# Patient Record
Sex: FEMALE | Race: ASIAN | NOT HISPANIC OR LATINO | Employment: PART TIME | URBAN - METROPOLITAN AREA
[De-identification: names, ages, dates, MRNs, and addresses within clinical notes are randomized per-mention and may not be internally consistent; named-entity substitution may affect disease eponyms.]

---

## 2017-01-03 ENCOUNTER — RADIANT APPOINTMENT (OUTPATIENT)
Dept: ULTRASOUND IMAGING | Facility: CLINIC | Age: 32
End: 2017-01-03
Payer: COMMERCIAL

## 2017-01-03 DIAGNOSIS — Z01.812 PRE-OPERATIVE LABORATORY EXAMINATION: Primary | ICD-10-CM

## 2017-01-03 DIAGNOSIS — Z34.83 PRENATAL CARE, SUBSEQUENT PREGNANCY, THIRD TRIMESTER: ICD-10-CM

## 2017-01-03 PROCEDURE — 76819 FETAL BIOPHYS PROFIL W/O NST: CPT | Performed by: OBSTETRICS & GYNECOLOGY

## 2017-01-05 ENCOUNTER — PRENATAL OFFICE VISIT (OUTPATIENT)
Dept: OBGYN | Facility: CLINIC | Age: 32
End: 2017-01-05
Payer: COMMERCIAL

## 2017-01-05 VITALS
BODY MASS INDEX: 32.81 KG/M2 | WEIGHT: 176.5 LBS | SYSTOLIC BLOOD PRESSURE: 104 MMHG | RESPIRATION RATE: 14 BRPM | DIASTOLIC BLOOD PRESSURE: 78 MMHG | HEART RATE: 88 BPM

## 2017-01-05 DIAGNOSIS — Z01.818 PREOPERATIVE EXAMINATION: Primary | ICD-10-CM

## 2017-01-05 DIAGNOSIS — Z34.83 PRENATAL CARE, SUBSEQUENT PREGNANCY, THIRD TRIMESTER: ICD-10-CM

## 2017-01-05 PROCEDURE — 99207 ZZC PRENATAL VISIT: CPT | Performed by: OBSTETRICS & GYNECOLOGY

## 2017-01-05 PROCEDURE — 59425 ANTEPARTUM CARE ONLY: CPT | Performed by: OBSTETRICS & GYNECOLOGY

## 2017-01-05 NOTE — NURSING NOTE
"Chief Complaint   Patient presents with     Prenatal Care       Initial /78 mmHg  Pulse 88  Resp 14  Wt 176 lb 8 oz (80.06 kg)  LMP 04/11/2016 (Exact Date) Estimated body mass index is 32.81 kg/(m^2) as calculated from the following:    Height as of 11/16/16: 5' 1.5\" (1.562 m).    Weight as of this encounter: 176 lb 8 oz (80.06 kg).  BP completed using cuff size: regular  38w3d  States feeling good, No concerns.  Having good fetal movements.  Jocelin Parrish LPN      "

## 2017-01-09 ENCOUNTER — ANESTHESIA EVENT (OUTPATIENT)
Dept: OBGYN | Facility: CLINIC | Age: 32
End: 2017-01-09
Payer: COMMERCIAL

## 2017-01-10 ENCOUNTER — HOSPITAL ENCOUNTER (OUTPATIENT)
Dept: LAB | Facility: CLINIC | Age: 32
Discharge: HOME | End: 2017-01-10
Attending: OBSTETRICS & GYNECOLOGY | Admitting: OBSTETRICS & GYNECOLOGY
Payer: COMMERCIAL

## 2017-01-10 DIAGNOSIS — Z01.812 PRE-OPERATIVE LABORATORY EXAMINATION: ICD-10-CM

## 2017-01-10 PROBLEM — O99.820 GROUP B STREPTOCOCCUS CARRIER, +RV CULTURE, CURRENTLY PREGNANT: Status: ACTIVE | Noted: 2017-01-10

## 2017-01-10 LAB
ABO + RH BLD: NORMAL
ABO + RH BLD: NORMAL
BLD GP AB SCN SERPL QL: NORMAL
BLOOD BANK CMNT PATIENT-IMP: NORMAL
HGB BLD-MCNC: 13.3 G/DL (ref 11.7–15.7)
SPECIMEN EXP DATE BLD: NORMAL

## 2017-01-10 PROCEDURE — 86850 RBC ANTIBODY SCREEN: CPT | Performed by: OBSTETRICS & GYNECOLOGY

## 2017-01-10 PROCEDURE — 36415 COLL VENOUS BLD VENIPUNCTURE: CPT | Performed by: OBSTETRICS & GYNECOLOGY

## 2017-01-10 PROCEDURE — 85018 HEMOGLOBIN: CPT | Performed by: OBSTETRICS & GYNECOLOGY

## 2017-01-10 PROCEDURE — 86900 BLOOD TYPING SEROLOGIC ABO: CPT | Performed by: OBSTETRICS & GYNECOLOGY

## 2017-01-10 PROCEDURE — 86901 BLOOD TYPING SEROLOGIC RH(D): CPT | Performed by: OBSTETRICS & GYNECOLOGY

## 2017-01-10 RX ORDER — SODIUM CHLORIDE, SODIUM LACTATE, POTASSIUM CHLORIDE, CALCIUM CHLORIDE 600; 310; 30; 20 MG/100ML; MG/100ML; MG/100ML; MG/100ML
INJECTION, SOLUTION INTRAVENOUS CONTINUOUS
Status: CANCELLED | OUTPATIENT
Start: 2017-01-10

## 2017-01-11 ENCOUNTER — ANESTHESIA (OUTPATIENT)
Dept: OBGYN | Facility: CLINIC | Age: 32
End: 2017-01-11
Payer: COMMERCIAL

## 2017-01-11 PROBLEM — O34.219 PREVIOUS CESAREAN DELIVERY AFFECTING PREGNANCY: Status: ACTIVE | Noted: 2017-01-11

## 2017-01-11 PROCEDURE — 25000125 ZZHC RX 250: Performed by: NURSE ANESTHETIST, CERTIFIED REGISTERED

## 2017-01-11 PROCEDURE — 25800025 ZZH RX 258: Performed by: OBSTETRICS & GYNECOLOGY

## 2017-01-11 PROCEDURE — 25000125 ZZHC RX 250: Performed by: ANESTHESIOLOGY

## 2017-01-11 PROCEDURE — 25000125 ZZHC RX 250: Performed by: OBSTETRICS & GYNECOLOGY

## 2017-01-11 RX ORDER — EPHEDRINE SULFATE 50 MG/ML
INJECTION, SOLUTION INTRAVENOUS PRN
Status: DISCONTINUED | OUTPATIENT
Start: 2017-01-11 | End: 2017-01-11

## 2017-01-11 RX ORDER — FENTANYL CITRATE 50 UG/ML
INJECTION, SOLUTION INTRAMUSCULAR; INTRAVENOUS PRN
Status: DISCONTINUED | OUTPATIENT
Start: 2017-01-11 | End: 2017-01-11

## 2017-01-11 RX ORDER — ONDANSETRON 2 MG/ML
INJECTION INTRAMUSCULAR; INTRAVENOUS PRN
Status: DISCONTINUED | OUTPATIENT
Start: 2017-01-11 | End: 2017-01-11

## 2017-01-11 RX ORDER — BUPIVACAINE HYDROCHLORIDE 7.5 MG/ML
INJECTION, SOLUTION INTRASPINAL PRN
Status: DISCONTINUED | OUTPATIENT
Start: 2017-01-11 | End: 2017-01-11

## 2017-01-11 RX ORDER — MORPHINE SULFATE 1 MG/ML
INJECTION, SOLUTION EPIDURAL; INTRATHECAL; INTRAVENOUS PRN
Status: DISCONTINUED | OUTPATIENT
Start: 2017-01-11 | End: 2017-01-11

## 2017-01-11 RX ORDER — DEXAMETHASONE SODIUM PHOSPHATE 4 MG/ML
INJECTION, SOLUTION INTRA-ARTICULAR; INTRALESIONAL; INTRAMUSCULAR; INTRAVENOUS; SOFT TISSUE PRN
Status: DISCONTINUED | OUTPATIENT
Start: 2017-01-11 | End: 2017-01-11

## 2017-01-11 RX ADMIN — ONDANSETRON 4 MG: 2 INJECTION INTRAMUSCULAR; INTRAVENOUS at 11:47

## 2017-01-11 RX ADMIN — OXYTOCIN-SODIUM CHLORIDE 0.9% IV SOLN 30 UNIT/500ML: 30-0.9/5 SOLUTION at 11:59

## 2017-01-11 RX ADMIN — DEXAMETHASONE SODIUM PHOSPHATE 4 MG: 4 INJECTION, SOLUTION INTRAMUSCULAR; INTRAVENOUS at 12:20

## 2017-01-11 RX ADMIN — SODIUM CHLORIDE, POTASSIUM CHLORIDE, SODIUM LACTATE AND CALCIUM CHLORIDE: 600; 310; 30; 20 INJECTION, SOLUTION INTRAVENOUS at 12:10

## 2017-01-11 RX ADMIN — FENTANYL CITRATE 20 MCG: 50 INJECTION, SOLUTION INTRAMUSCULAR; INTRAVENOUS at 11:35

## 2017-01-11 RX ADMIN — PHENYLEPHRINE HYDROCHLORIDE 150 MCG: 10 INJECTION, SOLUTION INTRAMUSCULAR; INTRAVENOUS; SUBCUTANEOUS at 11:40

## 2017-01-11 RX ADMIN — BUPIVACAINE HYDROCHLORIDE IN DEXTROSE 13.5 MG: 7.5 INJECTION, SOLUTION SUBARACHNOID at 11:35

## 2017-01-11 RX ADMIN — CEFAZOLIN SODIUM 2 G: 2 INJECTION, SOLUTION INTRAVENOUS at 11:29

## 2017-01-11 RX ADMIN — METOCLOPRAMIDE HYDROCHLORIDE 10 MG: 5 INJECTION INTRAMUSCULAR; INTRAVENOUS at 12:30

## 2017-01-11 RX ADMIN — EPHEDRINE SULFATE 5 MG: 50 INJECTION INTRAMUSCULAR; INTRAVENOUS; SUBCUTANEOUS at 11:53

## 2017-01-11 RX ADMIN — MORPHINE SULFATE 0.4 MG: 1 INJECTION EPIDURAL; INTRATHECAL; INTRAVENOUS at 11:35

## 2017-01-11 RX ADMIN — PHENYLEPHRINE HYDROCHLORIDE 150 MCG: 10 INJECTION, SOLUTION INTRAMUSCULAR; INTRAVENOUS; SUBCUTANEOUS at 11:53

## 2017-01-11 RX ADMIN — EPHEDRINE SULFATE 7.5 MG: 50 INJECTION INTRAMUSCULAR; INTRAVENOUS; SUBCUTANEOUS at 11:44

## 2017-01-11 RX ADMIN — SODIUM CHLORIDE, POTASSIUM CHLORIDE, SODIUM LACTATE AND CALCIUM CHLORIDE: 600; 310; 30; 20 INJECTION, SOLUTION INTRAVENOUS at 11:29

## 2017-01-11 NOTE — PROGRESS NOTES
Pre Op Exam: January 10, 2017    Aleyda Nicole is an 31 year old year old female  here for preop physical.     /78 mmHg  Pulse 88  Resp 14  Wt 176 lb 8 oz (80.06 kg)  LMP 2016 (Exact Date)    Smoker:Non-smoker  Alcohol/wk:  none  Living will: Unknown    PRESENT HISTORY:    Reason for admission:Gestational DM, Recurrent thyroid cancer with surgery during pregnancy, Previous  section, proceed with repeat  section.    Onset of Illness: now  Type of Surgery Anticipated: repeat  section  Type of Anesthesia Anticipated:  Spinal      Medications:   Current Outpatient Prescriptions   Medication     levothyroxine (SYNTHROID/LEVOTHROID) 150 MCG tablet     blood glucose monitoring (ACCU-CHEK FASTCLIX) lancets     blood glucose monitoring (ACCU-CHEK QUEENIE PLUS) test strip     acetone, Urine, test (KETOSTIX) STRP     calcium carbonate (OS-ESTHER 500 MG [place]. [place]) 500 MG tablet     Prenatal Vit-Fe Fumarate-FA (PRENATAL MULTIVITAMIN  PLUS IRON) 27-0.8 MG TABS     No current facility-administered medications for this visit.         Any Aspirin within 10 days? No      Family History: Review of patient's family history indicates:    Family History Negative        No family hx of             No family history of malignant hyperthermia.  No family history of bleeding disorder.  No history of Sleep Apnea    Past Medical History   Diagnosis Date     Pneumonia      LLL     Influenza A 2009      labor      Papillary thyroid carcinoma      Papillary carcinoma, follicular variant with     Gestational diabetes      GDM diet controlled     Gestational diabetes 2013     PONV (postoperative nausea and vomiting)      Hypocalcemia 2016       Past Surgical History   Procedure Laterality Date      section  10/26/2013     Procedure:  SECTION;  primary  section;  Surgeon: Rodney Mckee MD;  Location: [facility] L[facility]D     Thyroidectomy       Total, for cancer.       Dissection radical neck Left 2016     Procedure: DISSECTION RADICAL NECK;  Surgeon: Vy Theodore MD;  Location: [facility] OR     Dissection radical neck modified Left 2016     Procedure: DISSECTION RADICAL NECK MODIFIED;  Surgeon: Luis Brown MD;  Location: [facility] OR       Allergies   Allergen Reactions     No Known Drug Allergies        Transfusion reactions: No prior transfusions    Bleeding tendencies:No bleeding problems noted      REVIEW OF SYSTEMS:    Cardiovascular: NORMAL  Respiratory: NORMAL  Gastrointestinal: NORMAL  Genitourinary: NORMAL    Patient's LMP from OB Dating Form was 2016..      PHYSICAL EXAM:  General Appearance: healthy,alert,no distress  Head: Normocephalic. No masses, lesions, tenderness or abnormalities  Eyes: normal  Nose: Nares normal  Mouth: Oropharynx normal  Neck: Neck supple. No adenopathy. Thyroid symmetric, normal size,  Chest: Clear to auscultation  Breast: Not done.  Heart:normal, regular rate and rhythm and regular rate and rhythm,no murmurs, clicks, or gallops  Abdomen: Abdomen soft, non-tender. BS normal. Gravid  Genitals: Deferred  Extremities: Extremities normal. No deformities, edema, or skin discoloration.  Skin: Skin color, texture, turgor normal. No rashes or lesions.  Neurological: Gait normal. Reflexes normal and symmetric. Sensation grossly WNL.    Diabetic Instructions Given for Pre-Op Insulin: NOT APPLICABLE    Comments: Discussed indication, and risks of surgery, such as infection , bleeding, damage to bowel or bladder, uterus , fallopian tubes and ovaries.    Impression: Gestational DM, Recurrent thyroid cancer with surgery during pregnancy, Previous  section, proceed with repeat  section.      MD Signature: ________________________________________________  Hakeem Combs MD

## 2017-01-11 NOTE — ANESTHESIA PREPROCEDURE EVALUATION
PAC NOTE:       ANESTHESIA PRE EVALUATION:  Anesthesia Evaluation     .        ROS/MED HX    ENT/Pulmonary:  - neg pulmonary ROS     Neurologic:  - neg neurologic ROS     Cardiovascular:  - neg cardiovascular ROS       METS/Exercise Tolerance:     Hematologic:  - neg hematologic  ROS       Musculoskeletal:  - neg musculoskeletal ROS       GI/Hepatic:  - neg GI/hepatic ROS       Renal/Genitourinary:  - ROS Renal section negative       Endo: Comment: Gestational    (+) type II DM thyroid problem .      Psychiatric:  - neg psychiatric ROS       Infectious Disease:  - neg infectious disease ROS       Malignancy:   (+) Malignancy History of Other  Other CA Thyroid status post         Other:    - neg other ROS           Physical Exam  Normal systems: cardiovascular and pulmonary    Airway   Mallampati: II    Dental     Cardiovascular   Rhythm and rate: regular and normal      Pulmonary    breath sounds clear to auscultation             Anesthesia Plan      History & Physical Review  History and physical reviewed and following examination; no interval change.    ASA Status:  2 .        Plan for Spinal   PONV prophylaxis:  Ondansetron (or other 5HT-3), Scopolamine patch and Promethazine or metoclopramide       Postoperative Care      Consents                            .

## 2017-01-11 NOTE — ANESTHESIA POSTPROCEDURE EVALUATION
Patient: Aleyda Nicole     SECTION (N/A Abdomen)  Additional InformationProcedure(s):   SECTION  - Wound Class: II-Clean Contaminated    Diagnosis:previous  Diagnosis Additional Information:  Previous   Delivered infant    Anesthesia Type:  Spinal    Note:  Anesthesia Post Evaluation    Patient location during evaluation: PACU  Patient participation: Able to fully participate in evaluation  Level of consciousness: awake  Pain management: adequate  Airway patency: patent  Cardiovascular status: acceptable  Respiratory status: acceptable  Hydration status: acceptable  PONV: controlled     Anesthetic complications: None    Comments: .Anticipate full return of neurologic function          Last vitals:  Filed Vitals:    17 1430 17 1435 17 1440   BP: 116/62 118/65 121/67   Temp:      Resp:      SpO2:          Electronically Signed By: Bubba Santana DO  2017  3:03 PM

## 2017-01-11 NOTE — ANESTHESIA PROCEDURE NOTES
Peripheral nerve/Neuraxial procedure note : intrathecal  Pre-Procedure  Performed by LEANDRO SANTANA  Location: OR      Pre-Anesthestic Checklist: patient identified, IV checked, risks and benefits discussed, informed consent, monitors and equipment checked, pre-op evaluation and at physician/surgeon's request    Timeout  Correct Patient: Yes   Correct Procedure: Yes   Correct Site: Yes   Correct Laterality: N/A   Correct Position: Yes   Site Marked: N/A   .   Procedure Documentation    .    Procedure:    Intrathecal.  Insertion Site:L2-3  (midline approach)      Patient Prep;mask, sterile gloves, povidone-iodine 7.5% surgical scrub.  .  Needle: (). . Spinal Needle: Sprotte 24 G. 3.5 in.  Introducer used. . .     Assessment/Narrative  Paresthesias: No.  .  .  clear CSF fluid removed . Comments:  .Bupivicaine 13.5mg + Fentanyl 15mcg + Morphine 0.3mg    LEXA Santana

## 2017-01-11 NOTE — ANESTHESIA CARE TRANSFER NOTE
Patient: Aleyda Nicole     SECTION (N/A Abdomen)  Additional InformationProcedure(s):   SECTION  - Wound Class: II-Clean Contaminated    Diagnosis: previous  Diagnosis Additional Information: No value filed.    Anesthesia Type:   Spinal     Note:  Airway :Room Air  Patient transferred to:Labor and Delivery  Comments: VSS, report to RN.      Vitals: (Last set prior to Anesthesia Care Transfer)              Electronically Signed By: EDUARDO Sauer CRNA  2017  12:44 PM

## 2017-01-14 ENCOUNTER — TELEPHONE (OUTPATIENT)
Dept: NURSING | Facility: CLINIC | Age: 32
End: 2017-01-14

## 2017-01-14 NOTE — TELEPHONE ENCOUNTER
"Call Type: Triage Call    Presenting Problem: \"I am having a hard time breastfeeding. My breast  are full and hard. Baby is having a hard time latching on, she keeps  falling asleep.\" Denies redness or fever. Mom is pumping between  feedings,\"getting about 2 ml.\" Triaged and gave home care advice on  breast feeding, engorgement, stimulating baby for feedings, poops  and color. Also gave signs of mastitis. Gave lactation consultant  number for [place]. Call back if needed.  Triage Note:  Guideline Title: Pregnancy: Postpartum Breast Symptoms  Recommended Disposition: Provide Home/Self Care  Original Inclination: Wanted to speak with a nurse  Override Disposition:  Intended Action: Follow advice given  Physician Contacted: No  Breast(s) engorged ?  YES  On antibiotics for breast infection (mastitis) for more than 48 hours AND  symptoms  worsening or not improving ? NO  More than six weeks postpartum AND not breastfeeding ? NO  Pus-like discharge from nipple ? NO  Unusual discharge from nipple ? NO  Sore or cracked nipple(s) AND not responding to selfcare ? NO  Breast or armpit lump or thickening ? NO  Change in appearance of nipple or orange-peel appearance or dimpling of skin ? NO  Firm or tender area on breast that doesn't soften during feeding ? NO  Problems establishing or maintaining lactation ? NO  New ache or pain in breast AND generally feels ill ? NO  New onset or increasing redness, tenderness, localized warmth or swelling ? NO  Evaluated by provider and has question/concern about their condition, treatment  plan, treatment options, follow-up appointments, or other follow-up care. ? NO  New onset or worsening area of localized pain in breast or unusual pain during  breastfeeding ? NO  Physician Instructions:  Care Advice: Call provider if symptoms worsen or new symptoms develop.  If breastfeeding, be sure to continue.  Make sure to empty breast with each  feeding.  If unable to empty breast with nursing, express " milk manually or  with pump until breast empty.  SYMPTOM / CONDITION MANAGEMENT  Breastfeeding with Engorgement:   - Apply warm, moist compresses to breast  for 15-20 minutes prior to nursing to help promote the milk let-down  reflex, making the milk flow easier.   - Manually express a little milk to  soften the areola, making it easier for the baby to latch on.   - Nurse  frequently, 8-12 times every day for at least 10-15 minutes each breast.  Switch breast every 5 minutes when baby's sucking starts to slow. - Apply  cloth-covered cold compresses for no more than 20 minutes to breast after  breastfeeding, and every 1-2 hours.  Frozen bags of vegetables, cloth  wrapped, work well.  Lactation Suppression:  - Treatment goal is to reduce swelling, pain and  milk suppression in breasts.  - Use tight binder for 24-48 hours, then  sleep with tight bra.  May use large bath towel or sheet folded in half  lengthwise, wrapped snugly around breasts and pinned.   - Apply  cloth-covered cold compresses to breasts 15-20 minutes every 1-2 hours.  Cloth-covered frozen bags of vegetables work well.   - Avoid nipple  stimulation.  Don't allow shower water to strike breasts.  Avoid nipple  stimulation during sex.  Analgesic/Antipyretic Advice - Acetaminophen:  Consider acetaminophen as  directed on label or by pharmacist/provider for pain or fever.  PRECAUTIONS:  - Use only if there is no history of liver disease,  alcoholism, or intake of three or more alcohol drinks per day.  - If  approved by provider when breastfeeding. - Do not exceed recommended dose  or frequency. Do not take more than 3000 milligrams (mg) in 24 hours. Do  not take this medicine for more than 10 days unless recommended by your  provider. - To make sure you don't take too much, check other medicines you  take to see if they also contain acetaminophen.

## 2017-01-19 ENCOUNTER — OFFICE VISIT (OUTPATIENT)
Dept: OBGYN | Facility: CLINIC | Age: 32
End: 2017-01-19
Payer: COMMERCIAL

## 2017-01-19 VITALS
SYSTOLIC BLOOD PRESSURE: 112 MMHG | DIASTOLIC BLOOD PRESSURE: 76 MMHG | WEIGHT: 161 LBS | BODY MASS INDEX: 29.93 KG/M2 | OXYGEN SATURATION: 97 % | HEART RATE: 100 BPM

## 2017-01-19 DIAGNOSIS — Z09 POSTOPERATIVE EXAMINATION: Primary | ICD-10-CM

## 2017-01-19 PROCEDURE — 99024 POSTOP FOLLOW-UP VISIT: CPT | Performed by: OBSTETRICS & GYNECOLOGY

## 2017-01-19 NOTE — NURSING NOTE
"Chief Complaint   Patient presents with     Surgical Followup     DOS 2017  section        Initial /76 mmHg  Pulse 100  Wt 161 lb (73.029 kg)  SpO2 97%  LMP 2016 (Exact Date) Estimated body mass index is 29.93 kg/(m^2) as calculated from the following:    Height as of 16: 5' 1.5\" (1.562 m).    Weight as of this encounter: 161 lb (73.029 kg).  BP completed using cuff size: regular  Cassie Lima MA      "

## 2017-02-07 NOTE — PROGRESS NOTES
SUBJECTIVE: Aleyda Mounika Nicole, is a 31 year old, female, status post c/s, here for post-op check. Patient without complaints.    Exam: Incision healing well    Imp:  Post-op vist doing well    Plan: Return vist 5 weeks

## 2017-02-23 ENCOUNTER — OFFICE VISIT (OUTPATIENT)
Dept: ENDOCRINOLOGY | Facility: CLINIC | Age: 32
End: 2017-02-23
Payer: COMMERCIAL

## 2017-02-23 VITALS
HEART RATE: 99 BPM | DIASTOLIC BLOOD PRESSURE: 76 MMHG | OXYGEN SATURATION: 95 % | TEMPERATURE: 98 F | BODY MASS INDEX: 28.34 KG/M2 | HEIGHT: 62 IN | WEIGHT: 154 LBS | SYSTOLIC BLOOD PRESSURE: 102 MMHG

## 2017-02-23 DIAGNOSIS — C73 RECURRENT THYROID CANCER (H): Primary | ICD-10-CM

## 2017-02-23 DIAGNOSIS — C73 PAPILLARY CARCINOMA, FOLLICULAR VARIANT (H): ICD-10-CM

## 2017-02-23 DIAGNOSIS — E89.0 POSTOPERATIVE HYPOTHYROIDISM: ICD-10-CM

## 2017-02-23 DIAGNOSIS — E83.51 HYPOCALCEMIA: ICD-10-CM

## 2017-02-23 LAB
CALCIUM SERPL-MCNC: 9.2 MG/DL (ref 8.5–10.1)
MAGNESIUM SERPL-MCNC: 1.9 MG/DL (ref 1.6–2.3)
PHOSPHATE SERPL-MCNC: 3.9 MG/DL (ref 2.5–4.5)
PTH-INTACT SERPL-MCNC: 11 PG/ML (ref 12–72)
T4 FREE SERPL-MCNC: 1.8 NG/DL (ref 0.76–1.46)
TSH SERPL DL<=0.05 MIU/L-ACNC: 0.04 MU/L (ref 0.4–4)

## 2017-02-23 PROCEDURE — 00000344 ZZHCL STATISTIC REMEASURE THYROGLOBULIN: Mod: 90 | Performed by: INTERNAL MEDICINE

## 2017-02-23 PROCEDURE — 82306 VITAMIN D 25 HYDROXY: CPT | Performed by: INTERNAL MEDICINE

## 2017-02-23 PROCEDURE — 36415 COLL VENOUS BLD VENIPUNCTURE: CPT | Performed by: INTERNAL MEDICINE

## 2017-02-23 PROCEDURE — 99000 SPECIMEN HANDLING OFFICE-LAB: CPT | Performed by: INTERNAL MEDICINE

## 2017-02-23 PROCEDURE — 99214 OFFICE O/P EST MOD 30 MIN: CPT | Performed by: INTERNAL MEDICINE

## 2017-02-23 PROCEDURE — 83970 ASSAY OF PARATHORMONE: CPT | Performed by: INTERNAL MEDICINE

## 2017-02-23 PROCEDURE — 84439 ASSAY OF FREE THYROXINE: CPT | Performed by: INTERNAL MEDICINE

## 2017-02-23 PROCEDURE — 84100 ASSAY OF PHOSPHORUS: CPT | Performed by: INTERNAL MEDICINE

## 2017-02-23 PROCEDURE — 84432 ASSAY OF THYROGLOBULIN: CPT | Mod: 90 | Performed by: INTERNAL MEDICINE

## 2017-02-23 PROCEDURE — 82310 ASSAY OF CALCIUM: CPT | Performed by: INTERNAL MEDICINE

## 2017-02-23 PROCEDURE — 83735 ASSAY OF MAGNESIUM: CPT | Performed by: INTERNAL MEDICINE

## 2017-02-23 PROCEDURE — 84443 ASSAY THYROID STIM HORMONE: CPT | Performed by: INTERNAL MEDICINE

## 2017-02-23 PROCEDURE — 86800 THYROGLOBULIN ANTIBODY: CPT | Mod: 90 | Performed by: INTERNAL MEDICINE

## 2017-02-23 RX ORDER — LEVOTHYROXINE SODIUM 150 UG/1
150 TABLET ORAL DAILY
Qty: 30 TABLET | Refills: 11 | Status: SHIPPED | OUTPATIENT
Start: 2017-02-23 | End: 2017-03-03

## 2017-02-23 NOTE — NURSING NOTE
"Chief Complaint   Patient presents with     Consult     papillary thyroid carcinoma, postoperative hypothyroidism        Initial /76 (BP Location: Right arm, Patient Position: Chair, Cuff Size: Adult Large)  Pulse 99  Temp 98  F (36.7  C) (Oral)  Ht 1.562 m (5' 1.5\")  Wt 69.9 kg (154 lb)  LMP 2016 (Exact Date)  SpO2 95%  BMI 28.63 kg/m2 Estimated body mass index is 28.63 kg/(m^2) as calculated from the following:    Height as of this encounter: 1.562 m (5' 1.5\").    Weight as of this encounter: 69.9 kg (154 lb).  Medication Reconciliation: complete     ENDOCRINOLOGY INTAKE FORM    Patient Name:  Aleyda Nicole  :  1985    Is patient Diabetic?   No  Does patient have non-diabetic or other endocrine issues?  Yes: papillary thyroid carcinoma, postoperative hypothyroidism     Vitals: /76 (BP Location: Right arm, Patient Position: Chair, Cuff Size: Adult Large)  Pulse 99  Temp 98  F (36.7  C) (Oral)  Ht 1.562 m (5' 1.5\")  Wt 69.9 kg (154 lb)  LMP 2016 (Exact Date)  SpO2 95%  BMI 28.63 kg/m2  BMI= Body mass index is 28.63 kg/(m^2).    Flu vaccine:  completed  Pneumonia vaccine:  No    Smoking and Alcohol use:  Social History   Substance Use Topics     Smoking status: Never Smoker     Smokeless tobacco: Never Used     Alcohol use No     Staff Signature:  Antoinette Esteban CMA        "

## 2017-02-23 NOTE — PATIENT INSTRUCTIONS
[facility] & [place] locations   Dr Costello, Endocrinology Department      [facility]  [address] [place] [address]  Appointment [phone]  Fax: [phone]  [facility]: Tuesday and Wednesday         [facility]   [address] [facility] [place] [address]  Appointment [phone]  Fax: [phone]  [place]: Monday and Thursday         Labs today  Check with insurance cost of thyrogen injection  Follow up with radiology for whole body scan  Dicussed contact protection with her  Dose changes based on labs     [facility] radiology scheduleing  [phone]   [facility] Radiology scheduling  [phone]     Please call and schedule the recommended test as discussed in clinic visit. These are the numbers to call.

## 2017-02-23 NOTE — MR AVS SNAPSHOT
After Visit Summary   2017    [facility] Mounika Nicole    MRN: [id number]           Patient Information     Date Of Birth          1985        Visit Information        Provider Department      2017 11:30 AM Vandana Costello MD [facility]        Today's Diagnoses     Recurrent thyroid cancer (H)    -  1    Postoperative hypothyroidism        Papillary Thyroid carcinoma, follicular variant        Hypocalcemia          Care Instructions    [facility] & [place] locations   Dr Costello, Endocrinology Department      [facility]  [address] [address]  Appointment [phone]  Fax: [phone]  [facility]: Tuesday and Wednesday         [address] [place] [address]  Appointment [phone]  Fax: [phone]  [place]: Monday and Thursday         Labs today  Check with insurance cost of thyrogen injection  Follow up with radiology for whole body scan  Dicussed contact protection with her  Dose changes based on labs     [facility] radiology scheduleing  [phone]   [facility] Radiology scheduling  [phone]     Please call and schedule the recommended test as discussed in clinic visit. These are the numbers to call.          Follow-ups after your visit        Your next 10 appointments already scheduled     Mar 02, 2017  2:30 PM CST   [facility] Partum with Hakeem Combs MD   [facility]    [address] [id number] [phone]              Who to contact     If you have questions or need follow up information about today's clinic visit or your schedule please contact [facility] directly at [phone].  Normal or non-critical lab and imaging results will be communicated to you by MyChart, letter or phone within 4  "business days after the clinic has received the results. If you do not hear from us within 7 days, please contact the clinic through [employer] or phone. If you have a critical or abnormal lab result, we will notify you by phone as soon as possible.  Submit refill requests through [employer] or call your pharmacy and they will forward the refill request to us. Please allow 3 business days for your refill to be completed.          Additional Information About Your Visit        [employer] Information     [employer] gives you secure access to your electronic health record. If you see a primary care provider, you can also send messages to your care team and make appointments. If you have questions, please call your primary care clinic.  If you do not have a primary care provider, please call [phone] and they will assist you.        Care EveryWhere ID     This is your Care EveryWhere ID. This could be used by other organizations to access your [facility] medical records  [id number]        Your Vitals Were     Pulse Temperature Height Last Period Pulse Oximetry BMI (Body Mass Index)    99 98  F (36.7  C) (Oral) 1.562 m (5' 1.5\") 04/11/2016 (Exact Date) 95% 28.63 kg/m2       Blood Pressure from Last 3 Encounters:   02/23/17 102/76   01/19/17 112/76   01/13/17 113/64    Weight from Last 3 Encounters:   02/23/17 69.9 kg (154 lb)   01/19/17 73 kg (161 lb)   01/05/17 80.1 kg (176 lb 8 oz)              We Performed the Following     Calcium     Magnesium     Parathyroid Hormone Intact     Phosphorus     T4 free     Thyroglobulin and antibody     TSH     Vitamin D Deficiency          Where to get your medicines      These medications were sent to [address] [address]     Phone:  [phone]     levothyroxine 150 MCG tablet          Primary Care Provider Office Phone # Fax #    Evan Zamora [phone]       [facility] " [address] [place] [phone]        Thank you!     Thank you for choosing [facility]  for your care. Our goal is always to provide you with excellent care. Hearing back from our patients is one way we can continue to improve our services. Please take a few minutes to complete the written survey that you may receive in the mail after your visit with us. Thank you!             Your Updated Medication List - Protect others around you: Learn how to safely use, store and throw away your medicines at www.disposemymeds.org.          This list is accurate as of: 2/23/17 11:49 AM.  Always use your most recent med list.                   Brand Name Dispense Instructions for use    acetone (Urine) test Strp    KETOSTIX    25 each    Test once daily x1 week, then reduce to once weekly if consistently negative       blood glucose monitoring lancets     1 Box    Use to test blood sugar 4 times daily or as directed.       blood glucose monitoring test strip    ACCU-CHEK QUEENIE PLUS    100 strip    Use to test blood sugar 4 times daily or as directed.       calcium carbonate 500 MG tablet    OS-ESTHER 500 mg [place]. [place]     Take 500 mg by mouth 2 times daily Reported on 2/23/2017       levothyroxine 150 MCG tablet    SYNTHROID/LEVOTHROID    30 tablet    Take 1 tablet (150 mcg) by mouth daily       oxyCODONE 5 MG IR tablet    ROXICODONE    20 tablet    Take 1-2 tablets (5-10 mg) by mouth every 3 hours as needed for moderate to severe pain       prenatal multivitamin  plus iron 27-0.8 MG Tabs per tablet      Take 1 tablet by mouth daily

## 2017-02-23 NOTE — PROGRESS NOTES
Name: Aleyda Nicole  Seen at the request of   For papillary thyroid cancer and postoperative hypothyroidism  Chief Complaint   Patient presents with     Consult     papillary thyroid carcinoma, postoperative hypothyroidism      HPI:  Aleyda Nicole is a 30 year old female who presents for the evaluation of      1.  Papillary thyroid cancer;  Thyroid nodule was noticed in February 2011 which was followed by thyroid nodule biopsy which showed suspicious for papillary thyroid cancer.  She underwent total thyroidectomy 3/2011 and pathology showed papillary thyroid cancer with follicular pattern.  Tumor was about 2.2 cm on the left lobe.  No lymph node involvement.  S/p 78.7 mCi of I131 HERNANDEZ  8/2011. No evidence for distant metastatic disease on post therapy scan.  Neck US 6/2016- showed new lymph node in neck along with rising Tg levels  S/p FNA lymph node: 6/2016- c/w papillary thyroid cancer    8/2016: underwent left modified neck dissection.  1/27 lymph node positive one left modified neck dissection.  1 cm in greatest diameter.  Negative for external involvement at level II.    Tg decreased post left neck dissection from 1.3 to < 0.1    Postop course was complicated by hypocalcemia and was started on calcium supplement.  Taking calcium- not regular with BID dosing.    Delivered baby 1/2017. Has pumped milk for next few months.     She was followed by endocrinology before and then lost to follow-up with endocrinology in 2012.  No history of neck radiation prior to diagnosis of thyroid cancer.  No family history of thyroid cancer.      2.  Hypothyroidism (postoperative):  Was started on levothyroxine following thyroidectomy.  Currently she is taking levothyroxine 150  g per day.  She is on current dose since June 2016.  at that time the dose was decreased.  Taking generic levothyroxine.  Reports compliance.  Delivered 1/2017.  Due for labs .  Wt Readings from Last 2 Encounters:   02/23/17 69.9 kg (154 lb)    17 73 kg (161 lb)     No diarrhea, tremors. No palpitations.  No difficulty with swallowing, no pain during swallowing.    PMH/PSH:  Past Medical History   Diagnosis Date     Gestational diabetes      GDM diet controlled     Gestational diabetes 2013     Hypocalcemia 2016     Influenza A      Papillary thyroid carcinoma      Papillary carcinoma, follicular variant with     Pneumonia      LLL     PONV (postoperative nausea and vomiting)       labor      Past Surgical History   Procedure Laterality Date      section  10/26/2013     Procedure:  SECTION;  primary  section;  Surgeon: Rodney Mckee MD;  Location: [facility]     Thyroidectomy       Total, for cancer.      Dissection radical neck Left 2016     Procedure: DISSECTION RADICAL NECK;  Surgeon: Vy Theodore MD;  Location: [facility] OR     Dissection radical neck modified Left 2016     Procedure: DISSECTION RADICAL NECK MODIFIED;  Surgeon: Luis Brown MD;  Location: [facility] OR      section N/A 2017     Procedure:  SECTION;  Surgeon: Hakeem Combs MD;  Location: [facility]     Family Hx:  Family History   Problem Relation Age of Onset     DIABETES Mother      CEREBROVASCULAR DISEASE Mother      Hypertension Mother      DIABETES Father 50     Hypertension Father      Genitourinary Problems Father      kidney disease     Coronary Artery Disease Father      Thyroid disease: No        Social Hx:  Social History     Social History     Marital status:      Spouse name: Brock     Number of children: 1     Years of education: N/A     Occupational History     nursing student [employer]     Social History Main Topics     Smoking status: Never Smoker     Smokeless tobacco: Never Used     Alcohol use No     Drug use: No     Sexual activity: Yes     Partners: Male     Birth control/ protection:      Other Topics Concern      " Service No     Blood Transfusions No     Caffeine Concern No     Hobby Hazards No     Sleep Concern No     Stress Concern No     Weight Concern Yes     Special Diet No     Back Care No     Exercise No     Bike Helmet No     Seat Belt Yes     Self-Exams No     Social History Narrative    Pt lives with  and father.          MEDICATIONS:  has a current medication list which includes the following prescription(s): levothyroxine, prenatal multivitamin  plus iron, oxycodone, blood glucose monitoring, blood glucose monitoring, acetone (urine) test, and calcium carbonate.    ROS     ROS: 10 point ROS neg other than the symptoms noted above in the HPI.      Physical Exam   VS: /76 (BP Location: Right arm, Patient Position: Chair, Cuff Size: Adult Large)  Pulse 99  Temp 98  F (36.7  C) (Oral)  Ht 1.562 m (5' 1.5\")  Wt 69.9 kg (154 lb)  LMP 2016 (Exact Date)  SpO2 95%  BMI 28.63 kg/m2  GENERAL: AXOX3, NAD, well dressed, answering questions appropriately, appears stated age.  HEENT: OP clear, no LAD, no TM, non-tender, no exopthalmous, no proptosis, EOMI, no lig lag, no retraction  Thyroid: Hypertrophied scar, will healed thyroidectomy scar present.  Hypertrophied scar on lateral side of neck.  CV: RRR, no rubs, gallops, no murmurs  LUNGS: CTAB, no wheezes, rales, or ronchi  ABDOMEN: +BS  EXTREMITIES: no edema, +pulses, no rashes, no lesions  NEUROLOGY: CN grossly intact, + DTR upper and lower extremity, no tremors  MSK: grossly intact  SKIN: no rashes, no lesions  PSYCH: normal affect and mood      LABS:  11/10/16:  TSH 0.74  TgAb: <0.4  TG: <0.10     2016:  TSH: 0.08  TgAb: <0.4  T.30     2012:  TSH: 12.40  TGAB: 1.2  T.6      ENDO THYROID LABS-[facility] Latest Ref Rng & Units 11/10/2016   TSH 0.40 - 4.00 mU/L 0.74   T4 TOTAL 5.0 - 11.0 ug/dL    T4 FREE 0.76 - 1.46 ng/dL 1.30     ENDO THYROID LABS-[facility] Latest Ref Rng 2016   TSH 0.40 - 4.00 mU/L 0.08 (L)   T4 TOTAL 5.0 - 11.0 ug/dL    T4 " FREE 0.76 - 1.46 ng/dL 1.50 (H)   FREE T3 2.3 - 4.2 pg/mL    TRIIODOTHYRONINE(T3) 60 - 181 ng/dL 112     ENDO THYROID LABS-[facility] Latest Ref Rng 5/19/2016 2/5/2016 11/6/2015   TSH 0.40 - 4.00 mU/L 0.16 (L) 0.37 (L) 0.06 (L)   T4 TOTAL 5.0 - 11.0 ug/dL      T4 FREE 0.76 - 1.46 ng/dL 1.42 1.24 1.51 (H)   FREE T3 2.3 - 4.2 pg/mL        ENDO THYROID LABS-[facility] Latest Ref Rng 6/26/2015 11/14/2014   TSH 0.40 - 4.00 mU/L 0.32 (L) 7.86 (H)   T4 TOTAL 5.0 - 11.0 ug/dL     T4 FREE 0.76 - 1.46 ng/dL 1.33 1.37   FREE T3 2.3 - 4.2 pg/mL       ENDO THYROID LABS-[facility] Latest Ref Rng 7/29/2014 4/3/2014   TSH 0.40 - 4.00 mU/L 3.67 7.63 (H)   T4 TOTAL 5.0 - 11.0 ug/dL     T4 FREE 0.76 - 1.46 ng/dL  1.39   FREE T3 2.3 - 4.2 pg/mL       NM THYROID UPTAKE/SCAN   Feb 4, 2011 2:47:00 PM      COMPARISON: None.     HISTORY: Hyperthyroidism.     TECHNIQUE:  The patient was given 173 uCi of I-123 orally, followed by  24-hour thyroid scan and radioiodine uptake evaluation.     FINDINGS:  The thyroid gland appears normal in size. 24-hour uptake  was calculated at 46.4%, which is above the normal range. Normal range  is 10-30% 24-hour uptake. Focal area of decreased uptake in the mid  left thyroid lobe may represent a cold thyroid nodule or cyst.     IMPRESSION:    1. Elevated 24-hour radioiodine uptake in the thyroid at 46.4%.  2. Possible cold nodule or cyst in the mid left thyroid lobe. Thyroid  ultrasound is recommended for further evaluation.    NUCLEAR MEDICINE I-131 SCAN    Aug 10, 2011 8:04:00 AM      TECHNIQUE: 78.7 mCi I-131 ablation scan. Five days post therapy  imaging performed today.     HISTORY: Thyroid cancer.     COMPARISON:   Nuclear Study: Thyroid uptake and scan 2/4/2011.  Other Relevant Studies: Ultrasound neck 2/17/2011.     FINDINGS: Intense radiotracer uptake at the left greater than right  neck at the thyroid level. No evidence for distant metastatic disease.     IMPRESSION: Intense radiotracer uptake localizing to the  thyroidectomy  bed, left greater than right. This is consistent with residual thyroid  tissue. No distant metastatic disease identified.    ULTRASOUND THYROID  Feb 17, 2011      HISTORY: Hyperthyroidism. Thyroid nodule.      COMPARISON: Nuclear Medicine thyroid scan 2/4/2011.     FINDINGS: The thyroid gland is enlarged. The right lobe measures 5.4 x  1.6 x 2.1 cm. The left lobe measures 5.9 x 2.3 x 2.7 cm. There are 2  right thyroid nodules and 3 left thyroid nodules.  1. Right upper lobe, hypoechoic solid measuring 0.7 x 0.6 x 0.6 cm.  2. Right lower pole, solid measuring 0.7 x 0.5 x 0.6 cm.  3. Left upper pole, cystic and solid measuring 1.1 x 0.6 x 1.0 cm.  4. Left mid thyroid lobe, primarily solid with small cystic areas  measuring 2.7 x 1.9 x 2.1 cm.  5. Left lower pole, cystic and solid measuring 1.4 x 0.9 x 1.2 cm.     The primarily solid left mid thyroid nodule appears to correspond to  the cold nodule on thyroid uptake and scan.  IMPRESSION: Multinodular thyroid gland.    FNA thyroid nodule:  Copath Report       FNA-thyroid, left mid lobe nodule:   Suspicious for malignancy.  Suspicious for papillary carcinoma  Specimen Adequacy: Satisfactory for evaluation.           Surgical pathology:     SPECIMEN(S):  A: Thyroid, left lobe  B: Parathyroid gland, biopsy of right inferior  C: Thyroid, right lobe    FINAL DIAGNOSIS:  A. and C.  Thyroid, right and left lobes, total thyroidectomy -  Specimen/Procedure(s) and Laterality:   Right and left thyroid lobes,  total thyroidectomy.  Specimen Integrity:   Intact.  Specimen Size and Weight:   See Gross Description.  Histologic Tumor Type(s):   Papillary carcinoma, follicular variant with  focal papillary morphology.  Tumor Focality: Unifocal.  Tumor Laterality:   Left lobe.  Tumor Size(s):   Left lobe: 2.2 cm (greatest dimension).  Margins:   Close, but uninvolved.            Distance to closest margin, if negative:   Tumor 0.05 cm from  nearest paratracheal surface  "and 1.75 cm from nearest anterolateral  surface.  Tumor Capsular Invasion: Present.    Tumor Capsule: Partial.  Extrathyroidal Invasion:   Not identified.  Lymph-Vascular Invasion:   Not identified.  Lymph Nodes:   Two nodes without evidence of metastatic carcinoma (0/2;  one at isthmus and one adjacent to right lobe).  Pathologic Staging:   pT2, pN0, pM not applicable.  Additional Pathologic Findings:   Right thyroid lobe without evidence of  malignancy.  Background nodular hyperplasia and thyroiditis with  lymphoid follicles.  Left lobe with focal previous biopsy site changes.  No parathyroid tissue identified.  CAP Protocol Based on AJCC/UICC TNM, 7th edition; Protocol Effective  Date:  January 2010    B.  \"Parathyroid gland\", right inferior, biopsy - Thyroid tissue; no  parathyroid glandular tissue identified.    8/2016: left modified neck Dissection:  Copath Report      SPECIMEN(S):   A: Scar, left neck   B: Parathyroid gland, left inferior   C: Left central neck dissection, para and pretracheal lymph nodes   D: apical jugular lymph node   E: Left modified neck dissection     FINAL DIAGNOSIS:   A. Skin, neck/scar, excision:   - Hypertrophic scar; benign.     B. Parathyroid gland, left inferior, biopsy:   - Parathyroid tissue present.     C. Left central neck dissection with para-and pretracheal lymph nodes:   - One lymph node; no evidence of malignancy (0/1).   - Thymus and parathyroid tissue present.     D. Lymph node, apical jugular, excision:   - One lymph node; no evidence of malignancy (0/1).     E. Left modified neck dissection:   - 27 lymph nodes identified (level II- 10, level III- 9, level IV- 5 and   lymph nodes associated with jugular vein- 3).   - One (of 27) lymph node positive for metastatic papillary thyroid   carcinoma.  1 cm in greatest diameter.  Negative for extranodal   involvement  (Level II).   - Jugular vein negative for tumor.            All pertinent notes, labs, and images personally " reviewed by me.     A/P  Ms.Nhu Mounika Nicole is a 30 year old here for the evaluation of thyroid cancer:    1. 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  It was 2.2 cm unifocal, left lobe tumor with no lymph node involvement.  S/p  total thyroidectomy in 2011 and 78.7 mCi of I-131 HERNANDEZ. No evidence for distant metastatic disease on post therapy scan.  2016- new lymph node s/p FNA with PTC with rising TG  S/p 8/2016- left modified neck radiation. 1/27 lymph node + ( level2). Postop course complicated by hypocalcemia.  Delivered 1/2017. She is planning to stop breastfeeding.  -- get TG  -- get PTH and Calcium  -- get WBS. I discussed contact precautions and the need to stop breastfeeding before and after that. Contraception for 6 months. She will check Thyrogen injection with insurance.    2.  Hypothyroidism (postoperative following total thyroidectomy for thyroid cancer):  On levothyroxine 150  g/day. Generic.  Labs today   Dose change based on labs.  Consider to switch to synthroid- she will check with insurance.  With history of thyroid cancer goal TSH < 0.1    Labs ordered today:   Orders Placed This Encounter   Procedures     Thyroglobulin and antibody     Radiology/Consults ordered today: None    More than 50% of the time spent with Ms. Nicole on counseling / coordinating her care.  40 min.    Follow-up:  Follow up after WBS    Vandana Costello MD  Endocrinology   [facility]  CC: Evan Zamora     Addendum to above note and clinic visit:    Labs reviewed.    See result note/telephone encounter.

## 2017-02-24 LAB — DEPRECATED CALCIDIOL+CALCIFEROL SERPL-MC: 23 UG/L (ref 20–75)

## 2017-03-02 ENCOUNTER — PRENATAL OFFICE VISIT (OUTPATIENT)
Dept: OBGYN | Facility: CLINIC | Age: 32
End: 2017-03-02
Payer: COMMERCIAL

## 2017-03-02 VITALS
HEART RATE: 86 BPM | SYSTOLIC BLOOD PRESSURE: 102 MMHG | BODY MASS INDEX: 29.18 KG/M2 | OXYGEN SATURATION: 96 % | DIASTOLIC BLOOD PRESSURE: 60 MMHG | WEIGHT: 157 LBS

## 2017-03-02 DIAGNOSIS — Z12.4 SCREENING FOR MALIGNANT NEOPLASM OF CERVIX: ICD-10-CM

## 2017-03-02 DIAGNOSIS — Z30.011 ENCOUNTER FOR INITIAL PRESCRIPTION OF CONTRACEPTIVE PILLS: ICD-10-CM

## 2017-03-02 PROCEDURE — 87624 HPV HI-RISK TYP POOLED RSLT: CPT | Performed by: OBSTETRICS & GYNECOLOGY

## 2017-03-02 PROCEDURE — G0145 SCR C/V CYTO,THINLAYER,RESCR: HCPCS | Performed by: OBSTETRICS & GYNECOLOGY

## 2017-03-02 PROCEDURE — 99207 ZZC POST PARTUM EXAM: CPT | Performed by: OBSTETRICS & GYNECOLOGY

## 2017-03-02 RX ORDER — NORETHINDRONE ACETATE AND ETHINYL ESTRADIOL 1MG-20(21)
1 KIT ORAL DAILY
Qty: 84 TABLET | Refills: 1 | Status: SHIPPED | OUTPATIENT
Start: 2017-03-02 | End: 2017-03-21

## 2017-03-02 RX ORDER — ACETAMINOPHEN AND CODEINE PHOSPHATE 120; 12 MG/5ML; MG/5ML
1 SOLUTION ORAL DAILY
Qty: 84 TABLET | Refills: 3 | Status: SHIPPED | OUTPATIENT
Start: 2017-03-02 | End: 2017-03-02

## 2017-03-02 RX ORDER — ACETAMINOPHEN AND CODEINE PHOSPHATE 120; 12 MG/5ML; MG/5ML
1 SOLUTION ORAL DAILY
Qty: 84 TABLET | Refills: 3 | Status: SHIPPED | OUTPATIENT
Start: 2017-03-02 | End: 2017-03-02 | Stop reason: ALTCHOICE

## 2017-03-02 NOTE — MR AVS SNAPSHOT
"              After Visit Summary   3/2/2017    Aleyda Nicole    MRN: [id number]           Patient Information     Date Of Birth          1985        Visit Information        Provider Department      3/2/2017 2:30 PM Hakeem Combs MD [facility]        Today's Diagnoses     Routine postpartum follow-up    -  1    Encounter for initial prescription of contraceptive pills           Follow-ups after your visit        Who to contact     If you have questions or need follow up information about today's clinic visit or your schedule please contact [facility] directly at [phone].  Normal or non-critical lab and imaging results will be communicated to you by MyChart, letter or phone within 4 business days after the clinic has received the results. If you do not hear from us within 7 days, please contact the clinic through [employer] or phone. If you have a critical or abnormal lab result, we will notify you by phone as soon as possible.  Submit refill requests through [employer] or call your pharmacy and they will forward the refill request to us. Please allow 3 business days for your refill to be completed.          Additional Information About Your Visit        MyChart Information     [employer] lets you send messages to your doctor, view your test results, renew your prescriptions, schedule appointments and more. To sign up, go to [email or web address] . Click on \"Log in\" on the left side of the screen, which will take you to the Welcome page. Then click on \"Sign up Now\" on the right side of the page.     You will be asked to enter the access code listed below, as well as some personal information. Please follow the directions to create your username and password.     Your access code is: [email or web address]  Expires: 2017  2:52 PM     Your access code will  in 90 days. If you need help or a new code, please call your [facility] or [phone].      "   Care EveryWhere ID     This is your Care EveryWhere ID. This could be used by other organizations to access your [place] medical records  [id number]        Your Vitals Were     Pulse Last Period Pulse Oximetry BMI (Body Mass Index)          86 04/11/2016 (Exact Date) 96% 29.18 kg/m2         Blood Pressure from Last 3 Encounters:   03/02/17 102/60   02/23/17 102/76   01/19/17 112/76    Weight from Last 3 Encounters:   03/02/17 157 lb (71.2 kg)   02/23/17 154 lb (69.9 kg)   01/19/17 161 lb (73 kg)              We Performed the Following     OB HEMOGLOBIN          Today's Medication Changes          These changes are accurate as of: 3/2/17  2:52 PM.  If you have any questions, ask your nurse or doctor.               Start taking these medicines.        Dose/Directions    norethindrone 0.35 MG per tablet   Commonly known as:  MICRONOR   Used for:  Encounter for initial prescription of contraceptive pills   Started by:  Hakeem Combs MD        Dose:  1 tablet   Take 1 tablet (0.35 mg) by mouth daily   Quantity:  84 tablet   Refills:  3            Where to get your medicines      These medications were sent to [facility] [address] [address]     Phone:  [phone]     norethindrone 0.35 MG per tablet                Primary Care Provider Office Phone # Fax #    Evan Zamora [phone]       [address] [address]        Thank you!     Thank you for choosing [facility]  for your care. Our goal is always to provide you with excellent care. Hearing back from our patients is one way we can continue to improve our services. Please take a few minutes to complete the written survey that you may receive in the mail after your visit with us. Thank you!             Your Updated Medication List - Protect others around you: Learn how to safely use, store and  throw away your medicines at www.disposemymeds.org.          This list is accurate as of: 3/2/17  2:52 PM.  Always use your most recent med list.                   Brand Name Dispense Instructions for use    acetone (Urine) test Strp    KETOSTIX    25 each    Test once daily x1 week, then reduce to once weekly if consistently negative       blood glucose monitoring lancets     1 Box    Use to test blood sugar 4 times daily or as directed.       blood glucose monitoring test strip    ACCU-CHEK QUEENIE PLUS    100 strip    Use to test blood sugar 4 times daily or as directed.       calcium carbonate 500 MG tablet    OS-ESTHER 500 mg [place]. Ca     Take 500 mg by mouth 2 times daily Reported on 3/2/2017       levothyroxine 150 MCG tablet    SYNTHROID/LEVOTHROID    30 tablet    Take 1 tablet (150 mcg) by mouth daily       norethindrone 0.35 MG per tablet    MICRONOR    84 tablet    Take 1 tablet (0.35 mg) by mouth daily       oxyCODONE 5 MG IR tablet    ROXICODONE    20 tablet    Take 1-2 tablets (5-10 mg) by mouth every 3 hours as needed for moderate to severe pain       prenatal multivitamin  plus iron 27-0.8 MG Tabs per tablet      Take 1 tablet by mouth daily

## 2017-03-02 NOTE — PROGRESS NOTES
SUBJECTIVE: Aleyda is here for a 6-week postpartum checkup.    Date of Last Pap:  DUE    Delivery date was 2017. She had a  and repeat c/s of a viable girl, weight 6 pounds 11 oz., with no complications.  Since delivery, she has been breast feeding.  She has no signs of infection, bleeding or other complications.  We discussed contraceptions and she has chosen none.  She  has had intercourse since delivery and complains of dryness discomfort. Patient screened for postpartum depression and complaints are NEGATIVE. Screening has also been completed for intimate partner violence.      EXAM:  normal external genitalia, normal groin lymphatics, normal urethral meatus, normal vaginal mucosa, normal cervix, normal adnexa, no masses or tenderness, uterus normal size and shape and uterus antiverted.     ASSESSMENT:   Normal postpartum exam after repeat c/s.    PLAN:  Return as needed or at time of next expected pap, pelvic, or breast exam.

## 2017-03-02 NOTE — LETTER
[address] [id number] [phone]      March 8, 2017    [facility] Mounika Nicole  [address] [place] [facility] [address]    Dear Aleyda,  We are happy to inform you that your PAP smear result from 3/2/17 is normal.  We are now able to do a follow up test on PAP smears. The DNA test is for HPV (Human Papilloma Virus). Cervical cancer is closely linked with certain types of HPV. Your result showed no evidence of high risk HPV.  Therefore we recommend you return in 3 years for your next pap smear and HPV test.  You will still need to return to the clinic every year for an annual exam and other preventive tests.  Please contact the clinic with any questions.  Sincerely,  Hakeem Combs MD/lorraine

## 2017-03-02 NOTE — NURSING NOTE
"Chief Complaint   Patient presents with     Post Partum Exam       Initial /60  Pulse 86  Wt 157 lb (71.2 kg)  LMP 04/11/2016 (Exact Date)  SpO2 96%  BMI 29.18 kg/m2 Estimated body mass index is 29.18 kg/(m^2) as calculated from the following:    Height as of 2/23/17: 5' 1.5\" (1.562 m).    Weight as of this encounter: 157 lb (71.2 kg).  Medication Reconciliation: complete   Cassie Lima MA      "

## 2017-03-03 ENCOUNTER — TELEPHONE (OUTPATIENT)
Dept: INTERNAL MEDICINE | Facility: CLINIC | Age: 32
End: 2017-03-03

## 2017-03-03 DIAGNOSIS — C73 PAPILLARY CARCINOMA, FOLLICULAR VARIANT (H): Primary | ICD-10-CM

## 2017-03-03 DIAGNOSIS — E89.0 POSTOPERATIVE HYPOTHYROIDISM: ICD-10-CM

## 2017-03-03 LAB — LAB SCANNED RESULT: NORMAL

## 2017-03-03 RX ORDER — LEVOTHYROXINE SODIUM 137 UG/1
137 TABLET ORAL DAILY
Qty: 90 TABLET | Refills: 1 | Status: SHIPPED | OUTPATIENT
Start: 2017-03-03 | End: 2017-08-03

## 2017-03-03 ASSESSMENT — PATIENT HEALTH QUESTIONNAIRE - PHQ9: SUM OF ALL RESPONSES TO PHQ QUESTIONS 1-9: 1

## 2017-03-03 NOTE — LETTER
March 14, 2017      Aleyda Nicole  [address] [facility] [address]              Dear Aleyda Nicole,    Your recent lab results indicate that your dosage of Levothyroxine needs to be changed to 137mcg daily. A prescription has been sent to your pharmacy. Please follow up for a lab only visit in 2 months, with an office visit a week after labs to discuss results.  If you have any questions, please contact the clinic at [phone].     Sincerely,    Dr. Costello / Rosalba CALDERON CMA    Your [facility] Care Team

## 2017-03-03 NOTE — TELEPHONE ENCOUNTER
Component      Latest Ref Rng & Units 2/23/2017   Phosphorus      2.5 - 4.5 mg/dL 3.9   Parathyroid Hormone Intact      12 - 72 pg/mL 11 (L)   Magnesium      1.6 - 2.3 mg/dL 1.9   Vitamin D Deficiency screening      20 - 75 ug/L 23   T4 Free      0.76 - 1.46 ng/dL 1.80 (H)   TSH      0.40 - 4.00 mU/L 0.04 (L)   Calcium      8.5 - 10.1 mg/dL 9.2     ENDO THYROID LABS-[facility] Latest Ref Rng & Units 2/23/2017   TSH 0.40 - 4.00 mU/L 0.04 (L)   T4 TOTAL 5.0 - 11.0 ug/dL    T4 FREE 0.76 - 1.46 ng/dL 1.80 (H)     H/o thyroid cancer.  On levothyroxine 150 mcg/day    Plan: decrease dose to 137 mcg/day  Labs in 2 months  Please make a lab appointment for blood work and follow up clinic appointment in 1 week after that to discuss results.    Please call patient with above information.    Vandana Costello MD  Endocrinology   [facility]/[place]  March 3, 2017

## 2017-03-03 NOTE — TELEPHONE ENCOUNTER
Left voicemail for patient requesting return call to the clinic.     Rosalba Albarado CMA    3/3/2017  10:19 AM

## 2017-03-06 LAB
COPATH REPORT: NORMAL
PAP: NORMAL

## 2017-03-08 LAB
FINAL DIAGNOSIS: NORMAL
HPV HR 12 DNA CVX QL NAA+PROBE: NEGATIVE
HPV16 DNA SPEC QL NAA+PROBE: NEGATIVE
HPV18 DNA SPEC QL NAA+PROBE: NEGATIVE
SPECIMEN DESCRIPTION: NORMAL

## 2017-03-14 ENCOUNTER — TELEPHONE (OUTPATIENT)
Dept: ENDOCRINOLOGY | Facility: CLINIC | Age: 32
End: 2017-03-14

## 2017-03-14 DIAGNOSIS — E89.0 POSTOPERATIVE HYPOTHYROIDISM: ICD-10-CM

## 2017-03-14 DIAGNOSIS — C73 PAPILLARY CARCINOMA, FOLLICULAR VARIANT (H): Primary | ICD-10-CM

## 2017-03-14 NOTE — LETTER
March 17, 2017      [facility] Mounika Nicole  [address] [place] [facility] [address]              Dear Aleyda Nicole,    Here are the instructions from Dr. Costello:     She needs to stop breast-feeding.  She needs to use contraception for next six months at least.  She will need urine pregnancy test before test.    Whole Body Radioiodine Scan - Thyrogen 2 Dose: HERNANDEZ I 123    Start low iodine diet 2 weeks to 10 days before procedure    Day #1 Thyrogen 0.9 mg IM  __________    Day #2 Thyrogen 0.9 mg IM  __________  (serum Pregnancy for women)    Day #3   - Lab work ( make a lab appointment)- TSH, thyroglobulin, CBC  - I 123 scan      [facility] radiology scheduleing  [phone]       Vandana Costello MD  Endocrinology   [facility]/[place]  March 16, 2017      Your [facility] Care Team

## 2017-03-14 NOTE — TELEPHONE ENCOUNTER
Pt calls asking about the Whole body scan (WBS) and Thyrogen injection. She checked with her insurance and its covered.   Please advise.       She had OV with Dr Costello on 2/23/17.     After review of the note,     Need to confirm that she is taking the Junel oral contraceptives. This is on her med list. Also need to ask if breast feeding or not.

## 2017-03-16 ENCOUNTER — TELEPHONE (OUTPATIENT)
Dept: ENDOCRINOLOGY | Facility: CLINIC | Age: 32
End: 2017-03-16

## 2017-03-16 NOTE — TELEPHONE ENCOUNTER
Please see previous tele encoutner. 3/16/2017    Please call patient.  I accidentally clsoed that encounter.

## 2017-03-16 NOTE — TELEPHONE ENCOUNTER
Order are in place.  It will be done with radiology  She needs to stop breast-feeding.  She needs to use contraception for next six months at least.  She will need urine pregnancy test before test.    Whole Body Radioiodine Scan - Thyrogen 2 Dose: HERNANDEZ I 123    Start low iodine diet 2 weeks to 10 days before procedure    Day #1 Thyrogen 0.9 mg IM  __________    Day #2 Thyrogen 0.9 mg IM  __________  (serum Pregnancy for women)    Day #3   - Lab work ( make a lab appointment)- TSH, thyroglobulin, CBC  - I 123 scan      [facility] radiology scheduleing  [phone]       Please call patient with above information.    Vandana Costello MD  Endocrinology   [facility]/[place]  March 16, 2017

## 2017-03-21 ENCOUNTER — OFFICE VISIT (OUTPATIENT)
Dept: INTERNAL MEDICINE | Facility: CLINIC | Age: 32
End: 2017-03-21
Payer: COMMERCIAL

## 2017-03-21 VITALS
OXYGEN SATURATION: 94 % | HEIGHT: 62 IN | SYSTOLIC BLOOD PRESSURE: 120 MMHG | WEIGHT: 161.9 LBS | DIASTOLIC BLOOD PRESSURE: 82 MMHG | TEMPERATURE: 98.4 F | HEART RATE: 91 BPM | BODY MASS INDEX: 29.79 KG/M2

## 2017-03-21 DIAGNOSIS — J06.9 UPPER RESPIRATORY TRACT INFECTION, UNSPECIFIED TYPE: ICD-10-CM

## 2017-03-21 DIAGNOSIS — J98.01 ACUTE BRONCHOSPASM: Primary | ICD-10-CM

## 2017-03-21 PROCEDURE — 99213 OFFICE O/P EST LOW 20 MIN: CPT | Performed by: PHYSICIAN ASSISTANT

## 2017-03-21 RX ORDER — ALBUTEROL SULFATE 90 UG/1
2 AEROSOL, METERED RESPIRATORY (INHALATION) EVERY 4 HOURS PRN
Qty: 1 INHALER | Refills: 0 | Status: SHIPPED | OUTPATIENT
Start: 2017-03-21 | End: 2017-06-14

## 2017-03-21 NOTE — PROGRESS NOTES
"  SUBJECTIVE:                                                    Aleyda Nicole is a 31 year old female who presents to clinic today for the following health issues:      Concern - Cough/Wheezing     Onset: x2 wks    Description:   Pt states she has been coughing and wheezing the past 2 weeks, worse at night    Intensity: moderate    Progression of Symptoms:  worsening    Accompanying Signs & Symptoms:  No fever chills or sweats   Some thick mucus  No sinus pain or pressure  No ear pain.   No sore throat.        Previous history of similar problem:   yes    Precipitating factors:   Worsened by: laying down    Alleviating factors:  Improved by: nothing        Therapies Tried and outcome:   Hx of albuterol in past with a viral illness.     -------------------------------------    Problem list and histories reviewed & adjusted, as indicated.  Additional history: as documented    Labs reviewed in EPIC    Reviewed and updated as needed this visit by clinical staff  Tobacco  Allergies       Reviewed and updated as needed this visit by Provider         ROS:  Constitutional, HEENT, cardiovascular, pulmonary, gi and gu systems are negative, except as otherwise noted.    OBJECTIVE:                                                    /82 (BP Location: Left arm, Patient Position: Chair, Cuff Size: Adult Regular)  Pulse 91  Temp 98.4  F (36.9  C) (Oral)  Ht 5' 1.5\" (1.562 m)  Wt 161 lb 14.4 oz (73.4 kg)  LMP 04/11/2016 (Exact Date)  SpO2 94%  BMI 30.1 kg/m2  Body mass index is 30.1 kg/(m^2).  GENERAL: healthy, alert and no distress  HENT: normal cephalic/atraumatic, ear canals and TM's normal, nose and mouth without ulcers or lesions, rhinorrhea clear, oropharynx clear and oral mucous membranes moist  NECK: no adenopathy, no asymmetry, masses, or scars and thyroid normal to palpation  RESP:mild expiratory wheeze scattered   CV: regular rate and rhythm, normal S1 S2, no S3 or S4, no murmur, click or rub, no peripheral " edema and peripheral pulses strong  SKIN: no suspicious lesions or rashes    Diagnostic Test Results:  none      ASSESSMENT/PLAN:                                                            1. Acute bronchospasm    - albuterol (PROAIR HFA/PROVENTIL HFA/VENTOLIN HFA) 108 (90 BASE) MCG/ACT Inhaler; Inhale 2 puffs into the lungs every 4 hours as needed for shortness of breath / dyspnea or wheezing  Dispense: 1 Inhaler; Refill: 0    2. Upper respiratory tract infection, unspecified type    - albuterol (PROAIR HFA/PROVENTIL HFA/VENTOLIN HFA) 108 (90 BASE) MCG/ACT Inhaler; Inhale 2 puffs into the lungs every 4 hours as needed for shortness of breath / dyspnea or wheezing  Dispense: 1 Inhaler; Refill: 0    Monitor if fever or worsening then recheck in clinic  Saline rinse for sinus congestion     Carmen Sky PA-C  [facility]

## 2017-03-21 NOTE — NURSING NOTE
"Chief Complaint   Patient presents with     Cough     x2 wks        Initial /82 (BP Location: Left arm, Patient Position: Chair, Cuff Size: Adult Regular)  Pulse 91  Temp 98.4  F (36.9  C) (Oral)  Ht 5' 1.5\" (1.562 m)  Wt 161 lb 14.4 oz (73.4 kg)  LMP 04/11/2016 (Exact Date)  SpO2 94%  BMI 30.1 kg/m2 Estimated body mass index is 30.1 kg/(m^2) as calculated from the following:    Height as of this encounter: 5' 1.5\" (1.562 m).    Weight as of this encounter: 161 lb 14.4 oz (73.4 kg).  Medication Reconciliation: complete    "

## 2017-03-27 ENCOUNTER — HOSPITAL ENCOUNTER (OUTPATIENT)
Facility: CLINIC | Age: 32
End: 2017-03-27
Admitting: INTERNAL MEDICINE
Payer: COMMERCIAL

## 2017-03-28 ENCOUNTER — OFFICE VISIT (OUTPATIENT)
Dept: ENDOCRINOLOGY | Facility: CLINIC | Age: 32
End: 2017-03-28
Payer: COMMERCIAL

## 2017-03-28 VITALS
HEIGHT: 61 IN | WEIGHT: 159.4 LBS | BODY MASS INDEX: 30.09 KG/M2 | SYSTOLIC BLOOD PRESSURE: 118 MMHG | DIASTOLIC BLOOD PRESSURE: 82 MMHG | HEART RATE: 84 BPM | OXYGEN SATURATION: 95 % | TEMPERATURE: 97.6 F

## 2017-03-28 DIAGNOSIS — C73 PAPILLARY CARCINOMA, FOLLICULAR VARIANT (H): Primary | ICD-10-CM

## 2017-03-28 DIAGNOSIS — E89.0 POSTOPERATIVE HYPOTHYROIDISM: ICD-10-CM

## 2017-03-28 PROCEDURE — 99213 OFFICE O/P EST LOW 20 MIN: CPT | Performed by: INTERNAL MEDICINE

## 2017-03-28 NOTE — PROGRESS NOTES
Name: Aleyda Nicole  Seen for f/u of papillary thyroid cancer and postoperative hypothyroidism  Chief Complaint   Patient presents with     Consult     would like to duiscuss up-coming scan     HPI:  Aleyda Nicole is a 30 year old female who presents for the evaluation of      1.  Papillary thyroid cancer;  Thyroid nodule was noticed in February 2011 which was followed by thyroid nodule biopsy which showed suspicious for papillary thyroid cancer.  She underwent total thyroidectomy 3/2011 and pathology showed papillary thyroid cancer with follicular pattern.  Tumor was about 2.2 cm on the left lobe.  No lymph node involvement.  S/p 78.7 mCi of I131 HERNANDEZ  8/2011. No evidence for distant metastatic disease on post therapy scan.  Neck US 6/2016- showed new lymph node in neck along with rising Tg levels  S/p FNA lymph node: 6/2016- c/w papillary thyroid cancer    8/2016: underwent left modified neck dissection.  1/27 lymph node positive one left modified neck dissection.  1 cm in greatest diameter.  Negative for external involvement at level II.    Tg decreased post left neck dissection from 1.3 to < 0.1    Postop course was complicated by hypocalcemia and was started on calcium supplement.  Taking calcium- not regular with BID dosing.    Delivered baby 1/2017. No longer breast feeding    She was followed by endocrinology before and then lost to follow-up with endocrinology in 2012.  No history of neck radiation prior to diagnosis of thyroid cancer.  No family history of thyroid cancer.  Here to discuss WBC scan and protocol.  Has upcoming radiology appointments scheduled.      2.  Hypothyroidism (postoperative):  Was started on levothyroxine following thyroidectomy.  Currently she is taking levothyroxine 137  g per day.    Taking generic levothyroxine.  Reports compliance.  Delivered 1/2017.  Due for labs .  Wt Readings from Last 2 Encounters:   03/28/17 72.3 kg (159 lb 6.4 oz)   03/21/17 73.4 kg (161 lb 14.4 oz)      No diarrhea, tremors. No palpitations.  No difficulty with swallowing, no pain during swallowing.    PMH/PSH:  Past Medical History:   Diagnosis Date     Gestational diabetes     GDM diet controlled     Gestational diabetes 2013     Hypocalcemia 2016     Influenza A      Papillary thyroid carcinoma     Papillary carcinoma, follicular variant with     Pneumonia     LLL     PONV (postoperative nausea and vomiting)       labor      Past Surgical History:   Procedure Laterality Date      SECTION  10/26/2013    Procedure:  SECTION;  primary  section;  Surgeon: Rodney Mckee MD;  Location: [facility] L+D      SECTION N/A 2017    Procedure:  SECTION;  Surgeon: Hakeem Combs MD;  Location: [facility] L+[facility]     DISSECTION RADICAL NECK Left 2016    Procedure: DISSECTION RADICAL NECK;  Surgeon: Vy Theodore MD;  Location: [facility] OR     DISSECTION RADICAL NECK MODIFIED Left 2016    Procedure: DISSECTION RADICAL NECK MODIFIED;  Surgeon: Luis Brown MD;  Location: [facility] OR     THYROIDECTOMY      Total, for cancer.      Family Hx:  Family History   Problem Relation Age of Onset     DIABETES Mother      CEREBROVASCULAR DISEASE Mother      Hypertension Mother      DIABETES Father 50     Hypertension Father      Genitourinary Problems Father      kidney disease     Coronary Artery Disease Father      Thyroid disease: No        Social Hx:  Social History     Social History     Marital status:      Spouse name: Brock     Number of children: 1     Years of education: N/A     Occupational History     nursing student [employer]     Social History Main Topics     Smoking status: Never Smoker     Smokeless tobacco: Never Used     Alcohol use No     Drug use: No     Sexual activity: Yes     Partners: Male     Birth control/ protection:      Other Topics Concern      Service No     Blood  "Transfusions No     Caffeine Concern No     Hobby Hazards No     Sleep Concern No     Stress Concern No     Weight Concern Yes     Special Diet No     Back Care No     Exercise No     Bike Helmet No     Seat Belt Yes     Self-Exams No     Social History Narrative    Pt lives with  and father.          MEDICATIONS:  has a current medication list which includes the following prescription(s): albuterol, levothyroxine, and prenatal multivitamin  plus iron.    ROS     ROS: 10 point ROS neg other than the symptoms noted above in the HPI.      Physical Exam   VS: /82 (BP Location: Right arm, Patient Position: Chair, Cuff Size: Adult Regular)  Pulse 84  Temp 97.6  F (36.4  C) (Oral)  Ht 1.549 m (5' 1\")  Wt 72.3 kg (159 lb 6.4 oz)  LMP 2016 (Exact Date)  SpO2 95%  BMI 30.12 kg/m2  GENERAL: AXOX3, NAD, well dressed, answering questions appropriately, appears stated age.  HEENT: OP clear, no LAD, no TM, non-tender, no exopthalmous, no proptosis, EOMI, no lig lag, no retraction  Thyroid: Hypertrophied scar, will healed thyroidectomy scar present.  Hypertrophied scar on lateral side of neck. No lymphadenopathy.  CV: RRR, no rubs, gallops, no murmurs  LUNGS: CTAB, no wheezes, rales, or ronchi  ABDOMEN: +BS  EXTREMITIES: no edema, +pulses, no rashes, no lesions  NEUROLOGY: CN grossly intact, + DTR upper and lower extremity, no tremors  MSK: grossly intact  SKIN: no rashes, no lesions  PSYCH: normal affect and mood      LABS:  17:  TSH: 0.04  TgAB: <0.4  TG: <0.10    11/10/16:  TSH 0.74  TgAb: <0.4  TG: <0.10     2016:  TSH: 0.08  TgAb: <0.4  T.30     2012:  TSH: 12.40  TGAB: 1.2  T.6    ENDO THYROID LABS-[facility] Latest Ref Rng & Units 2017   TSH 0.40 - 4.00 mU/L 0.04 (L)   T4 TOTAL 5.0 - 11.0 ug/dL    T4 FREE 0.76 - 1.46 ng/dL 1.80 (H)     ENDO THYROID LABS-[facility] Latest Ref Rng & Units 11/10/2016   TSH 0.40 - 4.00 mU/L 0.74   T4 TOTAL 5.0 - 11.0 ug/dL    T4 FREE 0.76 - 1.46 ng/dL 1.30 "     ENDO THYROID LABS-[facility] Latest Ref Rng 6/7/2016   TSH 0.40 - 4.00 mU/L 0.08 (L)   T4 TOTAL 5.0 - 11.0 ug/dL    T4 FREE 0.76 - 1.46 ng/dL 1.50 (H)   FREE T3 2.3 - 4.2 pg/mL    TRIIODOTHYRONINE(T3) 60 - 181 ng/dL 112     ENDO THYROID LABS-[facility] Latest Ref Rng 5/19/2016 2/5/2016 11/6/2015   TSH 0.40 - 4.00 mU/L 0.16 (L) 0.37 (L) 0.06 (L)   T4 TOTAL 5.0 - 11.0 ug/dL      T4 FREE 0.76 - 1.46 ng/dL 1.42 1.24 1.51 (H)   FREE T3 2.3 - 4.2 pg/mL        ENDO THYROID LABS-[facility] Latest Ref Rng 6/26/2015 11/14/2014   TSH 0.40 - 4.00 mU/L 0.32 (L) 7.86 (H)   T4 TOTAL 5.0 - 11.0 ug/dL     T4 FREE 0.76 - 1.46 ng/dL 1.33 1.37   FREE T3 2.3 - 4.2 pg/mL       ENDO THYROID LABS-[facility] Latest Ref Rng 7/29/2014 4/3/2014   TSH 0.40 - 4.00 mU/L 3.67 7.63 (H)   T4 TOTAL 5.0 - 11.0 ug/dL     T4 FREE 0.76 - 1.46 ng/dL  1.39   FREE T3 2.3 - 4.2 pg/mL       NM THYROID UPTAKE/SCAN   Feb 4, 2011 2:47:00 PM      COMPARISON: None.     HISTORY: Hyperthyroidism.     TECHNIQUE:  The patient was given 173 uCi of I-123 orally, followed by  24-hour thyroid scan and radioiodine uptake evaluation.     FINDINGS:  The thyroid gland appears normal in size. 24-hour uptake  was calculated at 46.4%, which is above the normal range. Normal range  is 10-30% 24-hour uptake. Focal area of decreased uptake in the mid  left thyroid lobe may represent a cold thyroid nodule or cyst.     IMPRESSION:    1. Elevated 24-hour radioiodine uptake in the thyroid at 46.4%.  2. Possible cold nodule or cyst in the mid left thyroid lobe. Thyroid  ultrasound is recommended for further evaluation.    NUCLEAR MEDICINE I-131 SCAN    Aug 10, 2011 8:04:00 AM      TECHNIQUE: 78.7 mCi I-131 ablation scan. Five days post therapy  imaging performed today.     HISTORY: Thyroid cancer.     COMPARISON:   Nuclear Study: Thyroid uptake and scan 2/4/2011.  Other Relevant Studies: Ultrasound neck 2/17/2011.     FINDINGS: Intense radiotracer uptake at the left greater than right  neck at the thyroid  level. No evidence for distant metastatic disease.     IMPRESSION: Intense radiotracer uptake localizing to the thyroidectomy  bed, left greater than right. This is consistent with residual thyroid  tissue. No distant metastatic disease identified.    ULTRASOUND THYROID  Feb 17, 2011      HISTORY: Hyperthyroidism. Thyroid nodule.      COMPARISON: Nuclear Medicine thyroid scan 2/4/2011.     FINDINGS: The thyroid gland is enlarged. The right lobe measures 5.4 x  1.6 x 2.1 cm. The left lobe measures 5.9 x 2.3 x 2.7 cm. There are 2  right thyroid nodules and 3 left thyroid nodules.  1. Right upper lobe, hypoechoic solid measuring 0.7 x 0.6 x 0.6 cm.  2. Right lower pole, solid measuring 0.7 x 0.5 x 0.6 cm.  3. Left upper pole, cystic and solid measuring 1.1 x 0.6 x 1.0 cm.  4. Left mid thyroid lobe, primarily solid with small cystic areas  measuring 2.7 x 1.9 x 2.1 cm.  5. Left lower pole, cystic and solid measuring 1.4 x 0.9 x 1.2 cm.     The primarily solid left mid thyroid nodule appears to correspond to  the cold nodule on thyroid uptake and scan.  IMPRESSION: Multinodular thyroid gland.    FNA thyroid nodule:  Copath Report       FNA-thyroid, left mid lobe nodule:   Suspicious for malignancy.  Suspicious for papillary carcinoma  Specimen Adequacy: Satisfactory for evaluation.           Surgical pathology:     SPECIMEN(S):  A: Thyroid, left lobe  B: Parathyroid gland, biopsy of right inferior  C: Thyroid, right lobe    FINAL DIAGNOSIS:  A. and C.  Thyroid, right and left lobes, total thyroidectomy -  Specimen/Procedure(s) and Laterality:   Right and left thyroid lobes,  total thyroidectomy.  Specimen Integrity:   Intact.  Specimen Size and Weight:   See Gross Description.  Histologic Tumor Type(s):   Papillary carcinoma, follicular variant with  focal papillary morphology.  Tumor Focality: Unifocal.  Tumor Laterality:   Left lobe.  Tumor Size(s):   Left lobe: 2.2 cm (greatest dimension).  Margins:   Close, but  "uninvolved.            Distance to closest margin, if negative:   Tumor 0.05 cm from  nearest paratracheal surface and 1.75 cm from nearest anterolateral  surface.  Tumor Capsular Invasion: Present.    Tumor Capsule: Partial.  Extrathyroidal Invasion:   Not identified.  Lymph-Vascular Invasion:   Not identified.  Lymph Nodes:   Two nodes without evidence of metastatic carcinoma (0/2;  one at isthmus and one adjacent to right lobe).  Pathologic Staging:   pT2, pN0, pM not applicable.  Additional Pathologic Findings:   Right thyroid lobe without evidence of  malignancy.  Background nodular hyperplasia and thyroiditis with  lymphoid follicles.  Left lobe with focal previous biopsy site changes.  No parathyroid tissue identified.  CAP Protocol Based on AJCC/UICC TNM, 7th edition; Protocol Effective  Date:  January 2010    B.  \"Parathyroid gland\", right inferior, biopsy - Thyroid tissue; no  parathyroid glandular tissue identified.    8/2016: left modified neck Dissection:  Copath Report      SPECIMEN(S):   A: Scar, left neck   B: Parathyroid gland, left inferior   C: Left central neck dissection, para and pretracheal lymph nodes   D: apical jugular lymph node   E: Left modified neck dissection     FINAL DIAGNOSIS:   A. Skin, neck/scar, excision:   - Hypertrophic scar; benign.     B. Parathyroid gland, left inferior, biopsy:   - Parathyroid tissue present.     C. Left central neck dissection with para-and pretracheal lymph nodes:   - One lymph node; no evidence of malignancy (0/1).   - Thymus and parathyroid tissue present.     D. Lymph node, apical jugular, excision:   - One lymph node; no evidence of malignancy (0/1).     E. Left modified neck dissection:   - 27 lymph nodes identified (level II- 10, level III- 9, level IV- 5 and   lymph nodes associated with jugular vein- 3).   - One (of 27) lymph node positive for metastatic papillary thyroid   carcinoma.  1 cm in greatest diameter.  Negative for extranodal "   involvement  (Level II).   - Jugular vein negative for tumor.            All pertinent notes, labs, and images personally reviewed by me.     A/P  Ms.Nhu Mounika Nicole is a 30 year old here for the evaluation of thyroid cancer:    1. 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  It was 2.2 cm unifocal, left lobe tumor with no lymph node involvement.  S/p  total thyroidectomy in 2011 and 78.7 mCi of I-131 HERNANDEZ. No evidence for distant metastatic disease on post therapy scan.  2016- new lymph node s/p FNA with PTC with rising TG  S/p 8/2016- left modified neck radiation. 1/27 lymph node + ( level2). Postop course complicated by hypocalcemia.  Delivered 1/2017. She is no longer breastfeeding.  -- get WBS. I discussed contact precautions and the need to stop breastfeeding before and after that. Contraception for 6 months.  Discussed protocol.  2.  Hypothyroidism (postoperative following total thyroidectomy for thyroid cancer):  On levothyroxine 137  g/day. Generic. Since 3/3/17  Consider to switch to synthroid- she will check with insurance.  With history of thyroid cancer goal TSH < 0.1    More than 50% of the time spent with Ms. Nicole on counseling / coordinating her care.      Follow-up:  Follow up after WBS    Vandana Costello MD  Endocrinology   [facility]/[place]  CC: Evan Zamora     Addendum to above note and clinic visit:    Labs reviewed.    See result note/telephone encounter.

## 2017-03-28 NOTE — NURSING NOTE
"Chief Complaint   Patient presents with     Consult     would like to duiscuss up-coming scan       Initial /82 (BP Location: Right arm, Patient Position: Chair, Cuff Size: Adult Regular)  Pulse 84  Temp 97.6  F (36.4  C) (Oral)  Ht 5' 1\" (1.549 m)  Wt 159 lb 6.4 oz (72.3 kg)  LMP 04/11/2016 (Exact Date)  SpO2 95%  BMI 30.12 kg/m2 Estimated body mass index is 30.12 kg/(m^2) as calculated from the following:    Height as of this encounter: 5' 1\" (1.549 m).    Weight as of this encounter: 159 lb 6.4 oz (72.3 kg).  Medication Reconciliation: complete  "

## 2017-03-28 NOTE — MR AVS SNAPSHOT
After Visit Summary   3/28/2017    Aleyda Nicole    MRN: [id number]           Patient Information     Date Of Birth          1985        Visit Information        Provider Department      3/28/2017 1:30 PM Vandana Costello MD [facility]        Today's Diagnoses     Papillary Thyroid carcinoma, follicular variant    -  1    Postoperative hypothyroidism           Follow-ups after your visit        Your next 10 appointments already scheduled     Apr 17, 2017  9:00 AM CDT   NM INJECTION THYROGEN with SHNMINJ   [facility] ([facility])    [address] [facility] [address] [phone]           Please bring a list of your medicines to the exam. (Include vitamins, minerals and over-the-counter drugs.) You should wear comfortable clothes. Leave your valuables at home. Please bring related prior results and films.  Tell your doctor:   If you are breastfeeding or may be pregnant.   If you have had a barium test within the past few days. Barium may change the results of certain exams.   If you think you may need sedation (medicine to help you relax).  You may eat and drink as normal.  Please call your Imaging Department at your exam site with any questions.            Apr 18, 2017  9:00 AM CDT   NM INJECTION THYROGEN with SHNMINJ   [facility] ([facility])    [address] [facility] [address] [phone]           Please bring a list of your medicines to the exam. (Include vitamins, minerals and over-the-counter drugs.) You should wear comfortable clothes. Leave your valuables at home. Please bring related prior results and films.  Tell your doctor:   If you are breastfeeding or may be pregnant.   If you have had a barium test within the past few days. Barium may change the results of certain exams.   If you think you may need sedation (medicine to help you relax).  You may eat and  drink as normal.  Please call your Imaging Department at your exam site with any questions.            Apr 19, 2017  9:00 AM CDT   NM INJECT with SHNMINJ   [facility] ([facility])    [address] [facility] [address]   [phone]            Apr 20, 2017  9:00 AM CDT   NM THYROID WH BDY SCAN I 123 with SHNM1   [facility] ([facility])    [address] [facility] [address]   [phone]           If you take a thyroid hormone, you should stop taking it 3 to 6 weeks before your test or treatment. Your doctor will tell you when to stop taking it.  For 3 to 4 weeks before your test or treatment, avoid foods or medicines that contain large amounts of iodine. These include seafood, iodized salt, cold medicine and IV dye (used during medical exams). Tell your doctor if you ve had any of these in the last two months.  For an uptake and scan: Stop eating 4 hours before your first visit. You may drink water.  For thyroid therapy: Stop all food and drink (including water) 1 hour before your first visit.  Women of child-bearing age: Tell your doctor if you are breastfeeding or if there s any chance you may be pregnant. If you will have thyroid therapy, you must have a pregnancy test within 48 hours.  Please bring a list of your medicines to the hospital. Include vitamins, minerals and over-the-counter drugs.  You should wear loose clothing, such as a sweat suit or jogging clothes. We may ask you to undress and put on a hospital gown.  You may need to remove your watch and any jewelry. It is safest to leave personal items at home.              Who to contact     If you have questions or need follow up information about today's clinic visit or your schedule please contact [facility] directly at [phone].  Normal or non-critical lab and imaging results will be communicated to you by MyChart, letter or phone  "within 4 business days after the clinic has received the results. If you do not hear from us within 7 days, please contact the clinic through [employer] or phone. If you have a critical or abnormal lab result, we will notify you by phone as soon as possible.  Submit refill requests through [employer] or call your pharmacy and they will forward the refill request to us. Please allow 3 business days for your refill to be completed.          Additional Information About Your Visit        [employer] Information     [employer] lets you send messages to your doctor, view your test results, renew your prescriptions, schedule appointments and more. To sign up, go to [email or web address] . Click on \"Log in\" on the left side of the screen, which will take you to the Welcome page. Then click on \"Sign up Now\" on the right side of the page.     You will be asked to enter the access code listed below, as well as some personal information. Please follow the directions to create your username and password.     Your access code is: [email or web address]  Expires: 2017  3:52 PM     Your access code will  in 90 days. If you need help or a new code, please call your [facility] clinic or [phone].        Care EveryWhere ID     This is your Care EveryWhere ID. This could be used by other organizations to access your [facility] medical records  [id number]        Your Vitals Were     Pulse Temperature Height Last Period Pulse Oximetry BMI (Body Mass Index)    84 97.6  F (36.4  C) (Oral) 1.549 m (5' 1\") 2016 (Exact Date) 95% 30.12 kg/m2       Blood Pressure from Last 3 Encounters:   17 118/82   17 120/82   17 102/60    Weight from Last 3 Encounters:   17 72.3 kg (159 lb 6.4 oz)   17 73.4 kg (161 lb 14.4 oz)   17 71.2 kg (157 lb)              Today, you had the following     No orders found for display       Primary Care Provider Office Phone # Fax #    Evan Zamora [phone]    "    [facility] [address] [facility] [address]        Thank you!     Thank you for choosing [facility]  for your care. Our goal is always to provide you with excellent care. Hearing back from our patients is one way we can continue to improve our services. Please take a few minutes to complete the written survey that you may receive in the mail after your visit with us. Thank you!             Your Updated Medication List - Protect others around you: Learn how to safely use, store and throw away your medicines at www.disposemymeds.org.          This list is accurate as of: 3/28/17  2:15 PM.  Always use your most recent med list.                   Brand Name Dispense Instructions for use    albuterol 108 (90 BASE) MCG/ACT Inhaler    PROAIR HFA/PROVENTIL HFA/VENTOLIN HFA    1 Inhaler    Inhale 2 puffs into the lungs every 4 hours as needed for shortness of breath / dyspnea or wheezing       levothyroxine 137 MCG tablet    SYNTHROID/LEVOTHROID    90 tablet    Take 1 tablet (137 mcg) by mouth daily       prenatal multivitamin  plus iron 27-0.8 MG Tabs per tablet      Take 1 tablet by mouth daily

## 2017-04-13 DIAGNOSIS — E89.0 POSTOPERATIVE HYPOTHYROIDISM: ICD-10-CM

## 2017-04-13 DIAGNOSIS — C73 PAPILLARY CARCINOMA, FOLLICULAR VARIANT (H): ICD-10-CM

## 2017-04-13 LAB — BETA HCG QUAL IFA URINE: NEGATIVE

## 2017-04-13 PROCEDURE — 84703 CHORIONIC GONADOTROPIN ASSAY: CPT | Performed by: INTERNAL MEDICINE

## 2017-04-17 ENCOUNTER — HOSPITAL ENCOUNTER (OUTPATIENT)
Facility: CLINIC | Age: 32
Discharge: HOME | End: 2017-04-17
Admitting: RADIOLOGY
Payer: COMMERCIAL

## 2017-04-17 ENCOUNTER — HOSPITAL ENCOUNTER (OUTPATIENT)
Dept: NUCLEAR MEDICINE | Facility: CLINIC | Age: 32
Setting detail: NUCLEAR MEDICINE
End: 2017-04-17
Attending: INTERNAL MEDICINE
Payer: COMMERCIAL

## 2017-04-17 VITALS
HEART RATE: 77 BPM | OXYGEN SATURATION: 96 % | BODY MASS INDEX: 29.27 KG/M2 | RESPIRATION RATE: 16 BRPM | WEIGHT: 155 LBS | DIASTOLIC BLOOD PRESSURE: 74 MMHG | HEIGHT: 61 IN | SYSTOLIC BLOOD PRESSURE: 116 MMHG | TEMPERATURE: 97.9 F

## 2017-04-17 DIAGNOSIS — C73 PAPILLARY CARCINOMA, FOLLICULAR VARIANT (H): ICD-10-CM

## 2017-04-17 DIAGNOSIS — E89.0 POSTOPERATIVE HYPOTHYROIDISM: ICD-10-CM

## 2017-04-17 LAB — HCG SERPL QL: NEGATIVE

## 2017-04-17 PROCEDURE — 84703 CHORIONIC GONADOTROPIN ASSAY: CPT | Performed by: RADIOLOGY

## 2017-04-17 PROCEDURE — 96372 THER/PROPH/DIAG INJ SC/IM: CPT

## 2017-04-17 PROCEDURE — 36415 COLL VENOUS BLD VENIPUNCTURE: CPT | Performed by: RADIOLOGY

## 2017-04-17 PROCEDURE — 40000863 ZZH STATISTIC RADIOLOGY XRAY, US, CT, MAR, NM

## 2017-04-17 PROCEDURE — 25000128 H RX IP 250 OP 636: Performed by: RADIOLOGY

## 2017-04-17 RX ADMIN — THYROTROPIN ALFA 0.9 MG: KIT at 10:43

## 2017-04-17 NOTE — PROGRESS NOTES
Pre procedure plan of care reviewed with pt prior to injection.  All questions answered and pt appears  to accept understand.  Given thyrogen packet. Pt denies pain, nausea or vomiting at this time.

## 2017-04-17 NOTE — PROGRESS NOTES
Care Suites Arrival    Reason for Visit: thyrogen  Arrival interventions:none    Pt resting in bed/recyliner, denies additional needs at this time, call light in reach.  Waiting for injection.

## 2017-04-17 NOTE — PROGRESS NOTES
Pt denies nausea, vomiting, pain or lightheadedness post injection.  Ambulating safely at time of d/c to home.  Declines po food or fluids prior to D/C.

## 2017-04-18 ENCOUNTER — OFFICE VISIT (OUTPATIENT)
Dept: INTERNAL MEDICINE | Facility: CLINIC | Age: 32
End: 2017-04-18
Payer: COMMERCIAL

## 2017-04-18 ENCOUNTER — HOSPITAL ENCOUNTER (OUTPATIENT)
Facility: CLINIC | Age: 32
Discharge: HOME | End: 2017-04-18
Attending: INTERNAL MEDICINE | Admitting: INTERNAL MEDICINE
Payer: COMMERCIAL

## 2017-04-18 ENCOUNTER — HOSPITAL ENCOUNTER (OUTPATIENT)
Dept: NUCLEAR MEDICINE | Facility: CLINIC | Age: 32
Setting detail: NUCLEAR MEDICINE
End: 2017-04-18
Attending: INTERNAL MEDICINE
Payer: COMMERCIAL

## 2017-04-18 VITALS
SYSTOLIC BLOOD PRESSURE: 108 MMHG | RESPIRATION RATE: 16 BRPM | HEART RATE: 78 BPM | BODY MASS INDEX: 29.27 KG/M2 | DIASTOLIC BLOOD PRESSURE: 70 MMHG | OXYGEN SATURATION: 96 % | HEIGHT: 61 IN | WEIGHT: 155 LBS | TEMPERATURE: 97.7 F

## 2017-04-18 VITALS
WEIGHT: 157.8 LBS | SYSTOLIC BLOOD PRESSURE: 108 MMHG | HEART RATE: 82 BPM | DIASTOLIC BLOOD PRESSURE: 78 MMHG | BODY MASS INDEX: 29.82 KG/M2 | OXYGEN SATURATION: 97 % | TEMPERATURE: 98 F

## 2017-04-18 DIAGNOSIS — J98.01 ACUTE BRONCHOSPASM: ICD-10-CM

## 2017-04-18 DIAGNOSIS — J45.21 INTERMITTENT ASTHMA, WITH ACUTE EXACERBATION: Primary | ICD-10-CM

## 2017-04-18 PROCEDURE — 99214 OFFICE O/P EST MOD 30 MIN: CPT | Performed by: INTERNAL MEDICINE

## 2017-04-18 PROCEDURE — 25000128 H RX IP 250 OP 636: Performed by: INTERNAL MEDICINE

## 2017-04-18 PROCEDURE — 40000863 ZZH STATISTIC RADIOLOGY XRAY, US, CT, MAR, NM

## 2017-04-18 RX ORDER — MONTELUKAST SODIUM 10 MG/1
10 TABLET ORAL AT BEDTIME
Qty: 30 TABLET | Refills: 1 | Status: SHIPPED | OUTPATIENT
Start: 2017-04-18 | End: 2017-09-20

## 2017-04-18 RX ADMIN — THYROTROPIN ALFA 0.9 MG: 0.9 INJECTION, POWDER, FOR SOLUTION INTRAMUSCULAR at 09:03

## 2017-04-18 NOTE — PROGRESS NOTES
0900 Pregnancy test negative. No intercoarse. No symptoms with yesterday injection. Injection given today.  0917 reviewed discharge instructions and copy given. Answered questions, called [facility] to get answers. Updated schedule for her.  0920 pt denies any symptoms after injection, feels  Normal. 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 Pt discharged.

## 2017-04-18 NOTE — PROGRESS NOTES
SUBJECTIVE:                                                    Aleyda Nicole is a 31 year old female who presents to clinic today for the following health issues:      Concern - Wheezing and coughing at night     Onset: 1 month    Has sx earlier this month seen by PA and rx w/some success.    Last year also had similar sx in spring    Notes allergies typically flare up this time of year    Description:   Patient complains of coughing and wheezing at night    Intensity: mild    Progression of Symptoms:  same    Accompanying Signs & Symptoms:  none       Previous history of similar problem:   none    Precipitating factors:   Worsened by: Laying down    Alleviating factors:  Improved by: none       Therapies Tried and outcome: Inhaler sometimes helps    Problem list and histories reviewed & adjusted, as indicated.  Additional history: as documented    Labs reviewed in EPIC    Reviewed and updated as needed this visit by clinical staff  Allergies       Reviewed and updated as needed this visit by Provider       ROS:  Constitutional, HEENT, cardiovascular, pulmonary, gi and gu systems are negative, except as otherwise noted.    OBJECTIVE:                                                    /78  Pulse 82  Temp 98  F (36.7  C) (Oral)  Wt 157 lb 12.8 oz (71.6 kg)  SpO2 97%  BMI 29.82 kg/m2  Body mass index is 29.82 kg/(m^2).  GENERAL APPEARANCE: healthy, no distress and over weight  HENT: nose and mouth without ulcers or lesions  NECK: no adenopathy, no asymmetry, masses, or scars and thyroid normal to palpation  RESP: faint wheezing noted   CV: regular rates and rhythm, normal S1 S2, no S3 or S4 and no murmur, click or rub    Diagnostic test results:  none      ASSESSMENT/PLAN:                                                    1. Intermittent asthma, with acute exacerbation  2. Acute bronchospasm  I think she has a touch of asthma - or at least reactive airway disease in the setting of her spring allergies    Try using albuterol prn and singulair for the months of march -June each year  - montelukast (SINGULAIR) 10 MG tablet; Take 1 tablet (10 mg) by mouth At Bedtime  Dispense: 30 tablet; Refill: 1      Follow up with Provider - lea Zamora MD  [facility]

## 2017-04-18 NOTE — IP AVS SNAPSHOT
MRN:[id number]                      After Visit Summary   4/18/2017    Aleyda Nicole    MRN: [id number]           Visit Information        Department      4/18/2017  8:42 AM [facility]          Review of your medicines      UNREVIEWED medicines. Ask your doctor about these medicines        Dose / Directions    albuterol 108 (90 BASE) MCG/ACT Inhaler   Commonly known as:  PROAIR HFA/PROVENTIL HFA/VENTOLIN HFA   Used for:  Acute bronchospasm, Upper respiratory tract infection, unspecified type        Dose:  2 puff   Inhale 2 puffs into the lungs every 4 hours as needed for shortness of breath / dyspnea or wheezing   Quantity:  1 Inhaler   Refills:  0       levothyroxine 137 MCG tablet   Commonly known as:  SYNTHROID/LEVOTHROID   Used for:  Papillary carcinoma, follicular variant (H), Postoperative hypothyroidism        Dose:  137 mcg   Take 1 tablet (137 mcg) by mouth daily   Quantity:  90 tablet   Refills:  1       prenatal multivitamin  plus iron 27-0.8 MG Tabs per tablet   Used for:  Postoperative hypothyroidism, Papillary carcinoma, follicular variant (H)        Dose:  1 tablet   Take 1 tablet by mouth daily   Refills:  0                Protect others around you: Learn how to safely use, store and throw away your medicines at www.disposemymeds.org.         Follow-ups after your visit        Your next 10 appointments already scheduled     Apr 18, 2017  1:20 PM CDT   Office Visit with Evan Zamora MD   [facility] ([facility])    [address] [place] [address] [phone]           Bring a current list of meds and any records pertaining to this visit.  For Physicals, please bring immunization records and any forms needing to be filled out.  Please arrive 10 minutes early to complete paperwork.            Apr 19, 2017  9:00 AM CDT   NM INJECT with NANCI   [facility] ([facility]  [facility])    [address] [facility] [address]   [phone]            Apr 20, 2017  9:00 AM CDT   NM THYROID WH BDY SCAN I 123 with SHNM1   [facility] ([facility])    [address] [address]   [phone]           If you take a thyroid hormone, you should stop taking it 3 to 6 weeks before your test or treatment. Your doctor will tell you when to stop taking it.  For 3 to 4 weeks before your test or treatment, avoid foods or medicines that contain large amounts of iodine. These include seafood, iodized salt, cold medicine and IV dye (used during medical exams). Tell your doctor if you ve had any of these in the last two months.  For an uptake and scan: Stop eating 4 hours before your first visit. You may drink water.  For thyroid therapy: Stop all food and drink (including water) 1 hour before your first visit.  Women of child-bearing age: Tell your doctor if you are breastfeeding or if there s any chance you may be pregnant. If you will have thyroid therapy, you must have a pregnancy test within 48 hours.  Please bring a list of your medicines to the hospital. Include vitamins, minerals and over-the-counter drugs.  You should wear loose clothing, such as a sweat suit or jogging clothes. We may ask you to undress and put on a hospital gown.  You may need to remove your watch and any jewelry. It is safest to leave personal items at home.               Care Instructions        Further instructions from your care team       Thyrogen Discharge Instructions    Activity:      You may go back to your normal routine    Do not have sex for a week starting today. It is vital that you do not pass radiation to your partner, and that you prevent pregnancy.    Diet:      Stay on your low-iodine diet until after your scan - if you have been instructed to do so by you provider    Medicines:      Keep taking your regular medications     Side  "Effects:      You may have mild nausea, vomiting, headache, dizziness and/or fatigue    Side effects should be gone within 48 hours after your second dose of thyrogen            Follow Up Appointments:      Follow up with your primary care provider as needed    Call your primary care provider if:      Chills or a fever greater than 101 F (38 C)    Hives, a rash or unusual itching    Problems breathing    Any questions or concerns        If you have questions call:          [facility] Radiology Dept @ [phone]  Check in wed 17 0900 will go take some capsules in Nuc Med. Nothing to eat, drinking liquids is ok prior to coming in. Ok to take meds.  Check in Thur 17 0845 will go to [facility]uc [facility] for one hour scan.     Additional Information About Your Visit        [employer] Information     [employer] lets you send messages to your doctor, view your test results, renew your prescriptions, schedule appointments and more. To sign up, go to [email or web address] . Click on \"Log in\" on the left side of the screen, which will take you to the Welcome page. Then click on \"Sign up Now\" on the right side of the page.     You will be asked to enter the access code listed below, as well as some personal information. Please follow the directions to create your username and password.     Your access code is: [email or web address]  Expires: 2017  3:52 PM     Your access code will  in 90 days. If you need help or a new code, please call your [facility] clinic or [phone].        Care EveryWhere ID     This is your Care EveryWhere ID. This could be used by other organizations to access your [facility] medical records  [id number]        Your Vitals Were     Blood Pressure Pulse Temperature Respirations Height Weight    108/70 (BP Location: Right arm) 78 97.7  F (36.5  C) (Oral) 16 1.549 m (5' 1\") 70.3 kg (155 lb)    Pulse Oximetry BMI (Body Mass Index)                96% 29.29 kg/m2           Primary Care Provider Office " Phone # Fax #    Evan Zamora [phone]      Thank you!     Thank you for choosing [facility] for your care. Our goal is always to provide you with excellent care. Hearing back from our patients is one way we can continue to improve our services. Please take a few minutes to complete the written survey that you may receive in the mail after you visit with us. Thank you!             Medication List: This is a list of all your medications and when to take them. Check marks below indicate your daily home schedule. Keep this list as a reference.      Medications           Morning Afternoon Evening Bedtime As Needed    albuterol 108 (90 BASE) MCG/ACT Inhaler   Commonly known as:  PROAIR HFA/PROVENTIL HFA/VENTOLIN HFA   Inhale 2 puffs into the lungs every 4 hours as needed for shortness of breath / dyspnea or wheezing                                levothyroxine 137 MCG tablet   Commonly known as:  SYNTHROID/LEVOTHROID   Take 1 tablet (137 mcg) by mouth daily                                prenatal multivitamin  plus iron 27-0.8 MG Tabs per tablet   Take 1 tablet by mouth daily

## 2017-04-18 NOTE — LETTER
My Asthma Action Plan  Name: Aleyda Nicole   Date: 4/18/2017   My doctor: Evan Zamora   My clinic: [address] [address] [phone] My Asthma Severity: intermittent    My Control Medicine: singulair (spring only)  My Rescue Medicine: Albuterol     Pharmacy: [address]  Avoid these possible asthma triggers: smoke, upper respiratory infections, dust mites, pollens, animal dander, insects/rodents, mold, humidity, aspirin, strong odors and fumes, occupational exposure, exercise or sports, emotions, cold air and Gastric Reflux        GREEN ZONE   Good Control    I feel good    No cough or wheeze    Can work, sleep and play without asthma symptoms       Take your asthma control medicine every day.    1. If exercise triggers your asthma, take albuterol 2 puffs      15 minutes before exercise or sports, and    During exercise if you have asthma symptoms  2. Spacer to use with inhaler: no              YELLOW ZONE Getting Worse  I have ANY of these:    I do not feel good    Cough or wheeze    Chest feels tight    Wake up at night   1. Keep taking your Green Zone medications  2. Start taking your rescue medicine:    every 20 minutes for up to 1 hour. Then every 4 hours for 24-48 hours.  3. If you stay in the Yellow Zone for more than 12-24 hours, contact your doctor.  4. If you do not return to the Green Zone in 12-24 hours or you get worse, start taking your oral steroid medicine if prescribed by your provider.           RED ZONE Medical Alert - Get Help  I have ANY of these:    I feel awful    Medicine is not helping    Breathing getting harder    Trouble walking or talking    Nose opens wide to breathe       1. Take your rescue medicine NOW  2. If your provider has prescribed an oral steroid medicine, start taking it NOW  3. Call your doctor NOW  4. If you are still in the Red Zone after 20 minutes  and you have not reached your doctor:    Take your rescue medicine again and    Call 911 or go to the emergency room right away    See your regular doctor within 2 weeks of an Emergency Room or Urgent Care visit for follow-up treatment.        Asthma Health Reminders:    * Meet with Asthma Educator annually, if indicated  * Flu shot each year in the fall  * Pneumonia shot    Electronically signed April 18, 2017 by: Evan Zamora                          Asthma Triggers  How To Control Things That Make Your Asthma Worse    Triggers are things that make your asthma worse.  Look at the list below to help you find your triggers and what you can do about them.  You can help prevent asthma flare-ups by staying away from your triggers.      Trigger                                                          What you can do   Cigarette Smoke  Tobacco smoke can make asthma worse. Do not allow smoking in your home, car or around you.  Be sure no one smokes at a child s day care or school.  If you smoke, ask your health care provider for ways to help you quick.  Ask family members to quit too.  Ask your health care provider for a referral to Quit plan to help you quit smoking, or call 6-457-156-PLAN.     Colds, Flu, Bronchitis  These are common triggers of asthma. Wash your hands often.  Don t touch your eyes, nose or mouth.  Get a flu shot every year.     Dust Mites  These are tiny bugs that live in cloth or carpet. They are too small to see. Wash sheets and blankets in hot water every week.   Encase pillows and mattress in dust mite proof covers.  Avoid having carpet if you can. If you have carpet, vacuum weekly.   Use a dust mask and HEPA vacuum.   Pollen and Outdoor Mold  Some people are allergic to trees, grass, or weed pollen, or molds. Try to keep your windows closed.  Limit time out doors when pollen count is high.   Ask you health care provider about taking medicine during allergy season.     Animal Dander  Some  people are allergic to skin flakes, urine or saliva from pets with fur or feathers. Keep pets with fur or feathers out of your home.    If you can t keep the pet outdoors, then keep the pet out of your bedroom.  Keep the bedroom door closed.  Keep pets off cloth furniture and away from stuffed toys.     Mice, Rats, and Cockroaches  Some people are allergic to the waste from these pets.   Cover food and garbage.  Clean up spills and food crumbs.  Store grease in the refrigerator.   Keep food out of the bedroom.   Indoor Mold  This can be a trigger if your home has high moisture Fix leaking faucets, pipes, or other sources of water.   Clean moldy surfaces.  Dehumidify basement if it is damp and smelly.   Smoke, Strong Odors, and Sprays  These can reduce air quality. Stay away from strong odors and sprays, such as perfume, powder, hair spray, paints, smoke incense, paints, cleaning products, candles and new carpet.   Exercise or Sports  Some people with asthma have this trigger. Be active!  Ask you doctor about taking medicine before sports or exercise to prevent symptoms.    Warm up for 5-10 minutes before and after sports or exercise.     Other Triggers of Asthma  Cold air:  Cover your nose and mouth with a scarf.  Sometimes laughing or crying can be a trigger.  Some medicines and food can trigger asthma.

## 2017-04-18 NOTE — IP AVS SNAPSHOT
[place] [address]    [place] [address]    Phone:  [phone]                                       After Visit Summary   4/18/2017    Aleyda Nicole    MRN: [id number]           After Visit Summary Signature Page     I have received my discharge instructions, and my questions have been answered. I have discussed any challenges I see with this plan with the nurse or doctor.    ..........................................................................................................................................  Patient/Patient Representative Signature      ..........................................................................................................................................  Patient Representative Print Name and Relationship to Patient    ..................................................               ................................................  Date                                            Time    ..........................................................................................................................................  Reviewed by Signature/Title    ...................................................              ..............................................  Date                                                            Time

## 2017-04-18 NOTE — MR AVS SNAPSHOT
After Visit Summary   4/18/2017    Aleyda Nicole    MRN: [id number]           Patient Information     Date Of Birth          1985        Visit Information        Provider Department      4/18/2017 1:20 PM Evan Zamora MD [facility]        Today's Diagnoses     Intermittent asthma, with acute exacerbation    -  1    Acute bronchospasm           Follow-ups after your visit        Your next 10 appointments already scheduled     Apr 19, 2017  9:00 AM CDT   NM INJECT with SHNMINJ   [facility])    [address] [facility] [address]   [phone]            Apr 20, 2017  9:00 AM CDT   NM THYROID WH BDY SCAN I 123 with SHNM1   [facility])    [address] [facility] [address]   [phone]           If you take a thyroid hormone, you should stop taking it 3 to 6 weeks before your test or treatment. Your doctor will tell you when to stop taking it.  For 3 to 4 weeks before your test or treatment, avoid foods or medicines that contain large amounts of iodine. These include seafood, iodized salt, cold medicine and IV dye (used during medical exams). Tell your doctor if you ve had any of these in the last two months.  For an uptake and scan: Stop eating 4 hours before your first visit. You may drink water.  For thyroid therapy: Stop all food and drink (including water) 1 hour before your first visit.  Women of child-bearing age: Tell your doctor if you are breastfeeding or if there s any chance you may be pregnant. If you will have thyroid therapy, you must have a pregnancy test within 48 hours.  Please bring a list of your medicines to the hospital. Include vitamins, minerals and over-the-counter drugs.  You should wear loose clothing, such as a sweat suit or jogging clothes. We may ask you to undress and put on a hospital gown.  You may need to  "remove your watch and any jewelry. It is safest to leave personal items at home.              Who to contact     If you have questions or need follow up information about today's clinic visit or your schedule please contact [facility] directly at [phone].  Normal or non-critical lab and imaging results will be communicated to you by MyChart, letter or phone within 4 business days after the clinic has received the results. If you do not hear from us within 7 days, please contact the clinic through MyChart or phone. If you have a critical or abnormal lab result, we will notify you by phone as soon as possible.  Submit refill requests through [employer] or call your pharmacy and they will forward the refill request to us. Please allow 3 business days for your refill to be completed.          Additional Information About Your Visit        [facility] Information     [employer] lets you send messages to your doctor, view your test results, renew your prescriptions, schedule appointments and more. To sign up, go to [email or web address] . Click on \"Log in\" on the left side of the screen, which will take you to the Welcome page. Then click on \"Sign up Now\" on the right side of the page.     You will be asked to enter the access code listed below, as well as some personal information. Please follow the directions to create your username and password.     Your access code is: [email or web address]  Expires: 2017  3:52 PM     Your access code will  in 90 days. If you need help or a new code, please call your [facility] clinic or [phone].        Care EveryWhere ID     This is your Care EveryWhere ID. This could be used by other organizations to access your [facility] medical records  [id number]        Your Vitals Were     Pulse Temperature Pulse Oximetry BMI (Body Mass Index)          82 98  F (36.7  C) (Oral) 97% 29.82 kg/m2         Blood Pressure from Last 3 Encounters:   17 108/78   17 " 108/70   04/17/17 116/74    Weight from Last 3 Encounters:   04/18/17 157 lb 12.8 oz (71.6 kg)   04/18/17 155 lb (70.3 kg)   04/17/17 155 lb (70.3 kg)              We Performed the Following     Asthma Action Plan (AAP)          Today's Medication Changes          These changes are accurate as of: 4/18/17  2:01 PM.  If you have any questions, ask your nurse or doctor.               Start taking these medicines.        Dose/Directions    montelukast 10 MG tablet   Commonly known as:  SINGULAIR   Used for:  Acute bronchospasm, Intermittent asthma, with acute exacerbation        Dose:  10 mg   Take 1 tablet (10 mg) by mouth At Bedtime   Quantity:  30 tablet   Refills:  1            Where to get your medicines      These medications were sent to [facility] [address] [address]     Phone:  [phone]     montelukast 10 MG tablet                Primary Care Provider Office Phone # Fax #    Evan Zamora [phone]       [address] [address]        Thank you!     Thank you for choosing [facility]  for your care. Our goal is always to provide you with excellent care. Hearing back from our patients is one way we can continue to improve our services. Please take a few minutes to complete the written survey that you may receive in the mail after your visit with us. Thank you!             Your Updated Medication List - Protect others around you: Learn how to safely use, store and throw away your medicines at www.disposemymeds.org.          This list is accurate as of: 4/18/17  2:01 PM.  Always use your most recent med list.                   Brand Name Dispense Instructions for use    albuterol 108 (90 BASE) MCG/ACT Inhaler    PROAIR HFA/PROVENTIL HFA/VENTOLIN HFA    1 Inhaler    Inhale 2 puffs into the lungs every 4 hours as needed for shortness of breath / dyspnea  or wheezing       levothyroxine 137 MCG tablet    SYNTHROID/LEVOTHROID    90 tablet    Take 1 tablet (137 mcg) by mouth daily       montelukast 10 MG tablet    SINGULAIR    30 tablet    Take 1 tablet (10 mg) by mouth At Bedtime       prenatal multivitamin  plus iron 27-0.8 MG Tabs per tablet      Take 1 tablet by mouth daily

## 2017-04-18 NOTE — DISCHARGE INSTRUCTIONS
Thyrogen Discharge Instructions    Activity:      You may go back to your normal routine    Do not have sex for a week starting today. It is vital that you do not pass radiation to your partner, and that you prevent pregnancy.    Diet:      Stay on your low-iodine diet until after your scan - if you have been instructed to do so by you provider    Medicines:      Keep taking your regular medications     Side Effects:      You may have mild nausea, vomiting, headache, dizziness and/or fatigue    Side effects should be gone within 48 hours after your second dose of thyrogen            Follow Up Appointments:      Follow up with your primary care provider as needed    Call your primary care provider if:      Chills or a fever greater than 101 F (38 C)    Hives, a rash or unusual itching    Problems breathing    Any questions or concerns        If you have questions call:          [facility] @ [phone]  Check in wed 4-19-17 0900 will go take some capsules in [facility]. Nothing to eat, drinking liquids is ok prior to coming in. Ok to take meds.  Check in Thur 4-20-17 0845 will go to [facility] for one hour scan.

## 2017-04-18 NOTE — NURSING NOTE
"Chief Complaint   Patient presents with     Wheezing and coughing at night       Initial /78  Pulse 82  Temp 98  F (36.7  C) (Oral)  Wt 157 lb 12.8 oz (71.6 kg)  SpO2 97%  BMI 29.82 kg/m2 Estimated body mass index is 29.82 kg/(m^2) as calculated from the following:    Height as of an earlier encounter on 4/18/17: 5' 1\" (1.549 m).    Weight as of this encounter: 157 lb 12.8 oz (71.6 kg).  Medication Reconciliation: complete    "

## 2017-04-19 ENCOUNTER — HOSPITAL ENCOUNTER (OUTPATIENT)
Dept: NUCLEAR MEDICINE | Facility: CLINIC | Age: 32
Setting detail: NUCLEAR MEDICINE
Discharge: HOME | End: 2017-04-19
Attending: INTERNAL MEDICINE | Admitting: INTERNAL MEDICINE
Payer: COMMERCIAL

## 2017-04-19 DIAGNOSIS — E89.0 POSTOPERATIVE HYPOTHYROIDISM: ICD-10-CM

## 2017-04-19 DIAGNOSIS — C73 PAPILLARY CARCINOMA, FOLLICULAR VARIANT (H): ICD-10-CM

## 2017-04-19 LAB
ERYTHROCYTE [DISTWIDTH] IN BLOOD BY AUTOMATED COUNT: 14.1 % (ref 10–15)
HCT VFR BLD AUTO: 39.3 % (ref 35–47)
HGB BLD-MCNC: 12.8 G/DL (ref 11.7–15.7)
MCH RBC QN AUTO: 26.3 PG (ref 26.5–33)
MCHC RBC AUTO-ENTMCNC: 32.6 G/DL (ref 31.5–36.5)
MCV RBC AUTO: 81 FL (ref 78–100)
PLATELET # BLD AUTO: 274 10E9/L (ref 150–450)
RBC # BLD AUTO: 4.86 10E12/L (ref 3.8–5.2)
WBC # BLD AUTO: 7.3 10E9/L (ref 4–11)

## 2017-04-19 PROCEDURE — 36415 COLL VENOUS BLD VENIPUNCTURE: CPT | Performed by: INTERNAL MEDICINE

## 2017-04-19 PROCEDURE — 86800 THYROGLOBULIN ANTIBODY: CPT | Mod: 90 | Performed by: INTERNAL MEDICINE

## 2017-04-19 PROCEDURE — 84432 ASSAY OF THYROGLOBULIN: CPT | Mod: 90 | Performed by: INTERNAL MEDICINE

## 2017-04-19 PROCEDURE — 84443 ASSAY THYROID STIM HORMONE: CPT | Performed by: INTERNAL MEDICINE

## 2017-04-19 PROCEDURE — 85027 COMPLETE CBC AUTOMATED: CPT | Performed by: INTERNAL MEDICINE

## 2017-04-19 PROCEDURE — 99000 SPECIMEN HANDLING OFFICE-LAB: CPT | Performed by: INTERNAL MEDICINE

## 2017-04-19 PROCEDURE — 84439 ASSAY OF FREE THYROXINE: CPT | Performed by: INTERNAL MEDICINE

## 2017-04-19 PROCEDURE — 34300033 ZZH RX 343: Performed by: INTERNAL MEDICINE

## 2017-04-19 PROCEDURE — 00000344 ZZHCL STATISTIC REMEASURE THYROGLOBULIN: Mod: 90 | Performed by: INTERNAL MEDICINE

## 2017-04-19 PROCEDURE — A9509 IODINE I-123 SOD IODIDE MIL: HCPCS | Performed by: INTERNAL MEDICINE

## 2017-04-19 RX ORDER — SODIUM IODIDE-123 3.7 MBQ
2 CAPSULE ORAL ONCE
Status: COMPLETED | OUTPATIENT
Start: 2017-04-19 | End: 2017-04-19

## 2017-04-19 RX ADMIN — Medication 1.8 MCI.: at 09:25

## 2017-04-20 ENCOUNTER — HOSPITAL ENCOUNTER (OUTPATIENT)
Dept: NUCLEAR MEDICINE | Facility: CLINIC | Age: 32
Setting detail: NUCLEAR MEDICINE
End: 2017-04-20
Attending: INTERNAL MEDICINE
Payer: COMMERCIAL

## 2017-04-20 ENCOUNTER — TELEPHONE (OUTPATIENT)
Dept: ENDOCRINOLOGY | Facility: CLINIC | Age: 32
End: 2017-04-20

## 2017-04-20 LAB
T4 FREE SERPL-MCNC: 1.29 NG/DL (ref 0.76–1.46)
TSH SERPL DL<=0.005 MIU/L-ACNC: 47.89 MU/L (ref 0.4–4)

## 2017-04-20 NOTE — TELEPHONE ENCOUNTER
NUCLEAR MEDICINE THYROID WHOLE BODY SCAN I-123 4/20/2017 11:38 AM      TECHNIQUE: The patient was given 1.8 mCi iodine 123 per oral on  4/19/2017. SPECT-CT with fusion was performed at the level of the neck  and chest.     HISTORY: Malignant neoplasm of thyroid gland. Postprocedural  hypothyroidism.     COMPARISON:   Nuclear Study: None.     Other Relevant Studies: None.     FINDINGS: Thyroidectomy. No suspicious focus of abnormal radiotracer  is seen at the thyroidectomy bed identified. Physiologic tracer uptake  seen at the salivary glands. There are several small subcentimeter  lymph nodes involving the bilateral neck without conspicuous focal  tracer uptake outside of the background tracer distribution. There  appears to be left neck postoperative change causing some  inconspicuity of the left sternocleidomastoid muscle. No convincing  worrisome airspace disease or mediastinal abnormality is seen.         IMPRESSION: No worrisome focal tracer uptake is identified to suggest  recurrent neoplasm or remote metastatic disease. Some postoperative  changes at the left neck noted, likely accounting for inconspicuity of  the left sternocleidomastoid muscle. Small bilateral subcentimeter  neck lymph nodes noted.    Called Aleyda. Discussed results.    The patient indicates understanding of these issues and agrees with the plan.

## 2017-04-26 LAB — LAB SCANNED RESULT: NORMAL

## 2017-04-27 ENCOUNTER — TELEPHONE (OUTPATIENT)
Dept: ENDOCRINOLOGY | Facility: CLINIC | Age: 32
End: 2017-04-27

## 2017-06-14 ENCOUNTER — OFFICE VISIT (OUTPATIENT)
Dept: INTERNAL MEDICINE | Facility: CLINIC | Age: 32
End: 2017-06-14
Payer: COMMERCIAL

## 2017-06-14 VITALS
BODY MASS INDEX: 30.42 KG/M2 | TEMPERATURE: 98 F | WEIGHT: 161 LBS | SYSTOLIC BLOOD PRESSURE: 114 MMHG | HEART RATE: 86 BPM | DIASTOLIC BLOOD PRESSURE: 80 MMHG | OXYGEN SATURATION: 98 %

## 2017-06-14 DIAGNOSIS — J45.21 INTERMITTENT ASTHMA, WITH ACUTE EXACERBATION: Primary | ICD-10-CM

## 2017-06-14 PROCEDURE — 99214 OFFICE O/P EST MOD 30 MIN: CPT | Performed by: INTERNAL MEDICINE

## 2017-06-14 RX ORDER — ALBUTEROL SULFATE 90 UG/1
2 AEROSOL, METERED RESPIRATORY (INHALATION) EVERY 4 HOURS PRN
Qty: 1 INHALER | Refills: 5 | Status: SHIPPED | OUTPATIENT
Start: 2017-06-14 | End: 2017-12-19

## 2017-06-14 RX ORDER — ALBUTEROL SULFATE 0.83 MG/ML
1 SOLUTION RESPIRATORY (INHALATION) EVERY 6 HOURS PRN
Qty: 60 VIAL | Refills: 5 | Status: SHIPPED | OUTPATIENT
Start: 2017-06-14 | End: 2017-09-20

## 2017-06-14 NOTE — NURSING NOTE
"Chief Complaint   Patient presents with     Asthma       Initial /80  Pulse 86  Temp 98  F (36.7  C) (Oral)  Wt 161 lb (73 kg)  LMP 05/24/2017  SpO2 98%  BMI 30.42 kg/m2 Estimated body mass index is 30.42 kg/(m^2) as calculated from the following:    Height as of 4/18/17: 5' 1\" (1.549 m).    Weight as of this encounter: 161 lb (73 kg).  Medication Reconciliation: complete     Sarah Severino, SAVI       "

## 2017-06-14 NOTE — MR AVS SNAPSHOT
"              After Visit Summary   2017    Aleyda Nicole    MRN: [id number]           Patient Information     Date Of Birth          1985        Visit Information        Provider Department      2017 10:20 AM Evan Zamora MD [facility]        Today's Diagnoses     Intermittent asthma, with acute exacerbation    -  1       Follow-ups after your visit        Who to contact     If you have questions or need follow up information about today's clinic visit or your schedule please contact [facility] directly at [phone].  Normal or non-critical lab and imaging results will be communicated to you by [employer], letter or phone within 4 business days after the clinic has received the results. If you do not hear from us within 7 days, please contact the clinic through [employer] or phone. If you have a critical or abnormal lab result, we will notify you by phone as soon as possible.  Submit refill requests through [employer] or call your pharmacy and they will forward the refill request to us. Please allow 3 business days for your refill to be completed.          Additional Information About Your Visit        MyChart Information     [employer] lets you send messages to your doctor, view your test results, renew your prescriptions, schedule appointments and more. To sign up, go to [email or web address] . Click on \"Log in\" on the left side of the screen, which will take you to the Welcome page. Then click on \"Sign up Now\" on the right side of the page.     You will be asked to enter the access code listed below, as well as some personal information. Please follow the directions to create your username and password.     Your access code is: [email or web address]  Expires: 2017 11:01 AM     Your access code will  in 90 days. If you need help or a new code, please call your [facility] or [phone].        Care EveryWhere ID     This is your Care " EveryWhere ID. This could be used by other organizations to access your [place] medical records  [id number]        Your Vitals Were     Pulse Temperature Last Period Pulse Oximetry BMI (Body Mass Index)       86 98  F (36.7  C) (Oral) 05/24/2017 98% 30.42 kg/m2        Blood Pressure from Last 3 Encounters:   06/14/17 114/80   04/18/17 108/78   04/18/17 108/70    Weight from Last 3 Encounters:   06/14/17 161 lb (73 kg)   04/18/17 157 lb 12.8 oz (71.6 kg)   04/18/17 155 lb (70.3 kg)              Today, you had the following     No orders found for display         Today's Medication Changes          These changes are accurate as of: 6/14/17 11:01 AM.  If you have any questions, ask your nurse or doctor.               These medicines have changed or have updated prescriptions.        Dose/Directions    * albuterol 108 (90 BASE) MCG/ACT Inhaler   Commonly known as:  PROAIR HFA/PROVENTIL HFA/VENTOLIN HFA   This may have changed:  Another medication with the same name was added. Make sure you understand how and when to take each.   Used for:  Intermittent asthma, with acute exacerbation   Changed by:  Evan Zamora MD        Dose:  2 puff   Inhale 2 puffs into the lungs every 4 hours as needed for shortness of breath / dyspnea or wheezing   Quantity:  1 Inhaler   Refills:  5       * albuterol (2.5 MG/3ML) 0.083% neb solution   This may have changed:  You were already taking a medication with the same name, and this prescription was added. Make sure you understand how and when to take each.   Used for:  Intermittent asthma, with acute exacerbation   Changed by:  Evan Zamora MD        Dose:  1 vial   Take 1 vial (2.5 mg) by nebulization every 6 hours as needed for shortness of breath / dyspnea or wheezing   Quantity:  60 vial   Refills:  5       * Notice:  This list has 2 medication(s) that are the same as other medications prescribed for you. Read the directions carefully, and ask your doctor or other care  provider to review them with you.         Where to get your medicines      These medications were sent to [facility] [address]  [address], [facility] [address]     Phone:  [phone]     albuterol (2.5 MG/3ML) 0.083% neb solution    albuterol 108 (90 BASE) MCG/ACT Inhaler                Primary Care Provider Office Phone # Fax #    Evan Zamora [phone]       [facility] [address]  [facility] [address]        Thank you!     Thank you for choosing [facility]  for your care. Our goal is always to provide you with excellent care. Hearing back from our patients is one way we can continue to improve our services. Please take a few minutes to complete the written survey that you may receive in the mail after your visit with us. Thank you!             Your Updated Medication List - Protect others around you: Learn how to safely use, store and throw away your medicines at www.disposemymeds.org.          This list is accurate as of: 6/14/17 11:01 AM.  Always use your most recent med list.                   Brand Name Dispense Instructions for use    * albuterol 108 (90 BASE) MCG/ACT Inhaler    PROAIR HFA/PROVENTIL HFA/VENTOLIN HFA    1 Inhaler    Inhale 2 puffs into the lungs every 4 hours as needed for shortness of breath / dyspnea or wheezing       * albuterol (2.5 MG/3ML) 0.083% neb solution     60 vial    Take 1 vial (2.5 mg) by nebulization every 6 hours as needed for shortness of breath / dyspnea or wheezing       levothyroxine 137 MCG tablet    SYNTHROID/LEVOTHROID    90 tablet    Take 1 tablet (137 mcg) by mouth daily       montelukast 10 MG tablet    SINGULAIR    30 tablet    Take 1 tablet (10 mg) by mouth At Bedtime       prenatal multivitamin  plus iron 27-0.8 MG Tabs per tablet      Take 1 tablet by mouth daily       * Notice:  This list has 2 medication(s) that are the same as other  medications prescribed for you. Read the directions carefully, and ask your doctor or other care provider to review them with you.

## 2017-06-14 NOTE — PROGRESS NOTES
SUBJECTIVE:                                                    Aleyda Nicole is a 31 year old female who presents to clinic today for the following health issues:    Asthma Follow-Up    Was ACT completed today?  Yes    ACT Total Scores 6/14/2017   ACT TOTAL SCORE (Goal Greater than or Equal to 20) 12     Recent asthma triggers that patient is dealing with: coughing and wheezing       Amount of exercise or physical activity: None    Problems taking medications regularly: No    Medication side effects: none    Diet: regular (no restrictions)    Problem list and histories reviewed & adjusted, as indicated.  Additional history: as documented    Labs reviewed in EPIC    Reviewed and updated as needed this visit by clinical staff       Reviewed and updated as needed this visit by Provider         ROS:  Constitutional, HEENT, cardiovascular, pulmonary, gi and gu systems are negative, except as otherwise noted.    OBJECTIVE:                                                    /80  Pulse 86  Temp 98  F (36.7  C) (Oral)  Wt 161 lb (73 kg)  LMP 05/24/2017  SpO2 98%  BMI 30.42 kg/m2  Body mass index is 30.42 kg/(m^2).  GENERAL APPEARANCE: alert and no distress  RESP: Good BS bilat . Very slight inspiratory wheezes once rarely  CV: regular rates and rhythm, normal S1 S2, no S3 or S4 and no murmur, click or rub    Diagnostic test results:  none      ASSESSMENT/PLAN:                                                    1. Intermittent asthma, with acute exacerbation  Very mild   - albuterol (PROAIR HFA/PROVENTIL HFA/VENTOLIN HFA) 108 (90 BASE) MCG/ACT Inhaler; Inhale 2 puffs into the lungs every 4 hours as needed for shortness of breath / dyspnea or wheezing  Dispense: 1 Inhaler; Refill: 5  - albuterol (2.5 MG/3ML) 0.083% neb solution; Take 1 vial (2.5 mg) by nebulization every 6 hours as needed for shortness of breath / dyspnea or wheezing  Dispense: 60 vial; Refill: 5      Follow up with Provider -lea LOPEZ  MD Sera  [facility]

## 2017-08-03 ENCOUNTER — OFFICE VISIT (OUTPATIENT)
Dept: ENDOCRINOLOGY | Facility: CLINIC | Age: 32
End: 2017-08-03
Payer: COMMERCIAL

## 2017-08-03 VITALS
TEMPERATURE: 98 F | HEIGHT: 61 IN | SYSTOLIC BLOOD PRESSURE: 110 MMHG | HEART RATE: 93 BPM | BODY MASS INDEX: 30.49 KG/M2 | DIASTOLIC BLOOD PRESSURE: 80 MMHG | WEIGHT: 161.5 LBS | OXYGEN SATURATION: 97 %

## 2017-08-03 DIAGNOSIS — E89.0 POSTOPERATIVE HYPOTHYROIDISM: ICD-10-CM

## 2017-08-03 DIAGNOSIS — C73 PAPILLARY CARCINOMA, FOLLICULAR VARIANT (H): Primary | ICD-10-CM

## 2017-08-03 DIAGNOSIS — C73 RECURRENT THYROID CANCER (H): ICD-10-CM

## 2017-08-03 LAB
CALCIUM SERPL-MCNC: 7.8 MG/DL (ref 8.5–10.1)
PTH-INTACT SERPL-MCNC: 18 PG/ML (ref 12–72)
T4 FREE SERPL-MCNC: 1.31 NG/DL (ref 0.76–1.46)
TSH SERPL DL<=0.005 MIU/L-ACNC: 5.31 MU/L (ref 0.4–4)

## 2017-08-03 PROCEDURE — 84443 ASSAY THYROID STIM HORMONE: CPT | Performed by: INTERNAL MEDICINE

## 2017-08-03 PROCEDURE — 82310 ASSAY OF CALCIUM: CPT | Performed by: INTERNAL MEDICINE

## 2017-08-03 PROCEDURE — 36415 COLL VENOUS BLD VENIPUNCTURE: CPT | Performed by: INTERNAL MEDICINE

## 2017-08-03 PROCEDURE — 99214 OFFICE O/P EST MOD 30 MIN: CPT | Performed by: INTERNAL MEDICINE

## 2017-08-03 PROCEDURE — 84439 ASSAY OF FREE THYROXINE: CPT | Performed by: INTERNAL MEDICINE

## 2017-08-03 PROCEDURE — 82306 VITAMIN D 25 HYDROXY: CPT | Performed by: INTERNAL MEDICINE

## 2017-08-03 PROCEDURE — 83970 ASSAY OF PARATHORMONE: CPT | Performed by: INTERNAL MEDICINE

## 2017-08-03 RX ORDER — LEVOTHYROXINE SODIUM 137 UG/1
137 TABLET ORAL DAILY
Qty: 90 TABLET | Refills: 1 | Status: SHIPPED | OUTPATIENT
Start: 2017-08-03 | End: 2017-08-05 | Stop reason: DRUGHIGH

## 2017-08-03 NOTE — PROGRESS NOTES
Name: Aleyda Nicole  Seen for f/u of papillary thyroid cancer and postoperative hypothyroidism.    Chief Complaint   Patient presents with     Consult     hypothyroidism      HPI:  Aleyda Nicole is a 30 year old female who presents for the evaluation of      1.  Papillary thyroid cancer;  Thyroid nodule was noticed in February 2011 which was followed by thyroid nodule biopsy which showed suspicious for papillary thyroid cancer.  She underwent total thyroidectomy 3/2011 and pathology showed papillary thyroid cancer with follicular pattern.  Tumor was about 2.2 cm on the left lobe.  No lymph node involvement.  S/p 78.7 mCi of I131 HERNANDEZ  8/2011. No evidence for distant metastatic disease on post therapy scan.  Neck US 6/2016- showed new lymph node in neck along with rising Tg levels  S/p FNA lymph node: 6/2016- c/w papillary thyroid cancer    8/2016: underwent left modified neck dissection.  1/27 lymph node positive one left modified neck dissection.  1 cm in greatest diameter.  Negative for external involvement at level II.    Tg decreased post left neck dissection from 1.3 to < 0.1    Follow up I123 4/2017: no mets.      Postop course was complicated by hypocalcemia and was started on calcium supplement.  Taking calcium- not taking.    Delivered baby 1/2017. No longer breast feeding    She was followed by endocrinology before and then lost to follow-up with endocrinology in 2012.  No history of neck radiation prior to diagnosis of thyroid cancer.  No family history of thyroid cancer.  Here to discuss WBC scan and protocol.  Has upcoming radiology appointments scheduled.      2.  Hypothyroidism (postoperative):  Was started on levothyroxine following thyroidectomy.  Currently she is taking levothyroxine 137  g per day.    Taking generic levothyroxine.  Reports compliance.  Delivered 1/2017.  Due for labs.    Wt Readings from Last 2 Encounters:   08/03/17 73.3 kg (161 lb 8 oz)   06/14/17 73 kg (161 lb)     No  diarrhea, tremors. No palpitations.  No difficulty with swallowing, no pain during swallowing.    PMH/PSH:  Past Medical History:   Diagnosis Date     Gestational diabetes     GDM diet controlled     Gestational diabetes 2013     Hypocalcemia 2016     Influenza A      Papillary thyroid carcinoma     Papillary carcinoma, follicular variant with     Pneumonia     LLL     PONV (postoperative nausea and vomiting)       labor      Past Surgical History:   Procedure Laterality Date      SECTION  10/26/2013    Procedure:  SECTION;  primary  section;  Surgeon: Rodney Mckee MD;  Location: [facility]      SECTION N/A 2017    Procedure:  SECTION;  Surgeon: Hakeem Combs MD;  Location: [facility]     DISSECTION RADICAL NECK Left 2016    Procedure: DISSECTION RADICAL NECK;  Surgeon: Vy Theodore MD;  Location: [facility] OR     DISSECTION RADICAL NECK MODIFIED Left 2016    Procedure: DISSECTION RADICAL NECK MODIFIED;  Surgeon: Luis Brown MD;  Location: [facility] OR     THYROIDECTOMY      Total, for cancer.      Family Hx:  Family History   Problem Relation Age of Onset     DIABETES Mother      CEREBROVASCULAR DISEASE Mother      Hypertension Mother      DIABETES Father 50     Hypertension Father      Genitourinary Problems Father      kidney disease     Coronary Artery Disease Father      Thyroid disease: No        Social Hx:  Social History     Social History     Marital status:      Spouse name: Brock     Number of children: 1     Years of education: N/A     Occupational History       [employer]     Social History Main Topics     Smoking status: Never Smoker     Smokeless tobacco: Never Used     Alcohol use No     Drug use: No     Sexual activity: Yes     Partners: Male     Birth control/ protection:      Other Topics Concern      Service No     Blood Transfusions No     Caffeine Concern No     Hobby Hazards No      "Sleep Concern No     Stress Concern No     Weight Concern Yes     Special Diet No     Back Care No     Exercise No     Bike Helmet No     Seat Belt Yes     Self-Exams No     Social History Narrative    Pt lives with  and father.          MEDICATIONS:  has a current medication list which includes the following prescription(s): levothyroxine, albuterol, albuterol, prenatal multivitamin  plus iron, and montelukast.    ROS     ROS: 10 point ROS neg other than the symptoms noted above in the HPI.      Physical Exam   VS: /80 (BP Location: Left arm, Patient Position: Chair, Cuff Size: Adult Large)  Pulse 93  Temp 98  F (36.7  C) (Oral)  Ht 1.549 m (5' 1\")  Wt 73.3 kg (161 lb 8 oz)  LMP 2017 (Approximate)  SpO2 97%  BMI 30.52 kg/m2  GENERAL: AXOX3, NAD, well dressed, answering questions appropriately, appears stated age.  HEENT: OP clear, no LAD, no TM, non-tender, no exopthalmous, no proptosis, EOMI, no lig lag, no retraction  Thyroid: Hypertrophied scar, will healed thyroidectomy scar present.  Hypertrophied scar on lateral side of neck. No lymphadenopathy.  CV: RRR, no rubs, gallops, no murmurs  LUNGS: CTAB, no wheezes, rales, or ronchi  ABDOMEN: +BS  EXTREMITIES: no edema, +pulses, no rashes, no lesions  NEUROLOGY: CN grossly intact, + DTR upper and lower extremity, no tremors  MSK: grossly intact  SKIN: no rashes, no lesions  PSYCH: normal affect and mood      LABS:  2017:  TSH : 47.89 (post thyrogen)  TgAB: <0.4  TG: <0.10    17:  TSH: 0.04  TgAB: <0.4  TG: <0.10    11/10/16:  TSH 0.74  TgAb: <0.4  TG: <0.10     2016:  TSH: 0.08  TgAb: <0.4  T.30     2012:  TSH: 12.40  TGAB: 1.2  T.6    ENDO THYROID LABS-[facility] Latest Ref Rng & Units 2017   TSH 0.40 - 4.00 mU/L 47.89 (H)   T4 TOTAL 5.0 - 11.0 ug/dL    T4 FREE 0.76 - 1.46 ng/dL 1.29     ENDO THYROID LABS-[facility] Latest Ref Rng & Units 2017   TSH 0.40 - 4.00 mU/L 0.04 (L)   T4 TOTAL 5.0 - 11.0 ug/dL    T4 FREE 0.76 - 1.46 " ng/dL 1.80 (H)     ENDO THYROID LABS-[facility] Latest Ref Rng & Units 11/10/2016   TSH 0.40 - 4.00 mU/L 0.74   T4 TOTAL 5.0 - 11.0 ug/dL    T4 FREE 0.76 - 1.46 ng/dL 1.30     ENDO THYROID LABS-[facility] Latest Ref Rng 6/7/2016   TSH 0.40 - 4.00 mU/L 0.08 (L)   T4 TOTAL 5.0 - 11.0 ug/dL    T4 FREE 0.76 - 1.46 ng/dL 1.50 (H)   FREE T3 2.3 - 4.2 pg/mL    TRIIODOTHYRONINE(T3) 60 - 181 ng/dL 112     [facility]19/2016 2/5/2016 11/6/2015   TSH 0.40 - 4.00 mU/L 0.16 (L) 0.37 (L) 0.06 (L)   T4 TOTAL 5.0 - 11.0 ug/dL      T4 FREE 0.76 - 1.46 ng/dL 1.42 1.24 1.51 (H)   FREE T3 2.3 - 4.2 pg/mL        [facility] Latest Ref Rng 6/26/2015 11/14/2014   TSH 0.40 - 4.00 mU/L 0.32 (L) 7.86 (H)   T4 TOTAL 5.0 - 11.0 ug/dL     T4 FREE 0.76 - 1.46 ng/dL 1.33 1.37   FREE T3 2.3 - 4.2 pg/mL       [facility] Latest Ref Rng 7/29/2014 4/3/2014   TSH 0.40 - 4.00 mU/L 3.67 7.63 (H)   T4 TOTAL 5.0 - 11.0 ug/dL     T4 FREE 0.76 - 1.46 ng/dL  1.39   FREE T3 2.3 - 4.2 pg/mL       NM THYROID UPTAKE/SCAN   Feb 4, 2011 2:47:00 PM      COMPARISON: None.     HISTORY: Hyperthyroidism.     TECHNIQUE:  The patient was given 173 uCi of I-123 orally, followed by  24-hour thyroid scan and radioiodine uptake evaluation.     FINDINGS:  The thyroid gland appears normal in size. 24-hour uptake  was calculated at 46.4%, which is above the normal range. Normal range  is 10-30% 24-hour uptake. Focal area of decreased uptake in the mid  left thyroid lobe may represent a cold thyroid nodule or cyst.     IMPRESSION:    1. Elevated 24-hour radioiodine uptake in the thyroid at 46.4%.  2. Possible cold nodule or cyst in the mid left thyroid lobe. Thyroid  ultrasound is recommended for further evaluation.    NUCLEAR MEDICINE I-131 SCAN    Aug 10, 2011 8:04:00 AM      TECHNIQUE: 78.7 mCi I-131 ablation scan. Five days post therapy  imaging performed today.     HISTORY: Thyroid cancer.     COMPARISON:   Nuclear Study: Thyroid uptake and  scan 2/4/2011.  Other Relevant Studies: Ultrasound neck 2/17/2011.     FINDINGS: Intense radiotracer uptake at the left greater than right  neck at the thyroid level. No evidence for distant metastatic disease.     IMPRESSION: Intense radiotracer uptake localizing to the thyroidectomy  bed, left greater than right. This is consistent with residual thyroid  tissue. No distant metastatic disease identified.    ULTRASOUND THYROID  Feb 17, 2011      HISTORY: Hyperthyroidism. Thyroid nodule.      COMPARISON: Nuclear Medicine thyroid scan 2/4/2011.     FINDINGS: The thyroid gland is enlarged. The right lobe measures 5.4 x  1.6 x 2.1 cm. The left lobe measures 5.9 x 2.3 x 2.7 cm. There are 2  right thyroid nodules and 3 left thyroid nodules.  1. Right upper lobe, hypoechoic solid measuring 0.7 x 0.6 x 0.6 cm.  2. Right lower pole, solid measuring 0.7 x 0.5 x 0.6 cm.  3. Left upper pole, cystic and solid measuring 1.1 x 0.6 x 1.0 cm.  4. Left mid thyroid lobe, primarily solid with small cystic areas  measuring 2.7 x 1.9 x 2.1 cm.  5. Left lower pole, cystic and solid measuring 1.4 x 0.9 x 1.2 cm.     The primarily solid left mid thyroid nodule appears to correspond to  the cold nodule on thyroid uptake and scan.  IMPRESSION: Multinodular thyroid gland.    FNA thyroid nodule:  Copath Report       FNA-thyroid, left mid lobe nodule:   Suspicious for malignancy.  Suspicious for papillary carcinoma  Specimen Adequacy: Satisfactory for evaluation.           Surgical pathology:     SPECIMEN(S):  A: Thyroid, left lobe  B: Parathyroid gland, biopsy of right inferior  C: Thyroid, right lobe    FINAL DIAGNOSIS:  A. and C.  Thyroid, right and left lobes, total thyroidectomy -  Specimen/Procedure(s) and Laterality:   Right and left thyroid lobes,  total thyroidectomy.  Specimen Integrity:   Intact.  Specimen Size and Weight:   See Gross Description.  Histologic Tumor Type(s):   Papillary carcinoma, follicular variant with  focal  "papillary morphology.  Tumor Focality: Unifocal.  Tumor Laterality:   Left lobe.  Tumor Size(s):   Left lobe: 2.2 cm (greatest dimension).  Margins:   Close, but uninvolved.            Distance to closest margin, if negative:   Tumor 0.05 cm from  nearest paratracheal surface and 1.75 cm from nearest anterolateral  surface.  Tumor Capsular Invasion: Present.    Tumor Capsule: Partial.  Extrathyroidal Invasion:   Not identified.  Lymph-Vascular Invasion:   Not identified.  Lymph Nodes:   Two nodes without evidence of metastatic carcinoma (0/2;  one at isthmus and one adjacent to right lobe).  Pathologic Staging:   pT2, pN0, pM not applicable.  Additional Pathologic Findings:   Right thyroid lobe without evidence of  malignancy.  Background nodular hyperplasia and thyroiditis with  lymphoid follicles.  Left lobe with focal previous biopsy site changes.  No parathyroid tissue identified.  CAP Protocol Based on AJCC/UICC TNM, 7th edition; Protocol Effective  Date:  January 2010    B.  \"Parathyroid gland\", right inferior, biopsy - Thyroid tissue; no  parathyroid glandular tissue identified.    8/2016: left modified neck Dissection:  Copath Report      SPECIMEN(S):   A: Scar, left neck   B: Parathyroid gland, left inferior   C: Left central neck dissection, para and pretracheal lymph nodes   D: apical jugular lymph node   E: Left modified neck dissection     FINAL DIAGNOSIS:   A. Skin, neck/scar, excision:   - Hypertrophic scar; benign.     B. Parathyroid gland, left inferior, biopsy:   - Parathyroid tissue present.     C. Left central neck dissection with para-and pretracheal lymph nodes:   - One lymph node; no evidence of malignancy (0/1).   - Thymus and parathyroid tissue present.     D. Lymph node, apical jugular, excision:   - One lymph node; no evidence of malignancy (0/1).     E. Left modified neck dissection:   - 27 lymph nodes identified (level II- 10, level III- 9, level IV- 5 and   lymph nodes associated with " jugular vein- 3).   - One (of 27) lymph node positive for metastatic papillary thyroid   carcinoma.  1 cm in greatest diameter.  Negative for extranodal   involvement  (Level II).   - Jugular vein negative for tumor.        NUCLEAR MEDICINE THYROID WHOLE BODY SCAN I-123   4/20/2017 11:38 AM      TECHNIQUE:  The patient was given 1.8 mCi iodine 123 per oral on  4/19/2017. SPECT-CT with fusion was performed at the level of the neck  and chest.     HISTORY:  Malignant neoplasm of thyroid gland. Postprocedural  hypothyroidism.     COMPARISON:   Nuclear Study: None.     Other Relevant Studies: None.     FINDINGS:  Thyroidectomy. No suspicious focus of abnormal radiotracer  is seen at the thyroidectomy bed identified. Physiologic tracer uptake  seen at the salivary glands. There are several small subcentimeter  lymph nodes involving the bilateral neck without conspicuous focal  tracer uptake outside of the background tracer distribution. There  appears to be left neck postoperative change causing some  inconspicuity of the left sternocleidomastoid muscle. No convincing  worrisome airspace disease or mediastinal abnormality is seen.         IMPRESSION: No worrisome focal tracer uptake is identified to suggest  recurrent neoplasm or remote metastatic disease. Some postoperative  changes at the left neck noted, likely accounting for inconspicuity of  the left sternocleidomastoid muscle. Small bilateral subcentimeter  neck lymph nodes noted.       All pertinent notes, labs, and images personally reviewed by me.     A/P  Ms.Nhu Mounika Nicole is a 30 year old here for the evaluation of thyroid cancer:    1. 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  It was 2.2 cm unifocal, left lobe tumor with no lymph node involvement.  S/p  total thyroidectomy in 2011 and 78.7 mCi of I-131 HERNANDEZ. No evidence for distant metastatic disease on post  therapy scan.  2016- new lymph node s/p FNA with PTC with rising TG  S/p 8/2016- left modified neck radiation. 1/27 lymph node + ( level2). Postop course complicated by hypocalcemia.  Delivered 1/2017.   Follow up I123 WBS 4/2017- no mets  TG suppressed as above  Plan: continue to monitor. Annaul imaging and TG ( 3/2018)    2.  Hypothyroidism (postoperative following total thyroidectomy for thyroid cancer):  On levothyroxine 137  g/day. Generic. Since 3/3/17  Consider to switch to synthroid- she will check with insurance.  With history of thyroid cancer goal TSH < 0.1  Labs today.  Dose change based on that.    More than 50% of the time spent with Ms. Nicole on counseling / coordinating her care.      Follow-up:  3-6 months    Vandana Costello MD  Endocrinology   [facility]  CC: Evan Zamora     Addendum to above note and clinic visit:    Labs reviewed.    See result note/telephone encounter.

## 2017-08-03 NOTE — NURSING NOTE
"Chief Complaint   Patient presents with     Consult     hypothyroidism        Initial /80 (BP Location: Left arm, Patient Position: Chair, Cuff Size: Adult Large)  Pulse 93  Temp 98  F (36.7  C) (Oral)  Ht 1.549 m (5' 1\")  Wt 73.3 kg (161 lb 8 oz)  LMP 2017 (Approximate)  SpO2 97%  BMI 30.52 kg/m2 Estimated body mass index is 30.52 kg/(m^2) as calculated from the following:    Height as of this encounter: 1.549 m (5' 1\").    Weight as of this encounter: 73.3 kg (161 lb 8 oz).  Medication Reconciliation: complete     ENDOCRINOLOGY INTAKE FORM    Patient Name:  Aleyda Nicole  :  1985    Is patient Diabetic?   No  Does patient have non-diabetic or other endocrine issues?  Yes: hypothyroidism    Vitals: /80 (BP Location: Left arm, Patient Position: Chair, Cuff Size: Adult Large)  Pulse 93  Temp 98  F (36.7  C) (Oral)  Ht 1.549 m (5' 1\")  Wt 73.3 kg (161 lb 8 oz)  LMP 2017 (Approximate)  SpO2 97%  BMI 30.52 kg/m2  BMI= Body mass index is 30.52 kg/(m^2).    Flu vaccine:  Yes: 16  Pneumonia vaccine:  Yes: 11    Smoking and Alcohol use:  Social History   Substance Use Topics     Smoking status: Never Smoker     Smokeless tobacco: Never Used     Alcohol use No       Lab Results   Component Value Date    A1C 5.4 2013       No results found for: MICROL  No results found for: MICROALBUMIN        Staff Signature:  Antoinette Esteban CMA (AAMA)        "

## 2017-08-03 NOTE — PATIENT INSTRUCTIONS
[facility]   Dr Costello, Endocrinology Department      [facility]   [address]  [place] [address]  Appointment [phone]  Fax: [phone]  [place]: Monday and Tuesday         [place] [address]  [place] [address]  Appointment [phone]  Fax: [phone]  [place]: Wednesday and Thursday          Labs today.  Levothyroxine dose change based on that.    Follow up in 3 months

## 2017-08-03 NOTE — MR AVS SNAPSHOT
After Visit Summary   8/3/2017    Aleyda Nicole    MRN: [id number]           Patient Information     Date Of Birth          1985        Visit Information        Provider Department      8/3/2017 11:30 AM Vandana Costello MD [facility]        Today's Diagnoses     Papillary Thyroid carcinoma, follicular variant    -  1    Postoperative hypothyroidism        Recurrent thyroid cancer (H)          Care Instructions    [facility] & [place] locations   Dr Costello, Endocrinology Department      [facility]   [address]  [place] [address]  Appointment [phone]  Fax: [phone]  [place]: Monday and Tuesday         [place] [address]  [place] [address]  Appointment [phone]  Fax: [phone]  [place]: Wednesday and Thursday          Labs today.  Levothyroxine dose change based on that.    Follow up in 3 months                Follow-ups after your visit        Who to contact     If you have questions or need follow up information about today's clinic visit or your schedule please contact [facility] directly at [phone].  Normal or non-critical lab and imaging results will be communicated to you by MyChart, letter or phone within 4 business days after the clinic has received the results. If you do not hear from us within 7 days, please contact the clinic through MyChart or phone. If you have a critical or abnormal lab result, we will notify you by phone as soon as possible.  Submit refill requests through [employer] or call your pharmacy and they will forward the refill request to us. Please allow 3 business days for your refill to be completed.          Additional Information About Your Visit        [employer] Information     [employer] lets you send messages to your doctor, view your test results, renew your prescriptions, schedule  "appointments and more. To sign up, go to [email or web address] . Click on \"Log in\" on the left side of the screen, which will take you to the Welcome page. Then click on \"Sign up Now\" on the right side of the page.     You will be asked to enter the access code listed below, as well as some personal information. Please follow the directions to create your username and password.     Your access code is: [email or web address]  Expires: 2017 11:01 AM     Your access code will  in 90 days. If you need help or a new code, please call your [facility] clinic or [phone].        Care EveryWhere ID     This is your Care EveryWhere ID. This could be used by other organizations to access your [facility] medical records  [id number]        Your Vitals Were     Pulse Temperature Height Last Period Pulse Oximetry BMI (Body Mass Index)    93 98  F (36.7  C) (Oral) 1.549 m (5' 1\") 2017 (Approximate) 97% 30.52 kg/m2       Blood Pressure from Last 3 Encounters:   17 110/80   17 114/80   17 108/78    Weight from Last 3 Encounters:   17 73.3 kg (161 lb 8 oz)   17 73 kg (161 lb)   17 71.6 kg (157 lb 12.8 oz)              We Performed the Following     Calcium     Parathyroid Hormone Intact     T4 free     TSH     Vitamin D Deficiency          Where to get your medicines      These medications were sent to [facility] [address] [address]     Phone:  [phone]     levothyroxine 137 MCG tablet          Primary Care Provider Office Phone # Fax #    Evan Zamora [phone]       [address] [address]        Equal Access to Services     СВЕТЛАНА VALDEZ AH: Idania Cain, jonas clancy, cristina edgaraan ah. [facility] [phone].    ATENCIÓN: Si habla español, tiene a serna disposición servicios " shanna de asistencia lingüística. Christian ragland [phone].    We comply with applicable federal civil rights laws and [place] laws. We do not discriminate on the basis of race, color, national origin, age, disability sex, sexual orientation or gender identity.            Thank you!     Thank you for choosing [facility]  for your care. Our goal is always to provide you with excellent care. Hearing back from our patients is one way we can continue to improve our services. Please take a few minutes to complete the written survey that you may receive in the mail after your visit with us. Thank you!             Your Updated Medication List - Protect others around you: Learn how to safely use, store and throw away your medicines at www.disposemymeds.org.          This list is accurate as of: 8/3/17 11:44 AM.  Always use your most recent med list.                   Brand Name Dispense Instructions for use Diagnosis    * albuterol 108 (90 BASE) MCG/ACT Inhaler    PROAIR HFA/PROVENTIL HFA/VENTOLIN HFA    1 Inhaler    Inhale 2 puffs into the lungs every 4 hours as needed for shortness of breath / dyspnea or wheezing    Intermittent asthma, with acute exacerbation       * albuterol (2.5 MG/3ML) 0.083% neb solution     60 vial    Take 1 vial (2.5 mg) by nebulization every 6 hours as needed for shortness of breath / dyspnea or wheezing    Intermittent asthma, with acute exacerbation       levothyroxine 137 MCG tablet    SYNTHROID/LEVOTHROID    90 tablet    Take 1 tablet (137 mcg) by mouth daily    Papillary carcinoma, follicular variant (H), Postoperative hypothyroidism       montelukast 10 MG tablet    SINGULAIR    30 tablet    Take 1 tablet (10 mg) by mouth At Bedtime    Acute bronchospasm, Intermittent asthma, with acute exacerbation       prenatal multivitamin  plus iron 27-0.8 MG Tabs per tablet      Take 1 tablet by mouth daily    Postoperative hypothyroidism, Papillary carcinoma, follicular variant (H)        * Notice:  This list has 2 medication(s) that are the same as other medications prescribed for you. Read the directions carefully, and ask your doctor or other care provider to review them with you.

## 2017-08-03 NOTE — LETTER
[facility]  [address] [place] [address]  [phone]      August 29, 2017      [facility] Mounika Nicole  [address] [facility] [address]          Dear [facility]     Labs done with endocrinology showed slightly high TSH and I agree with Lillie Will ( covering provider) that increase in dose of levothyroxine was indicated.   She did send a new prescription earlier.   Plan to recheck labs in 8 weeks after dose change.   Please make a lab appointment for blood work and follow up clinic appointment in 1 week after that to discuss results.     TSH and FT4 are the labs to check thyroid function.     Please call endocrinology clinic (nurse line: [phone]) if questions.     Thank you.   Vandana Costello                                   Resulted Orders   T4 free   Result Value Ref Range    T4 Free 1.31 0.76 - 1.46 ng/dL   TSH   Result Value Ref Range    TSH 5.31 (H) 0.40 - 4.00 mU/L   Vitamin D Deficiency   Result Value Ref Range    Vitamin D Deficiency screening 28 20 - 75 ug/L      Comment:      Season, race, dietary intake, and treatment affect the concentration of   25-hydroxy-Vitamin D. Values may decrease during winter months and increase   during summer months. Values 20-29 ug/L may indicate Vitamin D insufficiency   and values <20 ug/L may indicate Vitamin D deficiency.   Vitamin D determination is routinely performed by an immunoassay specific for   25 hydroxyvitamin D3.  If an individual is on vitamin D2 (ergocalciferol)   supplementation, please specify 25 OH vitamin D2 and D3 level determination   by   LCMSMS test VITD23.     Parathyroid Hormone Intact   Result Value Ref Range    Parathyroid Hormone Intact 18 12 - 72 pg/mL   Calcium   Result Value Ref Range    Calcium 7.8 (L) 8.5 - 10.1 mg/dL

## 2017-08-04 LAB — DEPRECATED CALCIDIOL+CALCIFEROL SERPL-MC: 28 UG/L (ref 20–75)

## 2017-08-05 DIAGNOSIS — E89.0 POSTOPERATIVE HYPOTHYROIDISM: Primary | ICD-10-CM

## 2017-08-05 RX ORDER — LEVOTHYROXINE SODIUM 150 UG/1
150 TABLET ORAL DAILY
Qty: 90 TABLET | Refills: 0 | Status: SHIPPED | OUTPATIENT
Start: 2017-08-05 | End: 2017-11-13 | Stop reason: DRUGHIGH

## 2017-08-05 NOTE — PROGRESS NOTES
Please call  [facility],  Dr. Costello is out of the office.  I am covering for her while she is away.  Your thyroid levels are a little low.  I am increasing your levothyroxine dose to 150 mcg/day.  I will send a new prescription to your pharmacy.  Your calcium level is also very low.  Please take calcium 500 mg three times daily with meals.  I would like you to schedule a follow up visit with Dr. Costello in 4-6 weeks for a recheck.  Lillie Will NP  Endocrinology

## 2017-08-10 NOTE — PROGRESS NOTES
Aleyda had a Left central neck and modified left radical neck dissection in August 2016 for recurrent papillary carcinoma of the thyroid.  She was pregnant at the time and had an impressive keloid following her initial surgery.  This was excised and she received an injection of Kenalog in the scar to minimize recurrence.  She is here today for recurrent keloid in only the upper lateral neck, the central area is excellent.  The area of the keloid is tender.    PE:  Neck:   Slender, long and Visible scars on the left, hockey stick to the low cervical area.  The upper 8- 10 cm is a keloid, the remainder is normal healing.              Range of motion is normal.              No neck masses.  Assessment :  Keloid, options are re-excision and inject with steroid verses just steroid injection.  The scar will flatten, soften and become less itchy and tender, but might still be wide with injection.    Plan:  Aleyda wished to do the injection, will arrange for steroid and schedule in the office.  Vy Theodore MD

## 2017-08-28 NOTE — PROGRESS NOTES
[facility]    Labs done with endocrinology showed slightly high TSH and I agree with Lillie Will ( covering provider) that increase in dose of levothyroxine was indicated.  She did send a new prescription earlier.  Plan to recheck labs in 8 weeks after dose change.  Please make a lab appointment for blood work and follow up clinic appointment in 1 week after that to discuss results.    TSH and FT4 are the labs to check thyroid function.    Please call endocrinology clinic (nurse line: [phone]) if questions.    Please send a letter with above lab/test results and above comments.    Thank you.    Vandana Costello

## 2017-09-01 ENCOUNTER — OFFICE VISIT (OUTPATIENT)
Dept: URGENT CARE | Facility: URGENT CARE | Age: 32
End: 2017-09-01
Payer: COMMERCIAL

## 2017-09-01 VITALS
OXYGEN SATURATION: 95 % | WEIGHT: 162 LBS | RESPIRATION RATE: 20 BRPM | HEART RATE: 76 BPM | SYSTOLIC BLOOD PRESSURE: 110 MMHG | DIASTOLIC BLOOD PRESSURE: 76 MMHG | BODY MASS INDEX: 30.61 KG/M2 | TEMPERATURE: 98 F

## 2017-09-01 DIAGNOSIS — J45.21 MILD INTERMITTENT ASTHMA WITH EXACERBATION: Primary | ICD-10-CM

## 2017-09-01 PROCEDURE — 99213 OFFICE O/P EST LOW 20 MIN: CPT | Performed by: FAMILY MEDICINE

## 2017-09-01 RX ORDER — DOXYCYCLINE 100 MG/1
100 CAPSULE ORAL 2 TIMES DAILY
Qty: 20 CAPSULE | Refills: 0 | Status: SHIPPED | OUTPATIENT
Start: 2017-09-01 | End: 2017-09-11

## 2017-09-01 RX ORDER — NORETHINDRONE ACETATE AND ETHINYL ESTRADIOL 1.5-30(21)
1 KIT ORAL DAILY
COMMUNITY
End: 2017-09-20

## 2017-09-01 RX ORDER — METHYLPREDNISOLONE 4 MG
4 TABLET, DOSE PACK ORAL SEE ADMIN INSTRUCTIONS
Qty: 21 TABLET | Refills: 0 | Status: SHIPPED | OUTPATIENT
Start: 2017-09-01 | End: 2017-09-20

## 2017-09-01 NOTE — MR AVS SNAPSHOT
After Visit Summary   9/1/2017    Aleyda Nicole    MRN: [id number]           Patient Information     Date Of Birth          1985        Visit Information        Provider Department      9/1/2017 6:45 PM Rosalia Lynch MD [facility]        Today's Diagnoses     Mild intermittent asthma with exacerbation    -  1      Care Instructions      Asthma (Adult)  Asthma is a disease where the medium and  small air passages within the lung go into spasm and restrict the flow of air. Inflammation and swelling of the airways cause further restriction. During an acute asthma attack, these factors cause difficulty breathing, wheezing, cough and chest tightness.    An asthma attack can be triggered by many things. Common triggers include infections such as the common cold, bronchitis, pneumonia. Irritants such as smoke or pollutants in the air, emotional upset, and exercise can also trigger an attack. In many adults with asthma, allergies to dust, mold, pollen and animal dander can cause an asthma attack. Skipping doses of daily asthma medicine can also bring on an asthma attack.  Asthma can be controlled using the proper medicines prescribed by your healthcare provider and avoiding exposure to known triggers including allergens and irritants.  Home care    Take prescribed medicine exactly at the times advised. If you need medicine such as from a hand held inhaler or aerosol breathing machine more than every 4 hours, contact your healthcare provider or seek immediate medical attention. If prescribed an antibiotic or prednisone, take all of the medicine as prescribed, even if you are feeling better after a few days.    Do not smoke. Avoid being exposed to the smoke of others.    Some people with asthma have worsening of their symptoms when they take aspirin and non-steroidal or fever-reducing medicines like ibuprofen and naproxen. Talk to your healthcare provider if you think  "this may apply to you.  Follow-up care  Follow up with your healthcare provider, or as advised. Always bring all of your current medicines to any appointments with your healthcare provider. Also bring a complete list of medications even those not taken for asthma. If you do not already have one, talk to your healthcare provider about developing a personalized \"Asthma Action Plan.\"  A pneumococcal (pneumonia) vaccine and yearly flu shot (every fall) are recommended. Ask your doctor about this.  When to seek medical advice  Call your healthcare provider right away if any of these occur:     Increased wheezing or shortness of breath    Need to use your inhalers more often than usual without relief    Fever of 100.4 F (38 C) or higher, or as directed by your healthcare provider    Coughing up lots of dark-colored or bloody sputum (mucus)    Chest pain with each breath    If you use a peak flow meter as part of an Asthma Action Plan, and you are still in the yellow zone (50% to 80%) 15 minutes after using inhaler medicine.  Call 911  Call 911 if any of the following occur    Trouble walking or talking because of shortness of breath    If you use a peak flow meter as part of an Asthma Action Plan and you are still in the red zone (less than 50%) 15 minutes after using inhaler medicine    Lips or fingernails turning gray or blue  Date Last Reviewed: 12/2/2015 2000-2017 [employer]. [address] [place] [address], [place] [address]. All rights reserved. This information is not intended as a substitute for professional medical care. Always follow your healthcare professional's instructions.        Controlling Your Asthma  You can do a lot to manage your asthma and improve your quality of life. You will need to work with your healthcare provider to develop a plan. But it s up to you to put this plan into action.  Why you need to take control  You need to control the inflammation in your lungs. Take all medicine as " directed, especially controller medicines, even if you feel that your asthma is under good control. You also need to relieve symptoms when you have them. These are long-term tasks. But the more you stay in control, the better you ll feel. If you don t stay in control:    Asthma symptoms may cause you to miss school, work, or activities that you enjoy.    Asthma flare-ups can be dangerous, even deadly.    Uncontrolled asthma makes it more likely that you will need emergency department and in-hospital care.    Uncontrolled asthma may cause permanent damage to your lungs.    Peak flow monitoring helps measure how open your airways are.   Taking medicine helps you control your asthma and relieve symptoms when they occur.     Using an Asthma Action Plan will help you keep track of and respond to asthma symptoms.   Avoiding triggers--the things that inflame your airways--will help prevent symptoms and flare-ups.   Your action plan  Your healthcare provider will help you prepare, and when needed, update your personal Asthma Action Plan. Your plan tells you what to do based on your current symptoms. If you don't have an Asthma Action Plan, or if yours isn't up-to-date, make sure you talk with your healthcare provider.  Date Last Reviewed: 1/1/2017 2000-2017 [employer]. [address] [place] [address], [place] [address]. All rights reserved. This information is not intended as a substitute for professional medical care. Always follow your healthcare professional's instructions.                Follow-ups after your visit        Your next 10 appointments already scheduled     Sep 06, 2017 10:00 AM CDT   CONSULT with Vy Theodore MD   [facility]    [address]  [facility] [address] [phone]              Who to contact     If you have questions or need follow up information about today's clinic visit or your schedule please  "contact [facility] directly at [phone].  Normal or non-critical lab and imaging results will be communicated to you by MyChart, letter or phone within 4 business days after the clinic has received the results. If you do not hear from us within 7 days, please contact the clinic through MyChart or phone. If you have a critical or abnormal lab result, we will notify you by phone as soon as possible.  Submit refill requests through [employer] or call your pharmacy and they will forward the refill request to us. Please allow 3 business days for your refill to be completed.          Additional Information About Your Visit        [employer] Information     [employer] lets you send messages to your doctor, view your test results, renew your prescriptions, schedule appointments and more. To sign up, go to [email or web address] . Click on \"Log in\" on the left side of the screen, which will take you to the Welcome page. Then click on \"Sign up Now\" on the right side of the page.     You will be asked to enter the access code listed below, as well as some personal information. Please follow the directions to create your username and password.     Your access code is: [email or web address]  Expires: 2017 11:01 AM     Your access code will  in 90 days. If you need help or a new code, please call your [facility] clinic or [phone].        Care EveryWhere ID     This is your Care EveryWhere ID. This could be used by other organizations to access your [facility] medical records  [id number]        Your Vitals Were     Pulse Temperature Respirations Last Period Pulse Oximetry BMI (Body Mass Index)    76 98  F (36.7  C) (Oral) 20 2017 (Approximate) 95% 30.61 kg/m2       Blood Pressure from Last 3 Encounters:   17 110/76   17 110/80   17 114/80    Weight from Last 3 Encounters:   17 162 lb (73.5 kg)   17 161 lb 8 oz (73.3 kg)   17 161 lb (73 kg)              Today, " you had the following     No orders found for display         Today's Medication Changes          These changes are accurate as of: 9/1/17  7:04 PM.  If you have any questions, ask your nurse or doctor.               Start taking these medicines.        Dose/Directions    doxycycline 100 MG capsule   Commonly known as:  VIBRAMYCIN   Used for:  Mild intermittent asthma with exacerbation   Started by:  Rosalia Lynch MD        Dose:  100 mg   Take 1 capsule (100 mg) by mouth 2 times daily for 10 days   Quantity:  20 capsule   Refills:  0       methylPREDNISolone 4 MG tablet   Commonly known as:  MEDROL   Used for:  Mild intermittent asthma with exacerbation   Started by:  Rosalia Lynch MD        Dose:  4 mg   Take 1 tablet (4 mg) by mouth See Admin Instructions follow package directions   Quantity:  21 tablet   Refills:  0            Where to get your medicines      These medications were sent to [facility] [address] [address]     Phone:  [phone]     doxycycline 100 MG capsule    methylPREDNISolone 4 MG tablet                Primary Care Provider Office Phone # Fax #    Evan Zamora [phone]       [address] [place] [id number]        Equal Access to Services     TAJ VALDEZ : Hadii tim hoskinso Sotania, waaxda luqadaha, qaybta kaalmada adeegyada, cristina albright. So [facility] [phone].    ATENCIÓN: Si habla español, tiene a serna disposición servicios gratuitos de asistencia lingüística. [facility] [phone].    We comply with applicable federal civil rights laws and [place] laws. We do not discriminate on the basis of race, color, national origin, age, disability sex, sexual orientation or gender identity.            Thank you!     Thank you for choosing [facility]  for your care. Our goal is always to provide you with excellent care.  Hearing back from our patients is one way we can continue to improve our services. Please take a few minutes to complete the written survey that you may receive in the mail after your visit with us. Thank you!             Your Updated Medication List - Protect others around you: Learn how to safely use, store and throw away your medicines at www.disposemymeds.org.          This list is accurate as of: 9/1/17  7:04 PM.  Always use your most recent med list.                   Brand Name Dispense Instructions for use Diagnosis    * albuterol 108 (90 BASE) MCG/ACT Inhaler    PROAIR HFA/PROVENTIL HFA/VENTOLIN HFA    1 Inhaler    Inhale 2 puffs into the lungs every 4 hours as needed for shortness of breath / dyspnea or wheezing    Intermittent asthma, with acute exacerbation       * albuterol (2.5 MG/3ML) 0.083% neb solution     60 vial    Take 1 vial (2.5 mg) by nebulization every 6 hours as needed for shortness of breath / dyspnea or wheezing    Intermittent asthma, with acute exacerbation       doxycycline 100 MG capsule    VIBRAMYCIN    20 capsule    Take 1 capsule (100 mg) by mouth 2 times daily for 10 days    Mild intermittent asthma with exacerbation       JUNEL FE 1.5/30 1.5-30 MG-MCG per tablet   Generic drug:  norethindrone-ethinyl estradiol-iron      Take 1 tablet by mouth daily        levothyroxine 150 MCG tablet    SYNTHROID/LEVOTHROID    90 tablet    Take 1 tablet (150 mcg) by mouth daily    Postoperative hypothyroidism       methylPREDNISolone 4 MG tablet    MEDROL    21 tablet    Take 1 tablet (4 mg) by mouth See Admin Instructions follow package directions    Mild intermittent asthma with exacerbation       montelukast 10 MG tablet    SINGULAIR    30 tablet    Take 1 tablet (10 mg) by mouth At Bedtime    Acute bronchospasm, Intermittent asthma, with acute exacerbation       prenatal multivitamin plus iron 27-0.8 MG Tabs per tablet      Take 1 tablet by mouth daily    Postoperative hypothyroidism,  Papillary carcinoma, follicular variant (H)       * Notice:  This list has 2 medication(s) that are the same as other medications prescribed for you. Read the directions carefully, and ask your doctor or other care provider to review them with you.

## 2017-09-01 NOTE — NURSING NOTE
"Chief Complaint   Patient presents with     Breathing Problem     sx started wheezing 2 wks ago,worsening,some cough     Cough       Initial /76 (BP Location: Left arm, Patient Position: Chair, Cuff Size: Adult Regular)  Pulse 76  Temp 98  F (36.7  C) (Oral)  Resp 20  Wt 162 lb (73.5 kg)  LMP 07/17/2017 (Approximate)  SpO2 95%  BMI 30.61 kg/m2 Estimated body mass index is 30.61 kg/(m^2) as calculated from the following:    Height as of 8/3/17: 5' 1\" (1.549 m).    Weight as of this encounter: 162 lb (73.5 kg).  Medication Reconciliation: complete Elizabeth KABA    "

## 2017-09-02 NOTE — PROGRESS NOTES
Chief Complaint   Patient presents with     Breathing Problem     sx started wheezing 2 wks ago,worsening,some cough     Cough     Aleyda Nicole is a 31 year old female who presents for shortness of breath with wheezing that started 2 weeks ago.   A previous diagnosis of asthma was made concerning this patient on the basis of   symptoms and response to medications. Current symptoms include wheezing, non-productive cough, dyspnea on exertion and chest tightness.   Precipitating factors uri.  Treatment   modalities employed to this point include  Albuterol neb 4 x per day and albuterol inhaler. 4 x per day  No fevers/chills  She had a year with no asthma symptoms and did not need meds     Past Medical History:   Diagnosis Date     Gestational diabetes     GDM diet controlled     Gestational diabetes 2013     Hypocalcemia 2016     Influenza A      Papillary thyroid carcinoma     Papillary carcinoma, follicular variant with     Pneumonia     LLL     PONV (postoperative nausea and vomiting)       labor        ALLERGIES:  No known drug allergies      Current Outpatient Prescriptions on File Prior to Visit:  levothyroxine (SYNTHROID/LEVOTHROID) 150 MCG tablet Take 1 tablet (150 mcg) by mouth daily   albuterol (PROAIR HFA/PROVENTIL HFA/VENTOLIN HFA) 108 (90 BASE) MCG/ACT Inhaler Inhale 2 puffs into the lungs every 4 hours as needed for shortness of breath / dyspnea or wheezing   albuterol (2.5 MG/3ML) 0.083% neb solution Take 1 vial (2.5 mg) by nebulization every 6 hours as needed for shortness of breath / dyspnea or wheezing   Prenatal Vit-Fe Fumarate-FA (PRENATAL MULTIVITAMIN  PLUS IRON) 27-0.8 MG TABS Take 1 tablet by mouth daily   montelukast (SINGULAIR) 10 MG tablet Take 1 tablet (10 mg) by mouth At Bedtime (Patient not taking: Reported on 8/3/2017)     No current facility-administered medications on file prior to visit.     Social History   Substance Use Topics     Smoking status: Never  Smoker     Smokeless tobacco: Never Used     Alcohol use No       Family History   Problem Relation Age of Onset     DIABETES Mother      CEREBROVASCULAR DISEASE Mother      Hypertension Mother      DIABETES Father 50     Hypertension Father      Genitourinary Problems Father      kidney disease     Coronary Artery Disease Father      ROS:  CONSTITUTIONAL:NEGATIVE for fever, chills, change in weight  INTEGUMENTARY/SKIN: NEGATIVE for worrisome rashes, moles or lesions  EYES: NEGATIVE for vision changes or irritation  ENT/MOUTH: NEGATIVE for ear, mouth and throat problems  CV: NEGATIVE for chest pain, palpitations or peripheral edema  GI: NEGATIVE for nausea, abdominal pain, heartburn, or change in bowel habits  : dysuria and hematuria  MUSCULOSKELETAL: NEGATIVE for significant arthralgias or myalgia  NEURO: NEGATIVE for weakness, dizziness or paresthesias  ENDOCRINE: NEGATIVE for , skin/hair changes      OBJECTIVE: Initial physical exam  /76 (BP Location: Left arm, Patient Position: Chair, Cuff Size: Adult Regular)  Pulse 76  Temp 98  F (36.7  C) (Oral)  Resp 20  Wt 162 lb (73.5 kg)  LMP 07/17/2017 (Approximate)  SpO2 95%  BMI 30.61 kg/m2   GENERAL: alert, mild distress, cooperative  SKIN: skin is clear, no rashes noted  HEAD: The head is normocephalic.   EYES: conjunctivae and cornea normal.without erythema or discharge  EARS: The canals are clear, tympanic membranes normal with no erythema/effusion.  NOSE: Clear, no discharge or congestion: THROAT: moist mucous membranes, no erythema.  NECK: The neck is supple, no masses or significant adenopathy noted  LUNGS: POSITIVE  for rhonchi bilateral and expiratory wheezes bilateral  CV: regular rate and rhythm. S1 and S2 are normal. No murmurs.  ABDOMEN:  Abdomen soft, non-tender, non-distended, no masses. bowel sound normal       ASSESSMENT:  Mild intermittent asthma with exacerbation     - methylPREDNISolone (MEDROL) 4 MG tablet; Take 1 tablet (4 mg) by  mouth See Admin Instructions follow package directions  - doxycycline (VIBRAMYCIN) 100 MG capsule; Take 1 capsule (100 mg) by mouth 2 times daily for 10 days       Medication: continue current medication regimen unchanged-  She has enough albuterol neb and inhaler  Has not found singulair helpful      Discussed medication dosage, usage, side effects, and goals of   treatment in detail.     Avoidance of precipitants.    Return if worsening shortness of breath.    Follow-up with primary physician for chronic management of asthma symptoms    Patient Education: Instructed to return to urgent care or notify primary MD office of fever >101, blood in sputum, chest pain, dyspnea at rest, or other symptoms of concern to patient.

## 2017-09-02 NOTE — PATIENT INSTRUCTIONS
"  Asthma (Adult)  Asthma is a disease where the medium and  small air passages within the lung go into spasm and restrict the flow of air. Inflammation and swelling of the airways cause further restriction. During an acute asthma attack, these factors cause difficulty breathing, wheezing, cough and chest tightness.    An asthma attack can be triggered by many things. Common triggers include infections such as the common cold, bronchitis, pneumonia. Irritants such as smoke or pollutants in the air, emotional upset, and exercise can also trigger an attack. In many adults with asthma, allergies to dust, mold, pollen and animal dander can cause an asthma attack. Skipping doses of daily asthma medicine can also bring on an asthma attack.  Asthma can be controlled using the proper medicines prescribed by your healthcare provider and avoiding exposure to known triggers including allergens and irritants.  Home care    Take prescribed medicine exactly at the times advised. If you need medicine such as from a hand held inhaler or aerosol breathing machine more than every 4 hours, contact your healthcare provider or seek immediate medical attention. If prescribed an antibiotic or prednisone, take all of the medicine as prescribed, even if you are feeling better after a few days.    Do not smoke. Avoid being exposed to the smoke of others.    Some people with asthma have worsening of their symptoms when they take aspirin and non-steroidal or fever-reducing medicines like ibuprofen and naproxen. Talk to your healthcare provider if you think this may apply to you.  Follow-up care  Follow up with your healthcare provider, or as advised. Always bring all of your current medicines to any appointments with your healthcare provider. Also bring a complete list of medications even those not taken for asthma. If you do not already have one, talk to your healthcare provider about developing a personalized \"Asthma Action Plan.\"  A " pneumococcal (pneumonia) vaccine and yearly flu shot (every fall) are recommended. Ask your doctor about this.  When to seek medical advice  Call your healthcare provider right away if any of these occur:     Increased wheezing or shortness of breath    Need to use your inhalers more often than usual without relief    Fever of 100.4 F (38 C) or higher, or as directed by your healthcare provider    Coughing up lots of dark-colored or bloody sputum (mucus)    Chest pain with each breath    If you use a peak flow meter as part of an Asthma Action Plan, and you are still in the yellow zone (50% to 80%) 15 minutes after using inhaler medicine.  Call 911  Call 911 if any of the following occur    Trouble walking or talking because of shortness of breath    If you use a peak flow meter as part of an Asthma Action Plan and you are still in the red zone (less than 50%) 15 minutes after using inhaler medicine    Lips or fingernails turning gray or blue  Date Last Reviewed: 12/2/2015 2000-2017 [employer]. [address] [place] [address]. All rights reserved. This information is not intended as a substitute for professional medical care. Always follow your healthcare professional's instructions.        Controlling Your Asthma  You can do a lot to manage your asthma and improve your quality of life. You will need to work with your healthcare provider to develop a plan. But it s up to you to put this plan into action.  Why you need to take control  You need to control the inflammation in your lungs. Take all medicine as directed, especially controller medicines, even if you feel that your asthma is under good control. You also need to relieve symptoms when you have them. These are long-term tasks. But the more you stay in control, the better you ll feel. If you don t stay in control:    Asthma symptoms may cause you to miss school, work, or activities that you enjoy.    Asthma flare-ups can be  dangerous, even deadly.    Uncontrolled asthma makes it more likely that you will need emergency department and in-hospital care.    Uncontrolled asthma may cause permanent damage to your lungs.    Peak flow monitoring helps measure how open your airways are.   Taking medicine helps you control your asthma and relieve symptoms when they occur.     Using an Asthma Action Plan will help you keep track of and respond to asthma symptoms.   Avoiding triggers--the things that inflame your airways--will help prevent symptoms and flare-ups.   Your action plan  Your healthcare provider will help you prepare, and when needed, update your personal Asthma Action Plan. Your plan tells you what to do based on your current symptoms. If you don't have an Asthma Action Plan, or if yours isn't up-to-date, make sure you talk with your healthcare provider.  Date Last Reviewed: 1/1/2017 2000-2017 [employer]. [address] [place] [address], [place] [address]. All rights reserved. This information is not intended as a substitute for professional medical care. Always follow your healthcare professional's instructions.

## 2017-09-17 DIAGNOSIS — Z30.9 CONTRACEPTIVE MANAGEMENT: Primary | ICD-10-CM

## 2017-09-18 RX ORDER — NORETHINDRONE ACETATE AND ETHINYL ESTRADIOL AND FERROUS FUMARATE 1MG-20(21)
KIT ORAL
Qty: 84 TABLET | Refills: 1 | Status: SHIPPED | OUTPATIENT
Start: 2017-09-18 | End: 2018-03-06

## 2017-09-18 NOTE — TELEPHONE ENCOUNTER
Prescription approved per [facility] Refill Protocol.Maya Ceja RN

## 2017-09-20 ENCOUNTER — OFFICE VISIT (OUTPATIENT)
Dept: SURGERY | Facility: CLINIC | Age: 32
End: 2017-09-20
Payer: COMMERCIAL

## 2017-09-20 VITALS
SYSTOLIC BLOOD PRESSURE: 122 MMHG | WEIGHT: 162 LBS | HEART RATE: 96 BPM | HEIGHT: 61 IN | DIASTOLIC BLOOD PRESSURE: 64 MMHG | OXYGEN SATURATION: 98 % | BODY MASS INDEX: 30.58 KG/M2

## 2017-09-20 DIAGNOSIS — L91.0 KELOID SCAR: Primary | ICD-10-CM

## 2017-09-20 PROCEDURE — 99213 OFFICE O/P EST LOW 20 MIN: CPT | Performed by: SURGERY

## 2017-09-20 NOTE — LETTER
2017      RE:  Aleyda Nicole-: 85    Aleyda had a Left central neck and modified left radical neck dissection in 2016 for recurrent papillary carcinoma of the thyroid.  She was pregnant at the time and had an impressive keloid following her initial surgery.  This was excised and she received an injection of Kenalog in the scar to minimize recurrence.  She is here today for recurrent keloid in only the upper lateral neck, the central area is excellent.  The area of the keloid is tender.     PE:  Neck:   Slender, long and Visible scars on the left, hockey stick to the low cervical area.  The upper 8- 10 cm is a keloid, the remainder is normal healing.  Range of motion is normal.  No neck masses.  Assessment :  Keloid, options are re-excision and inject with steroid verses just steroid injection.  The scar will flatten, soften and become less itchy and tender, but might still be wide with injection.     Plan:  [facility] wished to do the injection, will arrange for steroid and schedule in the office.    Vy Theodore MD

## 2017-09-20 NOTE — PROGRESS NOTES
HPI      ROS (Review of Systems):     Respiratory: Positive for asthma.    Cardiovascular: Positive for hyperlipidemia.          Physical Exam

## 2017-09-20 NOTE — MR AVS SNAPSHOT
"              After Visit Summary   2017    [facility] Mounika Nicole    MRN: [id number]           Patient Information     Date Of Birth          1985        Visit Information        Provider Department      2017 3:00 PM Vy Theodore MD [facility] General [facility]      Today's Diagnoses     Keloid scar    -  1       Follow-ups after your visit        Follow-up notes from your care team     Return in about 3 weeks (around 10/11/2017).      Who to contact     If you have questions or need follow up information about today's clinic visit or your schedule please contact [facility] directly at [phone].  Normal or non-critical lab and imaging results will be communicated to you by MyChart, letter or phone within 4 business days after the clinic has received the results. If you do not hear from us within 7 days, please contact the clinic through [employer] or phone. If you have a critical or abnormal lab result, we will notify you by phone as soon as possible.  Submit refill requests through [employer] or call your pharmacy and they will forward the refill request to us. Please allow 3 business days for your refill to be completed.          Additional Information About Your Visit        MyChart Information     [employer] lets you send messages to your doctor, view your test results, renew your prescriptions, schedule appointments and more. To sign up, go to [email or web address] . Click on \"Log in\" on the left side of the screen, which will take you to the Welcome page. Then click on \"Sign up Now\" on the right side of the page.     You will be asked to enter the access code listed below, as well as some personal information. Please follow the directions to create your username and password.     Your access code is: [email or web address]  Expires: 2017  4:26 PM     Your access code will  in 90 days. If you need help or a new code, please call " "your [facility] clinic or [phone].        Care EveryWhere ID     This is your Care EveryWhere ID. This could be used by other organizations to access your [facility] medical records  [id number]        Your Vitals Were     Pulse Height Pulse Oximetry BMI (Body Mass Index)          96 5' 1\" (1.549 m) 98% 30.61 kg/m2         Blood Pressure from Last 3 Encounters:   09/20/17 122/64   09/01/17 110/76   08/03/17 110/80    Weight from Last 3 Encounters:   09/20/17 162 lb (73.5 kg)   09/01/17 162 lb (73.5 kg)   08/03/17 161 lb 8 oz (73.3 kg)              Today, you had the following     No orders found for display       Primary Care Provider Office Phone # Fax #    Evan Zamora [phone]       [address] [facility] [address]        Equal Access to Services     СВЕТЛАНА VALDEZ : Hadii aad ku hadasho Soomaali, waaxda luqadaha, qaybta kaalmada adeegyada, waxay ricardoin haytreva rangel . So [facility] [phone].    ATENCIÓN: Si homerla español, tiene a serna disposición servicios gratuitos de asistencia lingüística. Llame al [phone].    We comply with applicable federal civil rights laws and [place] laws. We do not discriminate on the basis of race, color, national origin, age, disability sex, sexual orientation or gender identity.            Thank you!     Thank you for choosing [facility]  for your care. Our goal is always to provide you with excellent care. Hearing back from our patients is one way we can continue to improve our services. Please take a few minutes to complete the written survey that you may receive in the mail after your visit with us. Thank you!             Your Updated Medication List - Protect others around you: Learn how to safely use, store and throw away your medicines at www.disposemymeds.org.          This list is accurate as of: 9/20/17  4:26 PM.  Always use your most recent med list.                   Brand Name Dispense Instructions " for use Diagnosis    albuterol 108 (90 BASE) MCG/ACT Inhaler    PROAIR HFA/PROVENTIL HFA/VENTOLIN HFA    1 Inhaler    Inhale 2 puffs into the lungs every 4 hours as needed for shortness of breath / dyspnea or wheezing    Intermittent asthma, with acute exacerbation       JUNEL FE 1/20 1-20 MG-MCG per tablet   Generic drug:  norethindrone-ethinyl estradiol     84 tablet    TAKE ONE TABLET BY MOUTH EVERY DAY    Contraceptive management       levothyroxine 150 MCG tablet    SYNTHROID/LEVOTHROID    90 tablet    Take 1 tablet (150 mcg) by mouth daily    Postoperative hypothyroidism       prenatal multivitamin plus iron 27-0.8 MG Tabs per tablet      Take 1 tablet by mouth daily    Postoperative hypothyroidism, Papillary carcinoma, follicular variant (H)

## 2017-10-17 DIAGNOSIS — C73 PAPILLARY CARCINOMA, FOLLICULAR VARIANT (H): ICD-10-CM

## 2017-10-17 DIAGNOSIS — E89.0 POSTOPERATIVE HYPOTHYROIDISM: ICD-10-CM

## 2017-10-17 LAB
CALCIUM SERPL-MCNC: 8.3 MG/DL (ref 8.5–10.1)
T4 FREE SERPL-MCNC: 1.24 NG/DL (ref 0.76–1.46)
TSH SERPL DL<=0.005 MIU/L-ACNC: 2.58 MU/L (ref 0.4–4)

## 2017-10-17 PROCEDURE — 36415 COLL VENOUS BLD VENIPUNCTURE: CPT | Performed by: INTERNAL MEDICINE

## 2017-10-17 PROCEDURE — 84439 ASSAY OF FREE THYROXINE: CPT | Performed by: INTERNAL MEDICINE

## 2017-10-17 PROCEDURE — 82310 ASSAY OF CALCIUM: CPT | Performed by: INTERNAL MEDICINE

## 2017-10-17 PROCEDURE — 84443 ASSAY THYROID STIM HORMONE: CPT | Performed by: INTERNAL MEDICINE

## 2017-11-13 ENCOUNTER — OFFICE VISIT (OUTPATIENT)
Dept: ENDOCRINOLOGY | Facility: CLINIC | Age: 32
End: 2017-11-13
Payer: COMMERCIAL

## 2017-11-13 VITALS
TEMPERATURE: 98 F | SYSTOLIC BLOOD PRESSURE: 108 MMHG | RESPIRATION RATE: 12 BRPM | BODY MASS INDEX: 31.74 KG/M2 | WEIGHT: 168 LBS | DIASTOLIC BLOOD PRESSURE: 77 MMHG | HEART RATE: 90 BPM

## 2017-11-13 DIAGNOSIS — E55.9 VITAMIN D DEFICIENCY: ICD-10-CM

## 2017-11-13 DIAGNOSIS — E89.0 POSTOPERATIVE HYPOTHYROIDISM: Primary | ICD-10-CM

## 2017-11-13 PROCEDURE — 99214 OFFICE O/P EST MOD 30 MIN: CPT | Performed by: CLINICAL NURSE SPECIALIST

## 2017-11-13 RX ORDER — LEVOTHYROXINE SODIUM 175 UG/1
175 TABLET ORAL DAILY
Qty: 30 TABLET | Refills: 2 | Status: SHIPPED | OUTPATIENT
Start: 2017-11-13 | End: 2018-02-13

## 2017-11-13 RX ORDER — FAMOTIDINE 20 MG
2000 TABLET ORAL DAILY
Qty: 60 CAPSULE | Refills: 3 | Status: SHIPPED | OUTPATIENT
Start: 2017-11-13 | End: 2018-05-19

## 2017-11-13 RX ORDER — FAMOTIDINE 20 MG
800 TABLET ORAL
COMMUNITY
End: 2017-11-13

## 2017-11-13 NOTE — MR AVS SNAPSHOT
After Visit Summary   11/13/2017    Aleyda Nicole    MRN: [id number]           Patient Information     Date Of Birth          1985        Visit Information        Provider Department      11/13/2017 4:00 PM Lillie Will APRN CNP [facility]        Today's Diagnoses     Postoperative hypothyroidism    -  1      Care Instructions        Increase Levothyroxine to 175 mcg/day.  Make a lab appointment to recheck thyroid levels in 6-8 weeks.    Follow up with Dr. Costello 4/2018 for a recheck.    My fitness pal  Calorie [facility] MOVE program - handouts  Track your calorie intake - 110 - 1300 calories per day should result in a 1 lb per week weight loss.  Eating well.com has good recipes.  Hungry girl.com - recipes (also both eating well and hungry girl have cookbooks, eating well has a magazine).    Thyca.org  The American Thyroid association - thyroid.org     Lillie Will NP  Endocrinology                  Follow-ups after your visit        Who to contact     If you have questions or need follow up information about today's clinic visit or your schedule please contact [facility] directly at [phone].  Normal or non-critical lab and imaging results will be communicated to you by MyChart, letter or phone within 4 business days after the clinic has received the results. If you do not hear from us within 7 days, please contact the clinic through [employer] or phone. If you have a critical or abnormal lab result, we will notify you by phone as soon as possible.  Submit refill requests through [employer] or call your pharmacy and they will forward the refill request to us. Please allow 3 business days for your refill to be completed.          Additional Information About Your Visit        [employer] Information     [employer]box lets you send messages to your doctor, view your test results, renew your prescriptions, schedule appointments and more. To sign up, go to  "[email or web address] . Click on \"Log in\" on the left side of the screen, which will take you to the Welcome page. Then click on \"Sign up Now\" on the right side of the page.     You will be asked to enter the access code listed below, as well as some personal information. Please follow the directions to create your username and password.     Your access code is: [email or web address]  Expires: 2017  3:26 PM     Your access code will  in 90 days. If you need help or a new code, please call your [facility] clinic or [phone].        Care EveryWhere ID     This is your Care EveryWhere ID. This could be used by other organizations to access your [facility] medical records  [id number]        Your Vitals Were     Pulse Temperature Respirations BMI (Body Mass Index)          90 98  F (36.7  C) (Oral) 12 31.74 kg/m2         Blood Pressure from Last 3 Encounters:   17 108/77   17 122/64   17 110/76    Weight from Last 3 Encounters:   17 168 lb (76.2 kg)   17 162 lb (73.5 kg)   17 162 lb (73.5 kg)              Today, you had the following     No orders found for display         Today's Medication Changes          These changes are accurate as of: 17  4:29 PM.  If you have any questions, ask your nurse or doctor.               These medicines have changed or have updated prescriptions.        Dose/Directions    * levothyroxine 150 MCG tablet   Commonly known as:  SYNTHROID/LEVOTHROID   This may have changed:  Another medication with the same name was added. Make sure you understand how and when to take each.   Used for:  Postoperative hypothyroidism   Changed by:  Lillie Will APRN CNP        Dose:  150 mcg   Take 1 tablet (150 mcg) by mouth daily   Quantity:  90 tablet   Refills:  0       * levothyroxine 175 MCG tablet   Commonly known as:  SYNTHROID/LEVOTHROID   This may have changed:  You were already taking a medication with the same name, and this prescription was " added. Make sure you understand how and when to take each.   Used for:  Postoperative hypothyroidism   Changed by:  Lillie Will APRN CNP        Dose:  175 mcg   Take 1 tablet (175 mcg) by mouth daily   Quantity:  30 tablet   Refills:  2       * Notice:  This list has 2 medication(s) that are the same as other medications prescribed for you. Read the directions carefully, and ask your doctor or other care provider to review them with you.         Where to get your medicines      These medications were sent to [facility] [address].  [address] [place] [address]     Phone:  [phone]     levothyroxine 175 MCG tablet                Primary Care Provider Office Phone # Fax #    Evan Zamora [phone]       [address] [place] [phone]        Equal Access to Services     [facility]: Hadii tim hoskinso Sotania, waaxda luqadaha, qaybta kaalmada adefloyahal, cristina rangel . So [facility] [phone].    ATENCIÓN: Si habla español, tiene a serna disposición servicios gratuitos de asistencia lingüística. Llame al [phone].    We comply with applicable federal civil rights laws and [place] laws. We do not discriminate on the basis of race, color, national origin, age, disability, sex, sexual orientation, or gender identity.            Thank you!     Thank you for choosing [facility]  for your care. Our goal is always to provide you with excellent care. Hearing back from our patients is one way we can continue to improve our services. Please take a few minutes to complete the written survey that you may receive in the mail after your visit with us. Thank you!             Your Updated Medication List - Protect others around you: Learn how to safely use, store and throw away your medicines at www.disposemymeds.org.          This list is accurate as of: 11/13/17  4:29 PM.  Always use  your most recent med list.                   Brand Name Dispense Instructions for use Diagnosis    albuterol 108 (90 BASE) MCG/ACT Inhaler    PROAIR HFA/PROVENTIL HFA/VENTOLIN HFA    1 Inhaler    Inhale 2 puffs into the lungs every 4 hours as needed for shortness of breath / dyspnea or wheezing    Intermittent asthma, with acute exacerbation       JUNEL FE 1/20 1-20 MG-MCG per tablet   Generic drug:  norethindrone-ethinyl estradiol     84 tablet    TAKE ONE TABLET BY MOUTH EVERY DAY    Contraceptive management       * levothyroxine 150 MCG tablet    SYNTHROID/LEVOTHROID    90 tablet    Take 1 tablet (150 mcg) by mouth daily    Postoperative hypothyroidism       * levothyroxine 175 MCG tablet    SYNTHROID/LEVOTHROID    30 tablet    Take 1 tablet (175 mcg) by mouth daily    Postoperative hypothyroidism       prenatal multivitamin plus iron 27-0.8 MG Tabs per tablet      Take 1 tablet by mouth daily    Postoperative hypothyroidism, Papillary carcinoma, follicular variant (H)       Vitamin D (Cholecalciferol) 1000 UNITS Caps      Take 800 Int'l Units by mouth        * Notice:  This list has 2 medication(s) that are the same as other medications prescribed for you. Read the directions carefully, and ask your doctor or other care provider to review them with you.

## 2017-11-13 NOTE — PROGRESS NOTES
Name: Aleyda Nicole  Seen for f/u of papillary thyroid cancer and postoperative hypothyroidism (Last seen 8/3/2017 by Dr. Costello).    Chief Complaint   Patient presents with     Thyroid Problem     HPI:  Aleyda Nicole is a 32 year old female who presents for the evaluation of      1.  Papillary thyroid cancer;  Thyroid nodule was noticed in February 2011 which was followed by thyroid nodule biopsy which showed suspicious for papillary thyroid cancer.  She underwent total thyroidectomy 3/2011 and pathology showed papillary thyroid cancer with follicular pattern.  Tumor was about 2.2 cm on the left lobe.  No lymph node involvement.  S/p 78.7 mCi of I131 HERNANDEZ  8/2011. No evidence for distant metastatic disease on post therapy scan.  Neck US 6/2016- showed new lymph node in neck along with rising Tg levels  S/p FNA lymph node: 6/2016- c/w papillary thyroid cancer    8/2016: underwent left modified neck dissection.  1/27 lymph node positive one left modified neck dissection.  1 cm in greatest diameter.  Negative for external involvement at level II.    Tg decreased post left neck dissection from 1.3 to < 0.1    Follow up I123 4/2017: no mets.    Postop course was complicated by hypocalcemia and was started on calcium supplement.  Taking calcium- not taking.    Delivered baby 1/2017. No longer breast feeding    She was followed by endocrinology before and then lost to follow-up with endocrinology in 2012.  No history of neck radiation prior to diagnosis of thyroid cancer.  No family history of thyroid cancer.  Here to discuss WBC scan and protocol.  Has upcoming radiology appointments scheduled.      2.  Hypothyroidism (postoperative):  Was started on levothyroxine following thyroidectomy.  Currently she is taking levothyroxine 150  g per day.    Taking generic levothyroxine.  Reports compliance.  Delivered 1/2017.  Recent labs, TSH decreased 5.31 --> 2.58.    Wt Readings from Last 2 Encounters:   09/20/17 73.5 kg  (162 lb)   17 73.5 kg (162 lb)     No diarrhea, tremors. No palpitations.  No difficulty with swallowing, no pain during swallowing.    PMH/PSH:  Past Medical History:   Diagnosis Date     Gestational diabetes     GDM diet controlled     Gestational diabetes 2013     Hypocalcemia 2016     Influenza A      Papillary thyroid carcinoma     Papillary carcinoma, follicular variant with     Pneumonia     LLL     PONV (postoperative nausea and vomiting)       labor      Uncomplicated asthma      Past Surgical History:   Procedure Laterality Date      SECTION  10/26/2013    Procedure:  SECTION;  primary  section;  Surgeon: Rodney Mckee MD;  Location: [facility]      SECTION N/A 2017    Procedure:  SECTION;  Surgeon: Hakeem Combs MD;  Location: [facility]     DISSECTION RADICAL NECK Left 2016    Procedure: DISSECTION RADICAL NECK;  Surgeon: Vy Theodore MD;  Location: [facility] OR     DISSECTION RADICAL NECK MODIFIED Left 2016    Procedure: DISSECTION RADICAL NECK MODIFIED;  Surgeon: Luis Brown MD;  Location: [facility] OR     THYROIDECTOMY      Total, for cancer.      Family Hx:  Family History   Problem Relation Age of Onset     DIABETES Mother      CEREBROVASCULAR DISEASE Mother      Hypertension Mother      DIABETES Father 50     Hypertension Father      Genitourinary Problems Father      kidney disease     Coronary Artery Disease Father      DIABETES Brother      Thyroid disease: No        Social Hx:  Social History     Social History     Marital status:      Spouse name: Brock     Number of children: 1     Years of education: N/A     Occupational History       [employer]     Social History Main Topics     Smoking status: Never Smoker     Smokeless tobacco: Never Used     Alcohol use No     Drug use: No     Sexual activity: Yes     Partners: Male     Birth control/ protection:      Other Topics Concern       Service No     Blood Transfusions No     Caffeine Concern No     Hobby Hazards No     Sleep Concern No     Stress Concern No     Weight Concern Yes     Special Diet No     Back Care No     Exercise No     Bike Helmet No     Seat Belt Yes     Self-Exams No     Social History Narrative    Pt lives with  and father.          MEDICATIONS:  has a current medication list which includes the following prescription(s): junel fe , levothyroxine, albuterol, and prenatal multivitamin plus iron.    ROS     ROS: 10 point ROS neg other than the symptoms noted above in the HPI.      Physical Exam   VS: There were no vitals taken for this visit.  GENERAL: AXOX3, NAD, well dressed, answering questions appropriately, appears stated age.  HEENT: OP clear, no LAD, no TM, non-tender, no exopthalmous, no proptosis, EOMI, no lig lag, no retraction  Thyroid: Hypertrophied scar, will healed thyroidectomy scar present.  Hypertrophied scar on lateral side of neck. No lymphadenopathy.  CV: RRR, no rubs, gallops, no murmurs  LUNGS: CTAB, no wheezes, rales, or ronchi  ABDOMEN: +BS  EXTREMITIES: no edema, +pulses, no rashes, no lesions  NEUROLOGY: CN grossly intact, + DTR upper and lower extremity, no tremors  MSK: grossly intact  SKIN: no rashes, no lesions  PSYCH: normal affect and mood      LABS:  2017:  TSH : 47.89 (post thyrogen)  TgAB: <0.4  TG: <0.10    17:  TSH: 0.04  TgAB: <0.4  TG: <0.10    11/10/16:  TSH 0.74  TgAb: <0.4  TG: <0.10     2016:  TSH: 0.08  TgAb: <0.4  T.30     2012:  TSH: 12.40  TGAB: 1.2  T.6    !THYROID Latest Ref Rng & Units 10/17/2017 8/3/2017 2017   TSH 0.40 - 4.00 mU/L 2.58 5.31 (H) 47.89 (H)   T4 FREE 0.76 - 1.46 ng/dL 1.24 1.31 1.29     !THYROID Latest Ref Rng & Units 2017 11/10/2016 2016   TSH 0.40 - 4.00 mU/L 0.04 (L) 0.74 0.08 (L)   T4 FREE 0.76 - 1.46 ng/dL 1.80 (H) 1.30 1.50 (H)     ENDO CALCIUM LABS-RUST Latest Ref Rng & Units 10/17/2017   CALCIUM 8.5 - 10.1  mg/dL 8.3 (L)   PHOSPHOROUS 2.5 - 4.5 mg/dL    MAGNESIUM 1.6 - 2.3 mg/dL    ALBUMIN 3.9 - 5.1 g/dL    BUN 7 - 30 mg/dL    CREATININE 0.52 - 1.04 mg/dL    PARATHYROID HORMONE INTACT 12 - 72 pg/mL    ALKPHOS 40 - 150 U/L    VITAMIN D DEFICIENCY SCREENING 20 - 75 ug/L      ENDO CALCIUM LABS-UMP Latest Ref Rng & Units 8/3/2017 2/23/2017   CALCIUM 8.5 - 10.1 mg/dL 7.8 (L) 9.2   PHOSPHOROUS 2.5 - 4.5 mg/dL  3.9   MAGNESIUM 1.6 - 2.3 mg/dL  1.9   ALBUMIN 3.9 - 5.1 g/dL     BUN 7 - 30 mg/dL     CREATININE 0.52 - 1.04 mg/dL     PARATHYROID HORMONE INTACT 12 - 72 pg/mL 18 11 (L)   ALKPHOS 40 - 150 U/L     VITAMIN D DEFICIENCY SCREENING 20 - 75 ug/L 28 23     ENDO CALCIUM LABS-UMP Latest Ref Rng & Units 11/10/2016   CALCIUM 8.5 - 10.1 mg/dL 8.2 (L)   PHOSPHOROUS 2.5 - 4.5 mg/dL    MAGNESIUM 1.6 - 2.3 mg/dL    ALBUMIN 3.9 - 5.1 g/dL    BUN 7 - 30 mg/dL    CREATININE 0.52 - 1.04 mg/dL    PARATHYROID HORMONE INTACT 12 - 72 pg/mL 8 (L)   ALKPHOS 40 - 150 U/L    VITAMIN D DEFICIENCY SCREENING 20 - 75 ug/L      NM THYROID UPTAKE/SCAN   Feb 4, 2011 2:47:00 PM      COMPARISON: None.     HISTORY: Hyperthyroidism.     TECHNIQUE:  The patient was given 173 uCi of I-123 orally, followed by  24-hour thyroid scan and radioiodine uptake evaluation.     FINDINGS:  The thyroid gland appears normal in size. 24-hour uptake  was calculated at 46.4%, which is above the normal range. Normal range  is 10-30% 24-hour uptake. Focal area of decreased uptake in the mid  left thyroid lobe may represent a cold thyroid nodule or cyst.     IMPRESSION:    1. Elevated 24-hour radioiodine uptake in the thyroid at 46.4%.  2. Possible cold nodule or cyst in the mid left thyroid lobe. Thyroid  ultrasound is recommended for further evaluation.    NUCLEAR MEDICINE I-131 SCAN    Aug 10, 2011 8:04:00 AM      TECHNIQUE: 78.7 mCi I-131 ablation scan. Five days post therapy  imaging performed today.     HISTORY: Thyroid cancer.     COMPARISON:   Nuclear Study: Thyroid  uptake and scan 2/4/2011.  Other Relevant Studies: Ultrasound neck 2/17/2011.     FINDINGS: Intense radiotracer uptake at the left greater than right  neck at the thyroid level. No evidence for distant metastatic disease.     IMPRESSION: Intense radiotracer uptake localizing to the thyroidectomy  bed, left greater than right. This is consistent with residual thyroid  tissue. No distant metastatic disease identified.    ULTRASOUND THYROID  Feb 17, 2011      HISTORY: Hyperthyroidism. Thyroid nodule.      COMPARISON: Nuclear Medicine thyroid scan 2/4/2011.     FINDINGS: The thyroid gland is enlarged. The right lobe measures 5.4 x  1.6 x 2.1 cm. The left lobe measures 5.9 x 2.3 x 2.7 cm. There are 2  right thyroid nodules and 3 left thyroid nodules.  1. Right upper lobe, hypoechoic solid measuring 0.7 x 0.6 x 0.6 cm.  2. Right lower pole, solid measuring 0.7 x 0.5 x 0.6 cm.  3. Left upper pole, cystic and solid measuring 1.1 x 0.6 x 1.0 cm.  4. Left mid thyroid lobe, primarily solid with small cystic areas  measuring 2.7 x 1.9 x 2.1 cm.  5. Left lower pole, cystic and solid measuring 1.4 x 0.9 x 1.2 cm.     The primarily solid left mid thyroid nodule appears to correspond to  the cold nodule on thyroid uptake and scan.  IMPRESSION: Multinodular thyroid gland.    FNA thyroid nodule:  Copath Report       FNA-thyroid, left mid lobe nodule:   Suspicious for malignancy.  Suspicious for papillary carcinoma  Specimen Adequacy: Satisfactory for evaluation.           Surgical pathology:     SPECIMEN(S):  A: Thyroid, left lobe  B: Parathyroid gland, biopsy of right inferior  C: Thyroid, right lobe    FINAL DIAGNOSIS:  A. and C.  Thyroid, right and left lobes, total thyroidectomy -  Specimen/Procedure(s) and Laterality:   Right and left thyroid lobes,  total thyroidectomy.  Specimen Integrity:   Intact.  Specimen Size and Weight:   See Gross Description.  Histologic Tumor Type(s):   Papillary carcinoma, follicular variant  "with  focal papillary morphology.  Tumor Focality: Unifocal.  Tumor Laterality:   Left lobe.  Tumor Size(s):   Left lobe: 2.2 cm (greatest dimension).  Margins:   Close, but uninvolved.            Distance to closest margin, if negative:   Tumor 0.05 cm from  nearest paratracheal surface and 1.75 cm from nearest anterolateral  surface.  Tumor Capsular Invasion: Present.    Tumor Capsule: Partial.  Extrathyroidal Invasion:   Not identified.  Lymph-Vascular Invasion:   Not identified.  Lymph Nodes:   Two nodes without evidence of metastatic carcinoma (0/2;  one at isthmus and one adjacent to right lobe).  Pathologic Staging:   pT2, pN0, pM not applicable.  Additional Pathologic Findings:   Right thyroid lobe without evidence of  malignancy.  Background nodular hyperplasia and thyroiditis with  lymphoid follicles.  Left lobe with focal previous biopsy site changes.  No parathyroid tissue identified.  CAP Protocol Based on AJCC/UICC TNM, 7th edition; Protocol Effective  Date:  January 2010    B.  \"Parathyroid gland\", right inferior, biopsy - Thyroid tissue; no  parathyroid glandular tissue identified.    8/2016: left modified neck Dissection:  Copath Report      SPECIMEN(S):   A: Scar, left neck   B: Parathyroid gland, left inferior   C: Left central neck dissection, para and pretracheal lymph nodes   D: apical jugular lymph node   E: Left modified neck dissection     FINAL DIAGNOSIS:   A. Skin, neck/scar, excision:   - Hypertrophic scar; benign.     B. Parathyroid gland, left inferior, biopsy:   - Parathyroid tissue present.     C. Left central neck dissection with para-and pretracheal lymph nodes:   - One lymph node; no evidence of malignancy (0/1).   - Thymus and parathyroid tissue present.     D. Lymph node, apical jugular, excision:   - One lymph node; no evidence of malignancy (0/1).     E. Left modified neck dissection:   - 27 lymph nodes identified (level II- 10, level III- 9, level IV- 5 and   lymph nodes " associated with jugular vein- 3).   - One (of 27) lymph node positive for metastatic papillary thyroid   carcinoma.  1 cm in greatest diameter.  Negative for extranodal   involvement  (Level II).   - Jugular vein negative for tumor.        NUCLEAR MEDICINE THYROID WHOLE BODY SCAN I-123   4/20/2017     TECHNIQUE:  The patient was given 1.8 mCi iodine 123 per oral on  4/19/2017. SPECT-CT with fusion was performed at the level of the neck  and chest.     HISTORY:  Malignant neoplasm of thyroid gland. Postprocedural  hypothyroidism.     COMPARISON:   Nuclear Study: None.     Other Relevant Studies: None.     FINDINGS:  Thyroidectomy. No suspicious focus of abnormal radiotracer  is seen at the thyroidectomy bed identified. Physiologic tracer uptake  seen at the salivary glands. There are several small subcentimeter  lymph nodes involving the bilateral neck without conspicuous focal  tracer uptake outside of the background tracer distribution. There  appears to be left neck postoperative change causing some  inconspicuity of the left sternocleidomastoid muscle. No convincing  worrisome airspace disease or mediastinal abnormality is seen.         IMPRESSION: No worrisome focal tracer uptake is identified to suggest  recurrent neoplasm or remote metastatic disease. Some postoperative  changes at the left neck noted, likely accounting for inconspicuity of  the left sternocleidomastoid muscle. Small bilateral subcentimeter  neck lymph nodes noted.       All pertinent notes, labs, and images personally reviewed by me.     A/P  Ms.Nhu Mounika Nicole is a 32 year old here for the evaluation of thyroid cancer:    1. 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  It was 2.2 cm unifocal, left lobe tumor with no lymph node involvement.  S/p  total thyroidectomy in 2011 and 78.7 mCi of I-131 HERNANDEZ. No evidence for distant metastatic disease on post  therapy scan.  2016- new lymph node s/p FNA with PTC with rising TG  S/p 8/2016- left modified neck radiation. 1/27 lymph node + ( level2). Postop course complicated by hypocalcemia.  Delivered 1/2017.   Follow up I123 WBS 4/2017- no mets  TG suppressed as above  Plan: continue to monitor. Annaul imaging and TG ( 4/2018)  2.  Hypothyroidism (postoperative following total thyroidectomy for thyroid cancer):  On levothyroxine 150  g/day. Generic. Since 3/3/17.  Dose was last increased 137-->150 mcg/day 8/2017 due to elevated TSH.  With history of thyroid cancer goal TSH < 0.1  Recent labs show TSH > 2.0.  Increase levothyroxine to 175 mcg/day.  Plan to repeat TFT's in 6 weeks.  2.  Low calcium, history of low vitamin D:  Recommend vitamin D 2000 IU daily  Recommend calcium 500-600 mg two to three times per day and/or calcium rich foods - 4 servings per day.  Recheck calcium, D, and PTH with labs in 6 weeks     Follow-up:  With Dr. Costello 4/2018    Lillie Will NP  Endocrinology   [facility]  CC: Evan Zamora Vaishnavi

## 2017-11-13 NOTE — PATIENT INSTRUCTIONS
Increase Levothyroxine to 175 mcg/day.  Make a lab appointment to recheck thyroid levels in 6-8 weeks.    Follow up with Dr. Costello 4/2018 for a recheck.    My fitness pal  Calorie [facility] MOVE program - handouts  Track your calorie intake - 110 - 1300 calories per day should result in a 1 lb per week weight loss.  Eating well.com has good recipes.  Hungry girl.com - recipes (also both eating well and hungry girl have cookbooks, eating well has a magazine).    Thyca.org  The American Thyroid association - thyroid.org     Lillie Will NP  Endocrinology

## 2017-11-13 NOTE — NURSING NOTE
"Chief Complaint   Patient presents with     Thyroid Problem       Initial /77 (BP Location: Left arm, Patient Position: Chair, Cuff Size: Adult Regular)  Pulse 90  Temp 98  F (36.7  C) (Oral)  Resp 12  Wt 168 lb (76.2 kg)  BMI 31.74 kg/m2 Estimated body mass index is 31.74 kg/(m^2) as calculated from the following:    Height as of 9/20/17: 5' 1\" (1.549 m).    Weight as of this encounter: 168 lb (76.2 kg).  Medication Reconciliation: complete    "

## 2017-12-19 ENCOUNTER — OFFICE VISIT (OUTPATIENT)
Dept: INTERNAL MEDICINE | Facility: CLINIC | Age: 32
End: 2017-12-19
Payer: COMMERCIAL

## 2017-12-19 VITALS
TEMPERATURE: 98 F | OXYGEN SATURATION: 98 % | SYSTOLIC BLOOD PRESSURE: 112 MMHG | RESPIRATION RATE: 16 BRPM | HEART RATE: 92 BPM | DIASTOLIC BLOOD PRESSURE: 76 MMHG | HEIGHT: 61 IN | BODY MASS INDEX: 30.78 KG/M2 | WEIGHT: 163 LBS

## 2017-12-19 DIAGNOSIS — L91.0 KELOID SCAR: ICD-10-CM

## 2017-12-19 DIAGNOSIS — L85.8 KERATOSIS PILARIS: ICD-10-CM

## 2017-12-19 DIAGNOSIS — Z00.00 ROUTINE GENERAL MEDICAL EXAMINATION AT A HEALTH CARE FACILITY: Primary | ICD-10-CM

## 2017-12-19 DIAGNOSIS — Z13.6 CARDIOVASCULAR SCREENING; LDL GOAL LESS THAN 160: ICD-10-CM

## 2017-12-19 DIAGNOSIS — Z13.1 SCREENING FOR DIABETES MELLITUS: ICD-10-CM

## 2017-12-19 DIAGNOSIS — J45.30 MILD PERSISTENT ASTHMA WITHOUT COMPLICATION: ICD-10-CM

## 2017-12-19 LAB
CHOLEST SERPL-MCNC: 175 MG/DL
GLUCOSE SERPL-MCNC: 101 MG/DL (ref 70–99)
HDLC SERPL-MCNC: 43 MG/DL
LDLC SERPL CALC-MCNC: 102 MG/DL
NONHDLC SERPL-MCNC: 132 MG/DL
TRIGL SERPL-MCNC: 149 MG/DL

## 2017-12-19 PROCEDURE — 80061 LIPID PANEL: CPT | Performed by: INTERNAL MEDICINE

## 2017-12-19 PROCEDURE — 82947 ASSAY GLUCOSE BLOOD QUANT: CPT | Performed by: INTERNAL MEDICINE

## 2017-12-19 PROCEDURE — 99395 PREV VISIT EST AGE 18-39: CPT | Mod: 25 | Performed by: INTERNAL MEDICINE

## 2017-12-19 PROCEDURE — 99213 OFFICE O/P EST LOW 20 MIN: CPT | Mod: 25 | Performed by: INTERNAL MEDICINE

## 2017-12-19 PROCEDURE — 90670 PCV13 VACCINE IM: CPT | Performed by: INTERNAL MEDICINE

## 2017-12-19 PROCEDURE — 36415 COLL VENOUS BLD VENIPUNCTURE: CPT | Performed by: INTERNAL MEDICINE

## 2017-12-19 PROCEDURE — 90471 IMMUNIZATION ADMIN: CPT | Performed by: INTERNAL MEDICINE

## 2017-12-19 RX ORDER — ALBUTEROL SULFATE 90 UG/1
2 AEROSOL, METERED RESPIRATORY (INHALATION) EVERY 4 HOURS PRN
Qty: 1 INHALER | Refills: 11 | Status: SHIPPED | OUTPATIENT
Start: 2017-12-19 | End: 2019-07-01

## 2017-12-19 RX ORDER — FLUTICASONE PROPIONATE 44 UG/1
1 AEROSOL, METERED RESPIRATORY (INHALATION) 2 TIMES DAILY
Qty: 1 INHALER | Refills: 11 | Status: SHIPPED | OUTPATIENT
Start: 2017-12-19 | End: 2019-07-01

## 2017-12-19 NOTE — MR AVS SNAPSHOT
After Visit Summary   12/19/2017    [facility] Mounika Nicole    MRN: [id number]           Patient Information     Date Of Birth          1985        Visit Information        Provider Department      12/19/2017 9:40 AM Evan Zamora MD [facility]        Today's Diagnoses     Routine general medical examination at a health care facility    -  1    Mild persistent asthma without complication        Keratosis pilaris        Keloid scar        CARDIOVASCULAR SCREENING; LDL GOAL LESS THAN 160        Screening for diabetes mellitus          Care Instructions      Preventive Health Recommendations  Female Ages 26 - 39  Yearly exam:   See your health care provider every year in order to    Review health changes.     Discuss preventive care.      Review your medicines if you your doctor has prescribed any.    Until age 30: Get a Pap test every three years (more often if you have had an abnormal result).    After age 30: Talk to your doctor about whether you should have a Pap test every 3 years or have a Pap test with HPV screening every 5 years.   You do not need a Pap test if your uterus was removed (hysterectomy) and you have not had cancer.  You should be tested each year for STDs (sexually transmitted diseases), if you're at risk.   Talk to your provider about how often to have your cholesterol checked.  If you are at risk for diabetes, you should have a diabetes test (fasting glucose).  Shots: Get a flu shot each year. Get a tetanus shot every 10 years.   Nutrition:     Eat at least 5 servings of fruits and vegetables each day.    Eat whole-grain bread, whole-wheat pasta and brown rice instead of white grains and rice.    Talk to your provider about Calcium and Vitamin D.     Lifestyle    Exercise at least 150 minutes a week (30 minutes a day, 5 days of the week). This will help you control your weight and prevent disease.    Limit alcohol to one drink per day.    No smoking.      Wear sunscreen to prevent skin cancer.    See your dentist every six months for an exam and cleaning.      If your asthma symptoms do not improve in the next several weeks increase the new flovent inhaler to 2 puffs twice daily from 1 puff twice daily.           Follow-ups after your visit        Additional Services     DERMATOLOGY REFERRAL       Your provider has referred you to:   [facility]: [facility] Dermatology [facility] [phone]   [email or web address] and [facility]: [facility]: Dermatology [facility] [phone]   [email or web address]    Please be aware that coverage of these services is subject to the terms and limitations of your health insurance plan.  Call member services at your health plan with any benefit or coverage questions.      Please bring the following with you to your appointment:    (1) Any X-Rays, CTs or MRIs which have been performed.  Contact the facility where they were done to arrange for  prior to your scheduled appointment.    (2) List of current medications  (3) This referral request   (4) Any documents/labs given to you for this referral                  Who to contact     If you have questions or need follow up information about today's clinic visit or your schedule please contact [facility] directly at [phone].  Normal or non-critical lab and imaging results will be communicated to you by MyChart, letter or phone within 4 business days after the clinic has received the results. If you do not hear from us within 7 days, please contact the clinic through MyChart or phone. If you have a critical or abnormal lab result, we will notify you by phone as soon as possible.  Submit refill requests through [employer] or call your pharmacy and they will forward the refill request to us. Please allow 3 business days for your refill to be completed.          Additional  "Information About Your Visit        MyChart Information     [employer] lets you send messages to your doctor, view your test results, renew your prescriptions, schedule appointments and more. To sign up, go to [email or web address] . Click on \"Log in\" on the left side of the screen, which will take you to the Welcome page. Then click on \"Sign up Now\" on the right side of the page.     You will be asked to enter the access code listed below, as well as some personal information. Please follow the directions to create your username and password.     Your access code is: [email or web address]  Expires: 2017  3:26 PM     Your access code will  in 90 days. If you need help or a new code, please call your [place] clinic or [phone].        Care EveryWhere ID     This is your Care EveryWhere ID. This could be used by other organizations to access your [place] medical records  [id number]        Your Vitals Were     Pulse Temperature Respirations Height Pulse Oximetry BMI (Body Mass Index)    92 98  F (36.7  C) (Oral) 16 5' 1\" (1.549 m) 98% 30.8 kg/m2       Blood Pressure from Last 3 Encounters:   17 112/76   17 108/77   17 122/64    Weight from Last 3 Encounters:   17 163 lb (73.9 kg)   17 168 lb (76.2 kg)   17 162 lb (73.5 kg)              We Performed the Following     DERMATOLOGY REFERRAL     Glucose     Lipid panel reflex to direct LDL Fasting          Today's Medication Changes          These changes are accurate as of: 17 10:26 AM.  If you have any questions, ask your nurse or doctor.               Start taking these medicines.        Dose/Directions    fluticasone 44 MCG/ACT Inhaler   Commonly known as:  FLOVENT HFA   Used for:  Mild persistent asthma without complication   Started by:  Evan Zamora MD        Dose:  1 puff   Inhale 1 puff into the lungs 2 times daily   Quantity:  1 Inhaler   Refills:  11            Where to get your medicines      These " medications were sent to [place], [place] - [address]  [address] [facility] [address]     Phone:  [phone]     albuterol 108 (90 BASE) MCG/ACT Inhaler    fluticasone 44 MCG/ACT Inhaler                Primary Care Provider Office Phone # Fax #    Evan Zamora [phone]       [address] [facility] [address]        Equal Access to Services     СВЕТЛАНА VALDEZ : Hadii aad ku hadasho Soomaali, waaxda luqadaha, qaybta kaalmada adeegyada, waxay idiin hayaan adeeg kharash lavineet . So [facility] [phone].    ATENCIÓN: Si habla español, tiene a serna disposición servicios gratuitos de asistencia lingüística. [facility] [phone].    We comply with applicable federal civil rights laws and [place] laws. We do not discriminate on the basis of race, color, national origin, age, disability, sex, sexual orientation, or gender identity.            Thank you!     Thank you for choosing [facility]  for your care. Our goal is always to provide you with excellent care. Hearing back from our patients is one way we can continue to improve our services. Please take a few minutes to complete the written survey that you may receive in the mail after your visit with us. Thank you!             Your Updated Medication List - Protect others around you: Learn how to safely use, store and throw away your medicines at www.disposemymeds.org.          This list is accurate as of: 12/19/17 10:26 AM.  Always use your most recent med list.                   Brand Name Dispense Instructions for use Diagnosis    albuterol 108 (90 BASE) MCG/ACT Inhaler    PROAIR HFA/PROVENTIL HFA/VENTOLIN HFA    1 Inhaler    Inhale 2 puffs into the lungs every 4 hours as needed for shortness of breath / dyspnea or wheezing    Mild persistent asthma without complication       fluticasone 44 MCG/ACT Inhaler    FLOVENT HFA    1 Inhaler    Inhale 1 puff into the lungs 2 times  daily    Mild persistent asthma without complication       JUNEL FE 1/20 1-20 MG-MCG per tablet   Generic drug:  norethindrone-ethinyl estradiol     84 tablet    TAKE ONE TABLET BY MOUTH EVERY DAY    Contraceptive management       levothyroxine 175 MCG tablet    SYNTHROID/LEVOTHROID    30 tablet    Take 1 tablet (175 mcg) by mouth daily    Postoperative hypothyroidism       prenatal multivitamin plus iron 27-0.8 MG Tabs per tablet      Take 1 tablet by mouth daily    Postoperative hypothyroidism, Papillary carcinoma, follicular variant (H)       Vitamin D (Cholecalciferol) 1000 UNITS Caps     60 capsule    Take 2,000 Int'l Units by mouth daily    Vitamin D deficiency

## 2017-12-19 NOTE — NURSING NOTE
"Chief Complaint   Patient presents with     Physical       Initial /76  Pulse 92  Temp 98  F (36.7  C) (Oral)  Resp 16  Ht 5' 1\" (1.549 m)  Wt 163 lb (73.9 kg)  SpO2 98%  BMI 30.8 kg/m2 Estimated body mass index is 30.8 kg/(m^2) as calculated from the following:    Height as of this encounter: 5' 1\" (1.549 m).    Weight as of this encounter: 163 lb (73.9 kg).  Medication Reconciliation: complete   Cassie Lima MA      "

## 2017-12-19 NOTE — PATIENT INSTRUCTIONS
Preventive Health Recommendations  Female Ages 26 - 39  Yearly exam:   See your health care provider every year in order to    Review health changes.     Discuss preventive care.      Review your medicines if you your doctor has prescribed any.    Until age 30: Get a Pap test every three years (more often if you have had an abnormal result).    After age 30: Talk to your doctor about whether you should have a Pap test every 3 years or have a Pap test with HPV screening every 5 years.   You do not need a Pap test if your uterus was removed (hysterectomy) and you have not had cancer.  You should be tested each year for STDs (sexually transmitted diseases), if you're at risk.   Talk to your provider about how often to have your cholesterol checked.  If you are at risk for diabetes, you should have a diabetes test (fasting glucose).  Shots: Get a flu shot each year. Get a tetanus shot every 10 years.   Nutrition:     Eat at least 5 servings of fruits and vegetables each day.    Eat whole-grain bread, whole-wheat pasta and brown rice instead of white grains and rice.    Talk to your provider about Calcium and Vitamin D.     Lifestyle    Exercise at least 150 minutes a week (30 minutes a day, 5 days of the week). This will help you control your weight and prevent disease.    Limit alcohol to one drink per day.    No smoking.     Wear sunscreen to prevent skin cancer.    See your dentist every six months for an exam and cleaning.      If your asthma symptoms do not improve in the next several weeks increase the new flovent inhaler to 2 puffs twice daily from 1 puff twice daily.

## 2017-12-19 NOTE — PROGRESS NOTES
SUBJECTIVE:   CC: Aleyda Nicole is an 32 year old woman who presents for preventive health visit.   Answers for HPI/ROS submitted by the patient on 12/19/2017  Annual Exam:  Getting at least 3 servings of Calcium per day:: Yes  Bi-annual eye exam:: NO  Dental care twice a year:: NO  Sleep apnea or symptoms of sleep apnea:: None  Diet:: Regular (no restrictions)  Frequency of exercise:: None  Taking medications regularly:: Yes  Medication side effects:: None  Additional concerns today:: No  PHQ-2 Score: 1      Asthma  Notes she is waking up daily at night with symptoms  She doesn't know why her symptoms have worsened since her last pregnancy     Today's PHQ-2 Score:   PHQ-2 ( 1999 [employer]) 6/14/2017 4/18/2017   Q1: Little interest or pleasure in doing things 0 0   Q2: Feeling down, depressed or hopeless 0 0   PHQ-2 Score 0 0     Abuse: Current or Past(Physical, Sexual or Emotional)- No  Do you feel safe in your environment - Yes  Social History   Substance Use Topics     Smoking status: Never Smoker     Smokeless tobacco: Never Used     Alcohol use No     If you drink alcohol do you typically have >3 drinks per day or >7 drinks per week? No                     Reviewed orders with patient.  Reviewed health maintenance and updated orders accordingly - Yes      Mammogram not appropriate for this patient based on age.    Pertinent mammograms are reviewed under the imaging tab.  History of abnormal Pap smear: NO - age 30- 65 PAP every 3 years recommended    Reviewed and updated as needed this visit by clinical staff  Tobacco  Allergies         Reviewed and updated as needed this visit by Provider              ROS:  C: NEGATIVE for fever, chills, change in weight  I: NEGATIVE for worrisome rashes, moles or lesions  E: NEGATIVE for vision changes or irritation  ENT: NEGATIVE for ear, mouth and throat problems  R: NEGATIVE for significant cough or SOB  B: NEGATIVE for masses, tenderness or discharge  CV: NEGATIVE for  "chest pain, palpitations or peripheral edema  GI: NEGATIVE for nausea, abdominal pain, heartburn, or change in bowel habits  : NEGATIVE for unusual urinary or vaginal symptoms. Periods are regular.  M: NEGATIVE for significant arthralgias or myalgia  N: NEGATIVE for weakness, dizziness or paresthesias  P: NEGATIVE for changes in mood or affect    OBJECTIVE:   /76  Pulse 92  Temp 98  F (36.7  C) (Oral)  Resp 16  Ht 5' 1\" (1.549 m)  Wt 163 lb (73.9 kg)  SpO2 98%  BMI 30.8 kg/m2  EXAM:  GENERAL: healthy, alert and no distress  EYES: Eyes grossly normal to inspection, PERRL and conjunctivae and sclerae normal  HENT: ear canals and TM's normal, nose and mouth without ulcers or lesions  NECK: no adenopathy, no asymmetry, masses, or scars and thyroid normal to palpation  RESP: lungs clear to auscultation - no rales, rhonchi or wheezes  CV: regular rate and rhythm, normal S1 S2, no S3 or S4, no murmur, click or rub, no peripheral edema and peripheral pulses strong  ABDOMEN: soft, nontender, no hepatosplenomegaly, no masses and bowel sounds normal  MS: no gross musculoskeletal defects noted, no edema  SKIN: keloid L side of neck on surgical scar, some fine papules on latearl aspect each arm consistent with KP.   NEURO: Normal strength and tone, mentation intact and speech normal  PSYCH: mentation appears normal, affect normal/bright  LYMPH: no cervical, supraclavicular, axillary, or inguinal adenopathy    Results for orders placed or performed in visit on 12/19/17   Lipid panel reflex to direct LDL Fasting   Result Value Ref Range    Cholesterol 175 <200 mg/dL    Triglycerides 149 <150 mg/dL    HDL Cholesterol 43 (L) >49 mg/dL    LDL Cholesterol Calculated 102 (H) <100 mg/dL    Non HDL Cholesterol 132 (H) <130 mg/dL   Glucose   Result Value Ref Range    Glucose 101 (H) 70 - 99 mg/dL      ASSESSMENT/PLAN:   1. Routine general medical examination at a health care facility  Overall doing well     2. Mild " "persistent asthma without complication  Not controlled- add flovent for daily use.   - fluticasone (FLOVENT HFA) 44 MCG/ACT Inhaler; Inhale 1 puff into the lungs 2 times daily  Dispense: 1 Inhaler; Refill: 11  - albuterol (PROAIR HFA/PROVENTIL HFA/VENTOLIN HFA) 108 (90 BASE) MCG/ACT Inhaler; Inhale 2 puffs into the lungs every 4 hours as needed for shortness of breath / dyspnea or wheezing  Dispense: 1 Inhaler; Refill: 11    3. Keratosis pilaris  Emollient , avoid over harsh soaps. Can discuss with derm     4. Keloid scar  Wants to look into intra-lesional steroid inj prior to considering surgical removal  - DERMATOLOGY REFERRAL    5. CARDIOVASCULAR SCREENING; LDL GOAL LESS THAN 160  - Lipid panel reflex to direct LDL Fasting    6. Screening for diabetes mellitus  - Glucose    COUNSELING:   Reviewed preventive health counseling, as reflected in patient instructions         reports that she has never smoked. She has never used smokeless tobacco.    Estimated body mass index is 30.8 kg/(m^2) as calculated from the following:    Height as of this encounter: 5' 1\" (1.549 m).    Weight as of this encounter: 163 lb (73.9 kg).   Weight management plan: Discussed healthy diet and exercise guidelines and patient will follow up in 3 months in clinic to re-evaluate.    Counseling Resources:  ATP IV Guidelines  Pooled Cohorts Equation Calculator  Breast Cancer Risk Calculator  FRAX Risk Assessment  ICSI Preventive Guidelines  Dietary Guidelines for Americans, 2010  USDA's MyPlate  ASA Prophylaxis  Lung CA Screening    Evan Zamora MD  [facility]       "

## 2017-12-22 ENCOUNTER — MYC MEDICAL ADVICE (OUTPATIENT)
Dept: INTERNAL MEDICINE | Facility: CLINIC | Age: 32
End: 2017-12-22

## 2018-01-25 DIAGNOSIS — C73 PAPILLARY CARCINOMA, FOLLICULAR VARIANT (H): ICD-10-CM

## 2018-01-25 DIAGNOSIS — E89.0 POSTOPERATIVE HYPOTHYROIDISM: ICD-10-CM

## 2018-01-25 LAB
T4 FREE SERPL-MCNC: 1.61 NG/DL (ref 0.76–1.46)
TSH SERPL DL<=0.005 MIU/L-ACNC: <0.01 MU/L (ref 0.4–4)

## 2018-01-25 PROCEDURE — 84443 ASSAY THYROID STIM HORMONE: CPT | Performed by: INTERNAL MEDICINE

## 2018-01-25 PROCEDURE — 84439 ASSAY OF FREE THYROXINE: CPT | Performed by: INTERNAL MEDICINE

## 2018-01-25 PROCEDURE — 36415 COLL VENOUS BLD VENIPUNCTURE: CPT | Performed by: INTERNAL MEDICINE

## 2018-01-30 ENCOUNTER — OFFICE VISIT (OUTPATIENT)
Dept: URGENT CARE | Facility: URGENT CARE | Age: 33
End: 2018-01-30
Payer: COMMERCIAL

## 2018-01-30 VITALS
WEIGHT: 158.1 LBS | OXYGEN SATURATION: 95 % | BODY MASS INDEX: 29.85 KG/M2 | TEMPERATURE: 98 F | DIASTOLIC BLOOD PRESSURE: 79 MMHG | HEART RATE: 102 BPM | HEIGHT: 61 IN | SYSTOLIC BLOOD PRESSURE: 113 MMHG | RESPIRATION RATE: 16 BRPM

## 2018-01-30 DIAGNOSIS — R50.9 FEVER AND CHILLS: ICD-10-CM

## 2018-01-30 DIAGNOSIS — J10.1 INFLUENZA A: Primary | ICD-10-CM

## 2018-01-30 LAB
FLUAV+FLUBV AG SPEC QL: NEGATIVE
FLUAV+FLUBV AG SPEC QL: POSITIVE
SPECIMEN SOURCE: ABNORMAL

## 2018-01-30 PROCEDURE — 99214 OFFICE O/P EST MOD 30 MIN: CPT | Performed by: PHYSICIAN ASSISTANT

## 2018-01-30 PROCEDURE — 87804 INFLUENZA ASSAY W/OPTIC: CPT | Performed by: PHYSICIAN ASSISTANT

## 2018-01-30 RX ORDER — CODEINE PHOSPHATE AND GUAIFENESIN 10; 100 MG/5ML; MG/5ML
1 SOLUTION ORAL EVERY 4 HOURS PRN
Qty: 120 ML | Refills: 0 | Status: SHIPPED | OUTPATIENT
Start: 2018-01-30 | End: 2018-03-06

## 2018-01-30 RX ORDER — OSELTAMIVIR PHOSPHATE 75 MG/1
75 CAPSULE ORAL 2 TIMES DAILY
Qty: 10 CAPSULE | Refills: 0 | Status: SHIPPED | OUTPATIENT
Start: 2018-01-30 | End: 2018-03-06

## 2018-01-30 RX ORDER — IBUPROFEN 800 MG/1
800 TABLET, FILM COATED ORAL EVERY 8 HOURS PRN
Qty: 30 TABLET | Refills: 1 | Status: SHIPPED | OUTPATIENT
Start: 2018-01-30 | End: 2018-08-28

## 2018-01-30 NOTE — NURSING NOTE
"Chief Complaint   Patient presents with     Urgent Care     Pt states cough, sore throat, fatigue, sleeping alot, body aches, chills, headaches 2x days        Initial /79  Pulse 102  Temp 98  F (36.7  C) (Oral)  Resp 16  Ht 5' 1\" (1.549 m)  Wt 158 lb 1.6 oz (71.7 kg)  SpO2 95%  BMI 29.87 kg/m2 Estimated body mass index is 29.87 kg/(m^2) as calculated from the following:    Height as of this encounter: 5' 1\" (1.549 m).    Weight as of this encounter: 158 lb 1.6 oz (71.7 kg).  Medication Reconciliation: complete      "

## 2018-01-30 NOTE — MR AVS SNAPSHOT
After Visit Summary   1/30/2018    [facility] Mounika Nicole    MRN: [id number]           Patient Information     Date Of Birth          1985        Visit Information        Provider Department      1/30/2018 10:35 AM Demetrice Aranda PA-C [facility]        Today's Diagnoses     Influenza A    -  1    Fever and chills          Care Instructions      The Flu (Influenza)     The virus that causes the flu spreads through the air in droplets when someone who has the flu coughs, sneezes, laughs, or talks.   The flu (influenza) is an infection that affects your respiratory tract. This tract is made up of your mouth, nose, and lungs, and the passages between them. Unlike a cold, the flu can make you very ill. And it can lead to pneumonia, a serious lung infection. The flu can have serious complications and even cause death.  Who is at risk for the flu?  Anyone can get the flu. But you are more likely to become infected if you:    Have a weakened immune system    Work in a healthcare setting where you may be exposed to flu germs    Live or work with someone who has the flu    Haven t had an annual flu shot  How does the flu spread?  The flu is caused by a virus. The virus spreads through the air in droplets when someone who has the flu coughs, sneezes, laughs, or talks. You can become infected when you inhale these viruses directly. You can also become infected when you touch a surface on which the droplets have landed and then transfer the germs to your eyes, nose, or mouth. Touching used tissues, or sharing utensils, drinking glasses, or a toothbrush from an infected person can expose you to flu viruses, too.  What are the symptoms of the flu?  Flu symptoms tend to come on quickly and may last a few days to a few weeks. They include:    Fever usually higher than 100.4 F  (38 C) and chills    Sore throat and headache    Dry cough    Runny nose    Tiredness and  weakness    Muscle aches  Who is at risk for flu complications?  For some people, the flu can be very serious. The risk for complications is greater for:    Children younger than age 5    Adults ages 65 and older    People with a chronic illness such as diabetes or heart, kidney, or lung disease    People who live in a nursing home or long-term care facility   How is the flu treated?  The flu usually gets better after 7 days or so. In some cases, your healthcare provider may prescribe an antiviral medicine. This may help you get well a little sooner. For the medicine to help, you need to take it as soon as possible (ideally within 48 hours) after your symptoms start. If you develop pneumonia or other serious illness, you may need to stay in the hospital.  Easing flu symptoms    Drink lots of fluids such as water, juice, and warm soup. A good rule is to drink enough so that you urinate your normal amount.    Get plenty of rest.    Ask your healthcare provider what to take for fever and pain.    Call your provider if your fever is 100.4 F (38 C) or higher, or you become dizzy, lightheaded, or short of breath.  Taking steps to protect others    Wash your hands often, especially after coughing or sneezing. Or clean your hands with an alcohol-based hand  containing at least 60% alcohol.    Cough or sneeze into a tissue. Then throw the tissue away and wash your hands. If you don t have a tissue, cough and sneeze into your elbow.    Stay home until at least 24 hours after you no longer have a fever or chills. Be sure the fever isn t being hidden by fever-reducing medicine.    Don t share food, utensils, drinking glasses, or a toothbrush with others.    Ask your healthcare provider if others in your household should get antiviral medicine to help them avoid infection.  How can the flu be prevented?    One of the best ways to avoid the flu is to get a flu vaccine each year. The virus that causes the flu changes from  year to year. For that reason, healthcare providers recommend getting the flu vaccine each year, as soon as it's available in your area. The vaccine is given as a shot. Your healthcare provider can tell you which vaccine is right for you. A nasal spray is also available but is not recommended for the 8414-2505 flu season. The CDC says this is because the nasal spray did not seem to protect against the flu over the last several flu seasons. In the past, it was meant for people ages 2 to 49.    Wash your hands often. Frequent handwashing is a proven way to help prevent infection.    Carry an alcohol-based hand gel containing at least 60% alcohol. Use it when you can't use soap and water. Then wash your hands as soon as you can.    Avoid touching your eyes, nose, and mouth.    At home and work, clean phones, computer keyboards, and toys often with disinfectant wipes.    If possible, avoid close contact with others who have the flu or symptoms of the flu.  Handwashing tips  Handwashing is one of the best ways to prevent many common infections. If you are caring for or visiting someone with the flu, wash your hands each time you enter and leave the room. Follow these steps:    Use warm water and plenty of soap. Rub your hands together well.    Clean the whole hand, including under your nails, between your fingers, and up the wrists.    Wash for at least 15 seconds.    Rinse, letting the water run down your fingers, not up your wrists.    Dry your hands well. Use a paper towel to turn off the faucet and open the door.  Using alcohol-based hand   Alcohol-based hand  are also a good choice. Use them when you can't use soap and water. Follow these steps:    Squeeze about a tablespoon of gel into the palm of one hand.    Rub your hands together briskly, cleaning the backs of your hands, the palms, between your fingers, and up the wrists.    Rub until the gel is gone and your hands are completely  dry.  Preventing the flu in healthcare settings  The flu is a special concern for people in hospitals and long-term care facilities. To help prevent the spread of flu, many hospitals and nursing homes take these steps:    Healthcare providers wash their hands or use an alcohol-based hand  before and after treating each patient.    People with the flu have private rooms and bathrooms or share a room with someone with the same infection.    People who are at high risk for the flu but don't have it are encouraged to get the flu and pneumonia vaccines.    All healthcare workers are encouraged or required to get flu shots.   Date Last Reviewed: 12/1/2016 2000-2017 [employer]. [address] [place] [address]. All rights reserved. This information is not intended as a substitute for professional medical care. Always follow your healthcare professional's instructions.                Follow-ups after your visit        Your next 10 appointments already scheduled     Feb 22, 2018 10:00 AM CST   New Visit with Osman Mims MD   [facility]    [address] [place] [address] [phone]              Who to contact     If you have questions or need follow up information about today's clinic visit or your schedule please contact [facility] directly at [phone].  Normal or non-critical lab and imaging results will be communicated to you by MyChart, letter or phone within 4 business days after the clinic has received the results. If you do not hear from us within 7 days, please contact the clinic through MyChart or phone. If you have a critical or abnormal lab result, we will notify you by phone as soon as possible.  Submit refill requests through [employer] or call your pharmacy and they will forward the refill request to us. Please allow 3 business days for your refill to be  "completed.          Additional Information About Your Visit        Re.Muhart Information     [employer] gives you secure access to your electronic health record. If you see a primary care provider, you can also send messages to your care team and make appointments. If you have questions, please call your primary care clinic.  If you do not have a primary care provider, please call [phone] and they will assist you.        Care EveryWhere ID     This is your Care EveryWhere ID. This could be used by other organizations to access your [facility] medical records  [id number]        Your Vitals Were     Pulse Temperature Respirations Height Pulse Oximetry BMI (Body Mass Index)    102 98  F (36.7  C) (Oral) 16 5' 1\" (1.549 m) 95% 29.87 kg/m2       Blood Pressure from Last 3 Encounters:   01/30/18 113/79   12/19/17 112/76   11/13/17 108/77    Weight from Last 3 Encounters:   01/30/18 158 lb 1.6 oz (71.7 kg)   12/19/17 163 lb (73.9 kg)   11/13/17 168 lb (76.2 kg)              We Performed the Following     Influenza A/B antigen          Today's Medication Changes          These changes are accurate as of 1/30/18 11:54 AM.  If you have any questions, ask your nurse or doctor.               Start taking these medicines.        Dose/Directions    guaiFENesin-codeine 100-10 MG/5ML Soln solution   Commonly known as:  ROBITUSSIN AC   Used for:  Influenza A   Started by:  Demetrice Aranda PA-C        Dose:  1 tsp.   Take 5 mLs by mouth every 4 hours as needed for cough   Quantity:  120 mL   Refills:  0       ibuprofen 800 MG tablet   Commonly known as:  ADVIL/MOTRIN   Used for:  Fever and chills   Started by:  Demetrice Aranda PA-C        Dose:  800 mg   Take 1 tablet (800 mg) by mouth every 8 hours as needed for moderate pain   Quantity:  30 tablet   Refills:  1       oseltamivir 75 MG capsule   Commonly known as:  TAMIFLU   Used for:  Influenza A   Started by:  Demetrice Aranda PA-C        Dose:  75 " mg   Take 1 capsule (75 mg) by mouth 2 times daily   Quantity:  10 capsule   Refills:  0            Where to get your medicines      These medications were sent to [place], [place] - [address].  [address] [address]     Phone:  [phone]     ibuprofen 800 MG tablet    oseltamivir 75 MG capsule         Some of these will need a paper prescription and others can be bought over the counter.  Ask your nurse if you have questions.     Bring a paper prescription for each of these medications     guaiFENesin-codeine 100-10 MG/5ML Soln solution                Primary Care Provider Office Phone # Fax #    Evan Zamora [phone]       [address] [facility] [address]        Equal Access to Services     СВЕТЛАНА VALDEZ : Hadii aad ku hadasho Sotania, waaxda luqadaha, qaybta kaalmada adeegyada, cristina rangel . So [facility] [phone].    ATENCIÓN: Si habla español, tiene a serna disposición servicios gratuitos de asistencia lingüística. Llame al [phone].    We comply with applicable federal civil rights laws and [place] laws. We do not discriminate on the basis of race, color, national origin, age, disability, sex, sexual orientation, or gender identity.            Thank you!     Thank you for choosing [facility]  for your care. Our goal is always to provide you with excellent care. Hearing back from our patients is one way we can continue to improve our services. Please take a few minutes to complete the written survey that you may receive in the mail after your visit with us. Thank you!             Your Updated Medication List - Protect others around you: Learn how to safely use, store and throw away your medicines at www.disposemymeds.org.          This list is accurate as of 1/30/18 11:54 AM.  Always use your most recent med list.                   Brand Name Dispense Instructions  for use Diagnosis    albuterol 108 (90 BASE) MCG/ACT Inhaler    PROAIR HFA/PROVENTIL HFA/VENTOLIN HFA    1 Inhaler    Inhale 2 puffs into the lungs every 4 hours as needed for shortness of breath / dyspnea or wheezing    Mild persistent asthma without complication       fluticasone 44 MCG/ACT Inhaler    FLOVENT HFA    1 Inhaler    Inhale 1 puff into the lungs 2 times daily    Mild persistent asthma without complication       guaiFENesin-codeine 100-10 MG/5ML Soln solution    ROBITUSSIN AC    120 mL    Take 5 mLs by mouth every 4 hours as needed for cough    Influenza A       ibuprofen 800 MG tablet    ADVIL/MOTRIN    30 tablet    Take 1 tablet (800 mg) by mouth every 8 hours as needed for moderate pain    Fever and chills       JUNEL FE 1/20 1-20 MG-MCG per tablet   Generic drug:  norethindrone-ethinyl estradiol     84 tablet    TAKE ONE TABLET BY MOUTH EVERY DAY    Contraceptive management       levothyroxine 175 MCG tablet    SYNTHROID/LEVOTHROID    30 tablet    Take 1 tablet (175 mcg) by mouth daily    Postoperative hypothyroidism       oseltamivir 75 MG capsule    TAMIFLU    10 capsule    Take 1 capsule (75 mg) by mouth 2 times daily    Influenza A       prenatal multivitamin plus iron 27-0.8 MG Tabs per tablet      Take 1 tablet by mouth daily    Postoperative hypothyroidism, Papillary carcinoma, follicular variant (H)       Vitamin D (Cholecalciferol) 1000 UNITS Caps     60 capsule    Take 2,000 Int'l Units by mouth daily    Vitamin D deficiency
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
## 2018-01-30 NOTE — PATIENT INSTRUCTIONS
The Flu (Influenza)     The virus that causes the flu spreads through the air in droplets when someone who has the flu coughs, sneezes, laughs, or talks.   The flu (influenza) is an infection that affects your respiratory tract. This tract is made up of your mouth, nose, and lungs, and the passages between them. Unlike a cold, the flu can make you very ill. And it can lead to pneumonia, a serious lung infection. The flu can have serious complications and even cause death.  Who is at risk for the flu?  Anyone can get the flu. But you are more likely to become infected if you:    Have a weakened immune system    Work in a healthcare setting where you may be exposed to flu germs    Live or work with someone who has the flu    Haven t had an annual flu shot  How does the flu spread?  The flu is caused by a virus. The virus spreads through the air in droplets when someone who has the flu coughs, sneezes, laughs, or talks. You can become infected when you inhale these viruses directly. You can also become infected when you touch a surface on which the droplets have landed and then transfer the germs to your eyes, nose, or mouth. Touching used tissues, or sharing utensils, drinking glasses, or a toothbrush from an infected person can expose you to flu viruses, too.  What are the symptoms of the flu?  Flu symptoms tend to come on quickly and may last a few days to a few weeks. They include:    Fever usually higher than 100.4 F  (38 C) and chills    Sore throat and headache    Dry cough    Runny nose    Tiredness and weakness    Muscle aches  Who is at risk for flu complications?  For some people, the flu can be very serious. The risk for complications is greater for:    Children younger than age 5    Adults ages 65 and older    People with a chronic illness such as diabetes or heart, kidney, or lung disease    People who live in a nursing home or long-term care facility   How is the flu treated?  The flu usually gets  better after 7 days or so. In some cases, your healthcare provider may prescribe an antiviral medicine. This may help you get well a little sooner. For the medicine to help, you need to take it as soon as possible (ideally within 48 hours) after your symptoms start. If you develop pneumonia or other serious illness, you may need to stay in the hospital.  Easing flu symptoms    Drink lots of fluids such as water, juice, and warm soup. A good rule is to drink enough so that you urinate your normal amount.    Get plenty of rest.    Ask your healthcare provider what to take for fever and pain.    Call your provider if your fever is 100.4 F (38 C) or higher, or you become dizzy, lightheaded, or short of breath.  Taking steps to protect others    Wash your hands often, especially after coughing or sneezing. Or clean your hands with an alcohol-based hand  containing at least 60% alcohol.    Cough or sneeze into a tissue. Then throw the tissue away and wash your hands. If you don t have a tissue, cough and sneeze into your elbow.    Stay home until at least 24 hours after you no longer have a fever or chills. Be sure the fever isn t being hidden by fever-reducing medicine.    Don t share food, utensils, drinking glasses, or a toothbrush with others.    Ask your healthcare provider if others in your household should get antiviral medicine to help them avoid infection.  How can the flu be prevented?    One of the best ways to avoid the flu is to get a flu vaccine each year. The virus that causes the flu changes from year to year. For that reason, healthcare providers recommend getting the flu vaccine each year, as soon as it's available in your area. The vaccine is given as a shot. Your healthcare provider can tell you which vaccine is right for you. A nasal spray is also available but is not recommended for the 5195-6855 flu season. The CDC says this is because the nasal spray did not seem to protect against the flu  over the last several flu seasons. In the past, it was meant for people ages 2 to 49.    Wash your hands often. Frequent handwashing is a proven way to help prevent infection.    Carry an alcohol-based hand gel containing at least 60% alcohol. Use it when you can't use soap and water. Then wash your hands as soon as you can.    Avoid touching your eyes, nose, and mouth.    At home and work, clean phones, computer keyboards, and toys often with disinfectant wipes.    If possible, avoid close contact with others who have the flu or symptoms of the flu.  Handwashing tips  Handwashing is one of the best ways to prevent many common infections. If you are caring for or visiting someone with the flu, wash your hands each time you enter and leave the room. Follow these steps:    Use warm water and plenty of soap. Rub your hands together well.    Clean the whole hand, including under your nails, between your fingers, and up the wrists.    Wash for at least 15 seconds.    Rinse, letting the water run down your fingers, not up your wrists.    Dry your hands well. Use a paper towel to turn off the faucet and open the door.  Using alcohol-based hand   Alcohol-based hand  are also a good choice. Use them when you can't use soap and water. Follow these steps:    Squeeze about a tablespoon of gel into the palm of one hand.    Rub your hands together briskly, cleaning the backs of your hands, the palms, between your fingers, and up the wrists.    Rub until the gel is gone and your hands are completely dry.  Preventing the flu in healthcare settings  The flu is a special concern for people in hospitals and long-term care facilities. To help prevent the spread of flu, many hospitals and nursing homes take these steps:    Healthcare providers wash their hands or use an alcohol-based hand  before and after treating each patient.    People with the flu have private rooms and bathrooms or share a room with someone  with the same infection.    People who are at high risk for the flu but don't have it are encouraged to get the flu and pneumonia vaccines.    All healthcare workers are encouraged or required to get flu shots.   Date Last Reviewed: 12/1/2016 2000-2017 [employer]. [address] [place] [address]. All rights reserved. This information is not intended as a substitute for professional medical care. Always follow your healthcare professional's instructions.

## 2018-02-13 DIAGNOSIS — E89.0 POSTOPERATIVE HYPOTHYROIDISM: ICD-10-CM

## 2018-02-13 NOTE — LETTER
[facility]  [address] [place], [address]  [phone]        February 20, 2018    [facility] Mounika Nicole                                                                                                                                                       [address] [facility] [address]            Dear [facility],          Lillie Will NP received a request to refill your Levothyroxine on 2/15/18.  She approved a temporary refill for a two week supply as you are due for clinic follow up.  This prescription was faxed to [facility], [facility].  We have tried to reach you by phone to relay Lillie's recommendations but unfortunately your mailbox is full and will not except additional messages.    Please call us at your earliest convenience to schedule a clinic follow up regarding your thyroid with either Lillie Will NP or Dr. Costello.              Sincerely,  Mica Das M.A.  Endocrinology

## 2018-02-14 NOTE — TELEPHONE ENCOUNTER
"Requested Prescriptions   Pending Prescriptions Disp Refills     levothyroxine (SYNTHROID/LEVOTHROID) 175 MCG tablet [Pharmacy Med Name: LEVOTHYROXINE SODIUM 175MCG TABS]    Last Written Prescription Date:  11/13/17  Last Fill Quantity: 30 tablet,  # refills: 2   Last office visit: 11/13/2017 with prescribing provider: Suman 11/13/17   Future Office Visit:   30 tablet 2     Sig: TAKE ONE TABLET BY MOUTH DAILY    Thyroid Protocol Failed    2/13/2018  8:24 PM       Failed - Normal TSH on file in past 12 months    Recent Labs   Lab Test  01/25/18   0933   TSH  <0.01*             Passed - Patient is 12 years or older       Passed - Recent or future visit with authorizing provider's specialty    Patient had office visit in the last year or has a visit in the next 30 days with authorizing provider.  See \"Patient Info\" tab in inbasket, or \"Choose Columns\" in Meds & Orders section of the refill encounter.            Passed - No active pregnancy on record    If patient is pregnant or has had a positive pregnancy test, please check TSH.         Passed - No positive pregnancy test in past 12 months    If patient is pregnant or has had a positive pregnancy test, please check TSH.          "

## 2018-02-15 RX ORDER — LEVOTHYROXINE SODIUM 175 UG/1
TABLET ORAL
Qty: 15 TABLET | Refills: 0 | Status: SHIPPED | OUTPATIENT
Start: 2018-02-15 | End: 2018-03-06

## 2018-02-15 NOTE — TELEPHONE ENCOUNTER
I gave her a two week supply - she needs to schedule a follow up visit with Dr. Costello or me.  Lillie Will NP  Endocrinology

## 2018-02-15 NOTE — TELEPHONE ENCOUNTER
Left a message on cell phone asking patient to return call to the [facility] regarding prescription request and a need for an appointment.  Encouraged patient to call back as we don't want her to run out of her medication.  Mica Das M.A.

## 2018-02-15 NOTE — TELEPHONE ENCOUNTER
Lillie-see below last visit note and result note.  Please advise.  Claribel Fung, RN    2/2/18    Entered by Vandana Costello MD at 2/2/2018  1:38 PM   Read by Aleyda Nicole at 2/2/2018  1:46 PM   [facility]     Recently done endocrinology lab test/ imaging test showed suppressed TSH and high free T4.  This means that your current dose of thyroid medication--levothyroxine/Synthroid is high.  We need to adjust dose of thyroid medication.  Given thyroid history of cancer it is very important to have TSH in goal range.   Please follow-up in clinic in few weeks to talk about dose adjustment.     Are you taking generic levothyroxine or brand Synthroid?   Can see Lillie Will at [facility] ([phone]) if she has earlier openings.     Here is a copy for your records.     TSH and Free T4 are the tests done to check thyroid function.     Please call endocrinology clinic (nurse line: [phone]) if questions.     Vandana Costello MD   Endocrinology   [facility]   February 2, 2018 11/13/17  A/P  Ms.Aleyda Nicole is a 32 year old here for the evaluation of thyroid cancer:     1. 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  It was 2.2 cm unifocal, left lobe tumor with no lymph node involvement.  S/p  total thyroidectomy in 2011 and 78.7 mCi of I-131 HERNANDEZ. No evidence for distant metastatic disease on post therapy scan.  2016- new lymph node s/p FNA with PTC with rising TG  S/p 8/2016- left modified neck radiation. 1/27 lymph node + ( level2). Postop course complicated by hypocalcemia.  Delivered 1/2017.   Follow up I123 WBS 4/2017- no mets  TG suppressed as above  Plan: continue to monitor. Annaul imaging and TG ( 4/2018)  2.  Hypothyroidism (postoperative following total thyroidectomy for thyroid cancer):  On levothyroxine 150  g/day. Generic. Since 3/3/17.  Dose was last increased 137-->150 mcg/day  8/2017 due to elevated TSH.  With history of thyroid cancer goal TSH < 0.1  Recent labs show TSH > 2.0.  Increase levothyroxine to 175 mcg/day.  Plan to repeat TFT's in 6 weeks.  2.  Low calcium, history of low vitamin D:  Recommend vitamin D 2000 IU daily  Recommend calcium 500-600 mg two to three times per day and/or calcium rich foods - 4 servings per day.  Recheck calcium, D, and PTH with labs in 6 weeks      Follow-up:  With Dr. Costello 4/2018     Lillie Will NP  Endocrinology   [facility]  CC: Evan Zamora Vaishnavi

## 2018-02-22 ENCOUNTER — OFFICE VISIT (OUTPATIENT)
Dept: DERMATOLOGY | Facility: CLINIC | Age: 33
End: 2018-02-22
Payer: COMMERCIAL

## 2018-02-22 VITALS — DIASTOLIC BLOOD PRESSURE: 75 MMHG | HEART RATE: 86 BPM | SYSTOLIC BLOOD PRESSURE: 122 MMHG | OXYGEN SATURATION: 98 %

## 2018-02-22 DIAGNOSIS — L91.0 KELOID: Primary | ICD-10-CM

## 2018-02-22 PROCEDURE — 99203 OFFICE O/P NEW LOW 30 MIN: CPT | Mod: 25 | Performed by: DERMATOLOGY

## 2018-02-22 PROCEDURE — 11900 INJECT SKIN LESIONS </W 7: CPT | Performed by: DERMATOLOGY

## 2018-02-22 NOTE — MR AVS SNAPSHOT
After Visit Summary   2/22/2018    Aleyda Nicole    MRN: [id number]           Patient Information     Date Of Birth          1985        Visit Information        Provider Department      2/22/2018 10:00 AM Osman Mims MD [facility]        Today's Diagnoses     Keloid    -  1       Follow-ups after your visit        Who to contact     If you have questions or need follow up information about today's clinic visit or your schedule please contact [facility] directly at [phone].  Normal or non-critical lab and imaging results will be communicated to you by [employer], letter or phone within 4 business days after the clinic has received the results. If you do not hear from us within 7 days, please contact the clinic through [employer] or phone. If you have a critical or abnormal lab result, we will notify you by phone as soon as possible.  Submit refill requests through [employer] or call your pharmacy and they will forward the refill request to us. Please allow 3 business days for your refill to be completed.          Additional Information About Your Visit        MyChart Information     [employer] gives you secure access to your electronic health record. If you see a primary care provider, you can also send messages to your care team and make appointments. If you have questions, please call your primary care clinic.  If you do not have a primary care provider, please call [phone] and they will assist you.        Care EveryWhere ID     This is your Care EveryWhere ID. This could be used by other organizations to access your [place] medical records  [id number]        Your Vitals Were     Pulse Pulse Oximetry                86 98%           Blood Pressure from Last 3 Encounters:   02/22/18 122/75   01/30/18 113/79   12/19/17 112/76    Weight from Last 3 Encounters:   01/30/18 71.7 kg (158 lb 1.6 oz)   12/19/17 73.9 kg (163 lb)   11/13/17  76.2 kg (168 lb)              We Performed the Following     INJECTION INTO SKIN LESIONS <=7     TRIAMCINOLONE ACET INJ NOS        Primary Care Provider Office Phone # Fax #    Evan Zamora [phone]       [address] [facility] [address]        Equal Access to Services     СВЕТЛАНА VALDEZ : Hadii aad ku hadasho Soomaali, waaxda luqadaha, qaybta kaalmada adeegyada, waxay idiin hayaan adeeg kharash la'aan . So [facility] [phone].    ATENCIÓN: Si habla español, tiene a serna disposición servicios gratuitos de asistencia lingüística. Christian al [phone].    We comply with applicable federal civil rights laws and [place] laws. We do not discriminate on the basis of race, color, national origin, age, disability, sex, sexual orientation, or gender identity.            Thank you!     Thank you for choosing [facility]  for your care. Our goal is always to provide you with excellent care. Hearing back from our patients is one way we can continue to improve our services. Please take a few minutes to complete the written survey that you may receive in the mail after your visit with us. Thank you!             Your Updated Medication List - Protect others around you: Learn how to safely use, store and throw away your medicines at www.disposemymeds.org.          This list is accurate as of 2/22/18 11:04 AM.  Always use your most recent med list.                   Brand Name Dispense Instructions for use Diagnosis    albuterol 108 (90 BASE) MCG/ACT Inhaler    PROAIR HFA/PROVENTIL HFA/VENTOLIN HFA    1 Inhaler    Inhale 2 puffs into the lungs every 4 hours as needed for shortness of breath / dyspnea or wheezing    Mild persistent asthma without complication       fluticasone 44 MCG/ACT Inhaler    FLOVENT HFA    1 Inhaler    Inhale 1 puff into the lungs 2 times daily    Mild persistent asthma without complication       guaiFENesin-codeine 100-10 MG/5ML Soln solution     ROBITUSSIN AC    120 mL    Take 5 mLs by mouth every 4 hours as needed for cough    Influenza A       ibuprofen 800 MG tablet    ADVIL/MOTRIN    30 tablet    Take 1 tablet (800 mg) by mouth every 8 hours as needed for moderate pain    Fever and chills       JUNEL FE 1/20 1-20 MG-MCG per tablet   Generic drug:  norethindrone-ethinyl estradiol     84 tablet    TAKE ONE TABLET BY MOUTH EVERY DAY    Contraceptive management       levothyroxine 175 MCG tablet    SYNTHROID/LEVOTHROID    15 tablet    TAKE ONE TABLET BY MOUTH DAILY    Postoperative hypothyroidism       oseltamivir 75 MG capsule    TAMIFLU    10 capsule    Take 1 capsule (75 mg) by mouth 2 times daily    Influenza A       prenatal multivitamin plus iron 27-0.8 MG Tabs per tablet      Take 1 tablet by mouth daily    Postoperative hypothyroidism, Papillary carcinoma, follicular variant (H)       Vitamin D (Cholecalciferol) 1000 UNITS Caps     60 capsule    Take 2,000 Int'l Units by mouth daily    Vitamin D deficiency

## 2018-02-22 NOTE — PROGRESS NOTES
Aleyda Nicole is a 32 year old year old female patient here today for tender keloid on left neck from thyroid cancer and lymph node surgery.   .  Patient states this has been present for month.  Patient reports the following symptoms:  tender.  Patient reports the following previous treatments none.  Patient reports the following modifying factors none.  Associated symptoms: none.  Patient has no other skin complaints today.  Remainder of the HPI, Meds, PMH, Allergies, FH, and SH was reviewed in chart.      Past Medical History:   Diagnosis Date     Gestational diabetes     GDM diet controlled     Gestational diabetes 2013     Hypocalcemia 2016     Influenza A      Papillary thyroid carcinoma     Papillary carcinoma, follicular variant with     Pneumonia     LLL     PONV (postoperative nausea and vomiting)       labor      Uncomplicated asthma        Past Surgical History:   Procedure Laterality Date      SECTION  10/26/2013    Procedure:  SECTION;  primary  section;  Surgeon: Rodney Mckee MD;  Location: [facility]      SECTION N/A 2017    Procedure:  SECTION;  Surgeon: Hakeem Combs MD;  Location: [facility] L[facility]     DISSECTION RADICAL NECK Left 2016    Procedure: DISSECTION RADICAL NECK;  Surgeon: Vy Theodore MD;  Location: [facility] OR     DISSECTION RADICAL NECK MODIFIED Left 2016    Procedure: DISSECTION RADICAL NECK MODIFIED;  Surgeon: Luis Brown MD;  Location: [facility] OR     THYROIDECTOMY      Total, for cancer.         Family History   Problem Relation Age of Onset     DIABETES Mother      CEREBROVASCULAR DISEASE Mother      Hypertension Mother      DIABETES Father 50     Hypertension Father      Genitourinary Problems Father      kidney disease     Coronary Artery Disease Father      DIABETES Brother        Social History     Social History     Marital status:      Spouse name: Brock     Number of children: 1      Years of education: N/A     Occupational History       [employer]     Social History Main Topics     Smoking status: Never Smoker     Smokeless tobacco: Never Used     Alcohol use No     Drug use: No     Sexual activity: Yes     Partners: Male     Birth control/ protection:      Other Topics Concern      Service No     Blood Transfusions No     Caffeine Concern No     Hobby Hazards No     Sleep Concern No     Stress Concern No     Weight Concern Yes     Special Diet No     Back Care No     Exercise No     Bike Helmet No     Seat Belt Yes     Self-Exams No     Social History Narrative    Pt lives with  and father.       Outpatient Encounter Prescriptions as of 2/22/2018   Medication Sig Dispense Refill     levothyroxine (SYNTHROID/LEVOTHROID) 175 MCG tablet TAKE ONE TABLET BY MOUTH DAILY 15 tablet 0     oseltamivir (TAMIFLU) 75 MG capsule Take 1 capsule (75 mg) by mouth 2 times daily 10 capsule 0     guaiFENesin-codeine (ROBITUSSIN AC) 100-10 MG/5ML SOLN solution Take 5 mLs by mouth every 4 hours as needed for cough 120 mL 0     ibuprofen (ADVIL/MOTRIN) 800 MG tablet Take 1 tablet (800 mg) by mouth every 8 hours as needed for moderate pain 30 tablet 1     fluticasone (FLOVENT HFA) 44 MCG/ACT Inhaler Inhale 1 puff into the lungs 2 times daily 1 Inhaler 11     albuterol (PROAIR HFA/PROVENTIL HFA/VENTOLIN HFA) 108 (90 BASE) MCG/ACT Inhaler Inhale 2 puffs into the lungs every 4 hours as needed for shortness of breath / dyspnea or wheezing 1 Inhaler 11     Vitamin D, Cholecalciferol, 1000 UNITS CAPS Take 2,000 Int'l Units by mouth daily 60 capsule 3     JUNEL FE 1/20 1-20 MG-MCG per tablet TAKE ONE TABLET BY MOUTH EVERY DAY (Patient not taking: Reported on 1/30/2018) 84 tablet 1     Prenatal Vit-Fe Fumarate-FA (PRENATAL MULTIVITAMIN  PLUS IRON) 27-0.8 MG TABS Take 1 tablet by mouth daily       No facility-administered encounter medications on file as of 2/22/2018.              Review Of  Systems  Skin: As above  Eyes: negative  Ears/Nose/Throat: negative  Respiratory: No shortness of breath, dyspnea on exertion, cough, or hemoptysis  Cardiovascular: negative  Gastrointestinal: negative  Genitourinary: negative  Musculoskeletal: negative  Neurologic: negative  Psychiatric: negative  Hematologic/Lymphatic/Immunologic: negative  Endocrine: negative      O:   NAD, WDWN, Alert & Oriented, Mood & Affect wnl, Vitals stable   Here today alone   /75  Pulse 86  SpO2 98%   General appearance normal   Vitals stable   Alert, oriented and in no acute distress     L neck firm brown plaque       Eyes: Conjunctivae/lids:Normal     ENT: Lips, buccal mucosa, tongue: normal    MSK:Normal    Cardiovascular: peripheral edema none    Pulm: Breathing Normal    Neuro/Psych: Orientation:Normal; Mood/Affect:Normal      A/P:  1. L neck keloid  excisionm il tac, aldara, radiation discussed with patient   IL TAC: PGACAC discussed.  Risks including but not limited to injection site reaction, bruising, no resolution.  All questions answered and entertained to patient s satisfaction.  Informed consent obtained.  IL TAC in concentration of 40mg/ml was injected ID to L neck.  Total injected was  1.1 ml.  Patient tolerated without complications and given wound care instructions, including not to move product around.  Return in 4 weeks for follow-up and possible additional IL TAC.      BENIGN LESIONS DISCUSSED WITH PATIENT:  I discussed the specifics of tumor, prognosis, and genetics of benign lesions.  I explained that treatment of these lesions would be purely cosmetic and not medically neccessary.  I discussed with patient different removal options including excision, cautery and /or laser.      Return to clinic 4 weeks

## 2018-02-22 NOTE — LETTER
2018         RE: Aleyda Nicole  [address]  [place] [address]        Dear Colleague,    Thank you for referring your patient, Aleyda Nicole, to the [facility]. Please see a copy of my visit note below.    Aleyda Nicole is a 32 year old year old female patient here today for tender keloid on left neck from thyroid cancer and lymph node surgery.   .  Patient states this has been present for month.  Patient reports the following symptoms:  tender.  Patient reports the following previous treatments none.  Patient reports the following modifying factors none.  Associated symptoms: none.  Patient has no other skin complaints today.  Remainder of the HPI, Meds, PMH, Allergies, FH, and SH was reviewed in chart.      Past Medical History:   Diagnosis Date     Gestational diabetes     GDM diet controlled     Gestational diabetes 2013     Hypocalcemia 2016     Influenza A      Papillary thyroid carcinoma     Papillary carcinoma, follicular variant with     Pneumonia     LLL     PONV (postoperative nausea and vomiting)       labor      Uncomplicated asthma        Past Surgical History:   Procedure Laterality Date      SECTION  10/26/2013    Procedure:  SECTION;  primary  section;  Surgeon: Rodney Mckee MD;  [facility]: [facility]      SECTION N/A 2017    Procedure:  SECTION;  Surgeon: Hakeem Combs MD;  Location: [facility]     DISSECTION RADICAL NECK Left 2016    Procedure: DISSECTION RADICAL NECK;  Surgeon: Vy Theodore MD;  Location: [facility] OR     DISSECTION RADICAL NECK MODIFIED Left 2016    Procedure: DISSECTION RADICAL NECK MODIFIED;  Surgeon: Luis Brown MD;  Location: [facility] OR     THYROIDECTOMY      Total, for cancer.         Family History   Problem Relation Age of Onset     DIABETES Mother      CEREBROVASCULAR DISEASE Mother      Hypertension Mother      DIABETES Father 50      Hypertension Father      Genitourinary Problems Father      kidney disease     Coronary Artery Disease Father      DIABETES Brother        Social History     Social History     Marital status:      Spouse name: Brock     Number of children: 1     Years of education: N/A     Occupational History       [employer]     Social History Main Topics     Smoking status: Never Smoker     Smokeless tobacco: Never Used     Alcohol use No     Drug use: No     Sexual activity: Yes     Partners: Male     Birth control/ protection:      Other Topics Concern      Service No     Blood Transfusions No     Caffeine Concern No     Hobby Hazards No     Sleep Concern No     Stress Concern No     Weight Concern Yes     Special Diet No     Back Care No     Exercise No     Bike Helmet No     Seat Belt Yes     Self-Exams No     Social History Narrative    Pt lives with  and father.       Outpatient Encounter Prescriptions as of 2/22/2018   Medication Sig Dispense Refill     levothyroxine (SYNTHROID/LEVOTHROID) 175 MCG tablet TAKE ONE TABLET BY MOUTH DAILY 15 tablet 0     oseltamivir (TAMIFLU) 75 MG capsule Take 1 capsule (75 mg) by mouth 2 times daily 10 capsule 0     guaiFENesin-codeine (ROBITUSSIN AC) 100-10 MG/5ML SOLN solution Take 5 mLs by mouth every 4 hours as needed for cough 120 mL 0     ibuprofen (ADVIL/MOTRIN) 800 MG tablet Take 1 tablet (800 mg) by mouth every 8 hours as needed for moderate pain 30 tablet 1     fluticasone (FLOVENT HFA) 44 MCG/ACT Inhaler Inhale 1 puff into the lungs 2 times daily 1 Inhaler 11     albuterol (PROAIR HFA/PROVENTIL HFA/VENTOLIN HFA) 108 (90 BASE) MCG/ACT Inhaler Inhale 2 puffs into the lungs every 4 hours as needed for shortness of breath / dyspnea or wheezing 1 Inhaler 11     Vitamin D, Cholecalciferol, 1000 UNITS CAPS Take 2,000 Int'l Units by mouth daily 60 capsule 3     JUNEL FE 1/20 1-20 MG-MCG per tablet TAKE ONE TABLET BY MOUTH EVERY DAY (Patient not taking:  Reported on 1/30/2018) 84 tablet 1     Prenatal Vit-Fe Fumarate-FA (PRENATAL MULTIVITAMIN  PLUS IRON) 27-0.8 MG TABS Take 1 tablet by mouth daily       No facility-administered encounter medications on file as of 2/22/2018.              Review Of Systems  Skin: As above  Eyes: negative  Ears/Nose/Throat: negative  Respiratory: No shortness of breath, dyspnea on exertion, cough, or hemoptysis  Cardiovascular: negative  Gastrointestinal: negative  Genitourinary: negative  Musculoskeletal: negative  Neurologic: negative  Psychiatric: negative  Hematologic/Lymphatic/Immunologic: negative  Endocrine: negative      O:   NAD, WDWN, Alert & Oriented, Mood & Affect wnl, Vitals stable   Here today alone   /75  Pulse 86  SpO2 98%   General appearance normal   Vitals stable   Alert, oriented and in no acute distress     L neck firm brown plaque       Eyes: Conjunctivae/lids:Normal     ENT: Lips, buccal mucosa, tongue: normal    MSK:Normal    Cardiovascular: peripheral edema none    Pulm: Breathing Normal    Neuro/Psych: Orientation:Normal; Mood/Affect:Normal      A/P:  1. L neck keloid  excisionm il tac, aldara, radiation discussed with patient   IL TAC: PGACAC discussed.  Risks including but not limited to injection site reaction, bruising, no resolution.  All questions answered and entertained to patient s satisfaction.  Informed consent obtained.  IL TAC in concentration of 40mg/ml was injected ID to L neck.  Total injected was  1.1 ml.  Patient tolerated without complications and given wound care instructions, including not to move product around.  Return in 4 weeks for follow-up and possible additional IL TAC.      BENIGN LESIONS DISCUSSED WITH PATIENT:  I discussed the specifics of tumor, prognosis, and genetics of benign lesions.  I explained that treatment of these lesions would be purely cosmetic and not medically neccessary.  I discussed with patient different removal options including excision, cautery and  /or laser.      Return to clinic 4 weeks     Again, thank you for allowing me to participate in the care of your patient.        Sincerely,        Osman Mims MD

## 2018-02-22 NOTE — NURSING NOTE
"Initial /75  Pulse 86  SpO2 98% Estimated body mass index is 29.87 kg/(m^2) as calculated from the following:    Height as of 1/30/18: 1.549 m (5' 1\").    Weight as of 1/30/18: 71.7 kg (158 lb 1.6 oz). .      "

## 2018-03-06 ENCOUNTER — OFFICE VISIT (OUTPATIENT)
Dept: ENDOCRINOLOGY | Facility: CLINIC | Age: 33
End: 2018-03-06
Payer: COMMERCIAL

## 2018-03-06 VITALS
WEIGHT: 162 LBS | HEART RATE: 80 BPM | DIASTOLIC BLOOD PRESSURE: 76 MMHG | BODY MASS INDEX: 30.61 KG/M2 | TEMPERATURE: 98.1 F | RESPIRATION RATE: 12 BRPM | SYSTOLIC BLOOD PRESSURE: 120 MMHG

## 2018-03-06 DIAGNOSIS — E89.0 POSTOPERATIVE HYPOTHYROIDISM: ICD-10-CM

## 2018-03-06 DIAGNOSIS — E55.9 VITAMIN D DEFICIENCY: ICD-10-CM

## 2018-03-06 DIAGNOSIS — C73 PAPILLARY CARCINOMA, FOLLICULAR VARIANT (H): Primary | ICD-10-CM

## 2018-03-06 LAB
ALBUMIN SERPL-MCNC: 3.9 G/DL (ref 3.4–5)
ANION GAP SERPL CALCULATED.3IONS-SCNC: 6 MMOL/L (ref 3–14)
BUN SERPL-MCNC: 12 MG/DL (ref 7–30)
CALCIUM SERPL-MCNC: 8.3 MG/DL (ref 8.5–10.1)
CHLORIDE SERPL-SCNC: 104 MMOL/L (ref 94–109)
CO2 SERPL-SCNC: 27 MMOL/L (ref 20–32)
CREAT SERPL-MCNC: 0.44 MG/DL (ref 0.52–1.04)
GFR SERPL CREATININE-BSD FRML MDRD: >90 ML/MIN/1.7M2
GLUCOSE SERPL-MCNC: 92 MG/DL (ref 70–99)
PHOSPHATE SERPL-MCNC: 3.7 MG/DL (ref 2.5–4.5)
POTASSIUM SERPL-SCNC: 3.9 MMOL/L (ref 3.4–5.3)
SODIUM SERPL-SCNC: 137 MMOL/L (ref 133–144)
T4 FREE SERPL-MCNC: 1.69 NG/DL (ref 0.76–1.46)
TSH SERPL DL<=0.005 MIU/L-ACNC: 0.07 MU/L (ref 0.4–4)

## 2018-03-06 PROCEDURE — 84439 ASSAY OF FREE THYROXINE: CPT | Performed by: CLINICAL NURSE SPECIALIST

## 2018-03-06 PROCEDURE — 82306 VITAMIN D 25 HYDROXY: CPT | Performed by: CLINICAL NURSE SPECIALIST

## 2018-03-06 PROCEDURE — 84443 ASSAY THYROID STIM HORMONE: CPT | Performed by: CLINICAL NURSE SPECIALIST

## 2018-03-06 PROCEDURE — 00000344 ZZHCL STATISTIC REMEASURE THYROGLOBULIN: Mod: 90 | Performed by: CLINICAL NURSE SPECIALIST

## 2018-03-06 PROCEDURE — 86800 THYROGLOBULIN ANTIBODY: CPT | Mod: 90 | Performed by: CLINICAL NURSE SPECIALIST

## 2018-03-06 PROCEDURE — 84432 ASSAY OF THYROGLOBULIN: CPT | Mod: 90 | Performed by: CLINICAL NURSE SPECIALIST

## 2018-03-06 PROCEDURE — 99000 SPECIMEN HANDLING OFFICE-LAB: CPT | Performed by: CLINICAL NURSE SPECIALIST

## 2018-03-06 PROCEDURE — 36415 COLL VENOUS BLD VENIPUNCTURE: CPT | Performed by: CLINICAL NURSE SPECIALIST

## 2018-03-06 PROCEDURE — 99214 OFFICE O/P EST MOD 30 MIN: CPT | Performed by: CLINICAL NURSE SPECIALIST

## 2018-03-06 PROCEDURE — 80069 RENAL FUNCTION PANEL: CPT | Performed by: CLINICAL NURSE SPECIALIST

## 2018-03-06 RX ORDER — LEVOTHYROXINE SODIUM 175 UG/1
TABLET ORAL
Qty: 80 TABLET | Refills: 1 | Status: SHIPPED | OUTPATIENT
Start: 2018-03-06 | End: 2018-03-13 | Stop reason: DRUGHIGH

## 2018-03-06 NOTE — NURSING NOTE
"Chief Complaint   Patient presents with     Thyroid Problem       Initial /76 (BP Location: Left arm, Patient Position: Chair, Cuff Size: Adult Regular)  Pulse 80  Temp 98.1  F (36.7  C) (Oral)  Resp 12  Wt 162 lb (73.5 kg)  BMI 30.61 kg/m2 Estimated body mass index is 30.61 kg/(m^2) as calculated from the following:    Height as of 1/30/18: 5' 1\" (1.549 m).    Weight as of this encounter: 162 lb (73.5 kg).  Medication Reconciliation: complete    "

## 2018-03-06 NOTE — MR AVS SNAPSHOT
After Visit Summary   3/6/2018    [facility] Mounika Nicole    MRN: [id number]           Patient Information     Date Of Birth          1985        Visit Information        Provider Department      3/6/2018 9:30 AM Lillie Will APRN CNP [facility]        Today's Diagnoses     Papillary Thyroid carcinoma, follicular variant    -  1    Postoperative hypothyroidism        Vitamin D deficiency          Care Instructions      Decrease levothyroxine to one tablet six days each week and one tablet one day each week.    [facility] will contact you to schedule the neck ultrasound.    Please make a lab only appointment in 6 weeks to recheck you thyroid levels.    Lillie Will NP  Endocrinology            Follow-ups after your visit        Future tests that were ordered for you today     Open Future Orders        Priority Expected Expires Ordered    US Head Neck Soft Tissue Routine 6/4/2018 9/2/2018 3/6/2018            Who to contact     If you have questions or need follow up information about today's clinic visit or your schedule please contact [facility] directly at [phone].  Normal or non-critical lab and imaging results will be communicated to you by [employer], letter or phone within 4 business days after the clinic has received the results. If you do not hear from us within 7 days, please contact the clinic through Shypt or phone. If you have a critical or abnormal lab result, we will notify you by phone as soon as possible.  Submit refill requests through [employer] or call your pharmacy and they will forward the refill request to us. Please allow 3 business days for your refill to be completed.          Additional Information About Your Visit        [employer] Information     [employer] gives you secure access to your electronic health record. If you see a primary care provider, you can also send messages to your care team and make appointments. If you have  questions, please call your primary care clinic.  If you do not have a primary care provider, please call [phone] and they will assist you.        Care EveryWhere ID     This is your Care EveryWhere ID. This could be used by other organizations to access your [facility] medical records  [id number]        Your Vitals Were     Pulse Temperature Respirations BMI (Body Mass Index)          80 98.1  F (36.7  C) (Oral) 12 30.61 kg/m2         Blood Pressure from Last 3 Encounters:   03/06/18 120/76   02/22/18 122/75   01/30/18 113/79    Weight from Last 3 Encounters:   03/06/18 73.5 kg (162 lb)   01/30/18 71.7 kg (158 lb 1.6 oz)   12/19/17 73.9 kg (163 lb)              We Performed the Following     Renal panel (Alb, BUN, Ca, Cl, CO2, Creat, Gluc, Phos, K, Na)     T4 FREE     Thyroglobulin and antibody     TSH     Vitamin D Deficiency          Today's Medication Changes          These changes are accurate as of 3/6/18 10:09 AM.  If you have any questions, ask your nurse or doctor.               These medicines have changed or have updated prescriptions.        Dose/Directions    levothyroxine 175 MCG tablet   Commonly known as:  SYNTHROID/LEVOTHROID   This may have changed:  See the new instructions.   Used for:  Papillary carcinoma, follicular variant (H), Postoperative hypothyroidism, Vitamin D deficiency   Changed by:  Lillie Will APRN CNP        Take one tablet six days each week and 1/2 tablet one day each week.   Quantity:  80 tablet   Refills:  1            Where to get your medicines      These medications were sent to [facility] [address] [address]     Phone:  [phone]     levothyroxine 175 MCG tablet                Primary Care Provider Office Phone # Fax #    Evan Zamora [phone]       [address] [place] [phone]        Equal Access to Services     СВЕТЛАНА VALDEZ AH: Idania viramontes  Sotania, waoliverioda luqadaha, qaybta kaalmada josé miguel, cristina gutierrez pedrodiana ortizdwayne jose ramon. So [facility] [phone].    ATENCIÓN: Si niru parks, tiene a serna disposición servicios gratuitos de asistencia lingüística. Christian al [phone].    We comply with applicable federal civil rights laws and [place] laws. We do not discriminate on the basis of race, color, national origin, age, disability, sex, sexual orientation, or gender identity.            Thank you!     Thank you for choosing [facility]  for your care. Our goal is always to provide you with excellent care. Hearing back from our patients is one way we can continue to improve our services. Please take a few minutes to complete the written survey that you may receive in the mail after your visit with us. Thank you!             Your Updated Medication List - Protect others around you: Learn how to safely use, store and throw away your medicines at www.disposemymeds.org.          This list is accurate as of 3/6/18 10:09 AM.  Always use your most recent med list.                   Brand Name Dispense Instructions for use Diagnosis    albuterol 108 (90 BASE) MCG/ACT Inhaler    PROAIR HFA/PROVENTIL HFA/VENTOLIN HFA    1 Inhaler    Inhale 2 puffs into the lungs every 4 hours as needed for shortness of breath / dyspnea or wheezing    Mild persistent asthma without complication       fluticasone 44 MCG/ACT Inhaler    FLOVENT HFA    1 Inhaler    Inhale 1 puff into the lungs 2 times daily    Mild persistent asthma without complication       ibuprofen 800 MG tablet    ADVIL/MOTRIN    30 tablet    Take 1 tablet (800 mg) by mouth every 8 hours as needed for moderate pain    Fever and chills       levothyroxine 175 MCG tablet    SYNTHROID/LEVOTHROID    80 tablet    Take one tablet six days each week and 1/2 tablet one day each week.    Papillary carcinoma, follicular variant (H), Postoperative hypothyroidism, Vitamin D deficiency       prenatal  multivitamin plus iron 27-0.8 MG Tabs per tablet      Take 1 tablet by mouth daily    Postoperative hypothyroidism, Papillary carcinoma, follicular variant (H)       Vitamin D (Cholecalciferol) 1000 UNITS Caps     60 capsule    Take 2,000 Int'l Units by mouth daily    Vitamin D deficiency

## 2018-03-06 NOTE — LETTER
3/6/2018         RE: Aleyda Nicole  [address], [place]. \[facility].\"" [id number]        Dear Colleague,    Thank you for referring your patient, Aleyda Nicole, to the [facility]. Please see a copy of my visit note below.    Name: Aleyda Nicole  Seen for f/u of papillary thyroid cancer and postoperative hypothyroidism (Last seen  8/3/2017 by Dr. Costello).    Chief Complaint   Patient presents with     Thyroid Problem     HPI:  Aleyda Nicole is a 30 year old female who presents for the evaluation of:      1.  Papillary thyroid cancer:  Normally sees Dr. Costello but no available appointments for at least 1 month and was out of levo refills.  Thyroid nodule was noticed in February 2011 which was followed by thyroid nodule biopsy which showed suspicious for papillary thyroid cancer.  She underwent total thyroidectomy 3/2011 and pathology showed papillary thyroid cancer with follicular pattern.  Tumor was about 2.2 cm on the left lobe.  No lymph node involvement.  S/p 78.7 mCi of I131 HERNANDEZ  8/2011. No evidence for distant metastatic disease on post therapy scan.  Neck US 6/2016- showed new lymph node in neck along with rising Tg levels  S/p FNA lymph node: 6/2016- c/w papillary thyroid cancer    8/2016: underwent left modified neck dissection.  1/27 lymph node positive one left modified neck dissection.  1 cm in greatest diameter.  Negative for external involvement at level II.    Tg decreased post left neck dissection from 1.3 to < 0.1    Follow up I123 4/2017: no mets.    Postop course was complicated by hypocalcemia and was started on calcium supplement.  Taking calcium- tries to take 2 tabs per day + 1 glass of milk daily.    Delivered baby 1/2017. No longer breast feeding    She was followed by endocrinology before and then lost to follow-up with endocrinology in 2012.  No history of neck radiation prior to diagnosis of thyroid cancer.  No family history of thyroid cancer.  Here to  discuss WBC scan and protocol.  Has upcoming radiology appointments scheduled.      2.  Hypothyroidism (postoperative):  Was started on levothyroxine following thyroidectomy.  Currently she is taking levothyroxine 175  g per day.    Previously increased 150 --> 175 mcg/day; TSH was not in target range on 150 mcg  Taking generic levothyroxine.  Reports compliance.  Delivered 2017.  Recent TFT's significant for elevated free T4.  Denies hyperthyroid symptoms.  Planning one more pregnancy.      Wt Readings from Last 2 Encounters:   18 73.5 kg (162 lb)   18 71.7 kg (158 lb 1.6 oz)     No diarrhea, tremors. No palpitations.  No difficulty with swallowing, no pain during swallowing.    PMH/PSH:  Past Medical History:   Diagnosis Date     Gestational diabetes     GDM diet controlled     Gestational diabetes 2013     Hypocalcemia 2016     Influenza A      Papillary thyroid carcinoma     Papillary carcinoma, follicular variant with     Pneumonia     LLL     PONV (postoperative nausea and vomiting)       labor      Uncomplicated asthma      Past Surgical History:   Procedure Laterality Date      SECTION  10/26/2013    Procedure:  SECTION;  primary  section;  Surgeon: Rodney Mckee MD;  Location: [facility] L[facility]      SECTION N/A 2017    Procedure:  SECTION;  Surgeon: Hakeem Combs MD;  Location: [facility] L[facility]     DISSECTION RADICAL NECK Left 2016    Procedure: DISSECTION RADICAL NECK;  Surgeon: Vy Theodore MD;  Location: [facility] OR     DISSECTION RADICAL NECK MODIFIED Left 2016    Procedure: DISSECTION RADICAL NECK MODIFIED;  Surgeon: Luis Brown MD;  Location: [facility] OR     THYROIDECTOMY      Total, for cancer.      Family Hx:  Family History   Problem Relation Age of Onset     DIABETES Mother      CEREBROVASCULAR DISEASE Mother      Hypertension Mother      DIABETES Father 50     Hypertension Father      Genitourinary Problems  Father      kidney disease     Coronary Artery Disease Father      DIABETES Brother      Thyroid disease: No        Social Hx:  Social History     Social History     Marital status:      Spouse name: Brock     Number of children: 1     Years of education: N/A     Occupational History       [employer]     Social History Main Topics     Smoking status: Never Smoker     Smokeless tobacco: Never Used     Alcohol use No     Drug use: No     Sexual activity: Yes     Partners: Male     Birth control/ protection:      Other Topics Concern      Service No     Blood Transfusions No     Caffeine Concern No     Hobby Hazards No     Sleep Concern No     Stress Concern No     Weight Concern Yes     Special Diet No     Back Care No     Exercise No     Bike Helmet No     Seat Belt Yes     Self-Exams No     Social History Narrative    Pt lives with  and father.          MEDICATIONS:  has a current medication list which includes the following prescription(s): levothyroxine, ibuprofen, fluticasone, albuterol, vitamin d (cholecalciferol), and prenatal multivitamin plus iron.    ROS     ROS: 10 point ROS neg other than the symptoms noted above in the HPI.      Physical Exam   VS: /76 (BP Location: Left arm, Patient Position: Chair, Cuff Size: Adult Regular)  Pulse 80  Temp 98.1  F (36.7  C) (Oral)  Resp 12  Wt 73.5 kg (162 lb)  BMI 30.61 kg/m2  GENERAL: AXOX3, NAD, well dressed, answering questions appropriately, appears stated age.  HEENT: OP clear, no LAD, no TM, non-tender, no exopthalmous, no proptosis, EOMI, no lig lag, no retraction  Thyroid: Hypertrophied scar, will healed thyroidectomy scar present.  Hypertrophied scar on lateral side of neck. No lymphadenopathy.  CV: RRR, no rubs, gallops, no murmurs  LUNGS: CTAB, no wheezes, rales, or ronchi  EXTREMITIES: no edema, +pulses, no rashes, no lesions  NEUROLOGY: CN grossly intact, no tremors  MSK: grossly intact  SKIN: no rashes, no  lesions  PSYCH: normal affect and mood      LABS:  2017:  TSH : 47.89 (post thyrogen)  TgAB: <0.4  TG: <0.10    17:  TSH: 0.04  TgAB: <0.4  TG: <0.10    11/10/16:  TSH 0.74  TgAb: <0.4  TG: <0.10     2016:  TSH: 0.08  TgAb: <0.4  T.30     2012:  TSH: 12.40  TGAB: 1.2  T.6  !THYROID Latest Ref Rng & Units 2018 10/17/2017 8/3/2017   TSH 0.40 - 4.00 mU/L <0.01 (L) 2.58 5.31 (H)   T4 FREE 0.76 - 1.46 ng/dL 1.61 (H) 1.24 1.31     !THYROID Latest Ref Rng & Units 2017 2017 11/10/2016   TSH 0.40 - 4.00 mU/L 47.89 (H) 0.04 (L) 0.74   T4 FREE 0.76 - 1.46 ng/dL 1.29 1.80 (H) 1.30       ENDO THYROID LABS-[facility] Latest Ref Rng 2016   TSH 0.40 - 4.00 mU/L 0.08 (L)   T4 TOTAL 5.0 - 11.0 ug/dL    T4 FREE 0.76 - 1.46 ng/dL 1.50 (H)   FREE T3 2.3 - 4.2 pg/mL    TRIIODOTHYRONINE(T3) 60 - 181 ng/dL 112     ENDO THYROID LABS-[facility] Latest Ref Rng 2016   TSH 0.40 - 4.00 mU/L 0.16 (L) 0.37 (L) 0.06 (L)   T4 TOTAL 5.0 - 11.0 ug/dL      T4 FREE 0.76 - 1.46 ng/dL 1.42 1.24 1.51 (H)   FREE T3 2.3 - 4.2 pg/mL        ENDO THYROID LABS-[facility] Latest Ref Rng 2014   TSH 0.40 - 4.00 mU/L 0.32 (L) 7.86 (H)   T4 TOTAL 5.0 - 11.0 ug/dL     T4 FREE 0.76 - 1.46 ng/dL 1.33 1.37   FREE T3 2.3 - 4.2 pg/mL       ENDO THYROID LABS-[facility] Latest Ref Rng 2014 4/3/2014   TSH 0.40 - 4.00 mU/L 3.67 7.63 (H)   T4 TOTAL 5.0 - 11.0 ug/dL     T4 FREE 0.76 - 1.46 ng/dL  1.39   FREE T3 2.3 - 4.2 pg/mL       Component      Latest Ref Rng & Units 10/8/2012 2017 8/3/2017   Vitamin D Deficiency screening      20 - 75 ug/L 18 (L) 23 28     !COMPREHENSIVE Latest Ref Rng & Units 2016   CALCIUM 8.5 - 10.1 mg/dL 7.7 (L) 9.0 8.6     !COMPREHENSIVE Latest Ref Rng & Units 11/10/2016 2017 8/3/2017   CALCIUM 8.5 - 10.1 mg/dL 8.2 (L) 9.2 7.8 (L)     !COMPREHENSIVE Latest Ref Rng & Units 10/17/2017   CALCIUM 8.5 - 10.1 mg/dL 8.3 (L)     NM THYROID UPTAKE/SCAN   2011 2:47:00  PM      COMPARISON: None.     HISTORY: Hyperthyroidism.     TECHNIQUE:  The patient was given 173 uCi of I-123 orally, followed by  24-hour thyroid scan and radioiodine uptake evaluation.     FINDINGS:  The thyroid gland appears normal in size. 24-hour uptake  was calculated at 46.4%, which is above the normal range. Normal range  is 10-30% 24-hour uptake. Focal area of decreased uptake in the mid  left thyroid lobe may represent a cold thyroid nodule or cyst.     IMPRESSION:    1. Elevated 24-hour radioiodine uptake in the thyroid at 46.4%.  2. Possible cold nodule or cyst in the mid left thyroid lobe. Thyroid  ultrasound is recommended for further evaluation.    NUCLEAR MEDICINE I-131 SCAN    Aug 10, 2011 8:04:00 AM      TECHNIQUE: 78.7 mCi I-131 ablation scan. Five days post therapy  imaging performed today.     HISTORY: Thyroid cancer.     COMPARISON:   Nuclear Study: Thyroid uptake and scan 2/4/2011.  Other Relevant Studies: Ultrasound neck 2/17/2011.     FINDINGS: Intense radiotracer uptake at the left greater than right  neck at the thyroid level. No evidence for distant metastatic disease.     IMPRESSION: Intense radiotracer uptake localizing to the thyroidectomy  bed, left greater than right. This is consistent with residual thyroid  tissue. No distant metastatic disease identified.    ULTRASOUND THYROID  Feb 17, 2011      HISTORY: Hyperthyroidism. Thyroid nodule.      COMPARISON: Nuclear Medicine thyroid scan 2/4/2011.     FINDINGS: The thyroid gland is enlarged. The right lobe measures 5.4 x  1.6 x 2.1 cm. The left lobe measures 5.9 x 2.3 x 2.7 cm. There are 2  right thyroid nodules and 3 left thyroid nodules.  1. Right upper lobe, hypoechoic solid measuring 0.7 x 0.6 x 0.6 cm.  2. Right lower pole, solid measuring 0.7 x 0.5 x 0.6 cm.  3. Left upper pole, cystic and solid measuring 1.1 x 0.6 x 1.0 cm.  4. Left mid thyroid lobe, primarily solid with small cystic areas  measuring 2.7 x 1.9 x 2.1 cm.  5.  "Left lower pole, cystic and solid measuring 1.4 x 0.9 x 1.2 cm.     The primarily solid left mid thyroid nodule appears to correspond to  the cold nodule on thyroid uptake and scan.  IMPRESSION: Multinodular thyroid gland.    FNA thyroid nodule:  Copath Report       FNA-thyroid, left mid lobe nodule:   Suspicious for malignancy.  Suspicious for papillary carcinoma  Specimen Adequacy: Satisfactory for evaluation.           Surgical pathology:     SPECIMEN(S):  A: Thyroid, left lobe  B: Parathyroid gland, biopsy of right inferior  C: Thyroid, right lobe    FINAL DIAGNOSIS:  A. and C.  Thyroid, right and left lobes, total thyroidectomy -  Specimen/Procedure(s) and Laterality:   Right and left thyroid lobes,  total thyroidectomy.  Specimen Integrity:   Intact.  Specimen Size and Weight:   See Gross Description.  Histologic Tumor Type(s):   Papillary carcinoma, follicular variant with  focal papillary morphology.  Tumor Focality: Unifocal.  Tumor Laterality:   Left lobe.  Tumor Size(s):   Left lobe: 2.2 cm (greatest dimension).  Margins:   Close, but uninvolved.            Distance to closest margin, if negative:   Tumor 0.05 cm from  nearest paratracheal surface and 1.75 cm from nearest anterolateral  surface.  Tumor Capsular Invasion: Present.    Tumor Capsule: Partial.  Extrathyroidal Invasion:   Not identified.  Lymph-Vascular Invasion:   Not identified.  Lymph Nodes:   Two nodes without evidence of metastatic carcinoma (0/2;  one at isthmus and one adjacent to right lobe).  Pathologic Staging:   pT2, pN0, pM not applicable.  Additional Pathologic Findings:   Right thyroid lobe without evidence of  malignancy.  Background nodular hyperplasia and thyroiditis with  lymphoid follicles.  Left lobe with focal previous biopsy site changes.  No parathyroid tissue identified.  CAP Protocol Based on AJCC/UICC TNM, 7th edition; Protocol Effective  Date:  January 2010    B.  \"Parathyroid gland\", right inferior, biopsy - Thyroid " tissue; no  parathyroid glandular tissue identified.    8/2016: left modified neck Dissection:  Copath Report      SPECIMEN(S):   A: Scar, left neck   B: Parathyroid gland, left inferior   C: Left central neck dissection, para and pretracheal lymph nodes   D: apical jugular lymph node   E: Left modified neck dissection     FINAL DIAGNOSIS:   A. Skin, neck/scar, excision:   - Hypertrophic scar; benign.     B. Parathyroid gland, left inferior, biopsy:   - Parathyroid tissue present.     C. Left central neck dissection with para-and pretracheal lymph nodes:   - One lymph node; no evidence of malignancy (0/1).   - Thymus and parathyroid tissue present.     D. Lymph node, apical jugular, excision:   - One lymph node; no evidence of malignancy (0/1).     E. Left modified neck dissection:   - 27 lymph nodes identified (level II- 10, level III- 9, level IV- 5 and   lymph nodes associated with jugular vein- 3).   - One (of 27) lymph node positive for metastatic papillary thyroid   carcinoma.  1 cm in greatest diameter.  Negative for extranodal   involvement  (Level II).   - Jugular vein negative for tumor.        NUCLEAR MEDICINE THYROID WHOLE BODY SCAN I-123   4/20/2017 11:38 AM      TECHNIQUE:  The patient was given 1.8 mCi iodine 123 per oral on  4/19/2017. SPECT-CT with fusion was performed at the level of the neck  and chest.     HISTORY:  Malignant neoplasm of thyroid gland. Postprocedural  hypothyroidism.     COMPARISON:   Nuclear Study: None.     Other Relevant Studies: None.     FINDINGS:  Thyroidectomy. No suspicious focus of abnormal radiotracer  is seen at the thyroidectomy bed identified. Physiologic tracer uptake  seen at the salivary glands. There are several small subcentimeter  lymph nodes involving the bilateral neck without conspicuous focal  tracer uptake outside of the background tracer distribution. There  appears to be left neck postoperative change causing some  inconspicuity of the left  sternocleidomastoid muscle. No convincing  worrisome airspace disease or mediastinal abnormality is seen.         IMPRESSION: No worrisome focal tracer uptake is identified to suggest  recurrent neoplasm or remote metastatic disease. Some postoperative  changes at the left neck noted, likely accounting for inconspicuity of  the left sternocleidomastoid muscle. Small bilateral subcentimeter  neck lymph nodes noted.       All pertinent notes, labs, and images personally reviewed by me.     A/P  Ms.Nhu Mounika Nicole is a 30 year old here for the evaluation of thyroid cancer:    1. 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  It was 2.2 cm unifocal, left lobe tumor with no lymph node involvement.  S/p  total thyroidectomy in 2011 and 78.7 mCi of I-131 HERNANDEZ. No evidence for distant metastatic disease on post therapy scan.  2016- new lymph node s/p FNA with PTC with rising TG  S/p 8/2016- left modified neck radiation. 1/27 lymph node + ( level2). Postop course complicated by hypocalcemia.  Delivered 1/2017.   Follow up I123 WBS 4/2017- no mets  TG suppressed as above  Plan: continue to monitor. Annual imaging and TG due now.  2.  Hypothyroidism (postoperative following total thyroidectomy for thyroid cancer):  On levothyroxine 175  g/day. Generic. Since 3/3/17  With history of thyroid cancer goal TSH < 0.1  Recent TSH in target range however free T4 elevated at 1.61.  Decrease levothyroxine slightly by 1/2 tab per week = average daily dose of 165.5 mcg.  Recheck TFT's in 6 weeks.    3.  History of vitamin D deficiency and hypocalcemia.  Not taking any supplemental vitamin D  Was previously advised to increase calcium supplement to 500 mg tid, however she reports difficulty with compliance.  Tries to take two calcium tabs per day.  Will obtain calcium and vitamin D level today - adjust supplemental calcium if indicated, start D  supplement if D level is below 30.    More than 50% of the time spent with Ms. Nicole on counseling / coordinating her care.      Follow-up:  To be determined based on labs and ultrasound results.    Lillie Will NP  Endocrinology   [facility]  CC: Evan Zamora MD        Again, thank you for allowing me to participate in the care of your patient.        Sincerely,        EDUARDO Ponce CNP

## 2018-03-06 NOTE — PROGRESS NOTES
Name: Aleyda Nicole  Seen for f/u of papillary thyroid cancer and postoperative hypothyroidism (Last seen  8/3/2017 by Dr. Costello).    Chief Complaint   Patient presents with     Thyroid Problem     HPI:  Aleyda Nicole is a 30 year old female who presents for the evaluation of:      1.  Papillary thyroid cancer:  Normally sees Dr. Costello but no available appointments for at least 1 month and was out of levo refills.  Thyroid nodule was noticed in February 2011 which was followed by thyroid nodule biopsy which showed suspicious for papillary thyroid cancer.  She underwent total thyroidectomy 3/2011 and pathology showed papillary thyroid cancer with follicular pattern.  Tumor was about 2.2 cm on the left lobe.  No lymph node involvement.  S/p 78.7 mCi of I131 HERNANDEZ  8/2011. No evidence for distant metastatic disease on post therapy scan.  Neck US 6/2016- showed new lymph node in neck along with rising Tg levels  S/p FNA lymph node: 6/2016- c/w papillary thyroid cancer    8/2016: underwent left modified neck dissection.  1/27 lymph node positive one left modified neck dissection.  1 cm in greatest diameter.  Negative for external involvement at level II.    Tg decreased post left neck dissection from 1.3 to < 0.1    Follow up I123 4/2017: no mets.    Postop course was complicated by hypocalcemia and was started on calcium supplement.  Taking calcium- tries to take 2 tabs per day + 1 glass of milk daily.    Delivered baby 1/2017. No longer breast feeding    She was followed by endocrinology before and then lost to follow-up with endocrinology in 2012.  No history of neck radiation prior to diagnosis of thyroid cancer.  No family history of thyroid cancer.  Here to discuss WBC scan and protocol.  Has upcoming radiology appointments scheduled.      2.  Hypothyroidism (postoperative):  Was started on levothyroxine following thyroidectomy.  Currently she is taking levothyroxine 175  g per day.    Previously increased  150 --> 175 mcg/day; TSH was not in target range on 150 mcg  Taking generic levothyroxine.  Reports compliance.  Delivered 2017.  Recent TFT's significant for elevated free T4.  Denies hyperthyroid symptoms.  Planning one more pregnancy.      Wt Readings from Last 2 Encounters:   18 73.5 kg (162 lb)   18 71.7 kg (158 lb 1.6 oz)     No diarrhea, tremors. No palpitations.  No difficulty with swallowing, no pain during swallowing.    PMH/PSH:  Past Medical History:   Diagnosis Date     Gestational diabetes     GDM diet controlled     Gestational diabetes 2013     Hypocalcemia 2016     Influenza A      Papillary thyroid carcinoma     Papillary carcinoma, follicular variant with     Pneumonia     LLL     PONV (postoperative nausea and vomiting)       labor      Uncomplicated asthma      Past Surgical History:   Procedure Laterality Date      SECTION  10/26/2013    Procedure:  SECTION;  primary  section;  Surgeon: Rodney Mckee MD;  [facility]      SECTION N/A 2017    Procedure:  SECTION;  Surgeon: Hakeem Combs MD;  Location: [facility]     DISSECTION RADICAL NECK Left 2016    Procedure: DISSECTION RADICAL NECK;  Surgeon: Vy Theodore MD;  Location: [facility] OR     DISSECTION RADICAL NECK MODIFIED Left 2016    Procedure: DISSECTION RADICAL NECK MODIFIED;  Surgeon: Luis Brown MD;  Location: [facility] OR     THYROIDECTOMY      Total, for cancer.      Family Hx:  Family History   Problem Relation Age of Onset     DIABETES Mother      CEREBROVASCULAR DISEASE Mother      Hypertension Mother      DIABETES Father 50     Hypertension Father      Genitourinary Problems Father      kidney disease     Coronary Artery Disease Father      DIABETES Brother      Thyroid disease: No        Social Hx:  Social History     Social History     Marital status:      Spouse name: Brock     Number of children: 1     Years of  education: N/A     Occupational History       [employer]     Social History Main Topics     Smoking status: Never Smoker     Smokeless tobacco: Never Used     Alcohol use No     Drug use: No     Sexual activity: Yes     Partners: Male     Birth control/ protection:      Other Topics Concern      Service No     Blood Transfusions No     Caffeine Concern No     Hobby Hazards No     Sleep Concern No     Stress Concern No     Weight Concern Yes     Special Diet No     Back Care No     Exercise No     Bike Helmet No     Seat Belt Yes     Self-Exams No     Social History Narrative    Pt lives with  and father.          MEDICATIONS:  has a current medication list which includes the following prescription(s): levothyroxine, ibuprofen, fluticasone, albuterol, vitamin d (cholecalciferol), and prenatal multivitamin plus iron.    ROS     ROS: 10 point ROS neg other than the symptoms noted above in the HPI.      Physical Exam   VS: /76 (BP Location: Left arm, Patient Position: Chair, Cuff Size: Adult Regular)  Pulse 80  Temp 98.1  F (36.7  C) (Oral)  Resp 12  Wt 73.5 kg (162 lb)  BMI 30.61 kg/m2  GENERAL: AXOX3, NAD, well dressed, answering questions appropriately, appears stated age.  HEENT: OP clear, no LAD, no TM, non-tender, no exopthalmous, no proptosis, EOMI, no lig lag, no retraction  Thyroid: Hypertrophied scar, will healed thyroidectomy scar present.  Hypertrophied scar on lateral side of neck. No lymphadenopathy.  CV: RRR, no rubs, gallops, no murmurs  LUNGS: CTAB, no wheezes, rales, or ronchi  EXTREMITIES: no edema, +pulses, no rashes, no lesions  NEUROLOGY: CN grossly intact, no tremors  MSK: grossly intact  SKIN: no rashes, no lesions  PSYCH: normal affect and mood      LABS:  4/2017:  TSH : 47.89 (post thyrogen)  TgAB: <0.4  TG: <0.10    2/23/17:  TSH: 0.04  TgAB: <0.4  TG: <0.10    11/10/16:  TSH 0.74  TgAb: <0.4  TG: <0.10     6/2016:  TSH: 0.08  TgAb: <0.4  TG:  1.30     2012:  TSH: 12.40  TGAB: 1.2  T.6  !THYROID Latest Ref Rng & Units 2018 10/17/2017 8/3/2017   TSH 0.40 - 4.00 mU/L <0.01 (L) 2.58 5.31 (H)   T4 FREE 0.76 - 1.46 ng/dL 1.61 (H) 1.24 1.31     !THYROID Latest Ref Rng & Units 2017 2017 11/10/2016   TSH 0.40 - 4.00 mU/L 47.89 (H) 0.04 (L) 0.74   T4 FREE 0.76 - 1.46 ng/dL 1.29 1.80 (H) 1.30       ENDO THYROID LABS-[facility] Latest Ref Rng 2016   TSH 0.40 - 4.00 mU/L 0.08 (L)   T4 TOTAL 5.0 - 11.0 ug/dL    T4 FREE 0.76 - 1.46 ng/dL 1.50 (H)   FREE T3 2.3 - 4.2 pg/mL    TRIIODOTHYRONINE(T3) 60 - 181 ng/dL 112     ENDO THYROID LABS-[facility] Latest Ref Rng 2016   TSH 0.40 - 4.00 mU/L 0.16 (L) 0.37 (L) 0.06 (L)   T4 TOTAL 5.0 - 11.0 ug/dL      T4 FREE 0.76 - 1.46 ng/dL 1.42 1.24 1.51 (H)   FREE T3 2.3 - 4.2 pg/mL        ENDO THYROID LABS-[facility] Latest Ref Rng 2014   TSH 0.40 - 4.00 mU/L 0.32 (L) 7.86 (H)   T4 TOTAL 5.0 - 11.0 ug/dL     T4 FREE 0.76 - 1.46 ng/dL 1.33 1.37   FREE T3 2.3 - 4.2 pg/mL       ENDO THYROID LABS-[facility] Latest Ref Rng 2014 4/3/2014   TSH 0.40 - 4.00 mU/L 3.67 7.63 (H)   T4 TOTAL 5.0 - 11.0 ug/dL     T4 FREE 0.76 - 1.46 ng/dL  1.39   FREE T3 2.3 - 4.2 pg/mL       Component      Latest Ref Rng & Units 10/8/2012 2017 8/3/2017   Vitamin D Deficiency screening      20 - 75 ug/L 18 (L) 23 28     !COMPREHENSIVE Latest Ref Rng & Units 2016   CALCIUM 8.5 - 10.1 mg/dL 7.7 (L) 9.0 8.6     !COMPREHENSIVE Latest Ref Rng & Units 11/10/2016 2017 8/3/2017   CALCIUM 8.5 - 10.1 mg/dL 8.2 (L) 9.2 7.8 (L)     !COMPREHENSIVE Latest Ref Rng & Units 10/17/2017   CALCIUM 8.5 - 10.1 mg/dL 8.3 (L)     NM THYROID UPTAKE/SCAN   2011 2:47:00 PM      COMPARISON: None.     HISTORY: Hyperthyroidism.     TECHNIQUE:  The patient was given 173 uCi of I-123 orally, followed by  24-hour thyroid scan and radioiodine uptake evaluation.     FINDINGS:  The thyroid gland appears normal in  size. 24-hour uptake  was calculated at 46.4%, which is above the normal range. Normal range  is 10-30% 24-hour uptake. Focal area of decreased uptake in the mid  left thyroid lobe may represent a cold thyroid nodule or cyst.     IMPRESSION:    1. Elevated 24-hour radioiodine uptake in the thyroid at 46.4%.  2. Possible cold nodule or cyst in the mid left thyroid lobe. Thyroid  ultrasound is recommended for further evaluation.    NUCLEAR MEDICINE I-131 SCAN    Aug 10, 2011 8:04:00 AM      TECHNIQUE: 78.7 mCi I-131 ablation scan. Five days post therapy  imaging performed today.     HISTORY: Thyroid cancer.     COMPARISON:   Nuclear Study: Thyroid uptake and scan 2/4/2011.  Other Relevant Studies: Ultrasound neck 2/17/2011.     FINDINGS: Intense radiotracer uptake at the left greater than right  neck at the thyroid level. No evidence for distant metastatic disease.     IMPRESSION: Intense radiotracer uptake localizing to the thyroidectomy  bed, left greater than right. This is consistent with residual thyroid  tissue. No distant metastatic disease identified.    ULTRASOUND THYROID  Feb 17, 2011      HISTORY: Hyperthyroidism. Thyroid nodule.      COMPARISON: Nuclear Medicine thyroid scan 2/4/2011.     FINDINGS: The thyroid gland is enlarged. The right lobe measures 5.4 x  1.6 x 2.1 cm. The left lobe measures 5.9 x 2.3 x 2.7 cm. There are 2  right thyroid nodules and 3 left thyroid nodules.  1. Right upper lobe, hypoechoic solid measuring 0.7 x 0.6 x 0.6 cm.  2. Right lower pole, solid measuring 0.7 x 0.5 x 0.6 cm.  3. Left upper pole, cystic and solid measuring 1.1 x 0.6 x 1.0 cm.  4. Left mid thyroid lobe, primarily solid with small cystic areas  measuring 2.7 x 1.9 x 2.1 cm.  5. Left lower pole, cystic and solid measuring 1.4 x 0.9 x 1.2 cm.     The primarily solid left mid thyroid nodule appears to correspond to  the cold nodule on thyroid uptake and scan.  IMPRESSION: Multinodular thyroid gland.    FNA thyroid  "nodule:  Copath Report       FNA-thyroid, left mid lobe nodule:   Suspicious for malignancy.  Suspicious for papillary carcinoma  Specimen Adequacy: Satisfactory for evaluation.           Surgical pathology:     SPECIMEN(S):  A: Thyroid, left lobe  B: Parathyroid gland, biopsy of right inferior  C: Thyroid, right lobe    FINAL DIAGNOSIS:  A. and C.  Thyroid, right and left lobes, total thyroidectomy -  Specimen/Procedure(s) and Laterality:   Right and left thyroid lobes,  total thyroidectomy.  Specimen Integrity:   Intact.  Specimen Size and Weight:   See Gross Description.  Histologic Tumor Type(s):   Papillary carcinoma, follicular variant with  focal papillary morphology.  Tumor Focality: Unifocal.  Tumor Laterality:   Left lobe.  Tumor Size(s):   Left lobe: 2.2 cm (greatest dimension).  Margins:   Close, but uninvolved.            Distance to closest margin, if negative:   Tumor 0.05 cm from  nearest paratracheal surface and 1.75 cm from nearest anterolateral  surface.  Tumor Capsular Invasion: Present.    Tumor Capsule: Partial.  Extrathyroidal Invasion:   Not identified.  Lymph-Vascular Invasion:   Not identified.  Lymph Nodes:   Two nodes without evidence of metastatic carcinoma (0/2;  one at isthmus and one adjacent to right lobe).  Pathologic Staging:   pT2, pN0, pM not applicable.  Additional Pathologic Findings:   Right thyroid lobe without evidence of  malignancy.  Background nodular hyperplasia and thyroiditis with  lymphoid follicles.  Left lobe with focal previous biopsy site changes.  No parathyroid tissue identified.  CAP Protocol Based on AJCC/UICC TNM, 7th edition; Protocol Effective  Date:  January 2010    B.  \"Parathyroid gland\", right inferior, biopsy - Thyroid tissue; no  parathyroid glandular tissue identified.    8/2016: left modified neck Dissection:  Copath Report      SPECIMEN(S):   A: Scar, left neck   B: Parathyroid gland, left inferior   C: Left central neck dissection, para and " pretracheal lymph nodes   D: apical jugular lymph node   E: Left modified neck dissection     FINAL DIAGNOSIS:   A. Skin, neck/scar, excision:   - Hypertrophic scar; benign.     B. Parathyroid gland, left inferior, biopsy:   - Parathyroid tissue present.     C. Left central neck dissection with para-and pretracheal lymph nodes:   - One lymph node; no evidence of malignancy (0/1).   - Thymus and parathyroid tissue present.     D. Lymph node, apical jugular, excision:   - One lymph node; no evidence of malignancy (0/1).     E. Left modified neck dissection:   - 27 lymph nodes identified (level II- 10, level III- 9, level IV- 5 and   lymph nodes associated with jugular vein- 3).   - One (of 27) lymph node positive for metastatic papillary thyroid   carcinoma.  1 cm in greatest diameter.  Negative for extranodal   involvement  (Level II).   - Jugular vein negative for tumor.        NUCLEAR MEDICINE THYROID WHOLE BODY SCAN I-123   4/20/2017 11:38 AM      TECHNIQUE:  The patient was given 1.8 mCi iodine 123 per oral on  4/19/2017. SPECT-CT with fusion was performed at the level of the neck  and chest.     HISTORY:  Malignant neoplasm of thyroid gland. Postprocedural  hypothyroidism.     COMPARISON:   Nuclear Study: None.     Other Relevant Studies: None.     FINDINGS:  Thyroidectomy. No suspicious focus of abnormal radiotracer  is seen at the thyroidectomy bed identified. Physiologic tracer uptake  seen at the salivary glands. There are several small subcentimeter  lymph nodes involving the bilateral neck without conspicuous focal  tracer uptake outside of the background tracer distribution. There  appears to be left neck postoperative change causing some  inconspicuity of the left sternocleidomastoid muscle. No convincing  worrisome airspace disease or mediastinal abnormality is seen.         IMPRESSION: No worrisome focal tracer uptake is identified to suggest  recurrent neoplasm or remote metastatic disease. Some  postoperative  changes at the left neck noted, likely accounting for inconspicuity of  the left sternocleidomastoid muscle. Small bilateral subcentimeter  neck lymph nodes noted.       All pertinent notes, labs, and images personally reviewed by me.     A/P  Ms.Nhu Mounika Nicole is a 30 year old here for the evaluation of thyroid cancer:    1. 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  It was 2.2 cm unifocal, left lobe tumor with no lymph node involvement.  S/p  total thyroidectomy in 2011 and 78.7 mCi of I-131 HERNANDEZ. No evidence for distant metastatic disease on post therapy scan.  2016- new lymph node s/p FNA with PTC with rising TG  S/p 8/2016- left modified neck radiation. 1/27 lymph node + ( level2). Postop course complicated by hypocalcemia.  Delivered 1/2017.   Follow up I123 WBS 4/2017- no mets  TG suppressed as above  Plan: continue to monitor. Annual imaging and TG due now.  2.  Hypothyroidism (postoperative following total thyroidectomy for thyroid cancer):  On levothyroxine 175  g/day. Generic. Since 3/3/17  With history of thyroid cancer goal TSH < 0.1  Recent TSH in target range however free T4 elevated at 1.61.  Decrease levothyroxine slightly by 1/2 tab per week = average daily dose of 165.5 mcg.  Recheck TFT's in 6 weeks.    3.  History of vitamin D deficiency and hypocalcemia.  Not taking any supplemental vitamin D  Was previously advised to increase calcium supplement to 500 mg tid, however she reports difficulty with compliance.  Tries to take two calcium tabs per day.  Will obtain calcium and vitamin D level today - adjust supplemental calcium if indicated, start D supplement if D level is below 30.    More than 50% of the time spent with Ms. Nicole on counseling / coordinating her care.      Follow-up:  To be determined based on labs and ultrasound results.    Lillie Will NP  Endocrinology   [place]  [place]  CC: Evan Zamora MD

## 2018-03-06 NOTE — PATIENT INSTRUCTIONS
Decrease levothyroxine to one tablet six days each week and one tablet one day each week.    Nazario Rowe will contact you to schedule the neck ultrasound.    Please make a lab only appointment in 6 weeks to recheck you thyroid levels.    Lillie Will NP  Endocrinology

## 2018-03-07 LAB — DEPRECATED CALCIDIOL+CALCIFEROL SERPL-MC: 24 UG/L (ref 20–75)

## 2018-03-13 ENCOUNTER — TELEPHONE (OUTPATIENT)
Dept: FAMILY MEDICINE | Facility: CLINIC | Age: 33
End: 2018-03-13

## 2018-03-13 RX ORDER — LEVOTHYROXINE SODIUM 150 UG/1
150 TABLET ORAL DAILY
Qty: 90 TABLET | Refills: 1 | Status: SHIPPED | OUTPATIENT
Start: 2018-03-13 | End: 2018-09-18

## 2018-03-13 NOTE — PROGRESS NOTES
Let Aleyda know she can also take the 175 mcg tablets six days per week and skip one day each week for a total weekly dose of six tabs/week.  This is equal to 150 mcg/day.  I also sent a new Rx for the 150 mcg tabs.  Lillie Will NP  Endocrinology

## 2018-03-13 NOTE — PROGRESS NOTES
Please call,  Aleyda,  Your thyroid levels are still a little too high.  I am decreasing your levothyroxine dose to 150 mcg/day.  You can take the 175 mcg tablets six days per week, skip one day each week to use up your current supply.  Also, your vitamin D is a little on the low side.  Please increase your vitamin D to 4000 IU per day.  Please make a follow up with Dr. Costello in another 2 months.  Lillie Will NP  Endocrinology

## 2018-03-13 NOTE — TELEPHONE ENCOUNTER
Called Pt to relay below result information. Left message, waiting callback.     Maryam Thompson RN -- [facility]     Notes Recorded by Lillie Will APRN CNP on 3/13/2018 at 7:31 AM  Let Aleyda know she can also take the 175 mcg tablets six days per week and skip one day each week for a total weekly dose of six tabs/week.  This is equal to 150 mcg/day.  I also sent a new Rx for the 150 mcg tabs.  Lillie Will NP  Endocrinology    ------    Notes Recorded by Lillie Will APRN CNP on 3/13/2018 at 7:28 AM  Please call,  Aleyda,  Your thyroid levels are still a little too high.  I am decreasing your levothyroxine dose to 150 mcg/day.  Also, your vitamin D is a little on the low side.  Please increase your vitamin D to 4000 IU per day.  Please make a follow up with Dr. Costello in another 2 months.  Lillie Will NP  Endocrinology

## 2018-03-14 LAB — LAB SCANNED RESULT: NORMAL

## 2018-03-15 NOTE — PROGRESS NOTES
Aleyda,  Good news! Your thyroid cancer markers were negative.  There is no sign of thyroid cancer recurrence.  We will continue to monitor this once per year.  Let me know if you have any questions.  Lillie Will NP  Endocrinology

## 2018-05-07 DIAGNOSIS — E55.9 VITAMIN D DEFICIENCY: ICD-10-CM

## 2018-05-07 DIAGNOSIS — C73 PAPILLARY CARCINOMA, FOLLICULAR VARIANT (H): ICD-10-CM

## 2018-05-07 DIAGNOSIS — E89.0 POSTOPERATIVE HYPOTHYROIDISM: ICD-10-CM

## 2018-05-07 LAB
T4 FREE SERPL-MCNC: 1.26 NG/DL (ref 0.76–1.46)
TSH SERPL DL<=0.005 MIU/L-ACNC: 0.1 MU/L (ref 0.4–4)

## 2018-05-07 PROCEDURE — 36415 COLL VENOUS BLD VENIPUNCTURE: CPT | Performed by: INTERNAL MEDICINE

## 2018-05-07 PROCEDURE — 84443 ASSAY THYROID STIM HORMONE: CPT | Performed by: INTERNAL MEDICINE

## 2018-05-07 PROCEDURE — 84439 ASSAY OF FREE THYROXINE: CPT | Performed by: INTERNAL MEDICINE

## 2018-05-13 NOTE — PROGRESS NOTES
Aleyda,  Your thyroid levels have improved.  Please continue the levothyroxine 150 mcg per day.  Please schedule a follow up appointment with Dr. Costello in 3 months.  Lillie Will NP  Endocrinology

## 2018-05-19 DIAGNOSIS — E55.9 VITAMIN D DEFICIENCY: ICD-10-CM

## 2018-05-19 NOTE — TELEPHONE ENCOUNTER
"Requested Prescriptions   Pending Prescriptions Disp Refills     VITAMIN D3 1000 units tablet [Pharmacy Med Name: VITAMIN D3 1000UNIT TABS] 60 tablet 3    Last Written Prescription Date:  11/13/2017  Last Fill Quantity: 60 tablet,  # refills: 3   Last office visit: 3/6/2018 with prescribing provider:  03/06/2018   Future Office Visit:   Next 5 appointments (look out 90 days)     May 21, 2018 11:20 AM CDT   Return Visit with Hannah Mederos PA-C   [facility]    [address] [place] [id number] [phone]                  Sig: TAKE TWO TABLETS BY MOUTH ONCE DAILY    Vitamin Supplements (Adult) Protocol Passed    5/19/2018  8:34 AM       Passed - High dose Vitamin D not ordered       Passed - Recent (12 mo) or future (30 days) visit within the authorizing provider's specialty    Patient had office visit in the last 12 months or has a visit in the next 30 days with authorizing provider or within the authorizing provider's specialty.  See \"Patient Info\" tab in inbasket, or \"Choose Columns\" in Meds & Orders section of the refill encounter.              Joaquin Serna XRT  "

## 2018-05-21 ENCOUNTER — OFFICE VISIT (OUTPATIENT)
Dept: DERMATOLOGY | Facility: CLINIC | Age: 33
End: 2018-05-21
Payer: COMMERCIAL

## 2018-05-21 VITALS — SYSTOLIC BLOOD PRESSURE: 118 MMHG | DIASTOLIC BLOOD PRESSURE: 74 MMHG | OXYGEN SATURATION: 95 % | HEART RATE: 98 BPM

## 2018-05-21 DIAGNOSIS — L91.0 KELOID: Primary | ICD-10-CM

## 2018-05-21 PROCEDURE — 11900 INJECT SKIN LESIONS </W 7: CPT | Performed by: PHYSICIAN ASSISTANT

## 2018-05-21 RX ORDER — CHOLECALCIFEROL (VITAMIN D3) 25 MCG
TABLET ORAL
Qty: 60 TABLET | Refills: 9 | Status: SHIPPED | OUTPATIENT
Start: 2018-05-21 | End: 2019-07-15

## 2018-05-21 NOTE — PROGRESS NOTES
HPI:  Aleyda Nicole is a 32 year old year old female patient here today for kenalog injection into keloid on left neck from thyroid cancer and lymph node surgery. LOV 18 injected with Kenalog 40 1.1 ml with improvement of coloration but scar has not seemed to improve.  Symptoms:  Itches at time        Alleviating/aggravating factors: no additional    Associated symptoms: none   Additional findings:none  Patient has no other skin complaints today.  Remainder of the HPI, Meds, PMH, Allergies, FH, and SH was reviewed in chart.      Past Medical History:   Diagnosis Date     Gestational diabetes     GDM diet controlled     Gestational diabetes 2013     Hypocalcemia 2016     Influenza A      Papillary thyroid carcinoma     Papillary carcinoma, follicular variant with     Pneumonia     LLL     PONV (postoperative nausea and vomiting)       labor      Uncomplicated asthma        Past Surgical History:   Procedure Laterality Date      SECTION  10/26/2013    Procedure:  SECTION;  primary  section;  Surgeon: Rodney Mckee MD;  Location: [facility]      SECTION N/A 2017    Procedure:  SECTION;  Surgeon: Hakeem Combs MD;  Location: [facility]     DISSECTION RADICAL NECK Left 2016    Procedure: DISSECTION RADICAL NECK;  Surgeon: Vy Theodore MD;  Location: [facility] OR     DISSECTION RADICAL NECK MODIFIED Left 2016    Procedure: DISSECTION RADICAL NECK MODIFIED;  Surgeon: Luis Brown MD;  Location: [facility] OR     THYROIDECTOMY      Total, for cancer.         Family History   Problem Relation Age of Onset     DIABETES Mother      CEREBROVASCULAR DISEASE Mother      Hypertension Mother      DIABETES Father 50     Hypertension Father      Genitourinary Problems Father      kidney disease     Coronary Artery Disease Father      DIABETES Brother        Social History     Social History     Marital status:      Spouse name: Brock      Number of children: 1     Years of education: N/A     Occupational History       [employer]     Social History Main Topics     Smoking status: Never Smoker     Smokeless tobacco: Never Used     Alcohol use No     Drug use: No     Sexual activity: Yes     Partners: Male     Birth control/ protection:      Other Topics Concern      Service No     Blood Transfusions No     Caffeine Concern No     Hobby Hazards No     Sleep Concern No     Stress Concern No     Weight Concern Yes     Special Diet No     Back Care No     Exercise No     Bike Helmet No     Seat Belt Yes     Self-Exams No     Social History Narrative    Pt lives with  and father.       Outpatient Encounter Prescriptions as of 5/21/2018   Medication Sig Dispense Refill     albuterol (PROAIR HFA/PROVENTIL HFA/VENTOLIN HFA) 108 (90 BASE) MCG/ACT Inhaler Inhale 2 puffs into the lungs every 4 hours as needed for shortness of breath / dyspnea or wheezing 1 Inhaler 11     fluticasone (FLOVENT HFA) 44 MCG/ACT Inhaler Inhale 1 puff into the lungs 2 times daily 1 Inhaler 11     ibuprofen (ADVIL/MOTRIN) 800 MG tablet Take 1 tablet (800 mg) by mouth every 8 hours as needed for moderate pain 30 tablet 1     levothyroxine (SYNTHROID/LEVOTHROID) 150 MCG tablet Take 1 tablet (150 mcg) by mouth daily 90 tablet 1     Prenatal Vit-Fe Fumarate-FA (PRENATAL MULTIVITAMIN  PLUS IRON) 27-0.8 MG TABS Take 1 tablet by mouth daily       Vitamin D, Cholecalciferol, 1000 UNITS CAPS Take 2,000 Int'l Units by mouth daily 60 capsule 3     No facility-administered encounter medications on file as of 5/21/2018.        Review Of Systems:  Skin: As above  Eyes: negative  Ears/Nose/Throat: negative  Respiratory: No shortness of breath, dyspnea on exertion, cough, or hemoptysis  Cardiovascular: negative  Gastrointestinal: negative  Genitourinary: negative  Musculoskeletal: negative  Neurologic: negative  Psychiatric: negative  Hematologic/Lymphatic/Immunologic:  negative  Endocrine: negative      Objective:     /74  Pulse 98  LMP 05/19/2018 (Exact Date)  SpO2 95%  Breastfeeding? No  Eyes: Conjunctivae/lids: Normal   ENT: Lips:  Normal  MSK: Normal  Pulm: Breathing Normal  Neuro/Psych: Orientation: Normal; Mood/Affect: Normal, NAD, WDWN  Following areas examined:face and neck  Findings:    1) pinkish brown WD smooth thickened plaque on left neck    Assessment and Plan:  1)Keloid of left neck  Injected 1.5 cc of Kenalog 40 into left neck. Disc permanent AE such as hypopigmentation and atrophy. May need additional treatment. May not be effective.   Lot #:[id number]  Exp: 06/18  I discussed the specifics of tumor, prognosis, and genetics of benign lesions.  I explained that treatment of these lesions would be purely cosmetic and not medically neccessary.  I discussed with patient different removal options including excision, cryotherapy, cautery and /or laser.        Follow up in 4 weeks

## 2018-05-21 NOTE — Clinical Note
2018         RE: Aleyda Nicole  [address]  [facility] [address]        Dear Colleague,    Thank you for referring your patient, Aleyda Nicole, to the [facility]. Please see a copy of my visit note below.    HPI:  Aleyda Nicole is a 32 year old year old female patient here today for kenalog injection into keloid on left neck from thyroid cancer and lymph node surgery. LOV 18 injected with Kenalog 40 1.1 ml with improvement of coloration but scar has not seemed to improve.  Symptoms:  Itches at time        Alleviating/aggravating factors: no additional    Associated symptoms: none   Additional findings:none  Patient has no other skin complaints today.  Remainder of the HPI, Meds, PMH, Allergies, FH, and SH was reviewed in chart.      Past Medical History:   Diagnosis Date     Gestational diabetes     GDM diet controlled     Gestational diabetes 2013     Hypocalcemia 2016     Influenza A      Papillary thyroid carcinoma     Papillary carcinoma, follicular variant with     Pneumonia     LLL     PONV (postoperative nausea and vomiting)       labor      Uncomplicated asthma        Past Surgical History:   Procedure Laterality Date      SECTION  10/26/2013    Procedure:  SECTION;  primary  section;  Surgeon: Rodney Mckee MD;  Location: [facility] L[facility]      SECTION N/A 2017    Procedure:  SECTION;  Surgeon: Hakeem Combs MD;  Location: [facility] L+[facility]     DISSECTION RADICAL NECK Left 2016    Procedure: DISSECTION RADICAL NECK;  Surgeon: Vy Theodore MD;  Location: [facility] OR     DISSECTION RADICAL NECK MODIFIED Left 2016    Procedure: DISSECTION RADICAL NECK MODIFIED;  Surgeon: Luis Brown MD;  Location: [facility] OR     THYROIDECTOMY      Total, for cancer.         Family History   Problem Relation Age of Onset     DIABETES Mother      CEREBROVASCULAR DISEASE Mother      Hypertension  Mother      DIABETES Father 50     Hypertension Father      Genitourinary Problems Father      kidney disease     Coronary Artery Disease Father      DIABETES Brother        Social History     Social History     Marital status:      Spouse name: Brock     Number of children: 1     Years of education: N/A     Occupational History       [employer]     Social History Main Topics     Smoking status: Never Smoker     Smokeless tobacco: Never Used     Alcohol use No     Drug use: No     Sexual activity: Yes     Partners: Male     Birth control/ protection:      Other Topics Concern      Service No     Blood Transfusions No     Caffeine Concern No     Hobby Hazards No     Sleep Concern No     Stress Concern No     Weight Concern Yes     Special Diet No     Back Care No     Exercise No     Bike Helmet No     Seat Belt Yes     Self-Exams No     Social History Narrative    Pt lives with  and father.       Outpatient Encounter Prescriptions as of 5/21/2018   Medication Sig Dispense Refill     albuterol (PROAIR HFA/PROVENTIL HFA/VENTOLIN HFA) 108 (90 BASE) MCG/ACT Inhaler Inhale 2 puffs into the lungs every 4 hours as needed for shortness of breath / dyspnea or wheezing 1 Inhaler 11     fluticasone (FLOVENT HFA) 44 MCG/ACT Inhaler Inhale 1 puff into the lungs 2 times daily 1 Inhaler 11     ibuprofen (ADVIL/MOTRIN) 800 MG tablet Take 1 tablet (800 mg) by mouth every 8 hours as needed for moderate pain 30 tablet 1     levothyroxine (SYNTHROID/LEVOTHROID) 150 MCG tablet Take 1 tablet (150 mcg) by mouth daily 90 tablet 1     Prenatal Vit-Fe Fumarate-FA (PRENATAL MULTIVITAMIN  PLUS IRON) 27-0.8 MG TABS Take 1 tablet by mouth daily       Vitamin D, Cholecalciferol, 1000 UNITS CAPS Take 2,000 Int'l Units by mouth daily 60 capsule 3     No facility-administered encounter medications on file as of 5/21/2018.        Review Of Systems:  Skin: As above  Eyes: negative  Ears/Nose/Throat: negative  Respiratory:  No shortness of breath, dyspnea on exertion, cough, or hemoptysis  Cardiovascular: negative  Gastrointestinal: negative  Genitourinary: negative  Musculoskeletal: negative  Neurologic: negative  Psychiatric: negative  Hematologic/Lymphatic/Immunologic: negative  Endocrine: negative      Objective:     /74  Pulse 98  LMP 2018 (Exact Date)  SpO2 95%  Breastfeeding? No  Eyes: Conjunctivae/lids: Normal   ENT: Lips:  Normal  MSK: Normal  Pulm: Breathing Normal  Neuro/Psych: Orientation: Normal; Mood/Affect: Normal, NAD, WDWN  Following areas examined:face and neck  Findings:    1) pinkish brown WD smooth thickened plaque on left neck    Assessment and Plan:  1)Keloid of left neck  Injected 1.5 cc of Kenalog 40 into left neck. Disc permanent AE such as hypopigmentation and atrophy. May need additional treatment. May not be effective.   Lot #:[id number]  Exp:   I discussed the specifics of tumor, prognosis, and genetics of benign lesions.  I explained that treatment of these lesions would be purely cosmetic and not medically neccessary.  I discussed with patient different removal options including excision, cryotherapy, cautery and /or laser.        Follow up in 4 weeks      HPI:  Aleyda Nicole is a 32 year old year old female patient here today for ***   Duration: ***  Symptoms:  ***     Previous treatments: ***     Alleviating/aggravating factors: ***    Associated symptoms: ***  Additional findings:***  Patient has no other skin complaints today.  Remainder of the HPI, Meds, PMH, Allergies, FH, and SH was reviewed in chart.    Pertinent Hx:   ***  Past Medical History:   Diagnosis Date     Gestational diabetes     GDM diet controlled     Gestational diabetes 2013     Hypocalcemia 2016     Influenza A 2009     Papillary thyroid carcinoma     Papillary carcinoma, follicular variant with     Pneumonia     LLL     PONV (postoperative nausea and vomiting)       labor       Uncomplicated asthma        Past Surgical History:   Procedure Laterality Date      SECTION  10/26/2013    Procedure:  SECTION;  primary  section;  Surgeon: Rodney Mckee MD;  [facility]      SECTION N/A 2017    Procedure:  SECTION;  Surgeon: Hakeem Combs MD;  Location: [facility]     DISSECTION RADICAL NECK Left 2016    Procedure: DISSECTION RADICAL NECK;  Surgeon: Vy Theodore MD;  Location: [facility] OR     DISSECTION RADICAL NECK MODIFIED Left 2016    Procedure: DISSECTION RADICAL NECK MODIFIED;  Surgeon: Luis Brown MD;  Location: [facility] OR     THYROIDECTOMY      Total, for cancer.         Family History   Problem Relation Age of Onset     DIABETES Mother      CEREBROVASCULAR DISEASE Mother      Hypertension Mother      DIABETES Father 50     Hypertension Father      Genitourinary Problems Father      kidney disease     Coronary Artery Disease Father      DIABETES Brother        Social History     Social History     Marital status:      Spouse name: Brock     Number of children: 1     Years of education: N/A     Occupational History       [employer]     Social History Main Topics     Smoking status: Never Smoker     Smokeless tobacco: Never Used     Alcohol use No     Drug use: No     Sexual activity: Yes     Partners: Male     Birth control/ protection:      Other Topics Concern      Service No     Blood Transfusions No     Caffeine Concern No     Hobby Hazards No     Sleep Concern No     Stress Concern No     Weight Concern Yes     Special Diet No     Back Care No     Exercise No     Bike Helmet No     Seat Belt Yes     Self-Exams No     Social History Narrative    Pt lives with  and father.       Outpatient Encounter Prescriptions as of 2018   Medication Sig Dispense Refill     albuterol (PROAIR HFA/PROVENTIL HFA/VENTOLIN HFA) 108 (90 BASE) MCG/ACT Inhaler Inhale 2 puffs into the lungs every 4 hours as  needed for shortness of breath / dyspnea or wheezing 1 Inhaler 11     fluticasone (FLOVENT HFA) 44 MCG/ACT Inhaler Inhale 1 puff into the lungs 2 times daily 1 Inhaler 11     ibuprofen (ADVIL/MOTRIN) 800 MG tablet Take 1 tablet (800 mg) by mouth every 8 hours as needed for moderate pain 30 tablet 1     levothyroxine (SYNTHROID/LEVOTHROID) 150 MCG tablet Take 1 tablet (150 mcg) by mouth daily 90 tablet 1     Prenatal Vit-Fe Fumarate-FA (PRENATAL MULTIVITAMIN  PLUS IRON) 27-0.8 MG TABS Take 1 tablet by mouth daily       Vitamin D, Cholecalciferol, 1000 UNITS CAPS Take 2,000 Int'l Units by mouth daily 60 capsule 3     No facility-administered encounter medications on file as of 5/21/2018.        Review Of Systems:***  Skin: As above  Eyes: negative  Ears/Nose/Throat: negative  Respiratory: No shortness of breath, dyspnea on exertion, cough, or hemoptysis  Cardiovascular: negative  Gastrointestinal: negative  Genitourinary: negative  Musculoskeletal: negative  Neurologic: negative  Psychiatric: negative  Hematologic/Lymphatic/Immunologic: negative  Endocrine: negative      Objective:     /74  Pulse 98  LMP 05/19/2018 (Exact Date)  SpO2 95%  Breastfeeding? No  Eyes: Conjunctivae/lids: Normal   ENT: Lips:  Normal  MSK: Normal  Cardiovascular: Peripheral edema none  Pulm: Breathing Normal  Neuro/Psych: Orientation: Normal; Mood/Affect: Normal, NAD, WDWN  Following areas examined: ***  Findings:    ***    Assessment and Plan:  ***  Follow up in ***      Again, thank you for allowing me to participate in the care of your patient.        Sincerely,        Hannah Mederos PA-C

## 2018-05-21 NOTE — PATIENT INSTRUCTIONS
Today we injected Kenalog. Kenalog is an antiinflammatory medication. We can do injections every four weeks as needed. Atrophy (thinning of the skin) and pigment changes can occur.

## 2018-05-21 NOTE — MR AVS SNAPSHOT
After Visit Summary   5/21/2018    Aleyda Nicole    MRN: [id number]           Patient Information     Date Of Birth          1985        Visit Information        Provider Department      5/21/2018 11:20 AM Hannah Mederos PA-C [facility]        Care Instructions    Today we injected Kenalog. Kenalog is an antiinflammatory medication. We can do injections every four weeks as needed. Atrophy (thinning of the skin) and pigment changes can occur.            Follow-ups after your visit        Your next 10 appointments already scheduled     Jun 04, 2018 10:30 AM CDT   US HEAD NECK SOFT TISSUE with SHUS5   [facility]ound ([facility])    [address] [address] [phone]           Please bring a list of your medicines (including vitamins, minerals and over-the-counter drugs). Also, tell your doctor about any allergies you may have. Wear comfortable clothes and leave your valuables at home.  You do not need to do anything special to prepare for your exam.  Please call the Imaging Department at your exam site with any questions.              Who to contact     If you have questions or need follow up information about today's clinic visit or your schedule please contact [facility] directly at [phone].  Normal or non-critical lab and imaging results will be communicated to you by MyChart, letter or phone within 4 business days after the clinic has received the results. If you do not hear from us within 7 days, please contact the clinic through MyChart or phone. If you have a critical or abnormal lab result, we will notify you by phone as soon as possible.  Submit refill requests through [employer] or call your pharmacy and they will forward the refill request to us. Please allow 3 business days for your refill to be completed.          Additional Information About Your Visit        Sonom[employer]  Information     [employer] gives you secure access to your electronic health record. If you see a primary care provider, you can also send messages to your care team and make appointments. If you have questions, please call your primary care clinic.  If you do not have a primary care provider, please call [phone] and they will assist you.        Care EveryWhere ID     This is your Care EveryWhere ID. This could be used by other organizations to access your [place] medical records  [id number]        Your Vitals Were     Pulse Last Period Pulse Oximetry Breastfeeding?          98 05/19/2018 (Exact Date) 95% No         Blood Pressure from Last 3 Encounters:   05/21/18 118/74   03/06/18 120/76   02/22/18 122/75    Weight from Last 3 Encounters:   03/06/18 73.5 kg (162 lb)   01/30/18 71.7 kg (158 lb 1.6 oz)   12/19/17 73.9 kg (163 lb)              Today, you had the following     No orders found for display       Primary Care Provider Office Phone # Fax #    Evan Zamora [phone]       [address] [facility] [address]        Equal Access to Services     СВЕТЛАНА VALDEZ : Hadii aad ku hadasho Soomaali, waaxda luqadaha, qaybta kaalmada adeegyada, cristina rodas haymelban matt albright. So [facility] [phone].    ATENCIÓN: Si habla español, tiene a serna disposición servicios gratuitos de asistencia lingüística. Christian al [phone].    We comply with applicable federal civil rights laws and [place] laws. We do not discriminate on the basis of race, color, national origin, age, disability, sex, sexual orientation, or gender identity.            Thank you!     Thank you for choosing [facility]  for your care. Our goal is always to provide you with excellent care. Hearing back from our patients is one way we can continue to improve our services. Please take a few minutes to complete the written survey that you may receive in the mail after your visit with us. Thank  you!             Your Updated Medication List - Protect others around you: Learn how to safely use, store and throw away your medicines at www.disposemymeds.org.          This list is accurate as of 5/21/18 11:50 AM.  Always use your most recent med list.                   Brand Name Dispense Instructions for use Diagnosis    albuterol 108 (90 Base) MCG/ACT Inhaler    PROAIR HFA/PROVENTIL HFA/VENTOLIN HFA    1 Inhaler    Inhale 2 puffs into the lungs every 4 hours as needed for shortness of breath / dyspnea or wheezing    Mild persistent asthma without complication       fluticasone 44 MCG/ACT Inhaler    FLOVENT HFA    1 Inhaler    Inhale 1 puff into the lungs 2 times daily    Mild persistent asthma without complication       ibuprofen 800 MG tablet    ADVIL/MOTRIN    30 tablet    Take 1 tablet (800 mg) by mouth every 8 hours as needed for moderate pain    Fever and chills       levothyroxine 150 MCG tablet    SYNTHROID/LEVOTHROID    90 tablet    Take 1 tablet (150 mcg) by mouth daily    Postoperative hypothyroidism       prenatal multivitamin plus iron 27-0.8 MG Tabs per tablet      Take 1 tablet by mouth daily    Postoperative hypothyroidism, Papillary carcinoma, follicular variant (H)       Vitamin D (Cholecalciferol) 1000 units Caps     60 capsule    Take 2,000 Int'l Units by mouth daily    Vitamin D deficiency

## 2018-06-04 ENCOUNTER — HOSPITAL ENCOUNTER (OUTPATIENT)
Dept: ULTRASOUND IMAGING | Facility: CLINIC | Age: 33
Discharge: HOME | End: 2018-06-04
Attending: CLINICAL NURSE SPECIALIST | Admitting: CLINICAL NURSE SPECIALIST
Payer: COMMERCIAL

## 2018-06-04 DIAGNOSIS — C73 PAPILLARY CARCINOMA, FOLLICULAR VARIANT (H): ICD-10-CM

## 2018-06-04 PROCEDURE — 76536 US EXAM OF HEAD AND NECK: CPT

## 2018-06-05 NOTE — PROGRESS NOTES
Aleyda,  Good news!  Your neck ultrasound did not show any sign of recurrent thyroid cancer.  Here's a copy of the results for your records.  We should repeat the neck ultrasound in one year.  Please let me know if you have any questions  Lillie Will NP  Endocrinology

## 2018-08-28 ENCOUNTER — OFFICE VISIT (OUTPATIENT)
Dept: INTERNAL MEDICINE | Facility: CLINIC | Age: 33
End: 2018-08-28
Payer: COMMERCIAL

## 2018-08-28 VITALS
RESPIRATION RATE: 14 BRPM | HEART RATE: 79 BPM | DIASTOLIC BLOOD PRESSURE: 84 MMHG | BODY MASS INDEX: 31.46 KG/M2 | WEIGHT: 166.5 LBS | SYSTOLIC BLOOD PRESSURE: 118 MMHG | OXYGEN SATURATION: 97 % | TEMPERATURE: 98 F

## 2018-08-28 DIAGNOSIS — R42 DIZZINESS: Primary | ICD-10-CM

## 2018-08-28 DIAGNOSIS — J45.30 MILD PERSISTENT ASTHMA WITHOUT COMPLICATION: ICD-10-CM

## 2018-08-28 DIAGNOSIS — E89.0 POSTOPERATIVE HYPOTHYROIDISM: ICD-10-CM

## 2018-08-28 LAB
HGB BLD-MCNC: 11.9 G/DL (ref 11.7–15.7)
TSH SERPL DL<=0.005 MIU/L-ACNC: 0.46 MU/L (ref 0.4–4)

## 2018-08-28 PROCEDURE — 36415 COLL VENOUS BLD VENIPUNCTURE: CPT | Performed by: INTERNAL MEDICINE

## 2018-08-28 PROCEDURE — 99213 OFFICE O/P EST LOW 20 MIN: CPT | Performed by: INTERNAL MEDICINE

## 2018-08-28 PROCEDURE — 84443 ASSAY THYROID STIM HORMONE: CPT | Performed by: INTERNAL MEDICINE

## 2018-08-28 PROCEDURE — 85018 HEMOGLOBIN: CPT | Performed by: INTERNAL MEDICINE

## 2018-08-28 NOTE — MR AVS SNAPSHOT
After Visit Summary   8/28/2018    Aleyda Nicole    MRN: [id number]           Patient Information     Date Of Birth          1985        Visit Information        Provider Department      8/28/2018 3:20 PM Evan Zamora MD [facility]        Today's Diagnoses     Dizziness    -  1    Mild persistent asthma without complication        Postoperative hypothyroidism           Follow-ups after your visit        Follow-up notes from your care team     Return if symptoms worsen or fail to improve.      Your next 10 appointments already scheduled     Sep 17, 2018 10:00 AM CDT   Return Visit with Hannah Mederos PA-C   [facility]    [address] [address] [phone]            Sep 25, 2018  2:30 PM CDT   Return Visit with Vandana Costello MD   [facility]    [address] [place] [address] [address]  [facility] [address] [phone]              Who to contact     If you have questions or need follow up information about today's clinic visit or your schedule please contact [facility] directly at [phone].  Normal or non-critical lab and imaging results will be communicated to you by MyChart, letter or phone within 4 business days after the clinic has received the results. If you do not hear from us within 7 days, please contact the clinic through MyChart or phone. If you have a critical or abnormal lab result, we will notify you by phone as soon as possible.  Submit refill requests through [employer] or call your pharmacy and they will forward the refill request to us. Please allow 3 business days for your refill to be completed.          Additional Information About Your Visit        MyChart Information     [employer] gives you secure access to your electronic health record. If you see a primary care provider, you  can also send messages to your care team and make appointments. If you have questions, please call your primary care clinic.  If you do not have a primary care provider, please call [phone] and they will assist you.        Care EveryWhere ID     This is your Care EveryWhere ID. This could be used by other organizations to access your [place] medical records  [id number]        Your Vitals Were     Pulse Temperature Respirations Last Period Pulse Oximetry Breastfeeding?    79 98  F (36.7  C) (Oral) 14 08/08/2018 97% No    BMI (Body Mass Index)                   31.46 kg/m2            Blood Pressure from Last 3 Encounters:   08/28/18 118/84   05/21/18 118/74   03/06/18 120/76    Weight from Last 3 Encounters:   08/28/18 166 lb 8 oz (75.5 kg)   03/06/18 162 lb (73.5 kg)   01/30/18 158 lb 1.6 oz (71.7 kg)              We Performed the Following     Hemoglobin     TSH with free T4 reflex        Primary Care Provider Office Phone # Fax #    Evan Zamora [phone]       [address] [facility] [address]        Equal Access to Services     TAJ VALDEZ : Hadii aad ku hadasho Soomaali, waaxda luqadaha, qaybta kaalmada adeegyada, cristina rangel . So [facility] [phone].    ATENCIÓN: Si habla español, tiene a serna disposición servicios gratuitos de asistencia lingüística. Llame al [phone].    We comply with applicable federal civil rights laws and [place] laws. We do not discriminate on the basis of race, color, national origin, age, disability, sex, sexual orientation, or gender identity.            Thank you!     Thank you for choosing [facility]  for your care. Our goal is always to provide you with excellent care. Hearing back from our patients is one way we can continue to improve our services. Please take a few minutes to complete the written survey that you may receive in the mail after your visit with us. Thank you!              Your Updated Medication List - Protect others around you: Learn how to safely use, store and throw away your medicines at www.disposemymeds.org.          This list is accurate as of 8/28/18  3:56 PM.  Always use your most recent med list.                   Brand Name Dispense Instructions for use Diagnosis    albuterol 108 (90 Base) MCG/ACT inhaler    PROAIR HFA/PROVENTIL HFA/VENTOLIN HFA    1 Inhaler    Inhale 2 puffs into the lungs every 4 hours as needed for shortness of breath / dyspnea or wheezing    Mild persistent asthma without complication       cholecalciferol 1000 UNIT tablet    vitamin D3    60 tablet    TAKE TWO TABLETS BY MOUTH ONCE DAILY    Vitamin D deficiency       fluticasone 44 MCG/ACT Inhaler    FLOVENT HFA    1 Inhaler    Inhale 1 puff into the lungs 2 times daily    Mild persistent asthma without complication       levothyroxine 150 MCG tablet    SYNTHROID/LEVOTHROID    90 tablet    Take 1 tablet (150 mcg) by mouth daily    Postoperative hypothyroidism       prenatal multivitamin plus iron 27-0.8 MG Tabs per tablet      Take 1 tablet by mouth daily    Postoperative hypothyroidism, Papillary carcinoma, follicular variant (H)

## 2018-08-28 NOTE — PROGRESS NOTES
SUBJECTIVE:   Aleyda Nicole is a 32 year old female who presents to clinic today for the following health issues:      Dizziness  Onset: 1 month    Description:   Do you feel faint:  no   Does it feel like the surroundings (bed, room) are moving: no   Unsteady/off balance: no   Have you passed out or fallen: no     Intensity: mild    Progression of Symptoms:  same    Accompanying Signs & Symptoms:  Heart palpitations: YES- sometimes  Nausea, vomiting: no   Weakness in arms or legs: no   Fatigue: YES  Vision or speech changes: no   Ringing in ears (Tinnitus): no   Hearing Loss: no     History:   Head trauma/concussion hx: no   Previous similar symptoms: When she was pregnant  Recent bleeding history: no     Precipitating factors:   Worse with activity or head movement: yes  Any new medications (BP?): no   Alcohol/drug abuse/withdrawal: no     Alleviating factors:   Does staying in a fixed position give relief:  no     Therapies Tried and outcome: none      Problem list and histories reviewed & adjusted, as indicated.  Additional history: as documented    Labs reviewed in EPIC    Reviewed and updated as needed this visit by clinical staff  Allergies  Meds       Reviewed and updated as needed this visit by Provider         ROS:  Constitutional, HEENT, cardiovascular, pulmonary, gi and gu systems are negative, except as otherwise noted.    OBJECTIVE:                                                    /84  Pulse 79  Temp 98  F (36.7  C) (Oral)  Resp 14  Wt 166 lb 8 oz (75.5 kg)  LMP 08/08/2018  SpO2 97%  Breastfeeding? No  BMI 31.46 kg/m2  Body mass index is 31.46 kg/(m^2).  GENERAL APPEARANCE: alert, no distress and over weight  HENT: nose and mouth without ulcers or lesions and normal cephalic/atraumatic  SKIN: no suspicious lesions or rashes  Lungs: end inspiratory wheezing noted bilat  NEURO: Normal strength and tone, sensory exam grossly normal, mentation intact and speech normal    Diagnostic  test results:  Pending     ASSESSMENT/PLAN:                                                    1. Dizziness  Not exactly certain of etiology.  She does report some intermittent palpitations which she cannot recall if these are associated with the dizziness.  Think if she does feel in the future that this is the case we can do a Holter monitor.  Also some chance that it might be related to her thyroid dose and thus getting the TSH will help evaluate this  - TSH with free T4 reflex  - Hemoglobin    2. Mild persistent asthma without complication  Recommend restarting her steroid inhaler which she has not been using recently.  This will likely help.    3. Postoperative hypothyroidism  - TSH with free T4 reflex      Evan Zamora MD  [facility]

## 2018-09-17 ENCOUNTER — OFFICE VISIT (OUTPATIENT)
Dept: DERMATOLOGY | Facility: CLINIC | Age: 33
End: 2018-09-17
Payer: COMMERCIAL

## 2018-09-17 VITALS — OXYGEN SATURATION: 96 % | HEART RATE: 83 BPM | SYSTOLIC BLOOD PRESSURE: 118 MMHG | DIASTOLIC BLOOD PRESSURE: 78 MMHG

## 2018-09-17 DIAGNOSIS — L91.0 KELOID: Primary | ICD-10-CM

## 2018-09-17 DIAGNOSIS — L29.9 LOCALIZED PRURITUS: ICD-10-CM

## 2018-09-17 PROCEDURE — 11900 INJECT SKIN LESIONS </W 7: CPT | Performed by: PHYSICIAN ASSISTANT

## 2018-09-17 PROCEDURE — 99212 OFFICE O/P EST SF 10 MIN: CPT | Mod: 25 | Performed by: PHYSICIAN ASSISTANT

## 2018-09-17 RX ORDER — CLOBETASOL PROPIONATE 0.5 MG/G
CREAM TOPICAL
Qty: 60 G | Refills: 1 | Status: SHIPPED | OUTPATIENT
Start: 2018-09-17 | End: 2019-10-23

## 2018-09-17 NOTE — MR AVS SNAPSHOT
After Visit Summary   9/17/2018    Aleyda Nicole    MRN: [id number]           Patient Information     Date Of Birth          1985        Visit Information        Provider Department      9/17/2018 10:00 AM Hannah Mederos PA-C [facility]        Today's Diagnoses     Keloid    -  1    Localized pruritus          Care Instructions    Proper skin care from [facility]:    -Eliminate harsh soaps as they strip the natural oils from the skin, often resulting in dry itchy skin ( i.e. Dial, Zest, [place] Spring)  -Use mild soaps such as Cetaphil or Dove Sensitive Skin in the shower. You do not need to use soap on arms, legs, and trunk every time you shower unless visibly soiled.   -Avoid hot or cold showers.  -After showering, lightly dry off and apply moisturizing within 2-3 minutes. This will help trap moisture in the skin.   -Aggressive use of a moisturizer at least 1-2 times a day to the entire body (including -Vanicream, Cetaphil, Aquaphor or Cerave) and moisturize hands after every washing.  -We recommend using moisturizers that come in a tub that needs to be scooped out, not a pump. This has more of an oil base. It will hold moisture in your skin much better than a water base moisturizer. The above recommended are non-pore clogging.       Today we injected Kenalog. Kenalog is an antiinflammatory medication. We can do injections every four weeks as needed. Atrophy (thinning of the skin) and pigment changes can occur.    Clobetasol :Apply a thin layer to affected area BID x 4-6 weeks, tapering with improvement. Do not apply to face or body folds.   Discussed side effects of topical steroids including but not limited to atrophy (skin thinning), striae (stretch marks) telangiectasias, steroid acne, and others. Do not apply to normal skin. Do not apply to discolored skin that does not have rash present.               Follow-ups after your visit        Your next  10 appointments already scheduled     Sep 25, 2018  2:30 PM CDT   Return Visit with Vandana Costello MD   [facility] [place] ([facility])    [address]  [address]  [place] [address] [phone]              Who to contact     If you have questions or need follow up information about today's clinic visit or your schedule please contact [facility] directly at [phone].  Normal or non-critical lab and imaging results will be communicated to you by [employer], letter or phone within 4 business days after the clinic has received the results. If you do not hear from us within 7 days, please contact the clinic through [employer] or phone. If you have a critical or abnormal lab result, we will notify you by phone as soon as possible.  Submit refill requests through [employer] or call your pharmacy and they will forward the refill request to us. Please allow 3 business days for your refill to be completed.          Additional Information About Your Visit        [employer] Information     [employer] gives you secure access to your electronic health record. If you see a primary care provider, you can also send messages to your care team and make appointments. If you have questions, please call your primary care clinic.  If you do not have a primary care provider, please call [phone] and they will assist you.        Care EveryWhere ID     This is your Care EveryWhere ID. This could be used by other organizations to access your [facility] medical records  [id number]        Your Vitals Were     Pulse Pulse Oximetry Breastfeeding?             83 96% No          Blood Pressure from Last 3 Encounters:   09/17/18 118/78   08/28/18 118/84   05/21/18 118/74    Weight from Last 3 Encounters:   08/28/18 75.5 kg (166 lb 8 oz)   03/06/18 73.5 kg (162 lb)   01/30/18 71.7 kg (158 lb 1.6 oz)              Today, you had the following     No orders found for display       Primary  Care Provider Office Phone # Fax #    Evan Zamora [phone] [address] [facility] [address]        Equal Access to Services     СВЕТЛАНА VALDEZ : Hadii tim ku gatoo Soolenaali, waaxda luqadaha, qaybta kaalmada adealona, cristina waite laCharanjittreva albright. So [facility] [phone].    ATENCIÓN: Si habla español, tiene a serna disposición servicios gratuitos de asistencia lingüística. Llame al [phone].    We comply with applicable federal civil rights laws and [place] laws. We do not discriminate on the basis of race, color, national origin, age, disability, sex, sexual orientation, or gender identity.            Thank you!     Thank you for choosing [facility]  for your care. Our goal is always to provide you with excellent care. Hearing back from our patients is one way we can continue to improve our services. Please take a few minutes to complete the written survey that you may receive in the mail after your visit with us. Thank you!             Your Updated Medication List - Protect others around you: Learn how to safely use, store and throw away your medicines at www.disposemymeds.org.          This list is accurate as of 9/17/18 10:12 AM.  Always use your most recent med list.                   Brand Name Dispense Instructions for use Diagnosis    albuterol 108 (90 Base) MCG/ACT inhaler    PROAIR HFA/PROVENTIL HFA/VENTOLIN HFA    1 Inhaler    Inhale 2 puffs into the lungs every 4 hours as needed for shortness of breath / dyspnea or wheezing    Mild persistent asthma without complication       cholecalciferol 1000 UNIT tablet    vitamin D3    60 tablet    TAKE TWO TABLETS BY MOUTH ONCE DAILY    Vitamin D deficiency       fluticasone 44 MCG/ACT Inhaler    FLOVENT HFA    1 Inhaler    Inhale 1 puff into the lungs 2 times daily    Mild persistent asthma without complication       levothyroxine 150 MCG tablet    SYNTHROID/LEVOTHROID    90 tablet     Take 1 tablet (150 mcg) by mouth daily    Postoperative hypothyroidism       prenatal multivitamin plus iron 27-0.8 MG Tabs per tablet      Take 1 tablet by mouth daily    Postoperative hypothyroidism, Papillary carcinoma, follicular variant (H)

## 2018-09-17 NOTE — LETTER
2018         RE: Aleyda Nicole  [address]  [facility] [address]        Dear Colleague,    Thank you for referring your patient, Aleyda Nicole, to the [facility]. Please see a copy of my visit note below.    HPI:  Aleyda Nicole is a 33 year old female patient here today for kenalog injection into keloid on left neck from lymph nodes surgery on neck. LOV 1.5 ml of kenalog 40 injected with some improvement.  Patient states this has been present for years.  Patient reports the following symptoms: mild itch .  Patient reports the following previous treatments: kenalog injection.  Patient reports the following modifying factors none.  Associated symptoms: none.  Patient has no other skin complaints today.  Remainder of the HPI, Meds, PMH, Allergies, FH, and SH was reviewed in chart.    Pertinent Hx:   Thyroid cancer  Past Medical History:   Diagnosis Date     Gestational diabetes     GDM diet controlled     Gestational diabetes 2013     Hypocalcemia 2016     Influenza A      Papillary thyroid carcinoma     Papillary carcinoma, follicular variant with     Pneumonia     LLL     PONV (postoperative nausea and vomiting)       labor      Uncomplicated asthma        Past Surgical History:   Procedure Laterality Date      SECTION  10/26/2013    Procedure:  SECTION;  primary  section;  Surgeon: Rodney Mckee MD;  Location: [facility]      SECTION N/A 2017    Procedure:  SECTION;  Surgeon: Hakeem Combs MD;  Location: [facility]     DISSECTION RADICAL NECK Left 2016    Procedure: DISSECTION RADICAL NECK;  Surgeon: Vy Theodore MD;  Location: [facility] OR     DISSECTION RADICAL NECK MODIFIED Left 2016    Procedure: DISSECTION RADICAL NECK MODIFIED;  Surgeon: Luis Brown MD;  Location: [facility] OR     THYROIDECTOMY      Total, for cancer.         Family History   Problem Relation Age of Onset      Diabetes Mother      Cerebrovascular Disease Mother      Hypertension Mother      Diabetes Father 50     Hypertension Father      Genitourinary Problems Father      kidney disease     Coronary Artery Disease Father      Diabetes Brother        Social History     Social History     Marital status:      Spouse name: Brock     Number of children: 2     Years of education: N/A     Occupational History       [employer]     Social History Main Topics     Smoking status: Never Smoker     Smokeless tobacco: Never Used     Alcohol use No     Drug use: No     Sexual activity: Yes     Partners: Male     Birth control/ protection:      Other Topics Concern      Service No     Blood Transfusions No     Caffeine Concern No     Hobby Hazards No     Sleep Concern No     Stress Concern No     Weight Concern Yes     Special Diet No     Back Care No     Exercise No     Bike Helmet No     Seat Belt Yes     Self-Exams No     Social History Narrative    Pt lives with  and father.       Outpatient Encounter Prescriptions as of 9/17/2018   Medication Sig Dispense Refill     albuterol (PROAIR HFA/PROVENTIL HFA/VENTOLIN HFA) 108 (90 BASE) MCG/ACT Inhaler Inhale 2 puffs into the lungs every 4 hours as needed for shortness of breath / dyspnea or wheezing 1 Inhaler 11     fluticasone (FLOVENT HFA) 44 MCG/ACT Inhaler Inhale 1 puff into the lungs 2 times daily 1 Inhaler 11     levothyroxine (SYNTHROID/LEVOTHROID) 150 MCG tablet Take 1 tablet (150 mcg) by mouth daily 90 tablet 1     Prenatal Vit-Fe Fumarate-FA (PRENATAL MULTIVITAMIN  PLUS IRON) 27-0.8 MG TABS Take 1 tablet by mouth daily       VITAMIN D3 1000 units tablet TAKE TWO TABLETS BY MOUTH ONCE DAILY 60 tablet 9     No facility-administered encounter medications on file as of 9/17/2018.        Review Of Systems:  Skin: As above  Eyes: negative  Ears/Nose/Throat: negative  Respiratory: No shortness of breath, dyspnea on exertion, cough, or  hemoptysis  Cardiovascular: negative  Gastrointestinal: negative  Genitourinary: negative  Musculoskeletal: negative  Neurologic: negative  Psychiatric: negative  Hematologic/Lymphatic/Immunologic: negative  Endocrine: negative      Objective:     There were no vitals taken for this visit.  Eyes: Conjunctivae/lids: Normal   ENT: Lips:  Normal  MSK: Normal  Cardiovascular: Peripheral edema none  Pulm: Breathing Normal  Neuro/Psych: Orientation: Normal; Mood/Affect: Normal, NAD, WDWN  Pt accompanied by: self  Following areas examined: face, neck, upper chest  Thacker skin type:iii  Findings:  1) pink/brown wd smooth thickened plaque on left neck  Assessment and Plan:  1) keloid of left neck  Injected 1cc of Kenalog 1 into keloid on left lateral neck. Disc permanent AE such as hypopigmentation and atrophy. May need additional treatment. May not be effective.   Lot #:[id number]  Exp: 01/2020    Clobetasol :Apply a thin layer to affected area BID x 4-6 weeks, tapering with improvement. Do not apply to face or body folds.   Discussed side effects of topical steroids including but not limited to atrophy (skin thinning), striae (stretch marks) telangiectasias, steroid acne, and others. Do not apply to normal skin. Do not apply to discolored skin that does not have rash present.         Follow up in 1 month      Again, thank you for allowing me to participate in the care of your patient.        Sincerely,        Hannah Mederos PA-C

## 2018-09-17 NOTE — PROGRESS NOTES
HPI:  Aleyda Nicole is a 33 year old female patient here today for kenalog injection into keloid on left neck from lymph nodes surgery on neck. LOV 1.5 ml of kenalog 40 injected with some improvement.  Patient states this has been present for years.  Patient reports the following symptoms: mild itch .  Patient reports the following previous treatments: kenalog injection.  Patient reports the following modifying factors none.  Associated symptoms: none.  Patient has no other skin complaints today.  Remainder of the HPI, Meds, PMH, Allergies, FH, and SH was reviewed in chart.    Pertinent Hx:   Thyroid cancer  Past Medical History:   Diagnosis Date     Gestational diabetes     GDM diet controlled     Gestational diabetes 2013     Hypocalcemia 2016     Influenza A      Papillary thyroid carcinoma     Papillary carcinoma, follicular variant with     Pneumonia     LLL     PONV (postoperative nausea and vomiting)       labor      Uncomplicated asthma        Past Surgical History:   Procedure Laterality Date      SECTION  10/26/2013    Procedure:  SECTION;  primary  section;  Surgeon: Rodney Mckee MD;  [facility]: [facility]      SECTION N/A 2017    Procedure:  SECTION;  Surgeon: Hakeem Combs MD;  Location: [facility]     DISSECTION RADICAL NECK Left 2016    Procedure: DISSECTION RADICAL NECK;  Surgeon: Vy Theodore MD;  Location: [facility] OR     DISSECTION RADICAL NECK MODIFIED Left 2016    Procedure: DISSECTION RADICAL NECK MODIFIED;  Surgeon: Luis Brown MD;  Location: [facility] OR     THYROIDECTOMY      Total, for cancer.         Family History   Problem Relation Age of Onset     Diabetes Mother      Cerebrovascular Disease Mother      Hypertension Mother      Diabetes Father 50     Hypertension Father      Genitourinary Problems Father      kidney disease     Coronary Artery Disease Father      Diabetes Brother        Social  History     Social History     Marital status:      Spouse name: Brock     Number of children: 2     Years of education: N/A     Occupational History       [employer]     Social History Main Topics     Smoking status: Never Smoker     Smokeless tobacco: Never Used     Alcohol use No     Drug use: No     Sexual activity: Yes     Partners: Male     Birth control/ protection:      Other Topics Concern      Service No     Blood Transfusions No     Caffeine Concern No     Hobby Hazards No     Sleep Concern No     Stress Concern No     Weight Concern Yes     Special Diet No     Back Care No     Exercise No     Bike Helmet No     Seat Belt Yes     Self-Exams No     Social History Narrative    Pt lives with  and father.       Outpatient Encounter Prescriptions as of 9/17/2018   Medication Sig Dispense Refill     albuterol (PROAIR HFA/PROVENTIL HFA/VENTOLIN HFA) 108 (90 BASE) MCG/ACT Inhaler Inhale 2 puffs into the lungs every 4 hours as needed for shortness of breath / dyspnea or wheezing 1 Inhaler 11     fluticasone (FLOVENT HFA) 44 MCG/ACT Inhaler Inhale 1 puff into the lungs 2 times daily 1 Inhaler 11     levothyroxine (SYNTHROID/LEVOTHROID) 150 MCG tablet Take 1 tablet (150 mcg) by mouth daily 90 tablet 1     Prenatal Vit-Fe Fumarate-FA (PRENATAL MULTIVITAMIN  PLUS IRON) 27-0.8 MG TABS Take 1 tablet by mouth daily       VITAMIN D3 1000 units tablet TAKE TWO TABLETS BY MOUTH ONCE DAILY 60 tablet 9     No facility-administered encounter medications on file as of 9/17/2018.        Review Of Systems:  Skin: As above  Eyes: negative  Ears/Nose/Throat: negative  Respiratory: No shortness of breath, dyspnea on exertion, cough, or hemoptysis  Cardiovascular: negative  Gastrointestinal: negative  Genitourinary: negative  Musculoskeletal: negative  Neurologic: negative  Psychiatric: negative  Hematologic/Lymphatic/Immunologic: negative  Endocrine: negative      Objective:     There were no vitals  taken for this visit.  Eyes: Conjunctivae/lids: Normal   ENT: Lips:  Normal  MSK: Normal  Cardiovascular: Peripheral edema none  Pulm: Breathing Normal  Neuro/Psych: Orientation: Normal; Mood/Affect: Normal, NAD, WDWN  Pt accompanied by: self  Following areas examined: face, neck, upper chest  Thacker skin type:iii  Findings:  1) pink/brown wd smooth thickened plaque on left neck  Assessment and Plan:  1) keloid of left neck  Injected 1cc of Kenalog 40 into keloid on left lateral neck. Disc permanent AE such as hypopigmentation and atrophy. May need additional treatment. May not be effective.   Lot #:[id number]  Exp: 01/2020    Clobetasol :Apply a thin layer to affected area BID x 4-6 weeks, tapering with improvement. Do not apply to face or body folds.   Discussed side effects of topical steroids including but not limited to atrophy (skin thinning), striae (stretch marks) telangiectasias, steroid acne, and others. Do not apply to normal skin. Do not apply to discolored skin that does not have rash present.         Follow up in 1 month

## 2018-09-17 NOTE — PATIENT INSTRUCTIONS
Proper skin care from [facility]:    -Eliminate harsh soaps as they strip the natural oils from the skin, often resulting in dry itchy skin ( i.e. Dial, Zest, [place] Spring)  -Use mild soaps such as Cetaphil or Dove Sensitive Skin in the shower. You do not need to use soap on arms, legs, and trunk every time you shower unless visibly soiled.   -Avoid hot or cold showers.  -After showering, lightly dry off and apply moisturizing within 2-3 minutes. This will help trap moisture in the skin.   -Aggressive use of a moisturizer at least 1-2 times a day to the entire body (including -Vanicream, Cetaphil, Aquaphor or Cerave) and moisturize hands after every washing.  -We recommend using moisturizers that come in a tub that needs to be scooped out, not a pump. This has more of an oil base. It will hold moisture in your skin much better than a water base moisturizer. The above recommended are non-pore clogging.       Today we injected Kenalog. Kenalog is an antiinflammatory medication. We can do injections every four weeks as needed. Atrophy (thinning of the skin) and pigment changes can occur.    Clobetasol :Apply a thin layer to affected area BID x 4-6 weeks, tapering with improvement. Do not apply to face or body folds.   Discussed side effects of topical steroids including but not limited to atrophy (skin thinning), striae (stretch marks) telangiectasias, steroid acne, and others. Do not apply to normal skin. Do not apply to discolored skin that does not have rash present.

## 2018-09-18 DIAGNOSIS — E89.0 POSTOPERATIVE HYPOTHYROIDISM: ICD-10-CM

## 2018-09-19 NOTE — TELEPHONE ENCOUNTER
"Requested Prescriptions   Pending Prescriptions Disp Refills     levothyroxine (SYNTHROID/LEVOTHROID) 150 MCG tablet [Pharmacy Med Name: LEVOTHYROXINE SODIUM 150MCG TABS]  Last Written Prescription Date:  3/13/2018  Last Fill Quantity: 90 tablet,  # refills: 1   Last office visit: 3/6/2018 with prescribing provider:  Gianna     Future Office Visit:   Next 5 appointments (look out 90 days)     Sep 25, 2018  2:30 PM CDT   Return Visit with Vandana Costello MD   [facility] ([facility])    [address] [place] [address]  [address]  [facility] [address]   [phone]                  90 tablet 1     Sig: TAKE ONE TABLET BY MOUTH EVERY DAY    Thyroid Protocol Passed    9/18/2018  8:39 PM       Passed - Patient is 12 years or older       Passed - Recent (12 mo) or future (30 days) visit within the authorizing provider's specialty    Patient had office visit in the last 12 months or has a visit in the next 30 days with authorizing provider or within the authorizing provider's specialty.  See \"Patient Info\" tab in inbasket, or \"Choose Columns\" in Meds & Orders section of the refill encounter.           Passed - Normal TSH on file in past 12 months    Recent Labs   Lab Test  08/28/18   1613   TSH  0.46             Passed - No active pregnancy on record    If patient is pregnant or has had a positive pregnancy test, please check TSH.         Passed - No positive pregnancy test in past 12 months    If patient is pregnant or has had a positive pregnancy test, please check TSH.            "

## 2018-09-20 RX ORDER — LEVOTHYROXINE SODIUM 150 UG/1
TABLET ORAL
Qty: 45 TABLET | Refills: 3 | Status: SHIPPED | OUTPATIENT
Start: 2018-09-20 | End: 2019-02-28

## 2018-09-20 NOTE — TELEPHONE ENCOUNTER
Per result note from pt's PCP:  Entered by Evan Zamora MD at 9/12/2018  9:04 PM   Read by Aleyda Nicole at 9/12/2018  9:06 PM   Dear New,   Here are your recent results   I don't think there is anything here to explain your dizziness but your TSH is now outside your goal range. I know your endocrine team wants it < 0.10.  When you see them later this month they will likely adjust the dose a bit.  Until then I would start by taking an extra 1/2 tablet once per week.  Therefore your dose will be 1 tablet 6 days per week and 1.5 tablets the 7th day.          Routing to sid Alvarado RN, BSN

## 2018-09-20 NOTE — TELEPHONE ENCOUNTER
Rx sent.     agree with recommendation of Evan Leach  Dose: take 1 tablet 6 days per week and 1.5 tablets the 7th day.    Please call patient with above information.  Has upcoming appointment 9/25/18.    Vandana Costello MD  Endocrinology   [facility]  September 20, 2018

## 2018-09-25 ENCOUNTER — OFFICE VISIT (OUTPATIENT)
Dept: ENDOCRINOLOGY | Facility: CLINIC | Age: 33
End: 2018-09-25
Payer: COMMERCIAL

## 2018-09-25 VITALS
BODY MASS INDEX: 30.47 KG/M2 | SYSTOLIC BLOOD PRESSURE: 110 MMHG | HEIGHT: 61 IN | WEIGHT: 161.4 LBS | TEMPERATURE: 98 F | OXYGEN SATURATION: 95 % | HEART RATE: 94 BPM | DIASTOLIC BLOOD PRESSURE: 76 MMHG

## 2018-09-25 DIAGNOSIS — C73 PAPILLARY CARCINOMA, FOLLICULAR VARIANT (H): Primary | ICD-10-CM

## 2018-09-25 DIAGNOSIS — E89.0 POSTOPERATIVE HYPOTHYROIDISM: ICD-10-CM

## 2018-09-25 PROCEDURE — 99214 OFFICE O/P EST MOD 30 MIN: CPT | Performed by: INTERNAL MEDICINE

## 2018-09-25 NOTE — MR AVS SNAPSHOT
After Visit Summary   2018    [facility] Mounika Nicole    MRN: [id number]           Patient Information     Date Of Birth          1985        Visit Information        Provider Department      2018 2:30 PM Vandana Costello MD [facility]        Care Instructions    [facility]   Dr Costello, Endocrinology Department      [facility]   [address]  [place] [address]  Appointment [phone]  Fax: [phone]  [place]: Monday and Tuesday         [place] [address] [facility]  [place] [address]  Appointment [phone]  Fax: [phone]  [place]: Wednesday and Thursday            Continue current dose of levothyroxine.  Labs in early Nov.  Please make a lab appointment for blood work and follow up clinic appointment in 1 week after that to discuss results.  Tumor markers and neck ULtrasound in 2019  Consider switch to brand synthorid. Check cost with insurance.    Take Levothyroxine on an empty stomach. Take it with a full glass of water at least 30 minutes to 1 hour before eating breakfast.   This medicine should be taken at least 4 hours before or 4 hours after these medicines: antacids (Maalox , Mylanta , Tums ), calcium supplements, cholestyramine (Prevalite , Questran ), colestipol (Colestid ), iron supplements, orlistat (Kaden , Xenical ), simethicone (Gas-X , Mylicon ), and sucralfate (Carafate ).   Swallow the capsule whole. Do not cut or crush it.               Follow-ups after your visit        Who to contact     If you have questions or need follow up information about today's clinic visit or your schedule please contact [facility] directly at [phone].  Normal or non-critical lab and imaging results will be communicated to you by MyChart, letter or phone within 4 business days after the clinic has received the  "results. If you do not hear from us within 7 days, please contact the clinic through AppShare or phone. If you have a critical or abnormal lab result, we will notify you by phone as soon as possible.  Submit refill requests through AppShare or call your pharmacy and they will forward the refill request to us. Please allow 3 business days for your refill to be completed.          Additional Information About Your Visit        AppShare Information     AppShare gives you secure access to your electronic health record. If you see a primary care provider, you can also send messages to your care team and make appointments. If you have questions, please call your primary care clinic.  If you do not have a primary care provider, please call [phone] and they will assist you.        Care EveryWhere ID     This is your Care EveryWhere ID. This could be used by other organizations to access your [place] medical records  [id number]        Your Vitals Were     Pulse Temperature Height Last Period Pulse Oximetry Breastfeeding?    94 98  F (36.7  C) (Oral) 1.549 m (5' 1\") 09/20/2018 95% No    BMI (Body Mass Index)                   30.5 kg/m2            Blood Pressure from Last 3 Encounters:   09/25/18 110/76   09/17/18 118/78   08/28/18 118/84    Weight from Last 3 Encounters:   09/25/18 73.2 kg (161 lb 6.4 oz)   08/28/18 75.5 kg (166 lb 8 oz)   03/06/18 73.5 kg (162 lb)              Today, you had the following     No orders found for display       Primary Care Provider Office Phone # Fax #    Evan Zamora [phone]       [address] [place] [phone]        Equal Access to Services     [facility] : Hadii tim Cain, jonas clancy, qanavneet cordobaalcristina flores . So [facility] [phone].    ATENCIÓN: Si habla español, tiene a serna disposición servicios gratuitos de asistencia lingüística. Llame al [phone].    We comply with applicable " federal civil rights laws and [place] laws. We do not discriminate on the basis of race, color, national origin, age, disability, sex, sexual orientation, or gender identity.            Thank you!     Thank you for choosing [facility]  for your care. Our goal is always to provide you with excellent care. Hearing back from our patients is one way we can continue to improve our services. Please take a few minutes to complete the written survey that you may receive in the mail after your visit with us. Thank you!             Your Updated Medication List - Protect others around you: Learn how to safely use, store and throw away your medicines at www.disposemymeds.org.          This list is accurate as of 9/25/18  2:50 PM.  Always use your most recent med list.                   Brand Name Dispense Instructions for use Diagnosis    albuterol 108 (90 Base) MCG/ACT inhaler    PROAIR HFA/PROVENTIL HFA/VENTOLIN HFA    1 Inhaler    Inhale 2 puffs into the lungs every 4 hours as needed for shortness of breath / dyspnea or wheezing    Mild persistent asthma without complication       cholecalciferol 1000 UNIT tablet    vitamin D3    60 tablet    TAKE TWO TABLETS BY MOUTH ONCE DAILY    Vitamin D deficiency       clobetasol 0.05 % cream    TEMOVATE    60 g    Apply BID to affected area on neck x 4-6 weeks.    Keloid, Localized pruritus       fluticasone 44 MCG/ACT Inhaler    FLOVENT HFA    1 Inhaler    Inhale 1 puff into the lungs 2 times daily    Mild persistent asthma without complication       levothyroxine 150 MCG tablet    SYNTHROID/LEVOTHROID    45 tablet    Take 1 tablet 6 days per week and 1.5 tablets the 7th day.    Postoperative hypothyroidism       prenatal multivitamin plus iron 27-0.8 MG Tabs per tablet      Take 1 tablet by mouth daily    Postoperative hypothyroidism, Papillary carcinoma, follicular variant (H)

## 2018-09-25 NOTE — PROGRESS NOTES
Name: Aleyda Nicole  Seen for f/u of papillary thyroid cancer and postoperative hypothyroidism    Chief Complaint   Patient presents with     Thyroid Disease     papillary thyroid cancer and postoperative hypothyroidism      HPI:  Aleyda Nicole is a 33 year old female who presents for the evaluation of:      1.  Papillary thyroid cancer:  Thyroid nodule was noticed in February 2011 which was followed by thyroid nodule biopsy which showed suspicious for papillary thyroid cancer.  She underwent total thyroidectomy 3/2011 and pathology showed papillary thyroid cancer with follicular pattern.  Tumor was about 2.2 cm on the left lobe.  No lymph node involvement.  S/p 78.7 mCi of I131 HERNANDEZ  8/2011. No evidence for distant metastatic disease on post therapy scan.  Neck US 6/2016- showed new lymph node in neck along with rising Tg levels  S/p FNA lymph node: 6/2016- c/w papillary thyroid cancer    8/2016: underwent left modified neck dissection.  1/27 lymph node positive one left modified neck dissection.  1 cm in greatest diameter.  Negative for external involvement at level II.    Tg decreased post left neck dissection from 1.3 to < 0.1    Follow up I123 4/2017: no mets.    Postop course was complicated by hypocalcemia and was started on calcium supplement.  Taking calcium- tries to take 2 tabs per day + 1 glass of milk daily.    Delivered baby 1/2017. No longer breast feeding    She was followed by endocrinology before and then lost to follow-up with endocrinology in 2012.  No history of neck radiation prior to diagnosis of thyroid cancer.  No family history of thyroid cancer.    Thyroglobulin levels appear stable and suppressed  Neck ultrasound June 2018 did not show any evidence of metastases.    2.  Hypothyroidism (postoperative):  Was started on levothyroxine following thyroidectomy.  Currently she is taking levothyroxine .    Currently taking levothyroxine 150 mcg X 6 days a week and 225 mcg X one day a week.  She  is on this dose for last 1 week.  Dose was increased based on labs by primary care provider Dr. Zamora on 18.  Taking generic levothyroxine.  Reports compliance.    Denies hyperthyroid symptoms.  Planning one more pregnancy in future.    Wt Readings from Last 2 Encounters:   18 73.2 kg (161 lb 6.4 oz)   18 75.5 kg (166 lb 8 oz)     No diarrhea, tremors. No palpitations.  No difficulty with swallowing, no pain during swallowing.    PMH/PSH:  Past Medical History:   Diagnosis Date     Gestational diabetes     GDM diet controlled     Gestational diabetes 2013     Hypocalcemia 2016     Influenza A      Papillary thyroid carcinoma     Papillary carcinoma, follicular variant with     Pneumonia     LLL     PONV (postoperative nausea and vomiting)       labor      Uncomplicated asthma      Past Surgical History:   Procedure Laterality Date      SECTION  10/26/2013    Procedure:  SECTION;  primary  section;  Surgeon: Rodney Mckee MD;  Location: [facility] L[facility]      SECTION N/A 2017    Procedure:  SECTION;  Surgeon: Hakeem Combs MD;  Location: [facility] L[facility]     DISSECTION RADICAL NECK Left 2016    Procedure: DISSECTION RADICAL NECK;  Surgeon: Vy Theodore MD;  Location: [facility] OR     DISSECTION RADICAL NECK MODIFIED Left 2016    Procedure: DISSECTION RADICAL NECK MODIFIED;  Surgeon: Luis Brown MD;  Location: [facility] OR     THYROIDECTOMY      Total, for cancer.      Family Hx:  Family History   Problem Relation Age of Onset     Diabetes Mother      Cerebrovascular Disease Mother      Hypertension Mother      Diabetes Father 50     Hypertension Father      Genitourinary Problems Father      kidney disease     Coronary Artery Disease Father      Diabetes Brother      Thyroid disease: No        Social Hx:  Social History     Social History     Marital status:      Spouse name: Brock     Number of children: 2     Years of  "education: N/A     Occupational History       [employer]     Social History Main Topics     Smoking status: Never Smoker     Smokeless tobacco: Never Used     Alcohol use No     Drug use: No     Sexual activity: Yes     Partners: Male     Birth control/ protection:      Other Topics Concern      Service No     Blood Transfusions No     Caffeine Concern No     Hobby Hazards No     Sleep Concern No     Stress Concern No     Weight Concern Yes     Special Diet No     Back Care No     Exercise No     Bike Helmet No     Seat Belt Yes     Self-Exams No     Social History Narrative    Pt lives with  and father.          MEDICATIONS:  has a current medication list which includes the following prescription(s): albuterol, clobetasol, fluticasone, levothyroxine, prenatal multivitamin plus iron, and vitamin d3.    ROS     ROS: 10 point ROS neg other than the symptoms noted above in the HPI.      Physical Exam   VS: /76 (BP Location: Right arm, Patient Position: Chair, Cuff Size: Adult Regular)  Pulse 94  Temp 98  F (36.7  C) (Oral)  Ht 1.549 m (5' 1\")  Wt 73.2 kg (161 lb 6.4 oz)  LMP 09/20/2018  SpO2 95%  Breastfeeding? No  BMI 30.5 kg/m2  GENERAL: AXOX3, NAD, well dressed, answering questions appropriately, appears stated age.  HEENT: OP clear, no LAD, no TM, non-tender, no exopthalmous, no proptosis, EOMI, no lig lag, no retraction  Thyroid: Hypertrophied scar, will healed thyroidectomy scar present.  Hypertrophied scar on lateral side of neck. No lymphadenopathy.  CV: RRR, no rubs, gallops, no murmurs  LUNGS: CTAB, no wheezes, rales, or ronchi  EXTREMITIES: no edema, +pulses, no rashes, no lesions  NEUROLOGY: CN grossly intact, no tremors  MSK: grossly intact  SKIN: no rashes, no lesions  PSYCH: normal affect and mood      LABS:  3/6/2018:  TSH: 0.07  TgAB: <0.4  TG: <0.10    4/2017:  TSH : 47.89 (post thyrogen)  TgAB: <0.4  TG: <0.10    2/23/17:  TSH: 0.04  TgAB: <0.4  TG: " <0.10    11/10/16:  TSH 0.74  TgAb: <0.4  TG: <0.10     2016:  TSH: 0.08  TgAb: <0.4  T.30     2012:  TSH: 12.40  TGAB: 1.2  T.6    ENDO THYROID LABS-[facility] Latest Ref Rng & Units 2018   TSH 0.40 - 4.00 mU/L 0.46 0.10 (L)   T4 TOTAL 5.0 - 11.0 ug/dL     T4 FREE 0.76 - 1.46 ng/dL  1.26     [facility] 3/6/2018   TSH 0.40 - 4.00 mU/L 0.07 (L)   T4 TOTAL 5.0 - 11.0 ug/dL    T4 FREE 0.76 - 1.46 ng/dL 1.69 (H)     !THYROID Latest Ref Rng & Units 2018 10/17/2017 8/3/2017   TSH 0.40 - 4.00 mU/L <0.01 (L) 2.58 5.31 (H)   T4 FREE 0.76 - 1.46 ng/dL 1.61 (H) 1.24 1.31     !THYROID Latest Ref Rng & Units 2017 2017 11/10/2016   TSH 0.40 - 4.00 mU/L 47.89 (H) 0.04 (L) 0.74   T4 FREE 0.76 - 1.46 ng/dL 1.29 1.80 (H) 1.30       [facility]ng 2016   TSH 0.40 - 4.00 mU/L 0.08 (L)   T4 TOTAL 5.0 - 11.0 ug/dL    T4 FREE 0.76 - 1.46 ng/dL 1.50 (H)   FREE T3 2.3 - 4.2 pg/mL    TRIIODOTHYRONINE(T3) 60 - 181 ng/dL 112     [facility]   TSH 0.40 - 4.00 mU/L 0.16 (L) 0.37 (L) 0.06 (L)   T4 TOTAL 5.0 - 11.0 ug/dL      T4 FREE 0.76 - 1.46 ng/dL 1.42 1.24 1.51 (H)   FREE T3 2.3 - 4.2 pg/mL        ENDO THYROID LABS-[facility] Latest Ref Rng 2014   TSH 0.40 - 4.00 mU/L 0.32 (L) 7.86 (H)   T4 TOTAL 5.0 - 11.0 ug/dL     T4 FREE 0.76 - 1.46 ng/dL 1.33 1.37   FREE T3 2.3 - 4.2 pg/mL       ENDO THYROID LABS-[facility] Latest Ref Rng 2014 4/3/2014   TSH 0.40 - 4.00 mU/L 3.67 7.63 (H)   T4 TOTAL 5.0 - 11.0 ug/dL     T4 FREE 0.76 - 1.46 ng/dL  1.39   FREE T3 2.3 - 4.2 pg/mL       Component      Latest Ref Rng & Units 10/8/2012 2017 8/3/2017   Vitamin D Deficiency screening      20 - 75 ug/L 18 (L) 23 28     !COMPREHENSIVE Latest Ref Rng & Units 2016   CALCIUM 8.5 - 10.1 mg/dL 7.7 (L) 9.0 8.6     !COMPREHENSIVE Latest Ref Rng & Units 11/10/2016 2017 8/3/2017    CALCIUM 8.5 - 10.1 mg/dL 8.2 (L) 9.2 7.8 (L)     !COMPREHENSIVE Latest Ref Rng & Units 10/17/2017   CALCIUM 8.5 - 10.1 mg/dL 8.3 (L)     NM THYROID UPTAKE/SCAN   Feb 4, 2011 2:47:00 PM      COMPARISON: None.     HISTORY: Hyperthyroidism.     TECHNIQUE:  The patient was given 173 uCi of I-123 orally, followed by  24-hour thyroid scan and radioiodine uptake evaluation.     FINDINGS:  The thyroid gland appears normal in size. 24-hour uptake  was calculated at 46.4%, which is above the normal range. Normal range  is 10-30% 24-hour uptake. Focal area of decreased uptake in the mid  left thyroid lobe may represent a cold thyroid nodule or cyst.     IMPRESSION:    1. Elevated 24-hour radioiodine uptake in the thyroid at 46.4%.  2. Possible cold nodule or cyst in the mid left thyroid lobe. Thyroid  ultrasound is recommended for further evaluation.    NUCLEAR MEDICINE I-131 SCAN    Aug 10, 2011 8:04:00 AM      TECHNIQUE: 78.7 mCi I-131 ablation scan. Five days post therapy  imaging performed today.     HISTORY: Thyroid cancer.     COMPARISON:   Nuclear Study: Thyroid uptake and scan 2/4/2011.  Other Relevant Studies: Ultrasound neck 2/17/2011.     FINDINGS: Intense radiotracer uptake at the left greater than right  neck at the thyroid level. No evidence for distant metastatic disease.     IMPRESSION: Intense radiotracer uptake localizing to the thyroidectomy  bed, left greater than right. This is consistent with residual thyroid  tissue. No distant metastatic disease identified.    ULTRASOUND THYROID  Feb 17, 2011      HISTORY: Hyperthyroidism. Thyroid nodule.      COMPARISON: Nuclear Medicine thyroid scan 2/4/2011.     FINDINGS: The thyroid gland is enlarged. The right lobe measures 5.4 x  1.6 x 2.1 cm. The left lobe measures 5.9 x 2.3 x 2.7 cm. There are 2  right thyroid nodules and 3 left thyroid nodules.  1. Right upper lobe, hypoechoic solid measuring 0.7 x 0.6 x 0.6 cm.  2. Right lower pole, solid measuring 0.7 x 0.5  x 0.6 cm.  3. Left upper pole, cystic and solid measuring 1.1 x 0.6 x 1.0 cm.  4. Left mid thyroid lobe, primarily solid with small cystic areas  measuring 2.7 x 1.9 x 2.1 cm.  5. Left lower pole, cystic and solid measuring 1.4 x 0.9 x 1.2 cm.     The primarily solid left mid thyroid nodule appears to correspond to  the cold nodule on thyroid uptake and scan.  IMPRESSION: Multinodular thyroid gland.    FNA thyroid nodule:  Copath Report       FNA-thyroid, left mid lobe nodule:   Suspicious for malignancy.  Suspicious for papillary carcinoma  Specimen Adequacy: Satisfactory for evaluation.           Surgical pathology:     SPECIMEN(S):  A: Thyroid, left lobe  B: Parathyroid gland, biopsy of right inferior  C: Thyroid, right lobe    FINAL DIAGNOSIS:  A. and C.  Thyroid, right and left lobes, total thyroidectomy -  Specimen/Procedure(s) and Laterality:   Right and left thyroid lobes,  total thyroidectomy.  Specimen Integrity:   Intact.  Specimen Size and Weight:   See Gross Description.  Histologic Tumor Type(s):   Papillary carcinoma, follicular variant with  focal papillary morphology.  Tumor Focality: Unifocal.  Tumor Laterality:   Left lobe.  Tumor Size(s):   Left lobe: 2.2 cm (greatest dimension).  Margins:   Close, but uninvolved.            Distance to closest margin, if negative:   Tumor 0.05 cm from  nearest paratracheal surface and 1.75 cm from nearest anterolateral  surface.  Tumor Capsular Invasion: Present.    Tumor Capsule: Partial.  Extrathyroidal Invasion:   Not identified.  Lymph-Vascular Invasion:   Not identified.  Lymph Nodes:   Two nodes without evidence of metastatic carcinoma (0/2;  one at isthmus and one adjacent to right lobe).  Pathologic Staging:   pT2, pN0, pM not applicable.  Additional Pathologic Findings:   Right thyroid lobe without evidence of  malignancy.  Background nodular hyperplasia and thyroiditis with  lymphoid follicles.  Left lobe with focal previous biopsy site changes.  No  "parathyroid tissue identified.  CAP Protocol Based on AJCC/UICC TNM, 7th edition; Protocol Effective  Date:  January 2010    B.  \"Parathyroid gland\", right inferior, biopsy - Thyroid tissue; no  parathyroid glandular tissue identified.    8/2016: left modified neck Dissection:  Copath Report      SPECIMEN(S):   A: Scar, left neck   B: Parathyroid gland, left inferior   C: Left central neck dissection, para and pretracheal lymph nodes   D: apical jugular lymph node   E: Left modified neck dissection     FINAL DIAGNOSIS:   A. Skin, neck/scar, excision:   - Hypertrophic scar; benign.     B. Parathyroid gland, left inferior, biopsy:   - Parathyroid tissue present.     C. Left central neck dissection with para-and pretracheal lymph nodes:   - One lymph node; no evidence of malignancy (0/1).   - Thymus and parathyroid tissue present.     D. Lymph node, apical jugular, excision:   - One lymph node; no evidence of malignancy (0/1).     E. Left modified neck dissection:   - 27 lymph nodes identified (level II- 10, level III- 9, level IV- 5 and   lymph nodes associated with jugular vein- 3).   - One (of 27) lymph node positive for metastatic papillary thyroid   carcinoma.  1 cm in greatest diameter.  Negative for extranodal   involvement  (Level II).   - Jugular vein negative for tumor.        NUCLEAR MEDICINE THYROID WHOLE BODY SCAN I-123   4/20/2017 11:38 AM      TECHNIQUE:  The patient was given 1.8 mCi iodine 123 per oral on  4/19/2017. SPECT-CT with fusion was performed at the level of the neck  and chest.     HISTORY:  Malignant neoplasm of thyroid gland. Postprocedural  hypothyroidism.     COMPARISON:   Nuclear Study: None.     Other Relevant Studies: None.     FINDINGS:  Thyroidectomy. No suspicious focus of abnormal radiotracer  is seen at the thyroidectomy bed identified. Physiologic tracer uptake  seen at the salivary glands. There are several small subcentimeter  lymph nodes involving the bilateral neck without " conspicuous focal  tracer uptake outside of the background tracer distribution. There  appears to be left neck postoperative change causing some  inconspicuity of the left sternocleidomastoid muscle. No convincing  worrisome airspace disease or mediastinal abnormality is seen.         IMPRESSION: No worrisome focal tracer uptake is identified to suggest  recurrent neoplasm or remote metastatic disease. Some postoperative  changes at the left neck noted, likely accounting for inconspicuity of  the left sternocleidomastoid muscle. Small bilateral subcentimeter  neck lymph nodes noted.     Neck US 6/2018:  ULTRASOUND HEAD AND NECK SOFT TISSUE  6/4/2018 11:11 AM     HISTORY:  Papillary carcinoma, follicular variant (H).     COMPARISON: None.     FINDINGS:  No evidence for residual thyroid tissue in the  thyroidectomy bed. There is no evidence for cervical adenopathy  bilaterally by size or morphology.          IMPRESSION:  Negative soft tissue neck study.      All pertinent notes, labs, and images personally reviewed by me.     A/P  Ms.Nhu Mounika Nicole is a 30 year old here for the evaluation of thyroid cancer:    1. 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  It was 2.2 cm unifocal, left lobe tumor with no lymph node involvement.  S/p  total thyroidectomy in 2011 and 78.7 mCi of I-131 HERNANDEZ. No evidence for distant metastatic disease on post therapy scan.  2016- new lymph node s/p FNA with PTC with rising TG  S/p 8/2016- left modified neck radiation. 1/27 lymph node + ( level2). Postop course complicated by hypocalcemia.  Delivered 1/2017.   Follow up I123 WBS 4/2017- no mets  TG suppressed as above  Plan: continue to monitor. Annual imaging and TG. (May 2019)  2.  Hypothyroidism (postoperative following total thyroidectomy for thyroid cancer):  Currently taking levothyroxine 150 mcg X 6 days a week and 225 mcg X one day a week.   She is on this dose for last 1 week.  Dose was increased based on labs by primary care provider Dr. Zamora on 9/12/18.  Taking generic levothyroxine.  Reports compliance.  Recent dose change.  Labs in 6-8 weeks  Please make a lab appointment for blood work and follow up clinic appointment in 1 week after that to discuss results.    3.  History of vitamin D deficiency and hypocalcemia, resolved.  Continue to monitor.    More than 50% of the time spent with Ms. Nicole on counseling / coordinating her care.      Follow-up:  6-8 weeks    Vandana Costello MD  Endocrinology   [facility]  September 25, 2018  CC: Evan Zamora

## 2018-09-25 NOTE — PATIENT INSTRUCTIONS
[facility]   Dr Costello, Endocrinology Department      [facility]   [address]  [place] [address]  Appointment [phone]  Fax: [phone]  [place]: Monday and Tuesday         [facility]   [address]  [place] [address]  Appointment [phone]  Fax: [phone]  [place]: Wednesday and Thursday            Continue current dose of levothyroxine.  Labs in early Nov.  Please make a lab appointment for blood work and follow up clinic appointment in 1 week after that to discuss results.  Tumor markers and neck ULtrasound in 2019  Consider switch to brand synthorid. Check cost with insurance.    Take Levothyroxine on an empty stomach. Take it with a full glass of water at least 30 minutes to 1 hour before eating breakfast.   This medicine should be taken at least 4 hours before or 4 hours after these medicines: antacids (Maalox , Mylanta , Tums ), calcium supplements, cholestyramine (Prevalite , Questran ), colestipol (Colestid ), iron supplements, orlistat (Kaden , Xenical ), simethicone (Gas-X , Mylicon ), and sucralfate (Carafate ).   Swallow the capsule whole. Do not cut or crush it.

## 2018-09-25 NOTE — LETTER
9/25/2018         RE: Aleyda Nicole  [address] [facility] [address]        Dear Colleague,    Thank you for referring your patient, Aleyda Nicole, to the [facility]. Please see a copy of my visit note below.    Name: Aleyda Nicole  Seen for f/u of papillary thyroid cancer and postoperative hypothyroidism    Chief Complaint   Patient presents with     Thyroid Disease     papillary thyroid cancer and postoperative hypothyroidism      HPI:  Aleyda Nicole is a 33 year old female who presents for the evaluation of:      1.  Papillary thyroid cancer:  Thyroid nodule was noticed in February 2011 which was followed by thyroid nodule biopsy which showed suspicious for papillary thyroid cancer.  She underwent total thyroidectomy 3/2011 and pathology showed papillary thyroid cancer with follicular pattern.  Tumor was about 2.2 cm on the left lobe.  No lymph node involvement.  S/p 78.7 mCi of I131 HERNANDEZ  8/2011. No evidence for distant metastatic disease on post therapy scan.  Neck US 6/2016- showed new lymph node in neck along with rising Tg levels  S/p FNA lymph node: 6/2016- c/w papillary thyroid cancer    8/2016: underwent left modified neck dissection.  1/27 lymph node positive one left modified neck dissection.  1 cm in greatest diameter.  Negative for external involvement at level II.    Tg decreased post left neck dissection from 1.3 to < 0.1    Follow up I123 4/2017: no mets.    Postop course was complicated by hypocalcemia and was started on calcium supplement.  Taking calcium- tries to take 2 tabs per day + 1 glass of milk daily.    Delivered baby 1/2017. No longer breast feeding    She was followed by endocrinology before and then lost to follow-up with endocrinology in 2012.  No history of neck radiation prior to diagnosis of thyroid cancer.  No family history of thyroid cancer.    Thyroglobulin levels appear stable and suppressed  Neck ultrasound June 2018 did not show any  evidence of metastases.    2.  Hypothyroidism (postoperative):  Was started on levothyroxine following thyroidectomy.  Currently she is taking levothyroxine .    Currently taking levothyroxine 150 mcg X 6 days a week and 225 mcg X one day a week.  She is on this dose for last 1 week.  Dose was increased based on labs by primary care provider Dr. Zamora on 18.  Taking generic levothyroxine.  Reports compliance.    Denies hyperthyroid symptoms.  Planning one more pregnancy in future.    Wt Readings from Last 2 Encounters:   18 73.2 kg (161 lb 6.4 oz)   18 75.5 kg (166 lb 8 oz)     No diarrhea, tremors. No palpitations.  No difficulty with swallowing, no pain during swallowing.    PMH/PSH:  Past Medical History:   Diagnosis Date     Gestational diabetes     GDM diet controlled     Gestational diabetes 2013     Hypocalcemia 2016     Influenza A      Papillary thyroid carcinoma     Papillary carcinoma, follicular variant with     Pneumonia     LLL     PONV (postoperative nausea and vomiting)       labor      Uncomplicated asthma      Past Surgical History:   Procedure Laterality Date      SECTION  10/26/2013    Procedure:  SECTION;  primary  section;  Surgeon: Rodney Mckee MD;  Location: [facility]      SECTION N/A 2017    Procedure:  SECTION;  Surgeon: Hakeem Combs MD;  Location: [facility]     DISSECTION RADICAL NECK Left 2016    Procedure: DISSECTION RADICAL NECK;  Surgeon: Vy Theodore MD;  Location: [facility] OR     DISSECTION RADICAL NECK MODIFIED Left 2016    Procedure: DISSECTION RADICAL NECK MODIFIED;  Surgeon: Luis Brown MD;  Location: [facility] OR     THYROIDECTOMY      Total, for cancer.      Family Hx:  Family History   Problem Relation Age of Onset     Diabetes Mother      Cerebrovascular Disease Mother      Hypertension Mother      Diabetes Father 50     Hypertension Father      Genitourinary  "Problems Father      kidney disease     Coronary Artery Disease Father      Diabetes Brother      Thyroid disease: No        Social Hx:  Social History     Social History     Marital status:      Spouse name: Brock     Number of children: 2     Years of education: N/A     Occupational History       [employer]     Social History Main Topics     Smoking status: Never Smoker     Smokeless tobacco: Never Used     Alcohol use No     Drug use: No     Sexual activity: Yes     Partners: Male     Birth control/ protection:      Other Topics Concern      Service No     Blood Transfusions No     Caffeine Concern No     Hobby Hazards No     Sleep Concern No     Stress Concern No     Weight Concern Yes     Special Diet No     Back Care No     Exercise No     Bike Helmet No     Seat Belt Yes     Self-Exams No     Social History Narrative    Pt lives with  and father.          MEDICATIONS:  has a current medication list which includes the following prescription(s): albuterol, clobetasol, fluticasone, levothyroxine, prenatal multivitamin plus iron, and vitamin d3.    ROS     ROS: 10 point ROS neg other than the symptoms noted above in the HPI.      Physical Exam   VS: /76 (BP Location: Right arm, Patient Position: Chair, Cuff Size: Adult Regular)  Pulse 94  Temp 98  F (36.7  C) (Oral)  Ht 1.549 m (5' 1\")  Wt 73.2 kg (161 lb 6.4 oz)  LMP 09/20/2018  SpO2 95%  Breastfeeding? No  BMI 30.5 kg/m2  GENERAL: AXOX3, NAD, well dressed, answering questions appropriately, appears stated age.  HEENT: OP clear, no LAD, no TM, non-tender, no exopthalmous, no proptosis, EOMI, no lig lag, no retraction  Thyroid: Hypertrophied scar, will healed thyroidectomy scar present.  Hypertrophied scar on lateral side of neck. No lymphadenopathy.  CV: RRR, no rubs, gallops, no murmurs  LUNGS: CTAB, no wheezes, rales, or ronchi  EXTREMITIES: no edema, +pulses, no rashes, no lesions  NEUROLOGY: CN grossly intact, no " tremors  MSK: grossly intact  SKIN: no rashes, no lesions  PSYCH: normal affect and mood      LABS:  3/6/2018:  TSH: 0.07  TgAB: <0.4  TG: <0.10    2017:  TSH : 47.89 (post thyrogen)  TgAB: <0.4  TG: <0.10    17:  TSH: 0.04  TgAB: <0.4  TG: <0.10    11/10/16:  TSH 0.74  TgAb: <0.4  TG: <0.10     2016:  TSH: 0.08  TgAb: <0.4  T.30     2012:  TSH: 12.40  TGAB: 1.2  T.6    ENDO THYROID LABS-[facility] Latest Ref Rng & Units 2018   TSH 0.40 - 4.00 mU/L 0.46 0.10 (L)   T4 TOTAL 5.0 - 11.0 ug/dL     T4 FREE 0.76 - 1.46 ng/dL  1.26     ENDO THYROID LABS-[facility] Latest Ref Rng & Units 3/6/2018   TSH 0.40 - 4.00 mU/L 0.07 (L)   T4 TOTAL 5.0 - 11.0 ug/dL    T4 FREE 0.76 - 1.46 ng/dL 1.69 (H)     !THYROID Latest Ref Rng & Units 2018 10/17/2017 8/3/2017   TSH 0.40 - 4.00 mU/L <0.01 (L) 2.58 5.31 (H)   T4 FREE 0.76 - 1.46 ng/dL 1.61 (H) 1.24 1.31     !THYROID Latest Ref Rng & Units 2017 2017 11/10/2016   TSH 0.40 - 4.00 mU/L 47.89 (H) 0.04 (L) 0.74   T4 FREE 0.76 - 1.46 ng/dL 1.29 1.80 (H) 1.30       ENDO THYROID LABS-[facility] Latest Ref Rng 2016   TSH 0.40 - 4.00 mU/L 0.08 (L)   T4 TOTAL 5.0 - 11.0 ug/dL    T4 FREE 0.76 - 1.46 ng/dL 1.50 (H)   FREE T3 2.3 - 4.2 pg/mL    TRIIODOTHYRONINE(T3) 60 - 181 ng/dL 112     ENDO THYROID LABS-[facility] Latest Ref Rng 2016   TSH 0.40 - 4.00 mU/L 0.16 (L) 0.37 (L) 0.06 (L)   T4 TOTAL 5.0 - 11.0 ug/dL      T4 FREE 0.76 - 1.46 ng/dL 1.42 1.24 1.51 (H)   FREE T3 2.3 - 4.2 pg/mL        ENDO THYROID LABS-[facility] Latest Ref Rng 2014   TSH 0.40 - 4.00 mU/L 0.32 (L) 7.86 (H)   T4 TOTAL 5.0 - 11.0 ug/dL     T4 FREE 0.76 - 1.46 ng/dL 1.33 1.37   FREE T3 2.3 - 4.2 pg/mL       ENDO THYROID LABS-[facility] Latest Ref Rng 2014 4/3/2014   TSH 0.40 - 4.00 mU/L 3.67 7.63 (H)   T4 TOTAL 5.0 - 11.0 ug/dL     T4 FREE 0.76 - 1.46 ng/dL  1.39   FREE T3 2.3 - 4.2 pg/mL       Component      Latest Ref Rng & Units 10/8/2012 2017 8/3/2017   Vitamin  D Deficiency screening      20 - 75 ug/L 18 (L) 23 28     !COMPREHENSIVE Latest Ref Rng & Units 8/31/2016 8/31/2016 9/1/2016   CALCIUM 8.5 - 10.1 mg/dL 7.7 (L) 9.0 8.6     !COMPREHENSIVE Latest Ref Rng & Units 11/10/2016 2/23/2017 8/3/2017   CALCIUM 8.5 - 10.1 mg/dL 8.2 (L) 9.2 7.8 (L)     !COMPREHENSIVE Latest Ref Rng & Units 10/17/2017   CALCIUM 8.5 - 10.1 mg/dL 8.3 (L)     NM THYROID UPTAKE/SCAN   Feb 4, 2011 2:47:00 PM      COMPARISON: None.     HISTORY: Hyperthyroidism.     TECHNIQUE:  The patient was given 173 uCi of I-123 orally, followed by  24-hour thyroid scan and radioiodine uptake evaluation.     FINDINGS:  The thyroid gland appears normal in size. 24-hour uptake  was calculated at 46.4%, which is above the normal range. Normal range  is 10-30% 24-hour uptake. Focal area of decreased uptake in the mid  left thyroid lobe may represent a cold thyroid nodule or cyst.     IMPRESSION:    1. Elevated 24-hour radioiodine uptake in the thyroid at 46.4%.  2. Possible cold nodule or cyst in the mid left thyroid lobe. Thyroid  ultrasound is recommended for further evaluation.    NUCLEAR MEDICINE I-131 SCAN    Aug 10, 2011 8:04:00 AM      TECHNIQUE: 78.7 mCi I-131 ablation scan. Five days post therapy  imaging performed today.     HISTORY: Thyroid cancer.     COMPARISON:   Nuclear Study: Thyroid uptake and scan 2/4/2011.  Other Relevant Studies: Ultrasound neck 2/17/2011.     FINDINGS: Intense radiotracer uptake at the left greater than right  neck at the thyroid level. No evidence for distant metastatic disease.     IMPRESSION: Intense radiotracer uptake localizing to the thyroidectomy  bed, left greater than right. This is consistent with residual thyroid  tissue. No distant metastatic disease identified.    ULTRASOUND THYROID  Feb 17, 2011      HISTORY: Hyperthyroidism. Thyroid nodule.      COMPARISON: Nuclear Medicine thyroid scan 2/4/2011.     FINDINGS: The thyroid gland is enlarged. The right lobe measures 5.4  x  1.6 x 2.1 cm. The left lobe measures 5.9 x 2.3 x 2.7 cm. There are 2  right thyroid nodules and 3 left thyroid nodules.  1. Right upper lobe, hypoechoic solid measuring 0.7 x 0.6 x 0.6 cm.  2. Right lower pole, solid measuring 0.7 x 0.5 x 0.6 cm.  3. Left upper pole, cystic and solid measuring 1.1 x 0.6 x 1.0 cm.  4. Left mid thyroid lobe, primarily solid with small cystic areas  measuring 2.7 x 1.9 x 2.1 cm.  5. Left lower pole, cystic and solid measuring 1.4 x 0.9 x 1.2 cm.     The primarily solid left mid thyroid nodule appears to correspond to  the cold nodule on thyroid uptake and scan.  IMPRESSION: Multinodular thyroid gland.    FNA thyroid nodule:  Copath Report       FNA-thyroid, left mid lobe nodule:   Suspicious for malignancy.  Suspicious for papillary carcinoma  Specimen Adequacy: Satisfactory for evaluation.           Surgical pathology:     SPECIMEN(S):  A: Thyroid, left lobe  B: Parathyroid gland, biopsy of right inferior  C: Thyroid, right lobe    FINAL DIAGNOSIS:  A. and C.  Thyroid, right and left lobes, total thyroidectomy -  Specimen/Procedure(s) and Laterality:   Right and left thyroid lobes,  total thyroidectomy.  Specimen Integrity:   Intact.  Specimen Size and Weight:   See Gross Description.  Histologic Tumor Type(s):   Papillary carcinoma, follicular variant with  focal papillary morphology.  Tumor Focality: Unifocal.  Tumor Laterality:   Left lobe.  Tumor Size(s):   Left lobe: 2.2 cm (greatest dimension).  Margins:   Close, but uninvolved.            Distance to closest margin, if negative:   Tumor 0.05 cm from  nearest paratracheal surface and 1.75 cm from nearest anterolateral  surface.  Tumor Capsular Invasion: Present.    Tumor Capsule: Partial.  Extrathyroidal Invasion:   Not identified.  Lymph-Vascular Invasion:   Not identified.  Lymph Nodes:   Two nodes without evidence of metastatic carcinoma (0/2;  one at isthmus and one adjacent to right lobe).  Pathologic Staging:   pT2,  "pN0, pM not applicable.  Additional Pathologic Findings:   Right thyroid lobe without evidence of  malignancy.  Background nodular hyperplasia and thyroiditis with  lymphoid follicles.  Left lobe with focal previous biopsy site changes.  No parathyroid tissue identified.  CAP Protocol Based on AJCC/UICC TNM, 7th edition; Protocol Effective  Date:  January 2010    B.  \"Parathyroid gland\", right inferior, biopsy - Thyroid tissue; no  parathyroid glandular tissue identified.    8/2016: left modified neck Dissection:  Copath Report      SPECIMEN(S):   A: Scar, left neck   B: Parathyroid gland, left inferior   C: Left central neck dissection, para and pretracheal lymph nodes   D: apical jugular lymph node   E: Left modified neck dissection     FINAL DIAGNOSIS:   A. Skin, neck/scar, excision:   - Hypertrophic scar; benign.     B. Parathyroid gland, left inferior, biopsy:   - Parathyroid tissue present.     C. Left central neck dissection with para-and pretracheal lymph nodes:   - One lymph node; no evidence of malignancy (0/1).   - Thymus and parathyroid tissue present.     D. Lymph node, apical jugular, excision:   - One lymph node; no evidence of malignancy (0/1).     E. Left modified neck dissection:   - 27 lymph nodes identified (level II- 10, level III- 9, level IV- 5 and   lymph nodes associated with jugular vein- 3).   - One (of 27) lymph node positive for metastatic papillary thyroid   carcinoma.  1 cm in greatest diameter.  Negative for extranodal   involvement  (Level II).   - Jugular vein negative for tumor.        NUCLEAR MEDICINE THYROID WHOLE BODY SCAN I-123   4/20/2017 11:38 AM      TECHNIQUE:  The patient was given 1.8 mCi iodine 123 per oral on  4/19/2017. SPECT-CT with fusion was performed at the level of the neck  and chest.     HISTORY:  Malignant neoplasm of thyroid gland. Postprocedural  hypothyroidism.     COMPARISON:   Nuclear Study: None.     Other Relevant Studies: None.     FINDINGS:  " Thyroidectomy. No suspicious focus of abnormal radiotracer  is seen at the thyroidectomy bed identified. Physiologic tracer uptake  seen at the salivary glands. There are several small subcentimeter  lymph nodes involving the bilateral neck without conspicuous focal  tracer uptake outside of the background tracer distribution. There  appears to be left neck postoperative change causing some  inconspicuity of the left sternocleidomastoid muscle. No convincing  worrisome airspace disease or mediastinal abnormality is seen.         IMPRESSION: No worrisome focal tracer uptake is identified to suggest  recurrent neoplasm or remote metastatic disease. Some postoperative  changes at the left neck noted, likely accounting for inconspicuity of  the left sternocleidomastoid muscle. Small bilateral subcentimeter  neck lymph nodes noted.     Neck US 6/2018:  ULTRASOUND HEAD AND NECK SOFT TISSUE  6/4/2018 11:11 AM     HISTORY:  Papillary carcinoma, follicular variant (H).     COMPARISON: None.     FINDINGS:  No evidence for residual thyroid tissue in the  thyroidectomy bed. There is no evidence for cervical adenopathy  bilaterally by size or morphology.          IMPRESSION:  Negative soft tissue neck study.      All pertinent notes, labs, and images personally reviewed by me.     A/P  Ms.Nhu Mounika Nicole is a 30 year old here for the evaluation of thyroid cancer:    1. 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  It was 2.2 cm unifocal, left lobe tumor with no lymph node involvement.  S/p  total thyroidectomy in 2011 and 78.7 mCi of I-131 HERNANDEZ. No evidence for distant metastatic disease on post therapy scan.  2016- new lymph node s/p FNA with PTC with rising TG  S/p 8/2016- left modified neck radiation. 1/27 lymph node + ( level2). Postop course complicated by hypocalcemia.  Delivered 1/2017.   Follow up I123 WBS 4/2017- no mets  TG  suppressed as above  Plan: continue to monitor. Annual imaging and TG. (May 2019)  2.  Hypothyroidism (postoperative following total thyroidectomy for thyroid cancer):  Currently taking levothyroxine 150 mcg X 6 days a week and 225 mcg X one day a week.  She is on this dose for last 1 week.  Dose was increased based on labs by primary care provider Dr. Zamora on 9/12/18.  Taking generic levothyroxine.  Reports compliance.  Recent dose change.  Labs in 6-8 weeks  Please make a lab appointment for blood work and follow up clinic appointment in 1 week after that to discuss results.    3.  History of vitamin D deficiency and hypocalcemia, resolved.  Continue to monitor.    More than 50% of the time spent with Ms. Nicole on counseling / coordinating her care.      Follow-up:  6-8 weeks    Vandana Costello MD  Endocrinology   [facility]  September 25, 2018  CC: Evan Zamora           Again, thank you for allowing me to participate in the care of your patient.        Sincerely,        Vandana Costello MD

## 2018-09-25 NOTE — NURSING NOTE
"ENDOCRINOLOGY INTAKE FORM    Patient Name:  Aleyda Nicole  :  1985    Is patient Diabetic?   No  Does patient have non-diabetic or other endocrine issues?  Yes: papillary thyroid cancer and postoperative hypothyroidism     Vitals: /76 (BP Location: Right arm, Patient Position: Chair, Cuff Size: Adult Regular)  Pulse 94  Temp 98  F (36.7  C) (Oral)  Ht 1.549 m (5' 1\")  Wt 73.2 kg (161 lb 6.4 oz)  LMP 2018  SpO2 95%  Breastfeeding? No  BMI 30.5 kg/m2  BMI= Body mass index is 30.5 kg/(m^2).    Flu vaccine:  No  Pneumonia vaccine:  Yes: both    Smoking and Alcohol use:  Social History   Substance Use Topics     Smoking status: Never Smoker     Smokeless tobacco: Never Used     Alcohol use No       Valentina Lafleur CMA    "

## 2018-10-24 ENCOUNTER — OFFICE VISIT (OUTPATIENT)
Dept: INTERNAL MEDICINE | Facility: CLINIC | Age: 33
End: 2018-10-24
Payer: COMMERCIAL

## 2018-10-24 VITALS
DIASTOLIC BLOOD PRESSURE: 78 MMHG | BODY MASS INDEX: 30.76 KG/M2 | RESPIRATION RATE: 12 BRPM | SYSTOLIC BLOOD PRESSURE: 112 MMHG | HEART RATE: 76 BPM | WEIGHT: 162.8 LBS | TEMPERATURE: 97.7 F

## 2018-10-24 DIAGNOSIS — M54.50 ACUTE MIDLINE LOW BACK PAIN WITHOUT SCIATICA: ICD-10-CM

## 2018-10-24 DIAGNOSIS — R10.9 FLANK PAIN: ICD-10-CM

## 2018-10-24 DIAGNOSIS — R10.31 ABDOMINAL PAIN, RIGHT LOWER QUADRANT: Primary | ICD-10-CM

## 2018-10-24 LAB
ALBUMIN SERPL-MCNC: 3.6 G/DL (ref 3.4–5)
ALBUMIN UR-MCNC: 30 MG/DL
ALP SERPL-CCNC: 80 U/L (ref 40–150)
ALT SERPL W P-5'-P-CCNC: 24 U/L (ref 0–50)
ANION GAP SERPL CALCULATED.3IONS-SCNC: 9 MMOL/L (ref 3–14)
APPEARANCE UR: CLEAR
AST SERPL W P-5'-P-CCNC: 12 U/L (ref 0–45)
BACTERIA #/AREA URNS HPF: ABNORMAL /HPF
BASOPHILS # BLD AUTO: 0 10E9/L (ref 0–0.2)
BASOPHILS NFR BLD AUTO: 0.2 %
BETA HCG QUAL IFA URINE: NEGATIVE
BILIRUB SERPL-MCNC: 0.2 MG/DL (ref 0.2–1.3)
BILIRUB UR QL STRIP: NEGATIVE
BUN SERPL-MCNC: 10 MG/DL (ref 7–30)
CALCIUM SERPL-MCNC: 8.3 MG/DL (ref 8.5–10.1)
CHLORIDE SERPL-SCNC: 104 MMOL/L (ref 94–109)
CO2 SERPL-SCNC: 26 MMOL/L (ref 20–32)
COLOR UR AUTO: YELLOW
CREAT SERPL-MCNC: 0.45 MG/DL (ref 0.52–1.04)
DIFFERENTIAL METHOD BLD: ABNORMAL
EOSINOPHIL # BLD AUTO: 0.4 10E9/L (ref 0–0.7)
EOSINOPHIL NFR BLD AUTO: 4 %
ERYTHROCYTE [DISTWIDTH] IN BLOOD BY AUTOMATED COUNT: 13.4 % (ref 10–15)
GFR SERPL CREATININE-BSD FRML MDRD: >90 ML/MIN/1.7M2
GLUCOSE SERPL-MCNC: 111 MG/DL (ref 70–99)
GLUCOSE UR STRIP-MCNC: NEGATIVE MG/DL
HCT VFR BLD AUTO: 39 % (ref 35–47)
HGB BLD-MCNC: 12.7 G/DL (ref 11.7–15.7)
HGB UR QL STRIP: ABNORMAL
KETONES UR STRIP-MCNC: NEGATIVE MG/DL
LEUKOCYTE ESTERASE UR QL STRIP: NEGATIVE
LYMPHOCYTES # BLD AUTO: 2.7 10E9/L (ref 0.8–5.3)
LYMPHOCYTES NFR BLD AUTO: 28.7 %
MCH RBC QN AUTO: 25.5 PG (ref 26.5–33)
MCHC RBC AUTO-ENTMCNC: 32.6 G/DL (ref 31.5–36.5)
MCV RBC AUTO: 78 FL (ref 78–100)
MONOCYTES # BLD AUTO: 0.8 10E9/L (ref 0–1.3)
MONOCYTES NFR BLD AUTO: 8.7 %
MUCOUS THREADS #/AREA URNS LPF: PRESENT /LPF
NEUTROPHILS # BLD AUTO: 5.5 10E9/L (ref 1.6–8.3)
NEUTROPHILS NFR BLD AUTO: 58.4 %
NITRATE UR QL: NEGATIVE
NON-SQ EPI CELLS #/AREA URNS LPF: ABNORMAL /LPF
PH UR STRIP: 6 PH (ref 5–7)
PLATELET # BLD AUTO: 346 10E9/L (ref 150–450)
POTASSIUM SERPL-SCNC: 3.5 MMOL/L (ref 3.4–5.3)
PROT SERPL-MCNC: 8.2 G/DL (ref 6.8–8.8)
RBC # BLD AUTO: 4.99 10E12/L (ref 3.8–5.2)
RBC #/AREA URNS AUTO: ABNORMAL /HPF
SODIUM SERPL-SCNC: 139 MMOL/L (ref 133–144)
SOURCE: ABNORMAL
SP GR UR STRIP: >1.03 (ref 1–1.03)
UROBILINOGEN UR STRIP-ACNC: 0.2 EU/DL (ref 0.2–1)
WBC # BLD AUTO: 9.4 10E9/L (ref 4–11)
WBC #/AREA URNS AUTO: ABNORMAL /HPF

## 2018-10-24 PROCEDURE — 81001 URINALYSIS AUTO W/SCOPE: CPT | Performed by: PHYSICIAN ASSISTANT

## 2018-10-24 PROCEDURE — 36415 COLL VENOUS BLD VENIPUNCTURE: CPT | Performed by: PHYSICIAN ASSISTANT

## 2018-10-24 PROCEDURE — 85025 COMPLETE CBC W/AUTO DIFF WBC: CPT | Performed by: PHYSICIAN ASSISTANT

## 2018-10-24 PROCEDURE — 84703 CHORIONIC GONADOTROPIN ASSAY: CPT | Performed by: PHYSICIAN ASSISTANT

## 2018-10-24 PROCEDURE — 80053 COMPREHEN METABOLIC PANEL: CPT | Performed by: PHYSICIAN ASSISTANT

## 2018-10-24 PROCEDURE — 99214 OFFICE O/P EST MOD 30 MIN: CPT | Performed by: PHYSICIAN ASSISTANT

## 2018-10-24 RX ORDER — IBUPROFEN 600 MG/1
600 TABLET, FILM COATED ORAL EVERY 6 HOURS PRN
Qty: 30 TABLET | Refills: 1 | Status: SHIPPED | OUTPATIENT
Start: 2018-10-24 | End: 2019-10-23

## 2018-10-24 ASSESSMENT — PAIN SCALES - GENERAL: PAINLEVEL: MODERATE PAIN (4)

## 2018-10-24 NOTE — MR AVS SNAPSHOT
After Visit Summary   10/24/2018    [facility] Mounika Nicole    MRN: [id number]           Patient Information     Date Of Birth          1985        Visit Information        Provider Department      10/24/2018 1:20 PM Carmen Sky PA-C [facility]        Today's Diagnoses     Abdominal pain, right lower quadrant    -  1    Flank pain        Acute midline low back pain without sciatica           Follow-ups after your visit        Your next 10 appointments already scheduled     Nov 19, 2018  2:30 PM CST   Return Visit with Vandana Costello MD   [facility] ([facility])    [address]  [address]  [facility] [address]   [phone]            Nov 21, 2018  3:00 PM CST   Return Visit with Hannah Mederos PA-C   [facility] ([facility])    [address] [id number] [phone]            Dec 17, 2018  3:00 PM CST   Return Visit with Hannah Mederos PA-C   [facility] ([facility])    [address] [id number] [phone]              Who to contact     If you have questions or need follow up information about today's clinic visit or your schedule please contact [facility] directly at [phone].  Normal or non-critical lab and imaging results will be communicated to you by MyChart, letter or phone within 4 business days after the clinic has received the results. If you do not hear from us within 7 days, please contact the clinic through MyChart or phone. If you have a critical or abnormal lab result, we will notify you by phone as soon as possible.  Submit refill requests through [employer] or call your pharmacy and they will forward the refill request to us. Please allow 3 business days for your refill to be completed.          Additional Information  About Your Visit        goBramble Information     go[employer]ramble gives you secure access to your electronic health record. If you see a primary care provider, you can also send messages to your care team and make appointments. If you have questions, please call your primary care clinic.  If you do not have a primary care provider, please call [phone] and they will assist you.        Care EveryWhere ID     This is your Care EveryWhere ID. This could be used by other organizations to access your [place] medical records  [id number]        Your Vitals Were     Pulse Temperature Respirations Last Period Breastfeeding? BMI (Body Mass Index)    76 97.7  F (36.5  C) (Oral) 12 10/17/2018 No 30.76 kg/m2       Blood Pressure from Last 3 Encounters:   10/24/18 112/78   09/25/18 110/76   09/17/18 118/78    Weight from Last 3 Encounters:   10/24/18 162 lb 12.8 oz (73.8 kg)   09/25/18 161 lb 6.4 oz (73.2 kg)   08/28/18 166 lb 8 oz (75.5 kg)              We Performed the Following     Beta HCG qual IFA urine     CBC with platelets differential     Comprehensive metabolic panel     UA reflex to Microscopic and Culture     Urine Microscopic          Today's Medication Changes          These changes are accurate as of 10/24/18  2:28 PM.  If you have any questions, ask your nurse or doctor.               Start taking these medicines.        Dose/Directions    ibuprofen 600 MG tablet   Commonly known as:  ADVIL/MOTRIN   Used for:  Flank pain, Acute midline low back pain without sciatica   Started by:  Carmen Sky PA-C        Dose:  600 mg   Take 1 tablet (600 mg) by mouth every 6 hours as needed for moderate pain   Quantity:  30 tablet   Refills:  1         Stop taking these medicines if you haven't already. Please contact your care team if you have questions.     prenatal multivitamin plus iron 27-0.8 MG Tabs per tablet   Stopped by:  Carmen Sky PA-C                Where to get your medicines      These  medications were sent to [facility] [place], [place] - [address]  [address] [facility] [address]     Phone:  [phone]     ibuprofen 600 MG tablet                Primary Care Provider Office Phone # Fax #    Evan Zamora [phone]       [address]  [facility] [address]        Equal Access to Services     СВЕТЛАНА VALDEZ : Hadii aad ku hadasho Soomaali, waaxda luqadaha, qaybta kaalmada adeegyada, waxay idiin hayaan adeeg khnataliesh lavineet . So [facility] [phone].    ATENCIÓN: Si homerla espgildardo, tiene a serna disposición servicios gratuitos de asistencia lingüística. Christian al [phone].    We comply with applicable federal civil rights laws and [place] laws. We do not discriminate on the basis of race, color, national origin, age, disability, sex, sexual orientation, or gender identity.            Thank you!     Thank you for choosing [facility]  for your care. Our goal is always to provide you with excellent care. Hearing back from our patients is one way we can continue to improve our services. Please take a few minutes to complete the written survey that you may receive in the mail after your visit with us. Thank you!             Your Updated Medication List - Protect others around you: Learn how to safely use, store and throw away your medicines at www.disposemymeds.org.          This list is accurate as of 10/24/18  2:28 PM.  Always use your most recent med list.                   Brand Name Dispense Instructions for use Diagnosis    albuterol 108 (90 Base) MCG/ACT inhaler    PROAIR HFA/PROVENTIL HFA/VENTOLIN HFA    1 Inhaler    Inhale 2 puffs into the lungs every 4 hours as needed for shortness of breath / dyspnea or wheezing    Mild persistent asthma without complication       cholecalciferol 1000 UNIT tablet    vitamin D3    60 tablet    TAKE TWO TABLETS BY MOUTH ONCE DAILY    Vitamin D deficiency       clobetasol 0.05 %  cream    TEMOVATE    60 g    Apply BID to affected area on neck x 4-6 weeks.    Keloid, Localized pruritus       fluticasone 44 MCG/ACT Inhaler    FLOVENT HFA    1 Inhaler    Inhale 1 puff into the lungs 2 times daily    Mild persistent asthma without complication       ibuprofen 600 MG tablet    ADVIL/MOTRIN    30 tablet    Take 1 tablet (600 mg) by mouth every 6 hours as needed for moderate pain    Flank pain, Acute midline low back pain without sciatica       levothyroxine 150 MCG tablet    SYNTHROID/LEVOTHROID    45 tablet    Take 1 tablet 6 days per week and 1.5 tablets the 7th day.    Postoperative hypothyroidism

## 2018-10-24 NOTE — PROGRESS NOTES
SUBJECTIVE:   Aleyda Nicole is a 33 year old female who presents to clinic today for the following health issues:      Abdominal Pain      Duration: started Sun with sweating and shakiness    Description (location/character/radiation): discomfort and cramping - right abdomen and low back in the middle of back and slightly towards the right       Associated flank pain: None    Intensity:  Moderate - now: 4/10 and at it's worst was 8/10    Accompanying signs and symptoms:        Fever/Chills: no fever, yes chills       Gas/Bloating: no        Nausea/vomitting: YES- nausea - no vomiting       Diarrhea: no - just soft stools - has been having more bm's than normal        Dysuria or Hematuria: no     History (previous similar pain/trauma/previous testing): no    Precipitating or alleviating factors:       Pain worse with eating/BM/urination: no       Pain relieved by BM: no     Therapies tried and outcome: None    LMP:  10/17/18      -------------------------------------    Problem list and histories reviewed & adjusted, as indicated.  Additional history: as documented    Labs reviewed in EPIC    Reviewed and updated as needed this visit by clinical staff  Tobacco  Allergies  Meds  Soc Hx      Reviewed and updated as needed this visit by Provider  Allergies  Meds         ROS:  Constitutional, HEENT, cardiovascular, pulmonary, gi and gu systems are negative, except as otherwise noted.    OBJECTIVE:     /78  Pulse 76  Temp 97.7  F (36.5  C) (Oral)  Resp 12  Wt 162 lb 12.8 oz (73.8 kg)  LMP 10/17/2018  Breastfeeding? No  BMI 30.76 kg/m2  Body mass index is 30.76 kg/(m^2).  GENERAL: healthy, alert and no distress  NECK: no adenopathy, no asymmetry, masses, or scars and thyroid normal to palpation  RESP: lungs clear to auscultation - no rales, rhonchi or wheezes  CV: regular rates and rhythm and normal S1 S2, no S3 or S4  ABDOMEN: tenderness RUQ and RLQ and bowel sounds normal  MS: extremities normal- no  gross deformities noted  Tenderness lower midline lumbar spine paraspinous area.   SKIN: no suspicious lesions or rashes    Diagnostic Test Results:  Results for orders placed or performed in visit on 10/24/18 (from the past 24 hour(s))   UA reflex to Microscopic and Culture   Result Value Ref Range    Color Urine Yellow     Appearance Urine Clear     Glucose Urine Negative NEG^Negative mg/dL    Bilirubin Urine Negative NEG^Negative    Ketones Urine Negative NEG^Negative mg/dL    Specific Gravity Urine >1.030 1.003 - 1.035    Blood Urine Moderate (A) NEG^Negative    pH Urine 6.0 5.0 - 7.0 pH    Protein Albumin Urine 30 (A) NEG^Negative mg/dL    Urobilinogen Urine 0.2 0.2 - 1.0 EU/dL    Nitrite Urine Negative NEG^Negative    Leukocyte Esterase Urine Negative NEG^Negative    Source Midstream Urine    Beta HCG qual IFA urine   Result Value Ref Range    Beta HCG Qual IFA Urine Negative NEG^Negative      Urine Microscopic   Result Value Ref Range    WBC Urine 0 - 5 OTO5^0 - 5 /HPF    RBC Urine 2-5 (A) OTO2^O - 2 /HPF    Squamous Epithelial /LPF Urine Few FEW^Few /LPF    Bacteria Urine Few (A) NEG^Negative /HPF    Mucous Urine Present (A) NEG^Negative /LPF       ASSESSMENT/PLAN:               ICD-10-CM    1. Abdominal pain, right lower quadrant R10.31 UA reflex to Microscopic and Culture     Beta HCG qual IFA urine     Urine Microscopic     CBC with platelets differential     Comprehensive metabolic panel   2. Flank pain R10.9 UA reflex to Microscopic and Culture     Beta HCG qual IFA urine     ibuprofen (ADVIL/MOTRIN) 600 MG tablet   3. Acute midline low back pain without sciatica M54.5 ibuprofen (ADVIL/MOTRIN) 600 MG tablet       Review results  Additional labs  Fluids rest  Ibuprofen  Recheck if not improving with PCP     25 minutes spent with patient > 50% of time on counseling and plan of care.      Carmen Sky PA-C  [facility]

## 2018-11-21 ENCOUNTER — OFFICE VISIT (OUTPATIENT)
Dept: DERMATOLOGY | Facility: CLINIC | Age: 33
End: 2018-11-21
Payer: COMMERCIAL

## 2018-11-21 VITALS — SYSTOLIC BLOOD PRESSURE: 112 MMHG | HEART RATE: 92 BPM | OXYGEN SATURATION: 98 % | DIASTOLIC BLOOD PRESSURE: 75 MMHG

## 2018-11-21 DIAGNOSIS — L29.9 LOCALIZED PRURITUS: ICD-10-CM

## 2018-11-21 DIAGNOSIS — L73.0 ACNE KELOID: Primary | ICD-10-CM

## 2018-11-21 PROCEDURE — 11900 INJECT SKIN LESIONS </W 7: CPT | Performed by: PHYSICIAN ASSISTANT

## 2018-11-21 NOTE — PROGRESS NOTES
HPI:  Aleyda Nicole is a 33 year old female patient here today for kenalog injection into keloid on left neck from lymph nodes surgery on neck. LOV 1.0 ml of kenalog 40 injected with improvement of skin color and itch  Patient states this has been present for years.  Patient reports the following symptoms: mild itch .  Patient reports the following previous treatments: kenalog injection.  Patient reports the following modifying factors none.  Associated symptoms: none.  Patient has no other skin complaints today.  Remainder of the HPI, Meds, PMH, Allergies, FH, and SH was reviewed in chart.    Pertinent Hx:   Thyroid cancer  Past Medical History:   Diagnosis Date     Gestational diabetes     GDM diet controlled     Gestational diabetes 2013     Hypocalcemia 2016     Influenza A      Papillary thyroid carcinoma     Papillary carcinoma, follicular variant with     Pneumonia     LLL     PONV (postoperative nausea and vomiting)       labor      Uncomplicated asthma        Past Surgical History:   Procedure Laterality Date      SECTION  10/26/2013    Procedure:  SECTION;  primary  section;  Surgeon: Rodney Mckee MD;  Location: [facility]      SECTION N/A 2017    Procedure:  SECTION;  Surgeon: Hakeem Combs MD;  Location: [facility]     DISSECTION RADICAL NECK Left 2016    Procedure: DISSECTION RADICAL NECK;  Surgeon: Vy Theodore MD;  Location: [facility] OR     DISSECTION RADICAL NECK MODIFIED Left 2016    Procedure: DISSECTION RADICAL NECK MODIFIED;  Surgeon: Luis Brown MD;  Location: [facility] OR     THYROIDECTOMY      Total, for cancer.         Family History   Problem Relation Age of Onset     Diabetes Mother      Cerebrovascular Disease Mother      Hypertension Mother      Diabetes Father 50     Hypertension Father      Genitourinary Problems Father      kidney disease     Coronary Artery Disease Father      Diabetes Brother         Social History     Social History     Marital status:      Spouse name: Brock     Number of children: 2     Years of education: N/A     Occupational History       [employer]     Social History Main Topics     Smoking status: Never Smoker     Smokeless tobacco: Never Used     Alcohol use No     Drug use: No     Sexual activity: Yes     Partners: Male     Birth control/ protection:      Other Topics Concern      Service No     Blood Transfusions No     Caffeine Concern No     Hobby Hazards No     Sleep Concern No     Stress Concern No     Weight Concern Yes     Special Diet No     Back Care No     Exercise No     Bike Helmet No     Seat Belt Yes     Self-Exams No     Social History Narrative    Pt lives with  and father.       Outpatient Encounter Prescriptions as of 11/21/2018   Medication Sig Dispense Refill     albuterol (PROAIR HFA/PROVENTIL HFA/VENTOLIN HFA) 108 (90 BASE) MCG/ACT Inhaler Inhale 2 puffs into the lungs every 4 hours as needed for shortness of breath / dyspnea or wheezing 1 Inhaler 11     clobetasol (TEMOVATE) 0.05 % cream Apply BID to affected area on neck x 4-6 weeks. 60 g 1     fluticasone (FLOVENT HFA) 44 MCG/ACT Inhaler Inhale 1 puff into the lungs 2 times daily 1 Inhaler 11     ibuprofen (ADVIL/MOTRIN) 600 MG tablet Take 1 tablet (600 mg) by mouth every 6 hours as needed for moderate pain 30 tablet 1     levothyroxine (SYNTHROID/LEVOTHROID) 150 MCG tablet Take 1 tablet 6 days per week and 1.5 tablets the 7th day. 45 tablet 3     VITAMIN D3 1000 units tablet TAKE TWO TABLETS BY MOUTH ONCE DAILY 60 tablet 9     No facility-administered encounter medications on file as of 11/21/2018.        Review Of Systems:  Skin: As above  Eyes: negative  Ears/Nose/Throat: negative  Respiratory: No shortness of breath, dyspnea on exertion, cough, or hemoptysis  Cardiovascular: negative  Gastrointestinal: negative  Genitourinary: negative  Musculoskeletal:  negative  Neurologic: negative  Psychiatric: negative  Hematologic/Lymphatic/Immunologic: negative  Endocrine: negative      Objective:     /75  Pulse 92  SpO2 98%  Eyes: Conjunctivae/lids: Normal   ENT: Lips:  Normal  MSK: Normal  Cardiovascular: Peripheral edema none  Pulm: Breathing Normal  Neuro/Psych: Orientation: Normal; Mood/Affect: Normal, NAD, WDWN  Pt accompanied by: self  Following areas examined: face, neck, upper chest  Thacker skin type:iii  Findings:  1) pink/brown wd smooth thickened plaque on left neck  Assessment and Plan:  1) keloid of left neck  Injected 1.5 cc of Kenalog 40 into keloid on left lateral neck. Disc permanent AE such as hypopigmentation and atrophy. May need additional treatment. May not be effective.   Lot #:[id number]  Exp: 01/2020    Clobetasol :Apply a thin layer to affected area BID x 4-6 weeks, tapering with improvement. Do not apply to face or body folds.   Side effects of topical steroids including but not limited to atrophy (skin thinning), striae (stretch marks) telangiectasias, steroid acne, and others. Do not apply to normal skin. Do not apply to discolored skin that does not have rash present.         Follow up in 1 month

## 2018-11-21 NOTE — MR AVS SNAPSHOT
After Visit Summary   11/21/2018    [facility] Mounika Nicole    MRN: [id number]           Patient Information     Date Of Birth          1985        Visit Information        Provider Department      11/21/2018 3:00 PM Hannah Mederos PA-C [facility]        Care Instructions    Today we injected Kenalog. Kenalog is an antiinflammatory medication. We can do injections every four weeks as needed. Atrophy (thinning of the skin) and pigment changes can occur.    Proper skin care from [facility]:    -Eliminate harsh soaps as they strip the natural oils from the skin, often resulting in dry itchy skin ( i.e. Dial, Zest, Green[place]ic Spring)  -Use mild soaps such as Cetaphil or Dove Sensitive Skin in the shower. You do not need to use soap on arms, legs, and trunk every time you shower unless visibly soiled.   -Avoid hot or cold showers.  -After showering, lightly dry off and apply moisturizing within 2-3 minutes. This will help trap moisture in the skin.   -Aggressive use of a moisturizer at least 1-2 times a day to the entire body (including -Vanicream, Cetaphil, Aquaphor or Cerave) and moisturize hands after every washing.  -We recommend using moisturizers that come in a tub that needs to be scooped out, not a pump. This has more of an oil base. It will hold moisture in your skin much better than a water base moisturizer. The above recommended are non-pore clogging.      Wear a sunscreen with at least SPF 30 on your face, ears, neck and V of the chest daily. Wear sunscreen on other areas of the body if those areas are exposed to the sun throughout the day. Sunscreens can contain physical and/or chemical blockers. Physical blockers are less likely to clog pores, these include zinc oxide and titanium dioxide. Reapply every two hour and after swimming. Sunscreen examples include Neutrogena CeraVe, Blue Lizard, Elta MD and many others.    UV radiation  UVA radiation remains  constant throughout the day and throughout the year. It is a longer wavelength than UVB and therefore penetrates deeper into the skin leading to immediate and delayed tanning, photoaging, and skin cancer. 70-80% of UVA and UVB radiation occurs between the hours of 10am-2pm.  UVB radiation  UVB radiation causes the most harmful effects and is more significant during the summer months. However, snow and ice can reflect UVB radiation leading to skin damage during the winter months as well. UVB radiation is responsible for tanning, burning, inflammation, delayed erythema (pinkness), pigmentation (brown spots), and skin cancer.   Just because you do not burn or are not developing a tan does not mean that you are not damaging your skin. A 15 minute drive to and from work for 30 years an lead to chronic sun damage of the skin. It is important to wear a broad spectrum (both UVA and UVB) sunscreen EVERY day with at least 30 SPF. Apply to face, ears, neck and v of the chest as this is where most of our sun exposure is. Reapply sunscreen every two hours if you plan on being outside.   Luis Mathur. Clinical Dermatology: A Color Guide to Diagnosis and Therapy. Elsevier, 2016.             Follow-ups after your visit        Your next 10 appointments already scheduled     Dec 17, 2018  3:00 PM CST   [facility] Visit with Hannah Mederos PA-C   [facility]    [address] [place] [id number] [phone]              Who to contact     If you have questions or need follow up information about today's clinic visit or your schedule please contact [facility] directly at [phone].  Normal or non-critical lab and imaging results will be communicated to you by MyChart, letter or phone within 4 business days after the clinic has received the results. If you do not hear from us within 7 days, please contact the clinic through  [employer] or phone. If you have a critical or abnormal lab result, we will notify you by phone as soon as possible.  Submit refill requests through [employer] or call your pharmacy and they will forward the refill request to us. Please allow 3 business days for your refill to be completed.          Additional Information About Your Visit        [employer] Information     [employer] gives you secure access to your electronic health record. If you see a primary care provider, you can also send messages to your care team and make appointments. If you have questions, please call your primary care clinic.  If you do not have a primary care provider, please call [phone] and they will assist you.        Care EveryWhere ID     This is your Care EveryWhere ID. This could be used by other organizations to access your [facility] medical records  [id number]        Your Vitals Were     Pulse Pulse Oximetry                92 98%           Blood Pressure from Last 3 Encounters:   11/21/18 112/75   10/24/18 112/78   09/25/18 110/76    Weight from Last 3 Encounters:   10/24/18 73.8 kg (162 lb 12.8 oz)   09/25/18 73.2 kg (161 lb 6.4 oz)   08/28/18 75.5 kg (166 lb 8 oz)              Today, you had the following     No orders found for display       Primary Care Provider Office Phone # Fax #    Evan Zamora [phone]       [address] [facility] [address]        Equal Access to Services     [facility] : Hadii aad ku hadasho Soomaali, waaxda luqadaha, qaybta kaalmada adeegyada, cristina rangel . So [facility] [phone].    ATENCIÓN: Si habla español, tiene a serna disposición servicios gratuitos de asistencia lingüística. Llame al [phone].    We comply with applicable federal civil rights laws and [place] laws. We do not discriminate on the basis of race, color, national origin, age, disability, sex, sexual orientation, or gender identity.            Thank you!     Thank you for  choosing [facility]  for your care. Our goal is always to provide you with excellent care. Hearing back from our patients is one way we can continue to improve our services. Please take a few minutes to complete the written survey that you may receive in the mail after your visit with us. Thank you!             Your Updated Medication List - Protect others around you: Learn how to safely use, store and throw away your medicines at www.disposemymeds.org.          This list is accurate as of 11/21/18  3:14 PM.  Always use your most recent med list.                   Brand Name Dispense Instructions for use Diagnosis    albuterol 108 (90 Base) MCG/ACT inhaler    PROAIR HFA/PROVENTIL HFA/VENTOLIN HFA    1 Inhaler    Inhale 2 puffs into the lungs every 4 hours as needed for shortness of breath / dyspnea or wheezing    Mild persistent asthma without complication       cholecalciferol 1000 UNIT tablet    vitamin D3    60 tablet    TAKE TWO TABLETS BY MOUTH ONCE DAILY    Vitamin D deficiency       clobetasol 0.05 % cream    TEMOVATE    60 g    Apply BID to affected area on neck x 4-6 weeks.    Keloid, Localized pruritus       fluticasone 44 MCG/ACT Inhaler    FLOVENT HFA    1 Inhaler    Inhale 1 puff into the lungs 2 times daily    Mild persistent asthma without complication       ibuprofen 600 MG tablet    ADVIL/MOTRIN    30 tablet    Take 1 tablet (600 mg) by mouth every 6 hours as needed for moderate pain    Flank pain, Acute midline low back pain without sciatica       levothyroxine 150 MCG tablet    SYNTHROID/LEVOTHROID    45 tablet    Take 1 tablet 6 days per week and 1.5 tablets the 7th day.    Postoperative hypothyroidism

## 2018-11-21 NOTE — LETTER
2018         RE: Aleyda Nicole  [address]  [place] [address]        Dear Colleague,    Thank you for referring your patient, Aleyda Nicole, to [facility]. Please see a copy of my visit note below.    HPI:  Aleyda Nicole is a 33 year old female patient here today for kenalog injection into keloid on left neck from lymph nodes surgery on neck. LOV 1.0 ml of kenalog 40 injected with improvement of skin color and itch  Patient states this has been present for years.  Patient reports the following symptoms: mild itch .  Patient reports the following previous treatments: kenalog injection.  Patient reports the following modifying factors none.  Associated symptoms: none.  Patient has no other skin complaints today.  Remainder of the HPI, Meds, PMH, Allergies, FH, and SH was reviewed in chart.    Pertinent Hx:   Thyroid cancer  Past Medical History:   Diagnosis Date     Gestational diabetes     GDM diet controlled     Gestational diabetes 2013     Hypocalcemia 2016     Influenza A      Papillary thyroid carcinoma     Papillary carcinoma, follicular variant with     Pneumonia     LLL     PONV (postoperative nausea and vomiting)       labor      Uncomplicated asthma        Past Surgical History:   Procedure Laterality Date      SECTION  10/26/2013    Procedure:  SECTION;  primary  section;  Surgeon: Rodney Mckee MD;  [facility]      SECTION N/A 2017    Procedure:  SECTION;  Surgeon: Hakeem Combs MD;  Location: [facility]     DISSECTION RADICAL NECK Left 2016    Procedure: DISSECTION RADICAL NECK;  Surgeon: Vy Theodore MD;  Location: [facility]     DISSECTION RADICAL NECK MODIFIED Left 2016    Procedure: DISSECTION RADICAL NECK MODIFIED;  Surgeon: Luis Brown MD;  Location: [facility]     THYROIDECTOMY      Total, for cancer.         Family History   Problem  Relation Age of Onset     Diabetes Mother      Cerebrovascular Disease Mother      Hypertension Mother      Diabetes Father 50     Hypertension Father      Genitourinary Problems Father      kidney disease     Coronary Artery Disease Father      Diabetes Brother        Social History     Social History     Marital status:      Spouse name: Brock     Number of children: 2     Years of education: N/A     Occupational History       [employer]     Social History Main Topics     Smoking status: Never Smoker     Smokeless tobacco: Never Used     Alcohol use No     Drug use: No     Sexual activity: Yes     Partners: Male     Birth control/ protection:      Other Topics Concern      Service No     Blood Transfusions No     Caffeine Concern No     Hobby Hazards No     Sleep Concern No     Stress Concern No     Weight Concern Yes     Special Diet No     Back Care No     Exercise No     Bike Helmet No     Seat Belt Yes     Self-Exams No     Social History Narrative    Pt lives with  and father.       Outpatient Encounter Prescriptions as of 11/21/2018   Medication Sig Dispense Refill     albuterol (PROAIR HFA/PROVENTIL HFA/VENTOLIN HFA) 108 (90 BASE) MCG/ACT Inhaler Inhale 2 puffs into the lungs every 4 hours as needed for shortness of breath / dyspnea or wheezing 1 Inhaler 11     clobetasol (TEMOVATE) 0.05 % cream Apply BID to affected area on neck x 4-6 weeks. 60 g 1     fluticasone (FLOVENT HFA) 44 MCG/ACT Inhaler Inhale 1 puff into the lungs 2 times daily 1 Inhaler 11     ibuprofen (ADVIL/MOTRIN) 600 MG tablet Take 1 tablet (600 mg) by mouth every 6 hours as needed for moderate pain 30 tablet 1     levothyroxine (SYNTHROID/LEVOTHROID) 150 MCG tablet Take 1 tablet 6 days per week and 1.5 tablets the 7th day. 45 tablet 3     VITAMIN D3 1000 units tablet TAKE TWO TABLETS BY MOUTH ONCE DAILY 60 tablet 9     No facility-administered encounter medications on file as of 11/21/2018.        Review Of  Systems:  Skin: As above  Eyes: negative  Ears/Nose/Throat: negative  Respiratory: No shortness of breath, dyspnea on exertion, cough, or hemoptysis  Cardiovascular: negative  Gastrointestinal: negative  Genitourinary: negative  Musculoskeletal: negative  Neurologic: negative  Psychiatric: negative  Hematologic/Lymphatic/Immunologic: negative  Endocrine: negative      Objective:     /75  Pulse 92  SpO2 98%  Eyes: Conjunctivae/lids: Normal   ENT: Lips:  Normal  MSK: Normal  Cardiovascular: Peripheral edema none  Pulm: Breathing Normal  Neuro/Psych: Orientation: Normal; Mood/Affect: Normal, NAD, WDWN  Pt accompanied by: self  Following areas examined: face, neck, upper chest  Thacker skin type:iii  Findings:  1) pink/brown wd smooth thickened plaque on left neck  Assessment and Plan:  1) keloid of left neck  Injected 1.5 cc of Kenalog 40 into keloid on left lateral neck. Disc permanent AE such as hypopigmentation and atrophy. May need additional treatment. May not be effective.   Lot #:[id number]  Exp: 01/2020    Clobetasol :Apply a thin layer to affected area BID x 4-6 weeks, tapering with improvement. Do not apply to face or body folds.   Side effects of topical steroids including but not limited to atrophy (skin thinning), striae (stretch marks) telangiectasias, steroid acne, and others. Do not apply to normal skin. Do not apply to discolored skin that does not have rash present.         Follow up in 1 month      Again, thank you for allowing me to participate in the care of your patient.        Sincerely,        Hannah Mederos PA-C

## 2018-11-21 NOTE — PATIENT INSTRUCTIONS
Today we injected Kenalog. Kenalog is an antiinflammatory medication. We can do injections every four weeks as needed. Atrophy (thinning of the skin) and pigment changes can occur.    Proper skin care from [facility]:    -Eliminate harsh soaps as they strip the natural oils from the skin, often resulting in dry itchy skin ( i.e. Dial, Zest, [place])  -Use mild soaps such as Cetaphil or Dove Sensitive Skin in the shower. You do not need to use soap on arms, legs, and trunk every time you shower unless visibly soiled.   -Avoid hot or cold showers.  -After showering, lightly dry off and apply moisturizing within 2-3 minutes. This will help trap moisture in the skin.   -Aggressive use of a moisturizer at least 1-2 times a day to the entire body (including -Vanicream, Cetaphil, Aquaphor or Cerave) and moisturize hands after every washing.  -We recommend using moisturizers that come in a tub that needs to be scooped out, not a pump. This has more of an oil base. It will hold moisture in your skin much better than a water base moisturizer. The above recommended are non-pore clogging.      Wear a sunscreen with at least SPF 30 on your face, ears, neck and V of the chest daily. Wear sunscreen on other areas of the body if those areas are exposed to the sun throughout the day. Sunscreens can contain physical and/or chemical blockers. Physical blockers are less likely to clog pores, these include zinc oxide and titanium dioxide. Reapply every two hour and after swimming. Sunscreen examples include Neutrogena, CeraVe, Blue Lizard, Elta MD and many others.    UV radiation  UVA radiation remains constant throughout the day and throughout the year. It is a longer wavelength than UVB and therefore penetrates deeper into the skin leading to immediate and delayed tanning, photoaging, and skin cancer. 70-80% of UVA and UVB radiation occurs between the hours of 10am-2pm.  UVB radiation  UVB radiation causes the most  harmful effects and is more significant during the summer months. However, snow and ice can reflect UVB radiation leading to skin damage during the winter months as well. UVB radiation is responsible for tanning, burning, inflammation, delayed erythema (pinkness), pigmentation (brown spots), and skin cancer.   Just because you do not burn or are not developing a tan does not mean that you are not damaging your skin. A 15 minute drive to and from work for 30 years an lead to chronic sun damage of the skin. It is important to wear a broad spectrum (both UVA and UVB) sunscreen EVERY day with at least 30 SPF. Apply to face, ears, neck and v of the chest as this is where most of our sun exposure is. Reapply sunscreen every two hours if you plan on being outside.   Luis Mathur. Clinical Dermatology: A Color Guide to Diagnosis and Therapy. Elsevier, 2016.

## 2018-12-17 ENCOUNTER — OFFICE VISIT (OUTPATIENT)
Dept: DERMATOLOGY | Facility: CLINIC | Age: 33
End: 2018-12-17
Payer: COMMERCIAL

## 2018-12-17 VITALS — OXYGEN SATURATION: 98 % | HEART RATE: 62 BPM | SYSTOLIC BLOOD PRESSURE: 114 MMHG | DIASTOLIC BLOOD PRESSURE: 74 MMHG

## 2018-12-17 DIAGNOSIS — L91.0 KELOID: Primary | ICD-10-CM

## 2018-12-17 PROCEDURE — 11900 INJECT SKIN LESIONS </W 7: CPT | Performed by: PHYSICIAN ASSISTANT

## 2018-12-17 PROCEDURE — 99213 OFFICE O/P EST LOW 20 MIN: CPT | Mod: 25 | Performed by: PHYSICIAN ASSISTANT

## 2018-12-17 NOTE — PROGRESS NOTES
HPI:  Aleyda Nicole is a 33 year old female patient here today for kenalog injection into keloid on left neck from lymph nodes surgery on neck. LOV 1.5 ml of kenalog 40 injected with improvement of skin color and itch  Patient states this has been present for years.  Patient reports the following symptoms: mild itch .  Patient reports the following previous treatments: kenalog injection.  Patient reports the following modifying factors none.  Associated symptoms: none.  Patient has no other skin complaints today.  Remainder of the HPI, Meds, PMH, Allergies, FH, and SH was reviewed in chart.    Pertinent Hx:   Thyroid cancer  Past Medical History:   Diagnosis Date     Gestational diabetes     GDM diet controlled     Gestational diabetes 2013     Hypocalcemia 2016     Influenza A      Papillary thyroid carcinoma     Papillary carcinoma, follicular variant with     Pneumonia     LLL     PONV (postoperative nausea and vomiting)       labor      Uncomplicated asthma        Past Surgical History:   Procedure Laterality Date      SECTION  10/26/2013    Procedure:  SECTION;  primary  section;  Surgeon: Rodney Mckee MD;  Location: [facility]      SECTION N/A 2017    Procedure:  SECTION;  Surgeon: Hakeem Combs MD;  Location: [facility]     DISSECTION RADICAL NECK Left 2016    Procedure: DISSECTION RADICAL NECK;  Surgeon: Vy Theodore MD;  Location: [facility] OR     DISSECTION RADICAL NECK MODIFIED Left 2016    Procedure: DISSECTION RADICAL NECK MODIFIED;  Surgeon: Luis Brown MD;  Location: [facility] OR     THYROIDECTOMY      Total, for cancer.         Family History   Problem Relation Age of Onset     Diabetes Mother      Cerebrovascular Disease Mother      Hypertension Mother      Diabetes Father 50     Hypertension Father      Genitourinary Problems Father         kidney disease     Coronary Artery Disease Father      Diabetes Brother         Social History     Socioeconomic History     Marital status:      Spouse name: Brock     Number of children: 2     Years of education: Not on file     Highest education level: Not on file   Social Needs     Financial resource strain: Not on file     Food insecurity - worry: Not on file     Food insecurity - inability: Not on file     Transportation needs - medical: Not on file     Transportation needs - non-medical: Not on file   Occupational History     Occupation:       Employer: [employer]   Tobacco Use     Smoking status: Never Smoker     Smokeless tobacco: Never Used   Substance and Sexual Activity     Alcohol use: No     Alcohol/week: 0.0 oz     Drug use: No     Sexual activity: Yes     Partners: Male   Other Topics Concern     Parent/sibling w/ CABG, MI or angioplasty before 65F 55M? Not Asked      Service No     Blood Transfusions No     Caffeine Concern No     Occupational Exposure Not Asked     Hobby Hazards No     Sleep Concern No     Stress Concern No     Weight Concern Yes     Special Diet No     Back Care No     Exercise No     Bike Helmet No     Seat Belt Yes     Self-Exams No   Social History Narrative    Pt lives with  and father.       Outpatient Encounter Medications as of 12/17/2018   Medication Sig Dispense Refill     albuterol (PROAIR HFA/PROVENTIL HFA/VENTOLIN HFA) 108 (90 BASE) MCG/ACT Inhaler Inhale 2 puffs into the lungs every 4 hours as needed for shortness of breath / dyspnea or wheezing 1 Inhaler 11     clobetasol (TEMOVATE) 0.05 % cream Apply BID to affected area on neck x 4-6 weeks. 60 g 1     fluticasone (FLOVENT HFA) 44 MCG/ACT Inhaler Inhale 1 puff into the lungs 2 times daily 1 Inhaler 11     ibuprofen (ADVIL/MOTRIN) 600 MG tablet Take 1 tablet (600 mg) by mouth every 6 hours as needed for moderate pain 30 tablet 1     levothyroxine (SYNTHROID/LEVOTHROID) 150 MCG tablet Take 1 tablet 6 days per week and 1.5 tablets the 7th day. 45 tablet 3      VITAMIN D3 1000 units tablet TAKE TWO TABLETS BY MOUTH ONCE DAILY 60 tablet 9     No facility-administered encounter medications on file as of 12/17/2018.        Review Of Systems:  Skin: As above  Eyes: negative  Ears/Nose/Throat: negative  Respiratory: No shortness of breath, dyspnea on exertion, cough, or hemoptysis  Cardiovascular: negative  Gastrointestinal: negative  Genitourinary: negative  Musculoskeletal: negative  Neurologic: negative  Psychiatric: negative  Hematologic/Lymphatic/Immunologic: negative  Endocrine: negative      Objective:     /74   Pulse 62   SpO2 98%   Breastfeeding? No   Eyes: Conjunctivae/lids: Normal   ENT: Lips:  Normal  MSK: Normal  Cardiovascular: Peripheral edema none  Pulm: Breathing Normal  Neuro/Psych: Orientation: Normal; Mood/Affect: Normal, NAD, WDWN  Pt accompanied by: self  Following areas examined: face, neck, upper chest  Thacker skin type:iii  Findings:  1) pink/brown wd smooth thickened plaque on left neck  Assessment and Plan:  1) keloid of left neck  Injected 0.55 cc of Kenalog 40 into keloid on left lateral neck. Disc permanent AE such as hypopigmentation and atrophy. May need additional treatment. May not be effective.   Lot #:[id number]  Exp: 04/2020    Clobetasol :Apply a thin layer to affected area BID x 4-6 weeks, tapering with improvement. Do not apply to face or body folds.   Side effects of topical steroids including but not limited to atrophy (skin thinning), striae (stretch marks) telangiectasias, steroid acne, and others. Do not apply to normal skin. Do not apply to discolored skin that does not have rash present.         Follow up in 1 month

## 2018-12-17 NOTE — PATIENT INSTRUCTIONS
Today we injected Kenalog. Kenalog is an antiinflammatory medication. We can do injections every four weeks as needed. Atrophy (thinning of the skin) and pigment changes can occur.    Proper skin care from [facility]:    -Eliminate harsh soaps as they strip the natural oils from the skin, often resulting in dry itchy skin ( i.e. Dial, Zest, [place])  -Use mild soaps such as Cetaphil or Dove Sensitive Skin in the shower. You do not need to use soap on arms, legs, and trunk every time you shower unless visibly soiled.   -Avoid hot or cold showers.  -After showering, lightly dry off and apply moisturizing within 2-3 minutes. This will help trap moisture in the skin.   -Aggressive use of a moisturizer at least 1-2 times a day to the entire body (including -Vanicream, Cetaphil, Aquaphor or Cerave) and moisturize hands after every washing.  -We recommend using moisturizers that come in a tub that needs to be scooped out, not a pump. This has more of an oil base. It will hold moisture in your skin much better than a water base moisturizer. The above recommended are non-pore clogging.

## 2018-12-17 NOTE — LETTER
2018         RE: Aleyda Nicole  [address]  [facility] [id number]        Dear Colleague,    Thank you for referring your patient, Aleyda Nicole, to the [facility]. Please see a copy of my visit note below.    HPI:  Aleyda Nicole is a 33 year old female patient here today for kenalog injection into keloid on left neck from lymph nodes surgery on neck. LOV 1.5 ml of kenalog 40 injected with improvement of skin color and itch  Patient states this has been present for years.  Patient reports the following symptoms: mild itch .  Patient reports the following previous treatments: kenalog injection.  Patient reports the following modifying factors none.  Associated symptoms: none.  Patient has no other skin complaints today.  Remainder of the HPI, Meds, PMH, Allergies, FH, and SH was reviewed in chart.    Pertinent Hx:   Thyroid cancer  Past Medical History:   Diagnosis Date     Gestational diabetes     GDM diet controlled     Gestational diabetes 2013     Hypocalcemia 2016     Influenza A      Papillary thyroid carcinoma     Papillary carcinoma, follicular variant with     Pneumonia     LLL     PONV (postoperative nausea and vomiting)       labor      Uncomplicated asthma        Past Surgical History:   Procedure Laterality Date      SECTION  10/26/2013    Procedure:  SECTION;  primary  section;  Surgeon: Rodney Mckee MD;  [facility]      SECTION N/A 2017    Procedure:  SECTION;  Surgeon: Hakeem Combs MD;  [facility]: [facility]     DISSECTION RADICAL NECK Left 2016    Procedure: DISSECTION RADICAL NECK;  Surgeon: Vy Theodore MD;  Location: [facility]     DISSECTION RADICAL NECK MODIFIED Left 2016    Procedure: DISSECTION RADICAL NECK MODIFIED;  Surgeon: Luis Brown MD;  Location: [facility]     THYROIDECTOMY      Total, for cancer.         Family History   Problem  Relation Age of Onset     Diabetes Mother      Cerebrovascular Disease Mother      Hypertension Mother      Diabetes Father 50     Hypertension Father      Genitourinary Problems Father         kidney disease     Coronary Artery Disease Father      Diabetes Brother        Social History     Socioeconomic History     Marital status:      Spouse name: Brock     Number of children: 2     Years of education: Not on file     Highest education level: Not on file   Social Needs     Financial resource strain: Not on file     Food insecurity - worry: Not on file     Food insecurity - inability: Not on file     Transportation needs - medical: Not on file     Transportation needs - non-medical: Not on file   Occupational History     Occupation:       Employer: [employer]   Tobacco Use     Smoking status: Never Smoker     Smokeless tobacco: Never Used   Substance and Sexual Activity     Alcohol use: No     Alcohol/week: 0.0 oz     Drug use: No     Sexual activity: Yes     Partners: Male   Other Topics Concern     Parent/sibling w/ CABG, MI or angioplasty before 65F 55M? Not Asked      Service No     Blood Transfusions No     Caffeine Concern No     Occupational Exposure Not Asked     Hobby Hazards No     Sleep Concern No     Stress Concern No     Weight Concern Yes     Special Diet No     Back Care No     Exercise No     Bike Helmet No     Seat Belt Yes     Self-Exams No   Social History Narrative    Pt lives with  and father.       Outpatient Encounter Medications as of 12/17/2018   Medication Sig Dispense Refill     albuterol (PROAIR HFA/PROVENTIL HFA/VENTOLIN HFA) 108 (90 BASE) MCG/ACT Inhaler Inhale 2 puffs into the lungs every 4 hours as needed for shortness of breath / dyspnea or wheezing 1 Inhaler 11     clobetasol (TEMOVATE) 0.05 % cream Apply BID to affected area on neck x 4-6 weeks. 60 g 1     fluticasone (FLOVENT HFA) 44 MCG/ACT Inhaler Inhale 1 puff into the lungs 2 times daily 1  Inhaler 11     ibuprofen (ADVIL/MOTRIN) 600 MG tablet Take 1 tablet (600 mg) by mouth every 6 hours as needed for moderate pain 30 tablet 1     levothyroxine (SYNTHROID/LEVOTHROID) 150 MCG tablet Take 1 tablet 6 days per week and 1.5 tablets the 7th day. 45 tablet 3     VITAMIN D3 1000 units tablet TAKE TWO TABLETS BY MOUTH ONCE DAILY 60 tablet 9     No facility-administered encounter medications on file as of 12/17/2018.        Review Of Systems:  Skin: As above  Eyes: negative  Ears/Nose/Throat: negative  Respiratory: No shortness of breath, dyspnea on exertion, cough, or hemoptysis  Cardiovascular: negative  Gastrointestinal: negative  Genitourinary: negative  Musculoskeletal: negative  Neurologic: negative  Psychiatric: negative  Hematologic/Lymphatic/Immunologic: negative  Endocrine: negative      Objective:     /74   Pulse 62   SpO2 98%   Breastfeeding? No   Eyes: Conjunctivae/lids: Normal   ENT: Lips:  Normal  MSK: Normal  Cardiovascular: Peripheral edema none  Pulm: Breathing Normal  Neuro/Psych: Orientation: Normal; Mood/Affect: Normal, NAD, WDWN  Pt accompanied by: self  Following areas examined: face, neck, upper chest  Thacker skin type:iii  Findings:  1) pink/brown wd smooth thickened plaque on left neck  Assessment and Plan:  1) keloid of left neck  Injected 0.55 cc of Kenalog 40 into keloid on left lateral neck. Disc permanent AE such as hypopigmentation and atrophy. May need additional treatment. May not be effective.   Lot #:[id number]  Exp: 04/2020    Clobetasol :Apply a thin layer to affected area BID x 4-6 weeks, tapering with improvement. Do not apply to face or body folds.   Side effects of topical steroids including but not limited to atrophy (skin thinning), striae (stretch marks) telangiectasias, steroid acne, and others. Do not apply to normal skin. Do not apply to discolored skin that does not have rash present.         Follow up in 1 month      Again, thank you for allowing me  to participate in the care of your patient.        Sincerely,        Hannah Mederos PA-C

## 2019-02-26 DIAGNOSIS — E89.0 POSTOPERATIVE HYPOTHYROIDISM: ICD-10-CM

## 2019-02-26 DIAGNOSIS — C73 PAPILLARY CARCINOMA, FOLLICULAR VARIANT (H): ICD-10-CM

## 2019-02-26 PROCEDURE — 84439 ASSAY OF FREE THYROXINE: CPT | Performed by: INTERNAL MEDICINE

## 2019-02-26 PROCEDURE — 36415 COLL VENOUS BLD VENIPUNCTURE: CPT | Performed by: INTERNAL MEDICINE

## 2019-02-26 PROCEDURE — 84443 ASSAY THYROID STIM HORMONE: CPT | Performed by: INTERNAL MEDICINE

## 2019-02-27 ENCOUNTER — TELEPHONE (OUTPATIENT)
Dept: ENDOCRINOLOGY | Facility: CLINIC | Age: 34
End: 2019-02-27

## 2019-02-27 ENCOUNTER — MYC MEDICAL ADVICE (OUTPATIENT)
Dept: ENDOCRINOLOGY | Facility: CLINIC | Age: 34
End: 2019-02-27

## 2019-02-27 DIAGNOSIS — C73 PAPILLARY CARCINOMA, FOLLICULAR VARIANT (H): Primary | ICD-10-CM

## 2019-02-27 LAB
T4 FREE SERPL-MCNC: 1.38 NG/DL (ref 0.76–1.46)
TSH SERPL DL<=0.005 MIU/L-ACNC: 0.04 MU/L (ref 0.4–4)

## 2019-02-27 NOTE — LETTER
[facility]  [address], [address]  [place] [address]  [phone]        February 27, 2019    Aleyda Brionesuyen  [address] [facility] [id number]            Dear Ms. Aleyda Mounika Nicole:      The results of your recent labs are enclosed.  Please schedule a clinic vist to discuss labs and dose adjustment.   If you have any further questions or problems, please contact our office.    Sincerely,        Dr. Vandana Costello/carmen    Results for orders placed or performed in visit on 02/26/19   TSH   Result Value Ref Range    TSH 0.04 (L) 0.40 - 4.00 mU/L   T4 free   Result Value Ref Range    T4 Free 1.38 0.76 - 1.46 ng/dL

## 2019-02-27 NOTE — TELEPHONE ENCOUNTER
ENDO THYROID LABS-[facility] Latest Ref Rng & Units 2/26/2019   TSH 0.40 - 4.00 mU/L 0.04 (L)   T4 TOTAL 5.0 - 11.0 ug/dL    T4 FREE 0.76 - 1.46 ng/dL 1.38     H/o thyroid cancer  Please help schedule clinic viist to discuss labs and dose adjustment  Also can you please check with lab if thyroglobulin levels can be added on the sample from yesterday?

## 2019-02-28 ENCOUNTER — OFFICE VISIT (OUTPATIENT)
Dept: ENDOCRINOLOGY | Facility: CLINIC | Age: 34
End: 2019-02-28
Payer: COMMERCIAL

## 2019-02-28 VITALS
WEIGHT: 164.5 LBS | SYSTOLIC BLOOD PRESSURE: 106 MMHG | BODY MASS INDEX: 31.06 KG/M2 | DIASTOLIC BLOOD PRESSURE: 68 MMHG | TEMPERATURE: 98.2 F | HEART RATE: 86 BPM | OXYGEN SATURATION: 98 % | HEIGHT: 61 IN

## 2019-02-28 DIAGNOSIS — E89.0 POSTOPERATIVE HYPOTHYROIDISM: ICD-10-CM

## 2019-02-28 PROCEDURE — 99214 OFFICE O/P EST MOD 30 MIN: CPT | Performed by: INTERNAL MEDICINE

## 2019-02-28 RX ORDER — LEVOTHYROXINE SODIUM 150 UG/1
150 TABLET ORAL DAILY
Qty: 90 TABLET | Refills: 3 | Status: SHIPPED | OUTPATIENT
Start: 2019-02-28 | End: 2019-10-31

## 2019-02-28 ASSESSMENT — MIFFLIN-ST. JEOR: SCORE: 1388.55

## 2019-02-28 NOTE — PATIENT INSTRUCTIONS
[facility]   Dr Costello, Endocrinology Department      [facility]   [address]  [place] [address]  Appointment [phone]  Fax: [phone]  [place]: Monday and Tuesday         [facility]   [address]  [place] [address]  Appointment [phone]  Fax: [phone]  [place]: Wednesday and Thursday          Decrease dose of levothyroxine to 150 mcg/day  Labs in 2 months or sooner if you get pregnant.  Please make a lab appointment for blood work and follow up clinic appointment in 1 week after that to discuss results.      Take Levothyroxine on an empty stomach. Take it with a full glass of water at least 30 minutes to 1 hour before eating breakfast.   This medicine should be taken at least 4 hours before or 4 hours after these medicines: antacids (Maalox , Mylanta , Tums ), calcium supplements, cholestyramine (Prevalite , Questran ), colestipol (Colestid ), iron supplements, orlistat (Kaden , Xenical ), simethicone (Gas-X , Mylicon ), and sucralfate (Carafate ).   Swallow the capsule whole. Do not cut or crush it.

## 2019-02-28 NOTE — NURSING NOTE
"ENDOCRINOLOGY INTAKE FORM    Patient Name:  Aleyda Nicole  :  1985    Is patient Diabetic?   No  Does patient have non-diabetic or other endocrine issues?  Yes: hypocalcemia, recurrent thyroid ca., gestational dm, postop hypothyroidism, papillary thyroid carcinoma, follicular variant    Vitals: /68 (BP Location: Left arm, Patient Position: Chair, Cuff Size: Adult Regular)   Pulse 86   Temp 98.2  F (36.8  C) (Oral)   Ht 1.549 m (5' 1\")   Wt 74.6 kg (164 lb 8 oz)   SpO2 98%   Breastfeeding? No   BMI 31.08 kg/m    BMI= Body mass index is 31.08 kg/m .    Flu vaccine:  Yes: 18  Pneumonia vaccine:  Yes: both    Smoking and Alcohol use:  Social History     Tobacco Use     Smoking status: Never Smoker     Smokeless tobacco: Never Used   Substance Use Topics     Alcohol use: No     Alcohol/week: 0.0 oz     Drug use: No       Valentina Lafleur CMA    "

## 2019-02-28 NOTE — TELEPHONE ENCOUNTER
Offer 02/28 at 230 in [place].  I left a message for the patient to return our call.  Valentina Lafleur, CMA

## 2019-02-28 NOTE — LETTER
2/28/2019         RE: Aleyda Nicole  [address] [facility] [address]        Dear Colleague,    Thank you for referring your patient, Aleyda Nicole, to the [facility]. Please see a copy of my visit note below.    Name: Aleyda Nicole  Seen for f/u of papillary thyroid cancer and postoperative hypothyroidism    Chief Complaint   Patient presents with     Thyroid Disease     HPI:  Aleyda Nicole is a 33 year old female who presents for the evaluation of:      1.  Papillary thyroid cancer:  Thyroid nodule was noticed in February 2011 which was followed by thyroid nodule biopsy which showed suspicious for papillary thyroid cancer.  She underwent total thyroidectomy 3/2011 and pathology showed papillary thyroid cancer with follicular pattern.  Tumor was about 2.2 cm on the left lobe.  No lymph node involvement.  S/p 78.7 mCi of I131 HERNANDEZ  8/2011. No evidence for distant metastatic disease on post therapy scan.  Neck US 6/2016- showed new lymph node in neck along with rising Tg levels  S/p FNA lymph node: 6/2016- c/w papillary thyroid cancer    8/2016: underwent left modified neck dissection.  1/27 lymph node positive one left modified neck dissection.  1 cm in greatest diameter.  Negative for external involvement at level II.    Tg decreased post left neck dissection from 1.3 to < 0.1    Follow up I123 4/2017: no mets.    Postop course was complicated by hypocalcemia and was started on calcium supplement.  Taking calcium- tries to take 2 tabs per day + 1 glass of milk daily.    Delivered baby 1/2017. No longer breast feeding    She was followed by endocrinology before and then lost to follow-up with endocrinology in 2012.  No history of neck radiation prior to diagnosis of thyroid cancer.  No family history of thyroid cancer.    Thyroglobulin levels appear stable and suppressed  Neck ultrasound June 2018 did not show any evidence of metastases.    2.  Hypothyroidism  (postoperative):  Was started on levothyroxine following thyroidectomy.  Currently she is taking levothyroxine .    Currently taking levothyroxine 150 mcg X 6 days a week and 225 mcg X one day a week.  Taking generic levothyroxine.  Reports compliance.    Denies hyperthyroid symptoms.  Planning one more pregnancy in future.    Wt Readings from Last 2 Encounters:   19 74.6 kg (164 lb 8 oz)   10/24/18 73.8 kg (162 lb 12.8 oz)     No diarrhea, tremors. No palpitations.  No difficulty with swallowing, no pain during swallowing.  Missed 2 periods. Home pregnancy test was neg.  Mood- some irritability  Weight: stable.  Wt Readings from Last 2 Encounters:   19 74.6 kg (164 lb 8 oz)   10/24/18 73.8 kg (162 lb 12.8 oz)         PMH/PSH:  Past Medical History:   Diagnosis Date     Gestational diabetes     GDM diet controlled     Gestational diabetes 2013     Hypocalcemia 2016     Influenza A      Papillary thyroid carcinoma     Papillary carcinoma, follicular variant with     Pneumonia     LLL     PONV (postoperative nausea and vomiting)       labor      Uncomplicated asthma      Past Surgical History:   Procedure Laterality Date      SECTION  10/26/2013    Procedure:  SECTION;  primary  section;  Surgeon: Rodney Mckee MD;  Location: [facility] L[facility]      SECTION N/A 2017    Procedure:  SECTION;  Surgeon: Hakeem Combs MD;  Location: [facility] L[facility]     DISSECTION RADICAL NECK Left 2016    Procedure: DISSECTION RADICAL NECK;  Surgeon: Vy Theodore MD;  Location: [facility] OR     DISSECTION RADICAL NECK MODIFIED Left 2016    Procedure: DISSECTION RADICAL NECK MODIFIED;  Surgeon: Luis Brown MD;  Location: [facility] OR     THYROIDECTOMY      Total, for cancer.      Family Hx:  Family History   Problem Relation Age of Onset     Diabetes Mother      Cerebrovascular Disease Mother      Hypertension Mother      Diabetes Father 50      Hypertension Father      Genitourinary Problems Father         kidney disease     Coronary Artery Disease Father      Diabetes Brother      Thyroid disease: No        Social Hx:  Social History     Socioeconomic History     Marital status:      Spouse name: Brock     Number of children: 2     Years of education: Not on file     Highest education level: Not on file   Occupational History     Occupation:       Employer: [employer]   Social Needs     Financial resource strain: Not on file     Food insecurity:     Worry: Not on file     Inability: Not on file     Transportation needs:     Medical: Not on file     Non-medical: Not on file   Tobacco Use     Smoking status: Never Smoker     Smokeless tobacco: Never Used   Substance and Sexual Activity     Alcohol use: No     Alcohol/week: 0.0 oz     Drug use: No     Sexual activity: Yes     Partners: Male   Lifestyle     Physical activity:     Days per week: Not on file     Minutes per session: Not on file     Stress: Not on file   Relationships     Social connections:     Talks on phone: Not on file     Gets together: Not on file     Attends [employer] service: Not on file     Active member of club or organization: Not on file     Attends meetings of clubs or organizations: Not on file     Relationship status: Not on file     Intimate partner violence:     Fear of current or ex partner: Not on file     Emotionally abused: Not on file     Physically abused: Not on file     Forced sexual activity: Not on file   Other Topics Concern     Parent/sibling w/ CABG, MI or angioplasty before 65F 55M? Not Asked      Service No     Blood Transfusions No     Caffeine Concern No     Occupational Exposure Not Asked     Hobby Hazards No     Sleep Concern No     Stress Concern No     Weight Concern Yes     Special Diet No     Back Care No     Exercise No     Bike Helmet No     Seat Belt Yes     Self-Exams No   Social History Narrative    Pt lives with  and  "father.          MEDICATIONS:  has a current medication list which includes the following prescription(s): albuterol, clobetasol, fluticasone, ibuprofen, levothyroxine, and vitamin d3.    ROS     ROS: 10 point ROS neg other than the symptoms noted above in the HPI.      Physical Exam   VS: /68 (BP Location: Left arm, Patient Position: Chair, Cuff Size: Adult Regular)   Pulse 86   Temp 98.2  F (36.8  C) (Oral)   Ht 1.549 m (5' 1\")   Wt 74.6 kg (164 lb 8 oz)   SpO2 98%   Breastfeeding? No   BMI 31.08 kg/m     GENERAL: AXOX3, NAD, well dressed, answering questions appropriately, appears stated age.  HEENT: OP clear, no LAD, no TM, non-tender, no exopthalmous, no proptosis, EOMI, no lig lag, no retraction  Thyroid: Hypertrophied scar, will healed thyroidectomy scar present.  Hypertrophied scar on lateral side of neck. No lymphadenopathy.  CV: RRR, no rubs, gallops, no murmurs  LUNGS: CTAB, no wheezes, rales, or ronchi  EXTREMITIES: no edema, +pulses, no rashes, no lesions  NEUROLOGY: CN grossly intact, no tremors  MSK: grossly intact  SKIN: no rashes, no lesions  PSYCH: normal affect and mood      LABS:  3/6/2018:  TSH: 0.07  TgAB: <0.4  TG: <0.10    2017:  TSH : 47.89 (post thyrogen)  TgAB: <0.4  TG: <0.10    17:  TSH: 0.04  TgAB: <0.4  TG: <0.10    11/10/16:  TSH 0.74  TgAb: <0.4  TG: <0.10     2016:  TSH: 0.08  TgAb: <0.4  T.30     2012:  TSH: 12.40  TGAB: 1.2  T.6    ENDO THYROID LABS-[facility] Latest Ref Rng & Units 2019   TSH 0.40 - 4.00 mU/L 0.04 (L)   T4 TOTAL 5.0 - 11.0 ug/dL    T4 FREE 0.76 - 1.46 ng/dL 1.38     ENDO THYROID LABS-[facility] Latest Ref Rng & Units 2018   TSH 0.40 - 4.00 mU/L 0.46 0.10 (L)   T4 TOTAL 5.0 - 11.0 ug/dL     T4 FREE 0.76 - 1.46 ng/dL  1.26     ENDO THYROID LABS-[facility] Latest Ref Rng & Units 3/6/2018   TSH 0.40 - 4.00 mU/L 0.07 (L)   T4 TOTAL 5.0 - 11.0 ug/dL    T4 FREE 0.76 - 1.46 ng/dL 1.69 (H)     !THYROID Latest Ref Rng & Units 2018 " 10/17/2017 8/3/2017   TSH 0.40 - 4.00 mU/L <0.01 (L) 2.58 5.31 (H)   T4 FREE 0.76 - 1.46 ng/dL 1.61 (H) 1.24 1.31     !THYROID Latest Ref Rng & Units 4/19/2017 2/23/2017 11/10/2016   TSH 0.40 - 4.00 mU/L 47.89 (H) 0.04 (L) 0.74   T4 FREE 0.76 - 1.46 ng/dL 1.29 1.80 (H) 1.30       ENDO THYROID LABS-[facility] Latest Ref Rng 6/7/2016   TSH 0.40 - 4.00 mU/L 0.08 (L)   T4 TOTAL 5.0 - 11.0 ug/dL    T4 FREE 0.76 - 1.46 ng/dL 1.50 (H)   FREE T3 2.3 - 4.2 pg/mL    TRIIODOTHYRONINE(T3) 60 - 181 ng/dL 112     ENDO THYROID LABS-[facility] Latest Ref Rng 5/19/2016 2/5/2016 11/6/2015   TSH 0.40 - 4.00 mU/L 0.16 (L) 0.37 (L) 0.06 (L)   T4 TOTAL 5.0 - 11.0 ug/dL      T4 FREE 0.76 - 1.46 ng/dL 1.42 1.24 1.51 (H)   FREE T3 2.3 - 4.2 pg/mL        ENDO THYROID LABS-[facility] Latest Ref Rng 6/26/2015 11/14/2014   TSH 0.40 - 4.00 mU/L 0.32 (L) 7.86 (H)   T4 TOTAL 5.0 - 11.0 ug/dL     T4 FREE 0.76 - 1.46 ng/dL 1.33 1.37   FREE T3 2.3 - 4.2 pg/mL       ENDO THYROID LABS-[facility] Latest Ref Rng 7/29/2014 4/3/2014   TSH 0.40 - 4.00 mU/L 3.67 7.63 (H)   T4 TOTAL 5.0 - 11.0 ug/dL     T4 FREE 0.76 - 1.46 ng/dL  1.39   FREE T3 2.3 - 4.2 pg/mL       Component      Latest Ref Rng & Units 10/8/2012 2/23/2017 8/3/2017   Vitamin D Deficiency screening      20 - 75 ug/L 18 (L) 23 28     !COMPREHENSIVE Latest Ref Rng & Units 8/31/2016 8/31/2016 9/1/2016   CALCIUM 8.5 - 10.1 mg/dL 7.7 (L) 9.0 8.6     !COMPREHENSIVE Latest Ref Rng & Units 11/10/2016 2/23/2017 8/3/2017   CALCIUM 8.5 - 10.1 mg/dL 8.2 (L) 9.2 7.8 (L)     !COMPREHENSIVE Latest Ref Rng & Units 10/17/2017   CALCIUM 8.5 - 10.1 mg/dL 8.3 (L)     NM THYROID UPTAKE/SCAN   Feb 4, 2011 2:47:00 PM      COMPARISON: None.     HISTORY: Hyperthyroidism.     TECHNIQUE:  The patient was given 173 uCi of I-123 orally, followed by  24-hour thyroid scan and radioiodine uptake evaluation.     FINDINGS:  The thyroid gland appears normal in size. 24-hour uptake  was calculated at 46.4%, which is above the normal range. Normal range  is  10-30% 24-hour uptake. Focal area of decreased uptake in the mid  left thyroid lobe may represent a cold thyroid nodule or cyst.     IMPRESSION:    1. Elevated 24-hour radioiodine uptake in the thyroid at 46.4%.  2. Possible cold nodule or cyst in the mid left thyroid lobe. Thyroid  ultrasound is recommended for further evaluation.    NUCLEAR MEDICINE I-131 SCAN    Aug 10, 2011 8:04:00 AM      TECHNIQUE: 78.7 mCi I-131 ablation scan. Five days post therapy  imaging performed today.     HISTORY: Thyroid cancer.     COMPARISON:   Nuclear Study: Thyroid uptake and scan 2/4/2011.  Other Relevant Studies: Ultrasound neck 2/17/2011.     FINDINGS: Intense radiotracer uptake at the left greater than right  neck at the thyroid level. No evidence for distant metastatic disease.     IMPRESSION: Intense radiotracer uptake localizing to the thyroidectomy  bed, left greater than right. This is consistent with residual thyroid  tissue. No distant metastatic disease identified.    ULTRASOUND THYROID  Feb 17, 2011      HISTORY: Hyperthyroidism. Thyroid nodule.      COMPARISON: Nuclear Medicine thyroid scan 2/4/2011.     FINDINGS: The thyroid gland is enlarged. The right lobe measures 5.4 x  1.6 x 2.1 cm. The left lobe measures 5.9 x 2.3 x 2.7 cm. There are 2  right thyroid nodules and 3 left thyroid nodules.  1. Right upper lobe, hypoechoic solid measuring 0.7 x 0.6 x 0.6 cm.  2. Right lower pole, solid measuring 0.7 x 0.5 x 0.6 cm.  3. Left upper pole, cystic and solid measuring 1.1 x 0.6 x 1.0 cm.  4. Left mid thyroid lobe, primarily solid with small cystic areas  measuring 2.7 x 1.9 x 2.1 cm.  5. Left lower pole, cystic and solid measuring 1.4 x 0.9 x 1.2 cm.     The primarily solid left mid thyroid nodule appears to correspond to  the cold nodule on thyroid uptake and scan.  IMPRESSION: Multinodular thyroid gland.    FNA thyroid nodule:  Copath Report       FNA-thyroid, left mid lobe nodule:   Suspicious for  "malignancy.  Suspicious for papillary carcinoma  Specimen Adequacy: Satisfactory for evaluation.           Surgical pathology:     SPECIMEN(S):  A: Thyroid, left lobe  B: Parathyroid gland, biopsy of right inferior  C: Thyroid, right lobe    FINAL DIAGNOSIS:  A. and C.  Thyroid, right and left lobes, total thyroidectomy -  Specimen/Procedure(s) and Laterality:   Right and left thyroid lobes,  total thyroidectomy.  Specimen Integrity:   Intact.  Specimen Size and Weight:   See Gross Description.  Histologic Tumor Type(s):   Papillary carcinoma, follicular variant with  focal papillary morphology.  Tumor Focality: Unifocal.  Tumor Laterality:   Left lobe.  Tumor Size(s):   Left lobe: 2.2 cm (greatest dimension).  Margins:   Close, but uninvolved.            Distance to closest margin, if negative:   Tumor 0.05 cm from  nearest paratracheal surface and 1.75 cm from nearest anterolateral  surface.  Tumor Capsular Invasion: Present.    Tumor Capsule: Partial.  Extrathyroidal Invasion:   Not identified.  Lymph-Vascular Invasion:   Not identified.  Lymph Nodes:   Two nodes without evidence of metastatic carcinoma (0/2;  one at isthmus and one adjacent to right lobe).  Pathologic Staging:   pT2, pN0, pM not applicable.  Additional Pathologic Findings:   Right thyroid lobe without evidence of  malignancy.  Background nodular hyperplasia and thyroiditis with  lymphoid follicles.  Left lobe with focal previous biopsy site changes.  No parathyroid tissue identified.  CAP Protocol Based on AJCC/UICC TNM, 7th edition; Protocol Effective  Date:  January 2010    B.  \"Parathyroid gland\", right inferior, biopsy - Thyroid tissue; no  parathyroid glandular tissue identified.    8/2016: left modified neck Dissection:  Copath Report      SPECIMEN(S):   A: Scar, left neck   B: Parathyroid gland, left inferior   C: Left central neck dissection, para and pretracheal lymph nodes   D: apical jugular lymph node   E: Left modified neck " dissection     FINAL DIAGNOSIS:   A. Skin, neck/scar, excision:   - Hypertrophic scar; benign.     B. Parathyroid gland, left inferior, biopsy:   - Parathyroid tissue present.     C. Left central neck dissection with para-and pretracheal lymph nodes:   - One lymph node; no evidence of malignancy (0/1).   - Thymus and parathyroid tissue present.     D. Lymph node, apical jugular, excision:   - One lymph node; no evidence of malignancy (0/1).     E. Left modified neck dissection:   - 27 lymph nodes identified (level II- 10, level III- 9, level IV- 5 and   lymph nodes associated with jugular vein- 3).   - One (of 27) lymph node positive for metastatic papillary thyroid   carcinoma.  1 cm in greatest diameter.  Negative for extranodal   involvement  (Level II).   - Jugular vein negative for tumor.        NUCLEAR MEDICINE THYROID WHOLE BODY SCAN I-123   4/20/2017 11:38 AM      TECHNIQUE:  The patient was given 1.8 mCi iodine 123 per oral on  4/19/2017. SPECT-CT with fusion was performed at the level of the neck  and chest.     HISTORY:  Malignant neoplasm of thyroid gland. Postprocedural  hypothyroidism.     COMPARISON:   Nuclear Study: None.     Other Relevant Studies: None.     FINDINGS:  Thyroidectomy. No suspicious focus of abnormal radiotracer  is seen at the thyroidectomy bed identified. Physiologic tracer uptake  seen at the salivary glands. There are several small subcentimeter  lymph nodes involving the bilateral neck without conspicuous focal  tracer uptake outside of the background tracer distribution. There  appears to be left neck postoperative change causing some  inconspicuity of the left sternocleidomastoid muscle. No convincing  worrisome airspace disease or mediastinal abnormality is seen.         IMPRESSION: No worrisome focal tracer uptake is identified to suggest  recurrent neoplasm or remote metastatic disease. Some postoperative  changes at the left neck noted, likely accounting for inconspicuity  of  the left sternocleidomastoid muscle. Small bilateral subcentimeter  neck lymph nodes noted.     Neck US 6/2018:  ULTRASOUND HEAD AND NECK SOFT TISSUE  6/4/2018 11:11 AM     HISTORY:  Papillary carcinoma, follicular variant (H).     COMPARISON: None.     FINDINGS:  No evidence for residual thyroid tissue in the  thyroidectomy bed. There is no evidence for cervical adenopathy  bilaterally by size or morphology.          IMPRESSION:  Negative soft tissue neck study.      All pertinent notes, labs, and images personally reviewed by me.     A/P  Ms.Nhu Mounika Nicole is a 30 year old here for the evaluation of thyroid cancer:    1. 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  It was 2.2 cm unifocal, left lobe tumor with no lymph node involvement.  S/p  total thyroidectomy in 2011 and 78.7 mCi of I-131 HERNANDEZ. No evidence for distant metastatic disease on post therapy scan.  2016- new lymph node s/p FNA with PTC with rising TG  S/p 8/2016- left modified neck radiation. 1/27 lymph node + ( level2). Postop course complicated by hypocalcemia.  Delivered 1/2017.   Follow up I123 WBS 4/2017- no mets  TG suppressed as above  Plan: continue to monitor. Annual imaging and TG. (May 2019)  2.  Hypothyroidism (postoperative following total thyroidectomy for thyroid cancer):  Currently taking levothyroxine 150 mcg X 6 days a week and 225 mcg X one day a week.    Taking generic levothyroxine.  Reports compliance.  Labs as noted above with TSH 0.04 with normal FT4  Clinically looks euthyroid.  Plan:  Decrease dose of levothyroxine to 150 mcg/day  Labs in 2 months or sooner if you get pregnant.  Please make a lab appointment for blood work and follow up clinic appointment in 1 week after that to discuss results.      Discussed s/s of hypothyroidism and hyperthyroidism to watch for.  The patient indicates understanding of these issues and agrees  with the plan.      3.  History of vitamin D deficiency and hypocalcemia, resolved.  Continue to monitor.    More than 50% of the time spent with Ms. Nicole on counseling / coordinating her care.      Follow-up:  2-3 months.    Vandana Costello MD  Endocrinology   [facility]  September 25, 2018  CC: Evan Zamora           Again, thank you for allowing me to participate in the care of your patient.        Sincerely,        Vandana Costello MD

## 2019-02-28 NOTE — PROGRESS NOTES
Name: Aleyda Nicole  Seen for f/u of papillary thyroid cancer and postoperative hypothyroidism    Chief Complaint   Patient presents with     Thyroid Disease     HPI:  Aleyda Nicole is a 33 year old female who presents for the evaluation of:      1.  Papillary thyroid cancer:  Thyroid nodule was noticed in February 2011 which was followed by thyroid nodule biopsy which showed suspicious for papillary thyroid cancer.  She underwent total thyroidectomy 3/2011 and pathology showed papillary thyroid cancer with follicular pattern.  Tumor was about 2.2 cm on the left lobe.  No lymph node involvement.  S/p 78.7 mCi of I131 HERNANDEZ  8/2011. No evidence for distant metastatic disease on post therapy scan.  Neck US 6/2016- showed new lymph node in neck along with rising Tg levels  S/p FNA lymph node: 6/2016- c/w papillary thyroid cancer    8/2016: underwent left modified neck dissection.  1/27 lymph node positive one left modified neck dissection.  1 cm in greatest diameter.  Negative for external involvement at level II.    Tg decreased post left neck dissection from 1.3 to < 0.1    Follow up I123 4/2017: no mets.    Postop course was complicated by hypocalcemia and was started on calcium supplement.  Taking calcium- tries to take 2 tabs per day + 1 glass of milk daily.    Delivered baby 1/2017. No longer breast feeding    She was followed by endocrinology before and then lost to follow-up with endocrinology in 2012.  No history of neck radiation prior to diagnosis of thyroid cancer.  No family history of thyroid cancer.    Thyroglobulin levels appear stable and suppressed  Neck ultrasound June 2018 did not show any evidence of metastases.    2.  Hypothyroidism (postoperative):  Was started on levothyroxine following thyroidectomy.  Currently she is taking levothyroxine .    Currently taking levothyroxine 150 mcg X 6 days a week and 225 mcg X one day a week.  Taking generic levothyroxine.  Reports compliance.    Denies  hyperthyroid symptoms.  Planning one more pregnancy in future.    Wt Readings from Last 2 Encounters:   19 74.6 kg (164 lb 8 oz)   10/24/18 73.8 kg (162 lb 12.8 oz)     No diarrhea, tremors. No palpitations.  No difficulty with swallowing, no pain during swallowing.  Missed 2 periods. Home pregnancy test was neg.  Mood- some irritability  Weight: stable.  Wt Readings from Last 2 Encounters:   19 74.6 kg (164 lb 8 oz)   10/24/18 73.8 kg (162 lb 12.8 oz)         PMH/PSH:  Past Medical History:   Diagnosis Date     Gestational diabetes     GDM diet controlled     Gestational diabetes 2013     Hypocalcemia 2016     Influenza A      Papillary thyroid carcinoma     Papillary carcinoma, follicular variant with     Pneumonia     LLL     PONV (postoperative nausea and vomiting)       labor      Uncomplicated asthma      Past Surgical History:   Procedure Laterality Date      SECTION  10/26/2013    Procedure:  SECTION;  primary  section;  Surgeon: Rodney Mckee MD;  [facility]      SECTION N/A 2017    Procedure:  SECTION;  Surgeon: Hakeem Combs MD;  Location: [facility]     DISSECTION RADICAL NECK Left 2016    Procedure: DISSECTION RADICAL NECK;  Surgeon: Vy Theodore MD;  Location: [facility]     DISSECTION RADICAL NECK MODIFIED Left 2016    Procedure: DISSECTION RADICAL NECK MODIFIED;  Surgeon: Luis Brown MD;  Location: [facility] OR     THYROIDECTOMY      Total, for cancer.      Family Hx:  Family History   Problem Relation Age of Onset     Diabetes Mother      Cerebrovascular Disease Mother      Hypertension Mother      Diabetes Father 50     Hypertension Father      Genitourinary Problems Father         kidney disease     Coronary Artery Disease Father      Diabetes Brother      Thyroid disease: No        Social Hx:  Social History     Socioeconomic History     Marital status:      Spouse name: Brock      Number of children: 2     Years of education: Not on file     Highest education level: Not on file   Occupational History     Occupation:       Employer: [employer]   Social Needs     Financial resource strain: Not on file     Food insecurity:     Worry: Not on file     Inability: Not on file     Transportation needs:     Medical: Not on file     Non-medical: Not on file   Tobacco Use     Smoking status: Never Smoker     Smokeless tobacco: Never Used   Substance and Sexual Activity     Alcohol use: No     Alcohol/week: 0.0 oz     Drug use: No     Sexual activity: Yes     Partners: Male   Lifestyle     Physical activity:     Days per week: Not on file     Minutes per session: Not on file     Stress: Not on file   Relationships     Social connections:     Talks on phone: Not on file     Gets together: Not on file     Attends Denominational service: Not on file     Active member of club or organization: Not on file     Attends meetings of clubs or organizations: Not on file     Relationship status: Not on file     Intimate partner violence:     Fear of current or ex partner: Not on file     Emotionally abused: Not on file     Physically abused: Not on file     Forced sexual activity: Not on file   Other Topics Concern     Parent/sibling w/ CABG, MI or angioplasty before 65F 55M? Not Asked      Service No     Blood Transfusions No     Caffeine Concern No     Occupational Exposure Not Asked     Hobby Hazards No     Sleep Concern No     Stress Concern No     Weight Concern Yes     Special Diet No     Back Care No     Exercise No     Bike Helmet No     Seat Belt Yes     Self-Exams No   Social History Narrative    Pt lives with  and father.          MEDICATIONS:  has a current medication list which includes the following prescription(s): albuterol, clobetasol, fluticasone, ibuprofen, levothyroxine, and vitamin d3.    ROS     ROS: 10 point ROS neg other than the symptoms noted above in the  "HPI.      Physical Exam   VS: /68 (BP Location: Left arm, Patient Position: Chair, Cuff Size: Adult Regular)   Pulse 86   Temp 98.2  F (36.8  C) (Oral)   Ht 1.549 m (5' 1\")   Wt 74.6 kg (164 lb 8 oz)   SpO2 98%   Breastfeeding? No   BMI 31.08 kg/m    GENERAL: AXOX3, NAD, well dressed, answering questions appropriately, appears stated age.  HEENT: OP clear, no LAD, no TM, non-tender, no exopthalmous, no proptosis, EOMI, no lig lag, no retraction  Thyroid: Hypertrophied scar, will healed thyroidectomy scar present.  Hypertrophied scar on lateral side of neck. No lymphadenopathy.  CV: RRR, no rubs, gallops, no murmurs  LUNGS: CTAB, no wheezes, rales, or ronchi  EXTREMITIES: no edema, +pulses, no rashes, no lesions  NEUROLOGY: CN grossly intact, no tremors  MSK: grossly intact  SKIN: no rashes, no lesions  PSYCH: normal affect and mood      LABS:  3/6/2018:  TSH: 0.07  TgAB: <0.4  TG: <0.10    2017:  TSH : 47.89 (post thyrogen)  TgAB: <0.4  TG: <0.10    17:  TSH: 0.04  TgAB: <0.4  TG: <0.10    11/10/16:  TSH 0.74  TgAb: <0.4  TG: <0.10     2016:  TSH: 0.08  TgAb: <0.4  T.30     2012:  TSH: 12.40  TGAB: 1.2  T.6    ENDO THYROID LABS-[facility] Latest Ref Rng & Units 2019   TSH 0.40 - 4.00 mU/L 0.04 (L)   T4 TOTAL 5.0 - 11.0 ug/dL    T4 FREE 0.76 - 1.46 ng/dL 1.38     ENDO THYROID LABS-[facility] Latest Ref Rng & Units 2018   TSH 0.40 - 4.00 mU/L 0.46 0.10 (L)   T4 TOTAL 5.0 - 11.0 ug/dL     T4 FREE 0.76 - 1.46 ng/dL  1.26     ENDO THYROID LABS-[facility] Latest Ref Rng & Units 3/6/2018   TSH 0.40 - 4.00 mU/L 0.07 (L)   T4 TOTAL 5.0 - 11.0 ug/dL    T4 FREE 0.76 - 1.46 ng/dL 1.69 (H)     !THYROID Latest Ref Rng & Units 2018 10/17/2017 8/3/2017   TSH 0.40 - 4.00 mU/L <0.01 (L) 2.58 5.31 (H)   T4 FREE 0.76 - 1.46 ng/dL 1.61 (H) 1.24 1.31     !THYROID Latest Ref Rng & Units 2017 2017 11/10/2016   TSH 0.40 - 4.00 mU/L 47.89 (H) 0.04 (L) 0.74   T4 FREE 0.76 - 1.46 ng/dL 1.29 1.80 (H) " 1.30       ENDO THYROID LABS-[facility] Latest Ref Rng 6/7/2016   TSH 0.40 - 4.00 mU/L 0.08 (L)   T4 TOTAL 5.0 - 11.0 ug/dL    T4 FREE 0.76 - 1.46 ng/dL 1.50 (H)   FREE T3 2.3 - 4.2 pg/mL    TRIIODOTHYRONINE(T3) 60 - 181 ng/dL 112     ENDO THYROID LABS-[facility] Latest Ref Rng 5/19/2016 2/5/2016 11/6/2015   TSH 0.40 - 4.00 mU/L 0.16 (L) 0.37 (L) 0.06 (L)   T4 TOTAL 5.0 - 11.0 ug/dL      T4 FREE 0.76 - 1.46 ng/dL 1.42 1.24 1.51 (H)   FREE T3 2.3 - 4.2 pg/mL        ENDO THYROID LABS-[facility] Latest Ref Rng 6/26/2015 11/14/2014   TSH 0.40 - 4.00 mU/L 0.32 (L) 7.86 (H)   T4 TOTAL 5.0 - 11.0 ug/dL     T4 FREE 0.76 - 1.46 ng/dL 1.33 1.37   FREE T3 2.3 - 4.2 pg/mL       ENDO THYROID LABS-[facility] Latest Ref Rng 7/29/2014 4/3/2014   TSH 0.40 - 4.00 mU/L 3.67 7.63 (H)   T4 TOTAL 5.0 - 11.0 ug/dL     T4 FREE 0.76 - 1.46 ng/dL  1.39   FREE T3 2.3 - 4.2 pg/mL       Component      Latest Ref Rng & Units 10/8/2012 2/23/2017 8/3/2017   Vitamin D Deficiency screening      20 - 75 ug/L 18 (L) 23 28     !COMPREHENSIVE Latest Ref Rng & Units 8/31/2016 8/31/2016 9/1/2016   CALCIUM 8.5 - 10.1 mg/dL 7.7 (L) 9.0 8.6     !COMPREHENSIVE Latest Ref Rng & Units 11/10/2016 2/23/2017 8/3/2017   CALCIUM 8.5 - 10.1 mg/dL 8.2 (L) 9.2 7.8 (L)     !COMPREHENSIVE Latest Ref Rng & Units 10/17/2017   CALCIUM 8.5 - 10.1 mg/dL 8.3 (L)     NM THYROID UPTAKE/SCAN   Feb 4, 2011 2:47:00 PM      COMPARISON: None.     HISTORY: Hyperthyroidism.     TECHNIQUE:  The patient was given 173 uCi of I-123 orally, followed by  24-hour thyroid scan and radioiodine uptake evaluation.     FINDINGS:  The thyroid gland appears normal in size. 24-hour uptake  was calculated at 46.4%, which is above the normal range. Normal range  is 10-30% 24-hour uptake. Focal area of decreased uptake in the mid  left thyroid lobe may represent a cold thyroid nodule or cyst.     IMPRESSION:    1. Elevated 24-hour radioiodine uptake in the thyroid at 46.4%.  2. Possible cold nodule or cyst in the mid left thyroid  lobe. Thyroid  ultrasound is recommended for further evaluation.    NUCLEAR MEDICINE I-131 SCAN    Aug 10, 2011 8:04:00 AM      TECHNIQUE: 78.7 mCi I-131 ablation scan. Five days post therapy  imaging performed today.     HISTORY: Thyroid cancer.     COMPARISON:   Nuclear Study: Thyroid uptake and scan 2/4/2011.  Other Relevant Studies: Ultrasound neck 2/17/2011.     FINDINGS: Intense radiotracer uptake at the left greater than right  neck at the thyroid level. No evidence for distant metastatic disease.     IMPRESSION: Intense radiotracer uptake localizing to the thyroidectomy  bed, left greater than right. This is consistent with residual thyroid  tissue. No distant metastatic disease identified.    ULTRASOUND THYROID  Feb 17, 2011      HISTORY: Hyperthyroidism. Thyroid nodule.      COMPARISON: Nuclear Medicine thyroid scan 2/4/2011.     FINDINGS: The thyroid gland is enlarged. The right lobe measures 5.4 x  1.6 x 2.1 cm. The left lobe measures 5.9 x 2.3 x 2.7 cm. There are 2  right thyroid nodules and 3 left thyroid nodules.  1. Right upper lobe, hypoechoic solid measuring 0.7 x 0.6 x 0.6 cm.  2. Right lower pole, solid measuring 0.7 x 0.5 x 0.6 cm.  3. Left upper pole, cystic and solid measuring 1.1 x 0.6 x 1.0 cm.  4. Left mid thyroid lobe, primarily solid with small cystic areas  measuring 2.7 x 1.9 x 2.1 cm.  5. Left lower pole, cystic and solid measuring 1.4 x 0.9 x 1.2 cm.     The primarily solid left mid thyroid nodule appears to correspond to  the cold nodule on thyroid uptake and scan.  IMPRESSION: Multinodular thyroid gland.    FNA thyroid nodule:  Copath Report       FNA-thyroid, left mid lobe nodule:   Suspicious for malignancy.  Suspicious for papillary carcinoma  Specimen Adequacy: Satisfactory for evaluation.           Surgical pathology:     SPECIMEN(S):  A: Thyroid, left lobe  B: Parathyroid gland, biopsy of right inferior  C: Thyroid, right lobe    FINAL DIAGNOSIS:  A. and C.  Thyroid, right and  "left lobes, total thyroidectomy -  Specimen/Procedure(s) and Laterality:   Right and left thyroid lobes,  total thyroidectomy.  Specimen Integrity:   Intact.  Specimen Size and Weight:   See Gross Description.  Histologic Tumor Type(s):   Papillary carcinoma, follicular variant with  focal papillary morphology.  Tumor Focality: Unifocal.  Tumor Laterality:   Left lobe.  Tumor Size(s):   Left lobe: 2.2 cm (greatest dimension).  Margins:   Close, but uninvolved.            Distance to closest margin, if negative:   Tumor 0.05 cm from  nearest paratracheal surface and 1.75 cm from nearest anterolateral  surface.  Tumor Capsular Invasion: Present.    Tumor Capsule: Partial.  Extrathyroidal Invasion:   Not identified.  Lymph-Vascular Invasion:   Not identified.  Lymph Nodes:   Two nodes without evidence of metastatic carcinoma (0/2;  one at isthmus and one adjacent to right lobe).  Pathologic Staging:   pT2, pN0, pM not applicable.  Additional Pathologic Findings:   Right thyroid lobe without evidence of  malignancy.  Background nodular hyperplasia and thyroiditis with  lymphoid follicles.  Left lobe with focal previous biopsy site changes.  No parathyroid tissue identified.  CAP Protocol Based on AJCC/UICC TNM, 7th edition; Protocol Effective  Date:  January 2010    B.  \"Parathyroid gland\", right inferior, biopsy - Thyroid tissue; no  parathyroid glandular tissue identified.    8/2016: left modified neck Dissection:  Copath Report      SPECIMEN(S):   A: Scar, left neck   B: Parathyroid gland, left inferior   C: Left central neck dissection, para and pretracheal lymph nodes   D: apical jugular lymph node   E: Left modified neck dissection     FINAL DIAGNOSIS:   A. Skin, neck/scar, excision:   - Hypertrophic scar; benign.     B. Parathyroid gland, left inferior, biopsy:   - Parathyroid tissue present.     C. Left central neck dissection with para-and pretracheal lymph nodes:   - One lymph node; no evidence of malignancy " (0/1).   - Thymus and parathyroid tissue present.     D. Lymph node, apical jugular, excision:   - One lymph node; no evidence of malignancy (0/1).     E. Left modified neck dissection:   - 27 lymph nodes identified (level II- 10, level III- 9, level IV- 5 and   lymph nodes associated with jugular vein- 3).   - One (of 27) lymph node positive for metastatic papillary thyroid   carcinoma.  1 cm in greatest diameter.  Negative for extranodal   involvement  (Level II).   - Jugular vein negative for tumor.        NUCLEAR MEDICINE THYROID WHOLE BODY SCAN I-123   4/20/2017 11:38 AM      TECHNIQUE:  The patient was given 1.8 mCi iodine 123 per oral on  4/19/2017. SPECT-CT with fusion was performed at the level of the neck  and chest.     HISTORY:  Malignant neoplasm of thyroid gland. Postprocedural  hypothyroidism.     COMPARISON:   Nuclear Study: None.     Other Relevant Studies: None.     FINDINGS:  Thyroidectomy. No suspicious focus of abnormal radiotracer  is seen at the thyroidectomy bed identified. Physiologic tracer uptake  seen at the salivary glands. There are several small subcentimeter  lymph nodes involving the bilateral neck without conspicuous focal  tracer uptake outside of the background tracer distribution. There  appears to be left neck postoperative change causing some  inconspicuity of the left sternocleidomastoid muscle. No convincing  worrisome airspace disease or mediastinal abnormality is seen.         IMPRESSION: No worrisome focal tracer uptake is identified to suggest  recurrent neoplasm or remote metastatic disease. Some postoperative  changes at the left neck noted, likely accounting for inconspicuity of  the left sternocleidomastoid muscle. Small bilateral subcentimeter  neck lymph nodes noted.     Neck US 6/2018:  ULTRASOUND HEAD AND NECK SOFT TISSUE  6/4/2018 11:11 AM     HISTORY:  Papillary carcinoma, follicular variant (H).     COMPARISON: None.     FINDINGS:  No evidence for residual  thyroid tissue in the  thyroidectomy bed. There is no evidence for cervical adenopathy  bilaterally by size or morphology.          IMPRESSION:  Negative soft tissue neck study.      All pertinent notes, labs, and images personally reviewed by me.     A/P  Ms.Nhu Mounika Nicole is a 30 year old here for the evaluation of thyroid cancer:    1. 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  It was 2.2 cm unifocal, left lobe tumor with no lymph node involvement.  S/p  total thyroidectomy in 2011 and 78.7 mCi of I-131 HERNANDEZ. No evidence for distant metastatic disease on post therapy scan.  2016- new lymph node s/p FNA with PTC with rising TG  S/p 8/2016- left modified neck radiation. 1/27 lymph node + ( level2). Postop course complicated by hypocalcemia.  Delivered 1/2017.   Follow up I123 WBS 4/2017- no mets  TG suppressed as above  Plan: continue to monitor. Annual imaging and TG. (May 2019)  2.  Hypothyroidism (postoperative following total thyroidectomy for thyroid cancer):  Currently taking levothyroxine 150 mcg X 6 days a week and 225 mcg X one day a week.    Taking generic levothyroxine.  Reports compliance.  Labs as noted above with TSH 0.04 with normal FT4  Clinically looks euthyroid.  Plan:  Decrease dose of levothyroxine to 150 mcg/day  Labs in 2 months or sooner if you get pregnant.  Please make a lab appointment for blood work and follow up clinic appointment in 1 week after that to discuss results.      Discussed s/s of hypothyroidism and hyperthyroidism to watch for.  The patient indicates understanding of these issues and agrees with the plan.      3.  History of vitamin D deficiency and hypocalcemia, resolved.  Continue to monitor.    More than 50% of the time spent with Ms. Nicole on counseling / coordinating her care.      Follow-up:  2-3 months.    Vandana Costello MD  Endocrinology   [place]  [place]  September 25, 2018  CC: Evan Zamora

## 2019-03-12 ENCOUNTER — OFFICE VISIT (OUTPATIENT)
Dept: OBGYN | Facility: CLINIC | Age: 34
End: 2019-03-12
Payer: COMMERCIAL

## 2019-03-12 VITALS — BODY MASS INDEX: 31.55 KG/M2 | WEIGHT: 167 LBS | DIASTOLIC BLOOD PRESSURE: 72 MMHG | SYSTOLIC BLOOD PRESSURE: 102 MMHG

## 2019-03-12 DIAGNOSIS — N97.0 FEMALE INFERTILITY ASSOCIATED WITH ANOVULATION: Primary | ICD-10-CM

## 2019-03-12 DIAGNOSIS — E89.0 POSTOPERATIVE HYPOTHYROIDISM: ICD-10-CM

## 2019-03-12 PROCEDURE — 99214 OFFICE O/P EST MOD 30 MIN: CPT | Performed by: OBSTETRICS & GYNECOLOGY

## 2019-03-12 NOTE — NURSING NOTE
"Chief Complaint   Patient presents with     Consult     family planning       Initial /72   Wt 75.8 kg (167 lb)   LMP 2019   BMI 31.55 kg/m   Estimated body mass index is 31.55 kg/m  as calculated from the following:    Height as of 19: 1.549 m (5' 1\").    Weight as of this encounter: 75.8 kg (167 lb).  BP completed using cuff size: regular    Questioned patient about current smoking habits.  Pt. has never smoked.          The following HM Due: NONE      The following patient reported/Care Every where data was sent to:  P ABSTRACT QUALITY INITIATIVES [[id number]]        Sarah Severino, [facility]                 "

## 2019-03-12 NOTE — PROGRESS NOTES
SUBJECTIVE:  Aleyda Nicole is a 33 year old,  female,  P2  who presents for infertility. No contraception x 1 year. Periods are irregular x 6 months and rare,30 - 60 days. Spotting.  Dysmenorrhea none.  Cyclic symptoms include  none.  Current contraception: none     Family History         Family History   Problem Relation Age of Onset     Diabetes Mother       Cerebrovascular Disease Mother       Hypertension Mother       Diabetes Father 50     Hypertension Father       Genitourinary Problems Father           kidney disease     Coronary Artery Disease Father       Diabetes Brother              Past Medical History   Past Medical History:   Diagnosis Date     Gestational diabetes       GDM diet controlled     Gestational diabetes 2013     Hypocalcemia 2016     Influenza A      Papillary thyroid carcinoma      Papillary carcinoma, follicular variant with     Pneumonia      LLL     PONV (postoperative nausea and vomiting)        labor       Uncomplicated asthma                                                Past Surgical History         Past Surgical History:   Procedure Laterality Date      SECTION   10/26/2013     Procedure:  SECTION;  primary  section;  Surgeon: Rodney Mckee MD;  [facility]      SECTION N/A 2017     Procedure:  SECTION;  Surgeon: Hakeem Combs MD;  Location: [facility]     DISSECTION RADICAL NECK Left 2016     Procedure: DISSECTION RADICAL NECK;  Surgeon: Vy Theodore MD;  Location: [facility] OR     DISSECTION RADICAL NECK MODIFIED Left 2016     Procedure: DISSECTION RADICAL NECK MODIFIED;  Surgeon: Luis Brown MD;  Location: [facility] OR     THYROIDECTOMY        Total, for cancer.                 Current Outpatient Medications   Medication     clobetasol (TEMOVATE) 0.05 % cream     ibuprofen (ADVIL/MOTRIN) 600 MG tablet     levothyroxine (SYNTHROID/LEVOTHROID) 150 MCG tablet     Prenatal  Vit-Fe Fumarate-FA (PRENATAL PO)     VITAMIN D3 1000 units tablet     albuterol (PROAIR HFA/PROVENTIL HFA/VENTOLIN HFA) 108 (90 BASE) MCG/ACT Inhaler     fluticasone (FLOVENT HFA) 44 MCG/ACT Inhaler      No current facility-administered medications for this visit.                  Allergies   Allergen Reactions     No Known Drug Allergies                                                     Social History            Tobacco Use     Smoking status: Never Smoker     Smokeless tobacco: Never Used   Substance Use Topics     Alcohol use: No       Alcohol/week: 0.0 oz                           Review of Systems     CONSTITUTIONAL:NEGATIVE  EYES: NEGATIVE  ENT/MOUTH: NEGATIVE  RESP: NEGATIVE  CV: NEGATIVE  GI: NEGATIVE  : NEGATIVE  MUSCULOSKELATAL: NEGATIVE  INTEGUMENTARY/SKIN: NEGATIVE  BREAST: NEGATIVE  NEURO: NEGATIVE.        OBJECTIVE:  /72   Wt 75.8 kg (167 lb)   LMP 03/12/2019   BMI 31.55 kg/m    General appearance: Healthy. No distress  Skin: Normal.  Mental Status: cooperative, normal affect, no gross thought process defects.  Extremities: Normal             ASSESSMENT:  Secondary infertility     PLAN:     1) Chart reviewed. Post-operative hypothyroidism. Probable irregular ovulation as a result. Negative home pregnancy test per patient report. Trying to stabilize thyroid levels would probably result in spontaneous ovulation.   Prometrium followed by clomid is alternative method. Discussed possible side-effects of clomid. Pt undecided. Total time spent was 25 minutes. 25 minutes of face to face time spent counseling and or coordination of care regarding above .

## 2019-03-15 ENCOUNTER — TELEPHONE (OUTPATIENT)
Dept: OBGYN | Facility: CLINIC | Age: 34
End: 2019-03-15

## 2019-03-15 DIAGNOSIS — N97.0 FEMALE INFERTILITY ASSOCIATED WITH ANOVULATION: Primary | ICD-10-CM

## 2019-03-15 NOTE — TELEPHONE ENCOUNTER
Patient has decided that she wants to go ahead with starting the Fertility meds they discussed at her previous appt. Please call it into her pharmacy or leave her a message on her cell phone.

## 2019-03-15 NOTE — TELEPHONE ENCOUNTER
"Per OV notes:    \"PLAN:     1) Chart reviewed. Post-operative hypothyroidism. Probable irregular ovulation as a result. Negative home pregnancy test per patient report. Trying to stabilize thyroid levels would probably result in spontaneous ovulation.   Prometrium followed by clomid is alternative method. Discussed possible side-effects of clomid. Pt undecided.\"      Please send rx to pharmacy.        Karlene Machado RN    "

## 2019-03-26 RX ORDER — CLOMIPHENE CITRATE 50 MG/1
50 TABLET ORAL DAILY
Qty: 5 TABLET | Refills: 1 | Status: SHIPPED | OUTPATIENT
Start: 2019-03-26 | End: 2019-07-01

## 2019-06-12 DIAGNOSIS — E89.0 POSTOPERATIVE HYPOTHYROIDISM: ICD-10-CM

## 2019-06-12 DIAGNOSIS — C73 PAPILLARY CARCINOMA, FOLLICULAR VARIANT (H): ICD-10-CM

## 2019-06-12 LAB
T4 FREE SERPL-MCNC: 1.47 NG/DL (ref 0.76–1.46)
TSH SERPL DL<=0.005 MIU/L-ACNC: 0.05 MU/L (ref 0.4–4)

## 2019-06-12 PROCEDURE — 84432 ASSAY OF THYROGLOBULIN: CPT | Mod: 90 | Performed by: INTERNAL MEDICINE

## 2019-06-12 PROCEDURE — 36415 COLL VENOUS BLD VENIPUNCTURE: CPT | Performed by: INTERNAL MEDICINE

## 2019-06-12 PROCEDURE — 84439 ASSAY OF FREE THYROXINE: CPT | Performed by: INTERNAL MEDICINE

## 2019-06-12 PROCEDURE — 86800 THYROGLOBULIN ANTIBODY: CPT | Mod: 90 | Performed by: INTERNAL MEDICINE

## 2019-06-12 PROCEDURE — 99000 SPECIMEN HANDLING OFFICE-LAB: CPT | Performed by: INTERNAL MEDICINE

## 2019-06-12 PROCEDURE — 00000344 ZZHCL STATISTIC REMEASURE THYROGLOBULIN: Mod: 90 | Performed by: INTERNAL MEDICINE

## 2019-06-12 PROCEDURE — 84443 ASSAY THYROID STIM HORMONE: CPT | Performed by: INTERNAL MEDICINE

## 2019-06-13 ENCOUNTER — TELEPHONE (OUTPATIENT)
Dept: ENDOCRINOLOGY | Facility: CLINIC | Age: 34
End: 2019-06-13

## 2019-06-13 NOTE — TELEPHONE ENCOUNTER
Placed on cancel list.    Letter with labs sent to antoni Lafleur, SAVI  [facility]  [place]

## 2019-06-13 NOTE — LETTER
[facility]  [address], [address]  [place] [address]  [phone]        June 13, 2019    Aleyda Brionesuyen  [address] [id number]            Dear Ms. Aleyda Nicole:      The results of your recent labs are enclosed.  Thyroglobulin levels are pending.  Please follow up in clinic in 1-2 weeks to discuss lab and next step.  If you have any further questions or problems, please contact our office.    Sincerely,      Dr. Vandana Costello    Results for orders placed or performed in visit on 06/12/19   T4 free   Result Value Ref Range    T4 Free 1.47 (H) 0.76 - 1.46 ng/dL   TSH   Result Value Ref Range    TSH 0.05 (L) 0.40 - 4.00 mU/L

## 2019-06-13 NOTE — TELEPHONE ENCOUNTER
ENDO THYROID LABS-[facility] Latest Ref Rng & Units 6/12/2019 2/26/2019   TSH 0.40 - 4.00 mU/L 0.05 (L) 0.04 (L)   T4 TOTAL 5.0 - 11.0 ug/dL     T4 FREE 0.76 - 1.46 ng/dL 1.47 (H) 1.38     History of thyroid cancer  Thyroglobulin levels are pending  Please asked patient to follow-up in clinic in 1-2 weeks to discuss labs and next step  Please help schedule  If there is any cancellation or openings.

## 2019-06-17 ENCOUNTER — TELEPHONE (OUTPATIENT)
Dept: ENDOCRINOLOGY | Facility: CLINIC | Age: 34
End: 2019-06-17

## 2019-06-17 NOTE — TELEPHONE ENCOUNTER
Offer 06/27 at 1130 in [place].    I left a message for the patient to return our call.     Valentina Lafleur CMA  [facility]  [place]

## 2019-06-18 NOTE — TELEPHONE ENCOUNTER
Letter sent 06/13/19.    Valentina Lafleur [facility]  [place]

## 2019-06-18 NOTE — TELEPHONE ENCOUNTER
Spot was taken, will re access.    Valentina Lafleur, SAVI  [facility]  [place]

## 2019-06-18 NOTE — TELEPHONE ENCOUNTER
I left a message for the patient to return our call.     Valentina Lafleur, SAVI  [facility]  [place]

## 2019-06-18 NOTE — TELEPHONE ENCOUNTER
History of thyroid cancer  Thyroglobulin levels are pending  Please asked patient to follow-up in clinic in 1-2 weeks to discuss labs and next step  Please help schedule  If there is any cancellation or openings.    Results for orders placed or performed in visit on 06/12/19   T4 free   Result Value Ref Range    T4 Free 1.47 (H) 0.76 - 1.46 ng/dL   TSH   Result Value Ref Range    TSH 0.05 (L) 0.40 - 4.00 mU/L     Patient is not returning my call.    Valentina Lafleur, SAVI  [facility]  [place]

## 2019-06-19 LAB — LAB SCANNED RESULT: NORMAL

## 2019-07-01 ENCOUNTER — OFFICE VISIT (OUTPATIENT)
Dept: INTERNAL MEDICINE | Facility: CLINIC | Age: 34
End: 2019-07-01
Payer: COMMERCIAL

## 2019-07-01 VITALS
WEIGHT: 163 LBS | SYSTOLIC BLOOD PRESSURE: 132 MMHG | BODY MASS INDEX: 30.8 KG/M2 | RESPIRATION RATE: 16 BRPM | TEMPERATURE: 98.6 F | DIASTOLIC BLOOD PRESSURE: 74 MMHG | HEART RATE: 85 BPM | OXYGEN SATURATION: 96 %

## 2019-07-01 DIAGNOSIS — J45.30 MILD PERSISTENT ASTHMA WITHOUT COMPLICATION: ICD-10-CM

## 2019-07-01 PROCEDURE — 99214 OFFICE O/P EST MOD 30 MIN: CPT | Performed by: INTERNAL MEDICINE

## 2019-07-01 RX ORDER — ALBUTEROL SULFATE 90 UG/1
2 AEROSOL, METERED RESPIRATORY (INHALATION) EVERY 4 HOURS PRN
Qty: 1 INHALER | Refills: 11 | Status: SHIPPED | OUTPATIENT
Start: 2019-07-01 | End: 2020-10-30

## 2019-07-01 RX ORDER — FLUTICASONE PROPIONATE 44 UG/1
1 AEROSOL, METERED RESPIRATORY (INHALATION) 2 TIMES DAILY
Qty: 1 INHALER | Refills: 11 | Status: SHIPPED | OUTPATIENT
Start: 2019-07-01 | End: 2019-10-23

## 2019-07-01 ASSESSMENT — ASTHMA QUESTIONNAIRES
QUESTION_5 LAST FOUR WEEKS HOW WOULD YOU RATE YOUR ASTHMA CONTROL: POORLY CONTROLLED
QUESTION_2 LAST FOUR WEEKS HOW OFTEN HAVE YOU HAD SHORTNESS OF BREATH: ONCE OR TWICE A WEEK
QUESTION_1 LAST FOUR WEEKS HOW MUCH OF THE TIME DID YOUR ASTHMA KEEP YOU FROM GETTING AS MUCH DONE AT WORK, SCHOOL OR AT HOME: MOST OF THE TIME
QUESTION_3 LAST FOUR WEEKS HOW OFTEN DID YOUR ASTHMA SYMPTOMS (WHEEZING, COUGHING, SHORTNESS OF BREATH, CHEST TIGHTNESS OR PAIN) WAKE YOU UP AT NIGHT OR EARLIER THAN USUAL IN THE MORNING: FOUR OR MORE NIGHTS A WEEK
QUESTION_4 LAST FOUR WEEKS HOW OFTEN HAVE YOU USED YOUR RESCUE INHALER OR NEBULIZER MEDICATION (SUCH AS ALBUTEROL): NOT AT ALL
ACT_TOTALSCORE: 14

## 2019-07-01 NOTE — PATIENT INSTRUCTIONS
try using the following nasal steroids available over the counter:  -fluticasone nasal spray     These need to be taken daily in order to work

## 2019-07-01 NOTE — PROGRESS NOTES
Subjective     Aleyda Mounika Nicole is a 33 year old female who presents to clinic today for the following health issues:    HPI     Asthma Follow-Up    Was ACT completed today?    Yes    ACT Total Scores 7/1/2019   ACT TOTAL SCORE (Goal Greater than or Equal to 20) 14   In the past 12 months, how many times did you visit the emergency room for your asthma without being admitted to the hospital? 0   In the past 12 months, how many times were you hospitalized overnight because of your asthma? 0       How many days per week do you miss taking your asthma controller medication?  I do not have an asthma controller medication    Please describe any recent triggers for your asthma: strong odors and fumes and laying down    Have you had any Emergency Room Visits, Urgent Care Visits, or Hospital Admissions since your last office visit?  No              Reviewed and updated as needed this visit by Provider         Review of Systems   ROS COMP: Constitutional, HEENT, cardiovascular, pulmonary, gi and gu systems are negative, except as otherwise noted.      Objective    /74   Pulse 85   Temp 98.6  F (37  C) (Temporal)   Resp 16   Wt 73.9 kg (163 lb)   SpO2 96%   Breastfeeding? No   BMI 30.80 kg/m    Body mass index is 30.8 kg/m .  Physical Exam   GENERAL APPEARANCE: alert and no distress  HENT: nose and mouth without ulcers or lesions and normal cephalic/atraumatic  NECK: no adenopathy, no asymmetry, masses, or scars and thyroid normal to palpation  RESP: lungs clear to auscultation - no rales, rhonchi or wheezes  CV: regular rates and rhythm, normal S1 S2, no S3 or S4 and no murmur, click or rub            Assessment & Plan     1. Mild persistent asthma without complication  Restart her steroid inhaler spring thru fall   Prn rescue inhalre  - albuterol (PROAIR HFA/PROVENTIL HFA/VENTOLIN HFA) 108 (90 Base) MCG/ACT inhaler; Inhale 2 puffs into the lungs every 4 hours as needed for shortness of breath / dyspnea or  "wheezing  Dispense: 1 Inhaler; Refill: 11  - fluticasone (FLOVENT HFA) 44 MCG/ACT inhaler; Inhale 1 puff into the lungs 2 times daily  Dispense: 1 Inhaler; Refill: 11     BMI:   Estimated body mass index is 30.8 kg/m  as calculated from the following:    Height as of 2/28/19: 1.549 m (5' 1\").    Weight as of this encounter: 73.9 kg (163 lb).   Weight management plan: Discussed healthy diet and exercise guidelines            No follow-ups on file.    Evan Zamora MD  [facility]      "

## 2019-07-02 ASSESSMENT — ASTHMA QUESTIONNAIRES: ACT_TOTALSCORE: 14

## 2019-07-15 DIAGNOSIS — E55.9 VITAMIN D DEFICIENCY: ICD-10-CM

## 2019-07-16 RX ORDER — CHOLECALCIFEROL (VITAMIN D3) 25 MCG
TABLET ORAL
Qty: 60 TABLET | Refills: 0 | Status: SHIPPED | OUTPATIENT
Start: 2019-07-16 | End: 2020-08-17

## 2019-07-16 NOTE — TELEPHONE ENCOUNTER
"Requested Prescriptions   Pending Prescriptions Disp Refills     VITAMIN D3 1000 units tablet [Pharmacy Med Name: VITAMIN D3 1000UNIT TABS]  Last Written Prescription Date:  05/21/2018  Last Fill Quantity: 60 tablet,  # refills: 9   Last Office Visit: 2/28/2019 Vandana Costello MD   Future Office Visit:      60 tablet 9     Sig: TAKE TWO TABLETS BY MOUTH EVERY DAY       Vitamin Supplements (Adult) Protocol Failed - 7/15/2019 10:23 PM        Failed - Recent (12 mo) or future (30 days) visit within the authorizing provider's specialty     Patient had office visit in the last 12 months or has a visit in the next 30 days with authorizing provider or within the authorizing provider's specialty.  See \"Patient Info\" tab in inbasket, or \"Choose Columns\" in Meds & Orders section of the refill encounter.              Passed - High dose Vitamin D not ordered        Passed - Medication is active on med list          "

## 2019-10-23 ENCOUNTER — OFFICE VISIT (OUTPATIENT)
Dept: OBGYN | Facility: CLINIC | Age: 34
End: 2019-10-23
Payer: COMMERCIAL

## 2019-10-23 VITALS
WEIGHT: 164 LBS | SYSTOLIC BLOOD PRESSURE: 92 MMHG | HEART RATE: 78 BPM | BODY MASS INDEX: 30.96 KG/M2 | HEIGHT: 61 IN | DIASTOLIC BLOOD PRESSURE: 64 MMHG

## 2019-10-23 DIAGNOSIS — E55.9 VITAMIN D DEFICIENCY: ICD-10-CM

## 2019-10-23 DIAGNOSIS — N97.9 INFERTILITY, FEMALE, SECONDARY: Primary | ICD-10-CM

## 2019-10-23 DIAGNOSIS — Z31.69 ENCOUNTER FOR PRECONCEPTION CONSULTATION: ICD-10-CM

## 2019-10-23 DIAGNOSIS — E89.0 POSTOPERATIVE HYPOTHYROIDISM: ICD-10-CM

## 2019-10-23 DIAGNOSIS — J45.30 MILD PERSISTENT ASTHMA WITHOUT COMPLICATION: ICD-10-CM

## 2019-10-23 PROBLEM — O99.820 GROUP B STREPTOCOCCUS CARRIER, +RV CULTURE, CURRENTLY PREGNANT: Status: RESOLVED | Noted: 2017-01-10 | Resolved: 2019-10-23

## 2019-10-23 LAB
DEPRECATED CALCIDIOL+CALCIFEROL SERPL-MC: 32 UG/L (ref 20–75)
T4 FREE SERPL-MCNC: 1.72 NG/DL (ref 0.76–1.46)
TSH SERPL DL<=0.005 MIU/L-ACNC: 0.04 MU/L (ref 0.4–4)

## 2019-10-23 PROCEDURE — 84439 ASSAY OF FREE THYROXINE: CPT | Performed by: NURSE PRACTITIONER

## 2019-10-23 PROCEDURE — 36415 COLL VENOUS BLD VENIPUNCTURE: CPT | Performed by: NURSE PRACTITIONER

## 2019-10-23 PROCEDURE — 82306 VITAMIN D 25 HYDROXY: CPT | Performed by: NURSE PRACTITIONER

## 2019-10-23 PROCEDURE — 99213 OFFICE O/P EST LOW 20 MIN: CPT | Performed by: NURSE PRACTITIONER

## 2019-10-23 PROCEDURE — 84443 ASSAY THYROID STIM HORMONE: CPT | Performed by: NURSE PRACTITIONER

## 2019-10-23 RX ORDER — FLUTICASONE PROPIONATE 44 UG/1
1 AEROSOL, METERED RESPIRATORY (INHALATION) 2 TIMES DAILY PRN
Qty: 1 INHALER | Refills: 11 | COMMUNITY
Start: 2019-10-23 | End: 2020-10-30

## 2019-10-23 ASSESSMENT — MIFFLIN-ST. JEOR: SCORE: 1381.28

## 2019-10-23 NOTE — PATIENT INSTRUCTIONS
Preconception Care    If you are thinking about becoming pregnant in the near future or actively trying to conceive we want to make sure you are as healthy as possible before becoming pregnant. By meeting with your midwife or provider prior to getting pregnant it allows us to address any health needs that can affect pregnancy. Please discuss your personal and family history with your midwife in order for us to identify any risk factors      If you wish to attempt pregnancy stop whichever form of contraception you use. If you have an IUD it can be removed in the office and you can start trying right away. If you take OCPs finish current pack, cycle, and then you can actively start trying      Make sure all the medications you are taking are safe for pregnancy. This is especially important for women with chronic health conditions such as diabetes, seizure disorders, and/or hypertension. If you are unsure if your medications are safe we can discuss them with you, do not abruptly stop taking something if you've been prescribed a medication by a medical provider      Folic acid 400-800 mcg per day by mouth daily, begin this routine 1 to 12 months prior to planned conception, and continue through at least 13 weeks gestation. Some prenatal/multi-vitamins contain enough folic acid       Be up to date on all your vaccines. Avoid pregnancy for 1 month after administration of varicella/ Rubella (MMR) vaccines      We encourage all women to be at healthy BMI (<26) when attempting pregnancy, it is healthier for both you and your baby. If you are overweight you can make a healthy choice by eating better and exercising more. Waiting to get pregnant at a healthy weight is an excellent choice.                                       Eat a healthy, well-balanced diet containing lots of fresh fruit and vegetables (frozen without added sugar is okay), protein, and healthy carbohydrates such as whole wheat breads and pastas      Exercise  regularly. If you are wishing to get pregnant maintain normal exercise routine. If you don't exercise this is a great time for light activity daily such as walking/swimming      Limit caffeine intake to less than 200 mg per day, typically 1-2 cups of regular coffee is about 200 mg.       Avoid cigarettes, alcohol, and recreational drug use while attempting pregnancy. If you are actively trying to conceive but you get your period or a negative pregnancy test it is okay to have alcohol in moderation until you are actively trying again      If you have the potential to toxic chemical exposures in your work place or home please use special precautions    Menstrual Cycles      Knowing your cycle is incredibly important when attempting pregnancy      Your cycle begins day 1 of your period and goes until the 1st day of your next period. Typically women have 28-32 day cycles. If your cycles are shorter or longer it can be a normal variation.       Women typically ovulate in the middle of their cycle      There are many great apps that can help you track you cycle monthly to establish when you are ovulating      You may also start taking your temperature daily with a basal body temp thermometer to help identify when you ovulate      There are also ovulation predictor kits available over the counter at the pharmacy      If you want more information please contact your midwife      It can take up to 1 year for a woman under the age of 35 or 6 months for a woman over the age of 35 to conceive. If it takes longer than this we would like to evaluate you for fertility issues.

## 2019-10-23 NOTE — PROGRESS NOTES
"Aleyda is a 34 year old female here to discuss fertility issues.  She previously had visit with Dr. Combs OB/GYN d/t infertility.  At that time, she was referred back to endocrinologist d/t TSH and free t4 being abnormal.  She also was given prometrium, which she states she took 1 time.    She is currently seeing endocrinologist for thyroid medication management and desires to be pregnant.      With partner- 9 yrs  Unprotected sex- 18 months    Pregnancies-5  Menstrual cycles:  frequency: every 28 days  H/O abnormal Pap No  Last pap- 3/2/2017  H/O abnormal Paps- No  H/O STI;s- No  H/O PID- No  H/O pelvic surgeries-Yes: C/S x 2  H/O endometriosis- No  H/O breast problems-No  H/O birth control- Yes: in 2017  H/O weight gain- No  H/O thyroid problems- Yes: postsurgical hypothyroid  H/O acne- No  H/O hirsutism- No  H/O galactorrhea- No  H/O infertility treatment-No    SEXUAL HISTORY  Frequency: 1-2 x week  Any problems? denies      Male Partner    Age- 34  Previous marriages-NA  Previous children-2   Meds- synthroid    Health maintenance updated:  yes    Review Of Systems  Skin: negative  Eyes: negative  Ears/Nose/Throat: negative  Respiratory: negative  Cardiovascular: negative  Gastrointestinal: negative  Genitourinary: negative  Musculoskeletal: negative  Neurologic: negative  Psychiatric: negative  Hematologic/Lymphatic/Immunologic: negative  Endocrine: negative    PE: Vitals: BP 92/64   Pulse 78   Ht 1.549 m (5' 1\")   Wt 74.4 kg (164 lb)   LMP 10/20/2019   BMI 30.99 kg/m    BMI= Body mass index is 30.99 kg/m .    Gen: alert, well-appearing pleasant female in no apparent distress      Assessment/Plan:      ICD-10-CM    1. Infertility, female, secondary N97.9 TSH     T4 free     Vitamin D Deficiency   2. Encounter for preconception consultation Z31.69    3. Mild persistent asthma without complication J45.30 fluticasone (FLOVENT HFA) 44 MCG/ACT inhaler   4. Postoperative hypothyroidism E89.0 TSH     T4 free     " Vitamin D Deficiency   5. Vitamin D deficiency E55.9 Vitamin D Deficiency         P:  Preconception counseling done.  Counseled on use of OPK for next 2-3 cycles to confirm whether or not she is ovulating  Thyroid labs done today.  Has appt with endocrinologist next week  return to clinic 2-3 months if not pregnant for infertility evaluation by OB MD Kim CLARK, CNM

## 2019-10-24 NOTE — RESULT ENCOUNTER NOTE
Please call patient and let her know that her vitamin D level was normal.  Her TSH and T4 (thyroid levels) are still abnormal.  Please inform her to discuss abnormal thyroid results with her endocrinologist.  She has an appt with her endo 10/31.     Please let me know if you have any questions or concerns.     Kim Ku, COTY

## 2019-10-31 ENCOUNTER — OFFICE VISIT (OUTPATIENT)
Dept: ENDOCRINOLOGY | Facility: CLINIC | Age: 34
End: 2019-10-31
Payer: COMMERCIAL

## 2019-10-31 VITALS
HEIGHT: 61 IN | SYSTOLIC BLOOD PRESSURE: 113 MMHG | WEIGHT: 164.9 LBS | OXYGEN SATURATION: 96 % | BODY MASS INDEX: 31.13 KG/M2 | RESPIRATION RATE: 16 BRPM | DIASTOLIC BLOOD PRESSURE: 68 MMHG | TEMPERATURE: 98.5 F | HEART RATE: 87 BPM

## 2019-10-31 DIAGNOSIS — E89.0 POSTOPERATIVE HYPOTHYROIDISM: Primary | ICD-10-CM

## 2019-10-31 DIAGNOSIS — C73 PAPILLARY CARCINOMA, FOLLICULAR VARIANT (H): ICD-10-CM

## 2019-10-31 PROCEDURE — 99214 OFFICE O/P EST MOD 30 MIN: CPT | Performed by: INTERNAL MEDICINE

## 2019-10-31 RX ORDER — LEVOTHYROXINE SODIUM 137 UG/1
137 TABLET ORAL DAILY
Qty: 90 TABLET | Refills: 0 | Status: SHIPPED | OUTPATIENT
Start: 2019-10-31 | End: 2020-02-05

## 2019-10-31 ASSESSMENT — MIFFLIN-ST. JEOR: SCORE: 1385.36

## 2019-10-31 NOTE — PATIENT INSTRUCTIONS
[facility]   Dr Costello, Endocrinology Department      [facility]   [address]  [place] [address]  Appointment [phone]  Fax: [phone]  [place]: Monday and Tuesday         [facility]   [address]  [place] [address]  Appointment [phone]  Fax: [phone]  [place]: Wednesday and Thursday            Please check the cost coverage and copay with insurance before recommended tests, services and medications (especially if new medications are prescribed).     If ordered, please get blood work done 1 week prior to your next appointment so they will be available to Dr. Costello at your visit.      Decrease dose levothyroxine to 137 mcg/day.  Labs in 2 months  Neck ultrasound in 2 months  Follow up after that.     [facility] radiology scheduleing   [place]  [phone]   [facility] Radiology scheduling  [place]  [phone]     Please call and schedule the recommended test as discussed in clinic visit. These are the numbers to call.

## 2019-10-31 NOTE — LETTER
10/31/2019         RE: Aleyda Nicole  [address] [facility] [address]        Dear Colleague,    Thank you for referring your patient, Aleyda Nicole, to the [facility]. Please see a copy of my visit note below.    Name: Aleyda Nicole  Seen for f/u of papillary thyroid cancer and postoperative hypothyroidism    Chief Complaint   Patient presents with     Thyroid Disease     HPI:  Aleyda Nicole is a 34 year old female who presents for the evaluation of:      1.  Papillary thyroid cancer:  Thyroid nodule was noticed in February 2011 which was followed by thyroid nodule biopsy which showed suspicious for papillary thyroid cancer.  She underwent total thyroidectomy 3/2011 and pathology showed papillary thyroid cancer with follicular pattern.  Tumor was about 2.2 cm on the left lobe.  No lymph node involvement.  S/p 78.7 mCi of I131 HERNANDEZ  8/2011. No evidence for distant metastatic disease on post therapy scan.  Neck US 6/2016- showed new lymph node in neck along with rising Tg levels  S/p FNA lymph node: 6/2016- c/w papillary thyroid cancer    8/2016: underwent left modified neck dissection.  1/27 lymph node positive one left modified neck dissection.  1 cm in greatest diameter.  Negative for external involvement at level II.    Tg decreased post left neck dissection from 1.3 to < 0.1    Follow up I123 4/2017: no mets.    Postop course was complicated by hypocalcemia and was started on calcium supplement.  Taking calcium- tries to take 2 tabs per day + 1 glass of milk daily.    Delivered baby 1/2017. No longer breast feeding.    She was followed by endocrinology before and then lost to follow-up with endocrinology in 2012.  No history of neck radiation prior to diagnosis of thyroid cancer.  No family history of thyroid cancer.    Thyroglobulin levels appear stable and suppressed  Neck ultrasound June 2018 did not show any evidence of metastases.    2.  Hypothyroidism  (postoperative):  Was started on levothyroxine following thyroidectomy.    Currently taking levothyroxine 150 mcg X 7 days a week.  Reports compliance.    Denies hyperthyroid symptoms.  Planning one more pregnancy in future.  + hair loss.  + fatigue.    Wt Readings from Last 2 Encounters:   10/31/19 74.8 kg (164 lb 14.4 oz)   10/23/19 74.4 kg (164 lb)     No diarrhea, tremors. No palpitations.  No difficulty with swallowing, no pain during swallowing.  Missed 2 periods. Home pregnancy test was neg.  Mood- some irritability.  Weight: stable.  PMH/PSH:  Past Medical History:   Diagnosis Date     Gestational diabetes 2013     Hypocalcemia 2016     Influenza A      Papillary thyroid carcinoma     Papillary carcinoma, follicular variant with     Pneumonia     LLL     PONV (postoperative nausea and vomiting)      Postoperative hypothyroidism       labor      Uncomplicated asthma      Past Surgical History:   Procedure Laterality Date      SECTION  10/26/2013    Procedure:  SECTION;  primary  section;  Surgeon: Rodney Mckee MD;  Location: [facility] L[facility]      SECTION N/A 2017    Procedure:  SECTION;  Surgeon: Hakeem Combs MD;  Location: [facility] L[facility]     DISSECTION RADICAL NECK Left 2016    Procedure: DISSECTION RADICAL NECK;  Surgeon: Vy Theodore MD;  Location: [facility] OR     DISSECTION RADICAL NECK MODIFIED Left 2016    Procedure: DISSECTION RADICAL NECK MODIFIED;  Surgeon: Luis Brown MD;  Location: [facility] OR     THYROIDECTOMY      Total, for cancer.      Family Hx:  Family History   Problem Relation Age of Onset     Diabetes Mother      Cerebrovascular Disease Mother      Hypertension Mother      Diabetes Father 50     Hypertension Father      Genitourinary Problems Father         kidney disease     Coronary Artery Disease Father      Diabetes Brother      Thyroid disease: No        Social Hx:  Social History     Socioeconomic  History     Marital status:      Spouse name: Brock     Number of children: 2     Years of education: Not on file     Highest education level: Not on file   Occupational History     Occupation:       Employer: [employer]   Social Needs     Financial resource strain: Not on file     Food insecurity:     Worry: Not on file     Inability: Not on file     Transportation needs:     Medical: Not on file     Non-medical: Not on file   Tobacco Use     Smoking status: Never Smoker     Smokeless tobacco: Never Used   Substance and Sexual Activity     Alcohol use: No     Alcohol/week: 0.0 standard drinks     Drug use: No     Sexual activity: Yes     Partners: Male     Birth control/protection: None   Lifestyle     Physical activity:     Days per week: Not on file     Minutes per session: Not on file     Stress: Not on file   Relationships     Social connections:     Talks on phone: Not on file     Gets together: Not on file     Attends Caodaism service: Not on file     Active member of club or organization: Not on file     Attends meetings of clubs or organizations: Not on file     Relationship status: Not on file     Intimate partner violence:     Fear of current or ex partner: Not on file     Emotionally abused: Not on file     Physically abused: Not on file     Forced sexual activity: Not on file   Other Topics Concern     Parent/sibling w/ CABG, MI or angioplasty before 65F 55M? Not Asked      Service No     Blood Transfusions No     Caffeine Concern No     Occupational Exposure Not Asked     Hobby Hazards No     Sleep Concern No     Stress Concern No     Weight Concern Yes     Special Diet No     Back Care No     Exercise No     Bike Helmet No     Seat Belt Yes     Self-Exams No   Social History Narrative    Pt lives with  and father.          MEDICATIONS:  has a current medication list which includes the following prescription(s): albuterol, fluticasone, levothyroxine, prenatal vit-fe  "fumarate-fa, and vitamin d3.    ROS     ROS: 10 point ROS neg other than the symptoms noted above in the HPI.      Physical Exam   VS: /68 (BP Location: Left arm, Patient Position: Chair, Cuff Size: Adult Regular)   Pulse 87   Temp 98.5  F (36.9  C) (Oral)   Resp 16   Ht 1.549 m (5' 1\")   Wt 74.8 kg (164 lb 14.4 oz)   LMP 10/20/2019   SpO2 96%   Breastfeeding? No   BMI 31.16 kg/m     GENERAL: AXOX3, NAD, well dressed, answering questions appropriately, appears stated age.  HEENT: OP clear, no LAD, no TM, non-tender, no exopthalmous, no proptosis, EOMI, no lig lag, no retraction  Thyroid: Hypertrophied scar, will healed thyroidectomy scar present.  Hypertrophied scar on lateral side of neck. No lymphadenopathy.  PSYCH: normal affect and mood      LABS:  2019:  TgAB: <0.4  TG: <0.10    3/6/2018:  TSH: 0.07  TgAB: <0.4  TG: <0.10    2017:  TSH : 47.89 (post thyrogen)  TgAB: <0.4  TG: <0.10    17:  TSH: 0.04  TgAB: <0.4  TG: <0.10    11/10/16:  TSH 0.74  TgAb: <0.4  TG: <0.10     2016:  TSH: 0.08  TgAb: <0.4  T.30     2012:  TSH: 12.40  TGAB: 1.2  T.6    ENDO THYROID LABS-[facility] Latest Ref Rng & Units 10/23/2019 2019   TSH 0.40 - 4.00 mU/L 0.04 (L) 0.05 (L)   T4 TOTAL 5.0 - 11.0 ug/dL     T4 FREE 0.76 - 1.46 ng/dL 1.72 (H) 1.47 (H)     ENDO THYROID LABS-[facility] Latest Ref Rng & Units 2019   TSH 0.40 - 4.00 mU/L 0.04 (L)   T4 TOTAL 5.0 - 11.0 ug/dL    T4 FREE 0.76 - 1.46 ng/dL 1.38     ENDO THYROID LABS-[facility] Latest Ref Rng & Units 2018   TSH 0.40 - 4.00 mU/L 0.46 0.10 (L)   T4 TOTAL 5.0 - 11.0 ug/dL     T4 FREE 0.76 - 1.46 ng/dL  1.26     ENDO THYROID LABS-[facility] Latest Ref Rng & Units 3/6/2018   TSH 0.40 - 4.00 mU/L 0.07 (L)   T4 TOTAL 5.0 - 11.0 ug/dL    T4 FREE 0.76 - 1.46 ng/dL 1.69 (H)     !THYROID Latest Ref Rng & Units 2018 10/17/2017 8/3/2017   TSH 0.40 - 4.00 mU/L <0.01 (L) 2.58 5.31 (H)   T4 FREE 0.76 - 1.46 ng/dL 1.61 (H) 1.24 1.31     !THYROID Latest Ref " Rng & Units 4/19/2017 2/23/2017 11/10/2016   TSH 0.40 - 4.00 mU/L 47.89 (H) 0.04 (L) 0.74   T4 FREE 0.76 - 1.46 ng/dL 1.29 1.80 (H) 1.30       ENDO THYROID LABS-[facility] Latest Ref Rng 6/7/2016   TSH 0.40 - 4.00 mU/L 0.08 (L)   T4 TOTAL 5.0 - 11.0 ug/dL    T4 FREE 0.76 - 1.46 ng/dL 1.50 (H)   FREE T3 2.3 - 4.2 pg/mL    TRIIODOTHYRONINE(T3) 60 - 181 ng/dL 112     ENDO THYROID LABS-[facility] Latest Ref Rng 5/19/2016 2/5/2016 11/6/2015   TSH 0.40 - 4.00 mU/L 0.16 (L) 0.37 (L) 0.06 (L)   T4 TOTAL 5.0 - 11.0 ug/dL      T4 FREE 0.76 - 1.46 ng/dL 1.42 1.24 1.51 (H)   FREE T3 2.3 - 4.2 pg/mL        ENDO THYROID LABS-[facility] Latest Ref Rng 6/26/2015 11/14/2014   TSH 0.40 - 4.00 mU/L 0.32 (L) 7.86 (H)   T4 TOTAL 5.0 - 11.0 ug/dL     T4 FREE 0.76 - 1.46 ng/dL 1.33 1.37   FREE T3 2.3 - 4.2 pg/mL       ENDO THYROID LABS-[facility] Latest Ref Rng 7/29/2014 4/3/2014   TSH 0.40 - 4.00 mU/L 3.67 7.63 (H)   T4 TOTAL 5.0 - 11.0 ug/dL     T4 FREE 0.76 - 1.46 ng/dL  1.39   FREE T3 2.3 - 4.2 pg/mL       Component      Latest Ref Rng & Units 10/8/2012 2/23/2017 8/3/2017   Vitamin D Deficiency screening      20 - 75 ug/L 18 (L) 23 28     !COMPREHENSIVE Latest Ref Rng & Units 8/31/2016 8/31/2016 9/1/2016   CALCIUM 8.5 - 10.1 mg/dL 7.7 (L) 9.0 8.6     !COMPREHENSIVE Latest Ref Rng & Units 11/10/2016 2/23/2017 8/3/2017   CALCIUM 8.5 - 10.1 mg/dL 8.2 (L) 9.2 7.8 (L)     !COMPREHENSIVE Latest Ref Rng & Units 10/17/2017   CALCIUM 8.5 - 10.1 mg/dL 8.3 (L)     NM THYROID UPTAKE/SCAN   Feb 4, 2011 2:47:00 PM      COMPARISON: None.     HISTORY: Hyperthyroidism.     TECHNIQUE:  The patient was given 173 uCi of I-123 orally, followed by  24-hour thyroid scan and radioiodine uptake evaluation.     FINDINGS:  The thyroid gland appears normal in size. 24-hour uptake  was calculated at 46.4%, which is above the normal range. Normal range  is 10-30% 24-hour uptake. Focal area of decreased uptake in the mid  left thyroid lobe may represent a cold thyroid nodule or  cyst.     IMPRESSION:    1. Elevated 24-hour radioiodine uptake in the thyroid at 46.4%.  2. Possible cold nodule or cyst in the mid left thyroid lobe. Thyroid  ultrasound is recommended for further evaluation.    NUCLEAR MEDICINE I-131 SCAN    Aug 10, 2011 8:04:00 AM      TECHNIQUE: 78.7 mCi I-131 ablation scan. Five days post therapy  imaging performed today.     HISTORY: Thyroid cancer.     COMPARISON:   Nuclear Study: Thyroid uptake and scan 2/4/2011.  Other Relevant Studies: Ultrasound neck 2/17/2011.     FINDINGS: Intense radiotracer uptake at the left greater than right  neck at the thyroid level. No evidence for distant metastatic disease.     IMPRESSION: Intense radiotracer uptake localizing to the thyroidectomy  bed, left greater than right. This is consistent with residual thyroid  tissue. No distant metastatic disease identified.    ULTRASOUND THYROID  Feb 17, 2011      HISTORY: Hyperthyroidism. Thyroid nodule.      COMPARISON: Nuclear Medicine thyroid scan 2/4/2011.     FINDINGS: The thyroid gland is enlarged. The right lobe measures 5.4 x  1.6 x 2.1 cm. The left lobe measures 5.9 x 2.3 x 2.7 cm. There are 2  right thyroid nodules and 3 left thyroid nodules.  1. Right upper lobe, hypoechoic solid measuring 0.7 x 0.6 x 0.6 cm.  2. Right lower pole, solid measuring 0.7 x 0.5 x 0.6 cm.  3. Left upper pole, cystic and solid measuring 1.1 x 0.6 x 1.0 cm.  4. Left mid thyroid lobe, primarily solid with small cystic areas  measuring 2.7 x 1.9 x 2.1 cm.  5. Left lower pole, cystic and solid measuring 1.4 x 0.9 x 1.2 cm.     The primarily solid left mid thyroid nodule appears to correspond to  the cold nodule on thyroid uptake and scan.  IMPRESSION: Multinodular thyroid gland.    FNA thyroid nodule:  Copath Report       FNA-thyroid, left mid lobe nodule:   Suspicious for malignancy.  Suspicious for papillary carcinoma  Specimen Adequacy: Satisfactory for evaluation.           Surgical  "pathology:     SPECIMEN(S):  A: Thyroid, left lobe  B: Parathyroid gland, biopsy of right inferior  C: Thyroid, right lobe    FINAL DIAGNOSIS:  A. and C.  Thyroid, right and left lobes, total thyroidectomy -  Specimen/Procedure(s) and Laterality:   Right and left thyroid lobes,  total thyroidectomy.  Specimen Integrity:   Intact.  Specimen Size and Weight:   See Gross Description.  Histologic Tumor Type(s):   Papillary carcinoma, follicular variant with  focal papillary morphology.  Tumor Focality: Unifocal.  Tumor Laterality:   Left lobe.  Tumor Size(s):   Left lobe: 2.2 cm (greatest dimension).  Margins:   Close, but uninvolved.            Distance to closest margin, if negative:   Tumor 0.05 cm from  nearest paratracheal surface and 1.75 cm from nearest anterolateral  surface.  Tumor Capsular Invasion: Present.    Tumor Capsule: Partial.  Extrathyroidal Invasion:   Not identified.  Lymph-Vascular Invasion:   Not identified.  Lymph Nodes:   Two nodes without evidence of metastatic carcinoma (0/2;  one at isthmus and one adjacent to right lobe).  Pathologic Staging:   pT2, pN0, pM not applicable.  Additional Pathologic Findings:   Right thyroid lobe without evidence of  malignancy.  Background nodular hyperplasia and thyroiditis with  lymphoid follicles.  Left lobe with focal previous biopsy site changes.  No parathyroid tissue identified.  CAP Protocol Based on AJCC/UICC TNM, 7th edition; Protocol Effective  Date:  January 2010    B.  \"Parathyroid gland\", right inferior, biopsy - Thyroid tissue; no  parathyroid glandular tissue identified.    8/2016: left modified neck Dissection:  Copath Report      SPECIMEN(S):   A: Scar, left neck   B: Parathyroid gland, left inferior   C: Left central neck dissection, para and pretracheal lymph nodes   D: apical jugular lymph node   E: Left modified neck dissection     FINAL DIAGNOSIS:   A. Skin, neck/scar, excision:   - Hypertrophic scar; benign.     B. Parathyroid gland, " left inferior, biopsy:   - Parathyroid tissue present.     C. Left central neck dissection with para-and pretracheal lymph nodes:   - One lymph node; no evidence of malignancy (0/1).   - Thymus and parathyroid tissue present.     D. Lymph node, apical jugular, excision:   - One lymph node; no evidence of malignancy (0/1).     E. Left modified neck dissection:   - 27 lymph nodes identified (level II- 10, level III- 9, level IV- 5 and   lymph nodes associated with jugular vein- 3).   - One (of 27) lymph node positive for metastatic papillary thyroid   carcinoma.  1 cm in greatest diameter.  Negative for extranodal   involvement  (Level II).   - Jugular vein negative for tumor.        NUCLEAR MEDICINE THYROID WHOLE BODY SCAN I-123   4/20/2017 11:38 AM      TECHNIQUE:  The patient was given 1.8 mCi iodine 123 per oral on  4/19/2017. SPECT-CT with fusion was performed at the level of the neck  and chest.     HISTORY:  Malignant neoplasm of thyroid gland. Postprocedural  hypothyroidism.     COMPARISON:   Nuclear Study: None.     Other Relevant Studies: None.     FINDINGS:  Thyroidectomy. No suspicious focus of abnormal radiotracer  is seen at the thyroidectomy bed identified. Physiologic tracer uptake  seen at the salivary glands. There are several small subcentimeter  lymph nodes involving the bilateral neck without conspicuous focal  tracer uptake outside of the background tracer distribution. There  appears to be left neck postoperative change causing some  inconspicuity of the left sternocleidomastoid muscle. No convincing  worrisome airspace disease or mediastinal abnormality is seen.         IMPRESSION: No worrisome focal tracer uptake is identified to suggest  recurrent neoplasm or remote metastatic disease. Some postoperative  changes at the left neck noted, likely accounting for inconspicuity of  the left sternocleidomastoid muscle. Small bilateral subcentimeter  neck lymph nodes noted.     Neck US  6/2018:  ULTRASOUND HEAD AND NECK SOFT TISSUE  6/4/2018 11:11 AM     HISTORY:  Papillary carcinoma, follicular variant (H).     COMPARISON: None.     FINDINGS:  No evidence for residual thyroid tissue in the  thyroidectomy bed. There is no evidence for cervical adenopathy  bilaterally by size or morphology.          IMPRESSION:  Negative soft tissue neck study.      All pertinent notes, labs, and images personally reviewed by me.     A/P  Ms.Nhu Mounika Nicole is a 34 year old here for the evaluation of thyroid cancer:    1. 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  It was 2.2 cm unifocal, left lobe tumor with no lymph node involvement.  S/p  total thyroidectomy in 2011 and 78.7 mCi of I-131 HERNANDEZ. No evidence for distant metastatic disease on post therapy scan.  2016- new lymph node s/p FNA with PTC with rising TG  S/p 8/2016- left modified neck radiation. 1/27 lymph node + ( level2). Postop course complicated by hypocalcemia.  Delivered 1/2017.   Follow up I123 WBS 4/2017- no mets  TG suppressed as above  Plan:  Continue to monitor.   Neck US in 2 months.  2.  Hypothyroidism (postoperative following total thyroidectomy for thyroid cancer):  Currently taking levothyroxine 150 mcg X day. Taking generic levothyroxine.  Reports compliance.  Labs as noted above with suppressed TSH with normal FT4.  Clinically looks euthyroid.  Plan:  Decrease dose of levothyroxine to 137 mcg/day (10/31/2019)  Labs in 2 months or sooner if you get pregnant.  Please make a lab appointment for blood work and follow up clinic appointment in 1 week after that to discuss results.      Discussed s/s of hypothyroidism and hyperthyroidism to watch for.  The patient indicates understanding of these issues and agrees with the plan.      3.  History of vitamin D deficiency and hypocalcemia, resolved.  Continue to monitor.    More than 50% of the time spent  with Ms. Nicole on counseling / coordinating her care.      Follow-up:  2 months.    Vandana Costello MD  Endocrinology   [facility]  September 25, 2018  CC: Evan Zamora           Again, thank you for allowing me to participate in the care of your patient.        Sincerely,        Vandana Costello MD

## 2019-10-31 NOTE — PROGRESS NOTES
Name: Aleyda Nicole  Seen for f/u of papillary thyroid cancer and postoperative hypothyroidism    Chief Complaint   Patient presents with     Thyroid Disease     HPI:  Aleyda Nicole is a 34 year old female who presents for the evaluation of:      1.  Papillary thyroid cancer:  Thyroid nodule was noticed in February 2011 which was followed by thyroid nodule biopsy which showed suspicious for papillary thyroid cancer.  She underwent total thyroidectomy 3/2011 and pathology showed papillary thyroid cancer with follicular pattern.  Tumor was about 2.2 cm on the left lobe.  No lymph node involvement.  S/p 78.7 mCi of I131 HERNANDEZ  8/2011. No evidence for distant metastatic disease on post therapy scan.  Neck US 6/2016- showed new lymph node in neck along with rising Tg levels  S/p FNA lymph node: 6/2016- c/w papillary thyroid cancer    8/2016: underwent left modified neck dissection.  1/27 lymph node positive one left modified neck dissection.  1 cm in greatest diameter.  Negative for external involvement at level II.    Tg decreased post left neck dissection from 1.3 to < 0.1    Follow up I123 4/2017: no mets.    Postop course was complicated by hypocalcemia and was started on calcium supplement.  Taking calcium- tries to take 2 tabs per day + 1 glass of milk daily.    Delivered baby 1/2017. No longer breast feeding.    She was followed by endocrinology before and then lost to follow-up with endocrinology in 2012.  No history of neck radiation prior to diagnosis of thyroid cancer.  No family history of thyroid cancer.    Thyroglobulin levels appear stable and suppressed  Neck ultrasound June 2018 did not show any evidence of metastases.    2.  Hypothyroidism (postoperative):  Was started on levothyroxine following thyroidectomy.    Currently taking levothyroxine 150 mcg X 7 days a week.  Reports compliance.    Denies hyperthyroid symptoms.  Planning one more pregnancy in future.  + hair loss.  + fatigue.    Wt Readings  from Last 2 Encounters:   10/31/19 74.8 kg (164 lb 14.4 oz)   10/23/19 74.4 kg (164 lb)     No diarrhea, tremors. No palpitations.  No difficulty with swallowing, no pain during swallowing.  Missed 2 periods. Home pregnancy test was neg.  Mood- some irritability.  Weight: stable.  PMH/PSH:  Past Medical History:   Diagnosis Date     Gestational diabetes 2013     Hypocalcemia 2016     Influenza A      Papillary thyroid carcinoma     Papillary carcinoma, follicular variant with     Pneumonia     LLL     PONV (postoperative nausea and vomiting)      Postoperative hypothyroidism       labor      Uncomplicated asthma      Past Surgical History:   Procedure Laterality Date      SECTION  10/26/2013    Procedure:  SECTION;  primary  section;  Surgeon: Rodney Mckee MD;  Location: [facility]      SECTION N/A 2017    Procedure:  SECTION;  Surgeon: Hakeem Combs MD;  Location: [facility]     DISSECTION RADICAL NECK Left 2016    Procedure: DISSECTION RADICAL NECK;  Surgeon: Vy Theodore MD;  Location: [facility] OR     DISSECTION RADICAL NECK MODIFIED Left 2016    Procedure: DISSECTION RADICAL NECK MODIFIED;  Surgeon: Luis Brown MD;  Location: [facility] OR     THYROIDECTOMY      Total, for cancer.      Family Hx:  Family History   Problem Relation Age of Onset     Diabetes Mother      Cerebrovascular Disease Mother      Hypertension Mother      Diabetes Father 50     Hypertension Father      Genitourinary Problems Father         kidney disease     Coronary Artery Disease Father      Diabetes Brother      Thyroid disease: No        Social Hx:  Social History     Socioeconomic History     Marital status:      Spouse name: Brock     Number of children: 2     Years of education: Not on file     Highest education level: Not on file   Occupational History     Occupation:       Employer: [employer]   Social Needs     Financial  resource strain: Not on file     Food insecurity:     Worry: Not on file     Inability: Not on file     Transportation needs:     Medical: Not on file     Non-medical: Not on file   Tobacco Use     Smoking status: Never Smoker     Smokeless tobacco: Never Used   Substance and Sexual Activity     Alcohol use: No     Alcohol/week: 0.0 standard drinks     Drug use: No     Sexual activity: Yes     Partners: Male     Birth control/protection: None   Lifestyle     Physical activity:     Days per week: Not on file     Minutes per session: Not on file     Stress: Not on file   Relationships     Social connections:     Talks on phone: Not on file     Gets together: Not on file     Attends [employer] service: Not on file     Active member of club or organization: Not on file     Attends meetings of clubs or organizations: Not on file     Relationship status: Not on file     Intimate partner violence:     Fear of current or ex partner: Not on file     Emotionally abused: Not on file     Physically abused: Not on file     Forced sexual activity: Not on file   Other Topics Concern     Parent/sibling w/ CABG, MI or angioplasty before 65F 55M? Not Asked      Service No     Blood Transfusions No     Caffeine Concern No     Occupational Exposure Not Asked     Hobby Hazards No     Sleep Concern No     Stress Concern No     Weight Concern Yes     Special Diet No     Back Care No     Exercise No     Bike Helmet No     Seat Belt Yes     Self-Exams No   Social History Narrative    Pt lives with  and father.          MEDICATIONS:  has a current medication list which includes the following prescription(s): albuterol, fluticasone, levothyroxine, prenatal vit-fe fumarate-fa, and vitamin d3.    ROS     ROS: 10 point ROS neg other than the symptoms noted above in the HPI.      Physical Exam   VS: /68 (BP Location: Left arm, Patient Position: Chair, Cuff Size: Adult Regular)   Pulse 87   Temp 98.5  F (36.9  C) (Oral)    "Resp 16   Ht 1.549 m (5' 1\")   Wt 74.8 kg (164 lb 14.4 oz)   LMP 10/20/2019   SpO2 96%   Breastfeeding? No   BMI 31.16 kg/m    GENERAL: AXOX3, NAD, well dressed, answering questions appropriately, appears stated age.  HEENT: OP clear, no LAD, no TM, non-tender, no exopthalmous, no proptosis, EOMI, no lig lag, no retraction  Thyroid: Hypertrophied scar, will healed thyroidectomy scar present.  Hypertrophied scar on lateral side of neck. No lymphadenopathy.  PSYCH: normal affect and mood      LABS:  2019:  TgAB: <0.4  TG: <0.10    3/6/2018:  TSH: 0.07  TgAB: <0.4  TG: <0.10    2017:  TSH : 47.89 (post thyrogen)  TgAB: <0.4  TG: <0.10    17:  TSH: 0.04  TgAB: <0.4  TG: <0.10    11/10/16:  TSH 0.74  TgAb: <0.4  TG: <0.10     2016:  TSH: 0.08  TgAb: <0.4  T.30     2012:  TSH: 12.40  TGAB: 1.2  T.6    ENDO THYROID LABS-[facility] Latest Ref Rng & Units 10/23/2019 2019   TSH 0.40 - 4.00 mU/L 0.04 (L) 0.05 (L)   T4 TOTAL 5.0 - 11.0 ug/dL     T4 FREE 0.76 - 1.46 ng/dL 1.72 (H) 1.47 (H)     ENDO THYROID LABS-[facility] Latest Ref Rng & Units 2019   TSH 0.40 - 4.00 mU/L 0.04 (L)   T4 TOTAL 5.0 - 11.0 ug/dL    T4 FREE 0.76 - 1.46 ng/dL 1.38     ENDO THYROID LABS-[facility] Latest Ref Rng & Units 2018   TSH 0.40 - 4.00 mU/L 0.46 0.10 (L)   T4 TOTAL 5.0 - 11.0 ug/dL     T4 FREE 0.76 - 1.46 ng/dL  1.26     ENDO THYROID LABS-[facility] Latest Ref Rng & Units 3/6/2018   TSH 0.40 - 4.00 mU/L 0.07 (L)   T4 TOTAL 5.0 - 11.0 ug/dL    T4 FREE 0.76 - 1.46 ng/dL 1.69 (H)     !THYROID Latest Ref Rng & Units 2018 10/17/2017 8/3/2017   TSH 0.40 - 4.00 mU/L <0.01 (L) 2.58 5.31 (H)   T4 FREE 0.76 - 1.46 ng/dL 1.61 (H) 1.24 1.31     !THYROID Latest Ref Rng & Units 2017 2017 11/10/2016   TSH 0.40 - 4.00 mU/L 47.89 (H) 0.04 (L) 0.74   T4 FREE 0.76 - 1.46 ng/dL 1.29 1.80 (H) 1.30       ENDO THYROID LABS-[facility] Latest Ref Rng 2016   TSH 0.40 - 4.00 mU/L 0.08 (L)   T4 TOTAL 5.0 - 11.0 ug/dL    T4 FREE 0.76 - " 1.46 ng/dL 1.50 (H)   FREE T3 2.3 - 4.2 pg/mL    TRIIODOTHYRONINE(T3) 60 - 181 ng/dL 112     ENDO THYROID LABS-[facility] Latest Ref Rng 5/19/2016 2/5/2016 11/6/2015   TSH 0.40 - 4.00 mU/L 0.16 (L) 0.37 (L) 0.06 (L)   T4 TOTAL 5.0 - 11.0 ug/dL      T4 FREE 0.76 - 1.46 ng/dL 1.42 1.24 1.51 (H)   FREE T3 2.3 - 4.2 pg/mL        ENDO THYROID LABS-[facility] Latest Ref Rng 6/26/2015 11/14/2014   TSH 0.40 - 4.00 mU/L 0.32 (L) 7.86 (H)   T4 TOTAL 5.0 - 11.0 ug/dL     T4 FREE 0.76 - 1.46 ng/dL 1.33 1.37   FREE T3 2.3 - 4.2 pg/mL       ENDO THYROID LABS-[facility] Latest Ref Rng 7/29/2014 4/3/2014   TSH 0.40 - 4.00 mU/L 3.67 7.63 (H)   T4 TOTAL 5.0 - 11.0 ug/dL     T4 FREE 0.76 - 1.46 ng/dL  1.39   FREE T3 2.3 - 4.2 pg/mL       Component      Latest Ref Rng & Units 10/8/2012 2/23/2017 8/3/2017   Vitamin D Deficiency screening      20 - 75 ug/L 18 (L) 23 28     !COMPREHENSIVE Latest Ref Rng & Units 8/31/2016 8/31/2016 9/1/2016   CALCIUM 8.5 - 10.1 mg/dL 7.7 (L) 9.0 8.6     !COMPREHENSIVE Latest Ref Rng & Units 11/10/2016 2/23/2017 8/3/2017   CALCIUM 8.5 - 10.1 mg/dL 8.2 (L) 9.2 7.8 (L)     !COMPREHENSIVE Latest Ref Rng & Units 10/17/2017   CALCIUM 8.5 - 10.1 mg/dL 8.3 (L)     NM THYROID UPTAKE/SCAN   Feb 4, 2011 2:47:00 PM      COMPARISON: None.     HISTORY: Hyperthyroidism.     TECHNIQUE:  The patient was given 173 uCi of I-123 orally, followed by  24-hour thyroid scan and radioiodine uptake evaluation.     FINDINGS:  The thyroid gland appears normal in size. 24-hour uptake  was calculated at 46.4%, which is above the normal range. Normal range  is 10-30% 24-hour uptake. Focal area of decreased uptake in the mid  left thyroid lobe may represent a cold thyroid nodule or cyst.     IMPRESSION:    1. Elevated 24-hour radioiodine uptake in the thyroid at 46.4%.  2. Possible cold nodule or cyst in the mid left thyroid lobe. Thyroid  ultrasound is recommended for further evaluation.    NUCLEAR MEDICINE I-131 SCAN    Aug 10, 2011 8:04:00 AM       TECHNIQUE: 78.7 mCi I-131 ablation scan. Five days post therapy  imaging performed today.     HISTORY: Thyroid cancer.     COMPARISON:   Nuclear Study: Thyroid uptake and scan 2/4/2011.  Other Relevant Studies: Ultrasound neck 2/17/2011.     FINDINGS: Intense radiotracer uptake at the left greater than right  neck at the thyroid level. No evidence for distant metastatic disease.     IMPRESSION: Intense radiotracer uptake localizing to the thyroidectomy  bed, left greater than right. This is consistent with residual thyroid  tissue. No distant metastatic disease identified.    ULTRASOUND THYROID  Feb 17, 2011      HISTORY: Hyperthyroidism. Thyroid nodule.      COMPARISON: Nuclear Medicine thyroid scan 2/4/2011.     FINDINGS: The thyroid gland is enlarged. The right lobe measures 5.4 x  1.6 x 2.1 cm. The left lobe measures 5.9 x 2.3 x 2.7 cm. There are 2  right thyroid nodules and 3 left thyroid nodules.  1. Right upper lobe, hypoechoic solid measuring 0.7 x 0.6 x 0.6 cm.  2. Right lower pole, solid measuring 0.7 x 0.5 x 0.6 cm.  3. Left upper pole, cystic and solid measuring 1.1 x 0.6 x 1.0 cm.  4. Left mid thyroid lobe, primarily solid with small cystic areas  measuring 2.7 x 1.9 x 2.1 cm.  5. Left lower pole, cystic and solid measuring 1.4 x 0.9 x 1.2 cm.     The primarily solid left mid thyroid nodule appears to correspond to  the cold nodule on thyroid uptake and scan.  IMPRESSION: Multinodular thyroid gland.    FNA thyroid nodule:  Copath Report       FNA-thyroid, left mid lobe nodule:   Suspicious for malignancy.  Suspicious for papillary carcinoma  Specimen Adequacy: Satisfactory for evaluation.           Surgical pathology:     SPECIMEN(S):  A: Thyroid, left lobe  B: Parathyroid gland, biopsy of right inferior  C: Thyroid, right lobe    FINAL DIAGNOSIS:  A. and C.  Thyroid, right and left lobes, total thyroidectomy -  Specimen/Procedure(s) and Laterality:   Right and left thyroid lobes,  total  "thyroidectomy.  Specimen Integrity:   Intact.  Specimen Size and Weight:   See Gross Description.  Histologic Tumor Type(s):   Papillary carcinoma, follicular variant with  focal papillary morphology.  Tumor Focality: Unifocal.  Tumor Laterality:   Left lobe.  Tumor Size(s):   Left lobe: 2.2 cm (greatest dimension).  Margins:   Close, but uninvolved.            Distance to closest margin, if negative:   Tumor 0.05 cm from  nearest paratracheal surface and 1.75 cm from nearest anterolateral  surface.  Tumor Capsular Invasion: Present.    Tumor Capsule: Partial.  Extrathyroidal Invasion:   Not identified.  Lymph-Vascular Invasion:   Not identified.  Lymph Nodes:   Two nodes without evidence of metastatic carcinoma (0/2;  one at isthmus and one adjacent to right lobe).  Pathologic Staging:   pT2, pN0, pM not applicable.  Additional Pathologic Findings:   Right thyroid lobe without evidence of  malignancy.  Background nodular hyperplasia and thyroiditis with  lymphoid follicles.  Left lobe with focal previous biopsy site changes.  No parathyroid tissue identified.  CAP Protocol Based on AJCC/UICC TNM, 7th edition; Protocol Effective  Date:  January 2010    B.  \"Parathyroid gland\", right inferior, biopsy - Thyroid tissue; no  parathyroid glandular tissue identified.    8/2016: left modified neck Dissection:  Copath Report      SPECIMEN(S):   A: Scar, left neck   B: Parathyroid gland, left inferior   C: Left central neck dissection, para and pretracheal lymph nodes   D: apical jugular lymph node   E: Left modified neck dissection     FINAL DIAGNOSIS:   A. Skin, neck/scar, excision:   - Hypertrophic scar; benign.     B. Parathyroid gland, left inferior, biopsy:   - Parathyroid tissue present.     C. Left central neck dissection with para-and pretracheal lymph nodes:   - One lymph node; no evidence of malignancy (0/1).   - Thymus and parathyroid tissue present.     D. Lymph node, apical jugular, excision:   - One lymph " node; no evidence of malignancy (0/1).     E. Left modified neck dissection:   - 27 lymph nodes identified (level II- 10, level III- 9, level IV- 5 and   lymph nodes associated with jugular vein- 3).   - One (of 27) lymph node positive for metastatic papillary thyroid   carcinoma.  1 cm in greatest diameter.  Negative for extranodal   involvement  (Level II).   - Jugular vein negative for tumor.        NUCLEAR MEDICINE THYROID WHOLE BODY SCAN I-123   4/20/2017 11:38 AM      TECHNIQUE:  The patient was given 1.8 mCi iodine 123 per oral on  4/19/2017. SPECT-CT with fusion was performed at the level of the neck  and chest.     HISTORY:  Malignant neoplasm of thyroid gland. Postprocedural  hypothyroidism.     COMPARISON:   Nuclear Study: None.     Other Relevant Studies: None.     FINDINGS:  Thyroidectomy. No suspicious focus of abnormal radiotracer  is seen at the thyroidectomy bed identified. Physiologic tracer uptake  seen at the salivary glands. There are several small subcentimeter  lymph nodes involving the bilateral neck without conspicuous focal  tracer uptake outside of the background tracer distribution. There  appears to be left neck postoperative change causing some  inconspicuity of the left sternocleidomastoid muscle. No convincing  worrisome airspace disease or mediastinal abnormality is seen.         IMPRESSION: No worrisome focal tracer uptake is identified to suggest  recurrent neoplasm or remote metastatic disease. Some postoperative  changes at the left neck noted, likely accounting for inconspicuity of  the left sternocleidomastoid muscle. Small bilateral subcentimeter  neck lymph nodes noted.     Neck US 6/2018:  ULTRASOUND HEAD AND NECK SOFT TISSUE  6/4/2018 11:11 AM     HISTORY:  Papillary carcinoma, follicular variant (H).     COMPARISON: None.     FINDINGS:  No evidence for residual thyroid tissue in the  thyroidectomy bed. There is no evidence for cervical adenopathy  bilaterally by size or  morphology.          IMPRESSION:  Negative soft tissue neck study.      All pertinent notes, labs, and images personally reviewed by me.     A/P  Ms.Nhu Mounika Nicole is a 34 year old here for the evaluation of thyroid cancer:    1. 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  It was 2.2 cm unifocal, left lobe tumor with no lymph node involvement.  S/p  total thyroidectomy in 2011 and 78.7 mCi of I-131 HERNANDEZ. No evidence for distant metastatic disease on post therapy scan.  2016- new lymph node s/p FNA with PTC with rising TG  S/p 8/2016- left modified neck radiation. 1/27 lymph node + ( level2). Postop course complicated by hypocalcemia.  Delivered 1/2017.   Follow up I123 WBS 4/2017- no mets  TG suppressed as above  Plan:  Continue to monitor.   Neck US in 2 months.  2.  Hypothyroidism (postoperative following total thyroidectomy for thyroid cancer):  Currently taking levothyroxine 150 mcg X day. Taking generic levothyroxine.  Reports compliance.  Labs as noted above with suppressed TSH with normal FT4.  Clinically looks euthyroid.  Plan:  Decrease dose of levothyroxine to 137 mcg/day (10/31/2019)  Labs in 2 months or sooner if you get pregnant.  Please make a lab appointment for blood work and follow up clinic appointment in 1 week after that to discuss results.      Discussed s/s of hypothyroidism and hyperthyroidism to watch for.  The patient indicates understanding of these issues and agrees with the plan.      3.  History of vitamin D deficiency and hypocalcemia, resolved.  Continue to monitor.    More than 50% of the time spent with Ms. Nicole on counseling / coordinating her care.      Follow-up:  2 months.    Vandana Costello MD  Endocrinology   [facility]  September 25, 2018  CC: Evan Zamora

## 2019-10-31 NOTE — NURSING NOTE
ENDOCRINOLOGY INTAKE FORM    Patient Name:  Aleyda Nicole  :  1985    Is patient Diabetic?   No  Does patient have non-diabetic or other endocrine issues?  Yes: hypocalcemia, recurrent thyroid cancer, postop hypothyroidism, PTC    Vitals: LMP 10/20/2019   BMI= There is no height or weight on file to calculate BMI.    Smoking and Alcohol use:  Social History     Tobacco Use     Smoking status: Never Smoker     Smokeless tobacco: Never Used   Substance Use Topics     Alcohol use: No     Alcohol/week: 0.0 standard drinks     Drug use: No     Valentina Lafleur CMA  [facility]  [place]

## 2019-11-04 ENCOUNTER — TELEPHONE (OUTPATIENT)
Dept: ENDOCRINOLOGY | Facility: CLINIC | Age: 34
End: 2019-11-04

## 2019-11-04 NOTE — TELEPHONE ENCOUNTER
Patients appt was cancelled for 12/26/2019. Patient would like to know if she can be worked in. She wants to make sure she can get her refills. Ok to call and bethanie [phone]

## 2019-11-27 ENCOUNTER — OFFICE VISIT (OUTPATIENT)
Dept: INTERNAL MEDICINE | Facility: CLINIC | Age: 34
End: 2019-11-27
Payer: COMMERCIAL

## 2019-11-27 VITALS
RESPIRATION RATE: 16 BRPM | SYSTOLIC BLOOD PRESSURE: 120 MMHG | DIASTOLIC BLOOD PRESSURE: 84 MMHG | BODY MASS INDEX: 30.99 KG/M2 | WEIGHT: 164 LBS | TEMPERATURE: 98.7 F | HEART RATE: 88 BPM | OXYGEN SATURATION: 97 %

## 2019-11-27 DIAGNOSIS — R10.11 RIGHT UPPER QUADRANT PAIN: Primary | ICD-10-CM

## 2019-11-27 LAB
ALBUMIN SERPL-MCNC: 3.8 G/DL (ref 3.4–5)
ALP SERPL-CCNC: 73 U/L (ref 40–150)
ALT SERPL W P-5'-P-CCNC: 22 U/L (ref 0–50)
ANION GAP SERPL CALCULATED.3IONS-SCNC: 6 MMOL/L (ref 3–14)
AST SERPL W P-5'-P-CCNC: 10 U/L (ref 0–45)
BASOPHILS # BLD AUTO: 0 10E9/L (ref 0–0.2)
BASOPHILS NFR BLD AUTO: 0.2 %
BILIRUB SERPL-MCNC: 0.5 MG/DL (ref 0.2–1.3)
BUN SERPL-MCNC: 12 MG/DL (ref 7–30)
CALCIUM SERPL-MCNC: 8.8 MG/DL (ref 8.5–10.1)
CHLORIDE SERPL-SCNC: 106 MMOL/L (ref 94–109)
CO2 SERPL-SCNC: 26 MMOL/L (ref 20–32)
CREAT SERPL-MCNC: 0.54 MG/DL (ref 0.52–1.04)
DIFFERENTIAL METHOD BLD: ABNORMAL
EOSINOPHIL # BLD AUTO: 0.2 10E9/L (ref 0–0.7)
EOSINOPHIL NFR BLD AUTO: 2.6 %
ERYTHROCYTE [DISTWIDTH] IN BLOOD BY AUTOMATED COUNT: 14.3 % (ref 10–15)
GFR SERPL CREATININE-BSD FRML MDRD: >90 ML/MIN/{1.73_M2}
GLUCOSE SERPL-MCNC: 103 MG/DL (ref 70–99)
HCT VFR BLD AUTO: 39.5 % (ref 35–47)
HGB BLD-MCNC: 13.1 G/DL (ref 11.7–15.7)
LYMPHOCYTES # BLD AUTO: 2.4 10E9/L (ref 0.8–5.3)
LYMPHOCYTES NFR BLD AUTO: 26.2 %
MCH RBC QN AUTO: 25.9 PG (ref 26.5–33)
MCHC RBC AUTO-ENTMCNC: 33.2 G/DL (ref 31.5–36.5)
MCV RBC AUTO: 78 FL (ref 78–100)
MONOCYTES # BLD AUTO: 0.6 10E9/L (ref 0–1.3)
MONOCYTES NFR BLD AUTO: 6.2 %
NEUTROPHILS # BLD AUTO: 5.9 10E9/L (ref 1.6–8.3)
NEUTROPHILS NFR BLD AUTO: 64.8 %
PLATELET # BLD AUTO: 253 10E9/L (ref 150–450)
POTASSIUM SERPL-SCNC: 3.9 MMOL/L (ref 3.4–5.3)
PROT SERPL-MCNC: 7.8 G/DL (ref 6.8–8.8)
RBC # BLD AUTO: 5.05 10E12/L (ref 3.8–5.2)
SODIUM SERPL-SCNC: 138 MMOL/L (ref 133–144)
WBC # BLD AUTO: 9.1 10E9/L (ref 4–11)

## 2019-11-27 PROCEDURE — 99214 OFFICE O/P EST MOD 30 MIN: CPT | Performed by: PHYSICIAN ASSISTANT

## 2019-11-27 PROCEDURE — 85025 COMPLETE CBC W/AUTO DIFF WBC: CPT | Performed by: PHYSICIAN ASSISTANT

## 2019-11-27 PROCEDURE — 36415 COLL VENOUS BLD VENIPUNCTURE: CPT | Performed by: PHYSICIAN ASSISTANT

## 2019-11-27 PROCEDURE — 80053 COMPREHEN METABOLIC PANEL: CPT | Performed by: PHYSICIAN ASSISTANT

## 2019-11-27 NOTE — PROGRESS NOTES
Subjective     Aleyda Nicole is a 34 year old female who presents to clinic today for the following health issues:    HPI   FLANK   PAIN     Onset: 2 months noting episode     Description:   Character: Deep  Location: right flank  Radiation: Back    Intensity: 7/10    Progression of Symptoms:  worsening and intermittent    Accompanying Signs & Symptoms:  Fever/Chills?: no   Gas/Bloating: YES- gas  Nausea: YES  Vomitting: no   Diarrhea?: YES  Constipation:no   Dysuria or Hematuria: no    History:   Trauma: no   Previous similar pain: YES -  Right lower to right side pain last fall too Seen by myself in clinic for this  UA and labs done normal  Recently with return of symptoms.  Previous tests done:     Precipitating factors:   Does the pain change with:     Food: no      BM: no     Urination: no     Alleviating factors:  None    Therapies Tried and outcome: tylenol- helps take the pain away some    LMP:  Unsure     -------------------------------------    BP Readings from Last 3 Encounters:   11/27/19 120/84   10/31/19 113/68   10/23/19 92/64    Wt Readings from Last 3 Encounters:   11/27/19 74.4 kg (164 lb)   10/31/19 74.8 kg (164 lb 14.4 oz)   10/23/19 74.4 kg (164 lb)                    -------------------------------------  Reviewed and updated as needed this visit by Provider  Allergies  Meds         Review of Systems   ROS COMP: Constitutional, HEENT, cardiovascular, pulmonary, gi and gu systems are negative, except as otherwise noted.      Objective    /84 (BP Location: Left arm, Patient Position: Chair, Cuff Size: Adult Regular)   Pulse 88   Temp 98.7  F (37.1  C) (Oral)   Resp 16   Wt 74.4 kg (164 lb)   LMP  (LMP Unknown)   SpO2 97%   BMI 30.99 kg/m    Body mass index is 30.99 kg/m .  Physical Exam   GENERAL: healthy, alert and no distress  RESP: lungs clear to auscultation - no rales, rhonchi or wheezes  CV: regular rate and rhythm, normal S1 S2, no S3 or S4, no murmur, click or rub, no  "peripheral edema and peripheral pulses strong  ABDOMEN: soft, nontender, no hepatosplenomegaly, no masses and bowel sounds normal  MS: no gross musculoskeletal defects noted, no edema  SKIN: no suspicious lesions or rashes    Diagnostic Test Results:  Pending         Assessment & Plan     1. Right upper quadrant pain  Recurrent not improving   - CBC with platelets differential  - Comprehensive metabolic panel  - US Abdomen Limited; Future     BMI:   Estimated body mass index is 30.99 kg/m  as calculated from the following:    Height as of 10/31/19: 1.549 m (5' 1\").    Weight as of this encounter: 74.4 kg (164 lb).   Weight management plan: Patient was referred to their PCP to discuss a diet and exercise plan.        Labs and ultrasound ordered will notify of results      Return in about 6 months (around 5/27/2020) for Routine Visit, regular primary provider.    Carmen Sky PA-C  [facility]      "

## 2019-11-29 ENCOUNTER — ANCILLARY PROCEDURE (OUTPATIENT)
Dept: ULTRASOUND IMAGING | Facility: CLINIC | Age: 34
End: 2019-11-29
Attending: PHYSICIAN ASSISTANT
Payer: COMMERCIAL

## 2019-11-29 DIAGNOSIS — R10.11 RIGHT UPPER QUADRANT PAIN: ICD-10-CM

## 2019-11-29 PROCEDURE — 76705 ECHO EXAM OF ABDOMEN: CPT

## 2019-12-27 DIAGNOSIS — E89.0 POSTOPERATIVE HYPOTHYROIDISM: ICD-10-CM

## 2019-12-27 DIAGNOSIS — C73 PAPILLARY CARCINOMA, FOLLICULAR VARIANT (H): ICD-10-CM

## 2019-12-27 LAB
T4 FREE SERPL-MCNC: 1.19 NG/DL (ref 0.76–1.46)
TSH SERPL DL<=0.005 MIU/L-ACNC: 0.66 MU/L (ref 0.4–4)

## 2019-12-27 PROCEDURE — 84443 ASSAY THYROID STIM HORMONE: CPT | Performed by: INTERNAL MEDICINE

## 2019-12-27 PROCEDURE — 84439 ASSAY OF FREE THYROXINE: CPT | Performed by: INTERNAL MEDICINE

## 2019-12-27 PROCEDURE — 36415 COLL VENOUS BLD VENIPUNCTURE: CPT | Performed by: INTERNAL MEDICINE

## 2020-02-04 DIAGNOSIS — C73 PAPILLARY CARCINOMA, FOLLICULAR VARIANT (H): ICD-10-CM

## 2020-02-04 DIAGNOSIS — E89.0 POSTOPERATIVE HYPOTHYROIDISM: ICD-10-CM

## 2020-02-04 NOTE — TELEPHONE ENCOUNTER
"Requested Prescriptions   Pending Prescriptions Disp Refills     levothyroxine (SYNTHROID/LEVOTHROID) 137 MCG tablet [Pharmacy Med Name: LEVOTHYROXINE SODIUM 137MCG TABS] 90 tablet 0     Sig: TAKE ONE TABLET BY MOUTH ONCE DAILY   Last Written Prescription Date:  10/31/19  Last Fill Quantity: 90,  # refills: 0   Last office visit: 10/31/2019 with prescribing provider:  yes   Future Office Visit:   Next 5 appointments (look out 90 days)    Feb 13, 2020 10:00 AM CST  [facility] [address]  [address]  [facility] [address]  [phone]   Apr 20, 2020 10:30 AM CDT  Return Visit with Vandana Costello MD  [facility] [address]  [address]  [facility] [address]  [phone]             Thyroid Protocol Passed - 2/4/2020 12:10 PM        Passed - Patient is 12 years or older        Passed - Recent (12 mo) or future (30 days) visit within the authorizing provider's specialty     Patient has had an office visit with the authorizing provider or a provider within the authorizing providers department within the previous 12 mos or has a future within next 30 days. See \"Patient Info\" tab in inbasket, or \"Choose Columns\" in Meds & Orders section of the refill encounter.              Passed - Medication is active on med list        Passed - Normal TSH on file in past 12 months     Recent Labs   Lab Test 12/27/19  1545   TSH 0.66              Passed - No active pregnancy on record     If patient is pregnant or has had a positive pregnancy test, please check TSH.          Passed - No positive pregnancy test in past 12 months     If patient is pregnant or has had a positive pregnancy test, please check TSH.            "

## 2020-02-05 RX ORDER — LEVOTHYROXINE SODIUM 137 UG/1
TABLET ORAL
Qty: 90 TABLET | Refills: 0 | Status: SHIPPED | OUTPATIENT
Start: 2020-02-05 | End: 2020-05-06

## 2020-02-13 ENCOUNTER — OFFICE VISIT (OUTPATIENT)
Dept: OBGYN | Facility: CLINIC | Age: 35
End: 2020-02-13
Payer: COMMERCIAL

## 2020-02-13 VITALS
SYSTOLIC BLOOD PRESSURE: 116 MMHG | BODY MASS INDEX: 31.55 KG/M2 | WEIGHT: 167 LBS | HEART RATE: 84 BPM | DIASTOLIC BLOOD PRESSURE: 80 MMHG

## 2020-02-13 DIAGNOSIS — N97.9 SECONDARY FEMALE INFERTILITY: Primary | ICD-10-CM

## 2020-02-13 DIAGNOSIS — Z11.51 SCREENING FOR HUMAN PAPILLOMAVIRUS: ICD-10-CM

## 2020-02-13 DIAGNOSIS — Z12.4 SCREENING FOR CERVICAL CANCER: ICD-10-CM

## 2020-02-13 PROCEDURE — 99215 OFFICE O/P EST HI 40 MIN: CPT | Performed by: OBSTETRICS & GYNECOLOGY

## 2020-02-13 PROCEDURE — G0145 SCR C/V CYTO,THINLAYER,RESCR: HCPCS | Performed by: OBSTETRICS & GYNECOLOGY

## 2020-02-13 PROCEDURE — 87624 HPV HI-RISK TYP POOLED RSLT: CPT | Performed by: OBSTETRICS & GYNECOLOGY

## 2020-02-13 NOTE — NURSING NOTE
"Chief Complaint   Patient presents with     Consult     family planning     Vaginal Bleeding     heavy period for first 2 days     Abdominal Cramping     x1 mo, not just during period       Initial /80 (Cuff Size: Adult Regular)   Pulse 84   Wt 75.8 kg (167 lb)   LMP 2020   BMI 31.55 kg/m   Estimated body mass index is 31.55 kg/m  as calculated from the following:    Height as of 10/31/19: 1.549 m (5' 1\").    Weight as of this encounter: 75.8 kg (167 lb).  BP completed using cuff size: regular    Questioned patient about current smoking habits.  Pt. has never smoked.          The following HM Due: pap smear  Camille Rebolledo CMA         "

## 2020-02-13 NOTE — PROGRESS NOTES
Secondary infertility    Ms.Nhu Mounika Nicole 34 year old P2 (hx of  x 2) presents for infertility consultation.   She and her  have been trying for 2 years to become pregnant. Her  is the biological father of her kids.   She reports seeking evaluation for secondary amenorrhea (~ 7 months) last year. She had thyroid testing done last year as well that was abnormal and thus had her synthroid medication adjusted and repeat testing as of 2019 has shown normal thyroid testing. She has history of hypothyroidism since  following her thyroidectomy. She is puzzled by her infertility issues and even the episode of amenorrhea given that she has been able to conceive in the past and have relatively monthly interval menses despite fluctuating normal and abnormal thyroid function testing since .   At any rate, currently she reports menses intervals spanning 23-29 days. Duration of her menses is usually 4 days but reports that they have been more heavier recently and also her cramping has more intensified but has remained moderate intensity and tolerable.   Denies intermenstrual bleeding, postcoital bleeding and dyspareunia.  Reports normal bladder and bowel habits.     Her  is otherwise healthy. Her is being treated with a liver medication that sounds like treatment for an infectious disease consistent with hepatitis. They had inquired to their hepatologist if the medication he is taking could be affecting his fertility status, but they were reassured that it was not.      She has been using ovulation predictor kits for the last 3 months which has shown a faint positive test indicating ovulation. Prior to that, they did not engage in timed intercourse as it was random, but she still was using an julius that predicted when she would be ovulating. She does also endorse history of Clomid use in the past but was not successful with it.     Past Medical History:   Diagnosis Date     Gestational  diabetes 2013     Hypocalcemia 2016     Influenza A      Papillary thyroid carcinoma     Papillary carcinoma, follicular variant with     Pneumonia     LLL     PONV (postoperative nausea and vomiting)      Postoperative hypothyroidism       labor      Uncomplicated asthma        Past Surgical History:   Procedure Laterality Date      SECTION  10/26/2013    Procedure:  SECTION;  primary  section;  Surgeon: Rodney Mckee MD;  Location: [facility]      SECTION N/A 2017    Procedure:  SECTION;  Surgeon: Hakeem Combs MD;  Location: [facility]     DISSECTION RADICAL NECK Left 2016    Procedure: DISSECTION RADICAL NECK;  Surgeon: Vy Theodore MD;  Location: [facility] OR     DISSECTION RADICAL NECK MODIFIED Left 2016    Procedure: DISSECTION RADICAL NECK MODIFIED;  Surgeon: Lius Brown MD;  Location: [facility] OR     THYROIDECTOMY      Total, for cancer.        Current Outpatient Medications   Medication     albuterol (PROAIR HFA/PROVENTIL HFA/VENTOLIN HFA) 108 (90 Base) MCG/ACT inhaler     fluticasone (FLOVENT HFA) 44 MCG/ACT inhaler     levothyroxine (SYNTHROID/LEVOTHROID) 137 MCG tablet     Prenatal Vit-Fe Fumarate-FA (PRENATAL PO)     VITAMIN D3 1000 units tablet     No current facility-administered medications for this visit.        Allergies   Allergen Reactions     No Known Drug Allergies        Social History     Tobacco Use     Smoking status: Never Smoker     Smokeless tobacco: Never Used   Substance Use Topics     Alcohol use: No     Alcohol/week: 0.0 standard drinks     Drug use: No       Family History   Problem Relation Age of Onset     Diabetes Mother      Cerebrovascular Disease Mother      Hypertension Mother      Diabetes Father 50     Hypertension Father      Genitourinary Problems Father         kidney disease     Coronary Artery Disease Father      Diabetes Brother        C: NEGATIVE for fever, chills, change in  weight  HEENT: NEGATIVE for visual changes, runny nose, epistaxis, ear pain, tinnitus, tooth ache, sore throat, difficulty with swallowing, sinus pain  R: NEGATIVE for significant cough or SOB  CV: NEGATIVE for chest pain, palpitations or peripheral edema  BREAST: NEGATIVE For soreness, pain, lumps or nipple discharge  GI: NEGATIVE for nausea, abdominal pain, heartburn, or change in bowel habits  : NEGATIVE for frequency, dysuria, hematuria, vaginal discharge, or irregular bleeding  Neuro: NEGATIVE for numbness, tingling, focal weakness, or headache  INT: NEGATIVE for rashes, lesions or pruritis   PSYCH: NEGATIVE for anxiety, depression, or halluciinations    Exam:   My medical assistant, Carmen Howard CMA, was present as a chaperone for entire clinic visit including the physical exam.  /80 (Cuff Size: Adult Regular)   Pulse 84   Wt 75.8 kg (167 lb)   LMP 01/30/2020   BMI 31.55 kg/m    General Appearance: Well nourished, well developed female, NAD, AOx3  Neurological: Mental Status Normal and Station and Gait Normal   Skin: Normal skin turgor  HEET: Atraumatic, normocephalic, EOMI  Neck: Supple,no adenopathy,thyroid normal  Lungs: Good respiratory effort  Abdomen: Soft, NT, ND, no masses  Pelvic: Normal external female genitalia.  No external lesions, normal hair distribution, no adenopathy. Speculum exam reveals vaginal epithelium well rugated with no abnormal discharge. Cervix appears smooth, pink, with no visible lesions. Bimanual exam reveals normal size uterus, anteverted, non-tender, and mobile. No adnexal masses or tenderness. No cervical motion tenderness.   Extremities: No clubbing, no cyanosis and no edema    A/P: Secondary infertility  -- labs ordered and instructed to have all of them collected on day #3 of her cycle given the temporal clinical significance of FSH and estradiol; patient conveys understanding  -- pelvic US ordered as well to rule out structural Gyn lesions that could be  the cause of her infertility   -- discussed that HSG testing would be deferred until her  pursues a semen analysis given HSG testing has risks ie pelvic infection   -- deferred thyroid testing given recent normal test results, also deferred androgenic hormone testing given clinical absence of hirsutism or other androgenic symptoms    Total time spent was 40 minutes; greater than 50% of time was spent in counseling and/or coordination of care for the above listed diagnoses, not including time spent on the procedure.      Rodney Crabtree MD  [facility]

## 2020-02-13 NOTE — LETTER
February 20, 2020    Aleyda Nicole  [address] [id number]    Dear ,  This letter is regarding your recent Pap smear (cervical cancer screening) and Human Papillomavirus (HPV) test.  We are happy to inform you that your Pap smear result is normal. Cervical cancer is closely linked with certain types of HPV. Your results showed no evidence of high-risk HPV.  We recommend you have your next PAP smear and HPV test in 5 years.  You will still need to return to the clinic every year for an annual exam and other preventive tests.  If you have additional questions regarding this result, please call our registered nurse, Opal at [phone].  Sincerely,    Rodney Crabtree MD //[facility]

## 2020-02-17 ENCOUNTER — ANCILLARY PROCEDURE (OUTPATIENT)
Dept: ULTRASOUND IMAGING | Facility: CLINIC | Age: 35
End: 2020-02-17
Attending: OBSTETRICS & GYNECOLOGY
Payer: COMMERCIAL

## 2020-02-17 DIAGNOSIS — N97.9 SECONDARY FEMALE INFERTILITY: ICD-10-CM

## 2020-02-17 PROCEDURE — 76830 TRANSVAGINAL US NON-OB: CPT | Performed by: OBSTETRICS & GYNECOLOGY

## 2020-02-17 PROCEDURE — 76856 US EXAM PELVIC COMPLETE: CPT | Performed by: OBSTETRICS & GYNECOLOGY

## 2020-02-18 LAB
COPATH REPORT: NORMAL
PAP: NORMAL

## 2020-02-19 LAB
FINAL DIAGNOSIS: NORMAL
HPV HR 12 DNA CVX QL NAA+PROBE: NEGATIVE
HPV16 DNA SPEC QL NAA+PROBE: NEGATIVE
HPV18 DNA SPEC QL NAA+PROBE: NEGATIVE
SPECIMEN DESCRIPTION: NORMAL
SPECIMEN SOURCE CVX/VAG CYTO: NORMAL

## 2020-02-20 DIAGNOSIS — R93.89 ENDOMETRIAL THICKENING ON ULTRASOUND: Primary | ICD-10-CM

## 2020-02-20 NOTE — RESULT ENCOUNTER NOTE
Inform patient that her pelvic ultrasound results show that her uterine lining is slightly irregular and prominent which might be a normal finding given that she had this ultrasound done towards the end of her cycle.  But it may also mean that there could be a structural lesion within the uterine cavity consistent with a endometrial polyp.  One option she can pursue is to repeat the ultrasound in the early part of her cycle shortly after her period.  To ensure that the lining is thin and thereby rule out the possibility of there being a polyp.  Another option for her to pursue would be to evaluate the uterine cavity by either doing a saline infused sonohysterogram or diagnostic hysteroscopy in the operating room.  If she wants to be conservative then she should have the ultrasound repeated shortly after her next period.  Please place an order for that ultrasound if she elects to choose that option.    Thank you
veneer/normal

## 2020-02-24 DIAGNOSIS — N97.9 SECONDARY FEMALE INFERTILITY: ICD-10-CM

## 2020-02-24 LAB
ESTRADIOL SERPL-MCNC: 28 PG/ML
FSH SERPL-ACNC: 6.8 IU/L
HBA1C MFR BLD: 5.4 % (ref 0–5.6)
PROLACTIN SERPL-MCNC: 10 UG/L (ref 3–27)

## 2020-02-24 PROCEDURE — 83036 HEMOGLOBIN GLYCOSYLATED A1C: CPT | Performed by: OBSTETRICS & GYNECOLOGY

## 2020-02-24 PROCEDURE — 83001 ASSAY OF GONADOTROPIN (FSH): CPT | Performed by: OBSTETRICS & GYNECOLOGY

## 2020-02-24 PROCEDURE — 99000 SPECIMEN HANDLING OFFICE-LAB: CPT | Performed by: OBSTETRICS & GYNECOLOGY

## 2020-02-24 PROCEDURE — 36415 COLL VENOUS BLD VENIPUNCTURE: CPT | Performed by: OBSTETRICS & GYNECOLOGY

## 2020-02-24 PROCEDURE — 83520 IMMUNOASSAY QUANT NOS NONAB: CPT | Mod: 90 | Performed by: OBSTETRICS & GYNECOLOGY

## 2020-02-24 PROCEDURE — 84146 ASSAY OF PROLACTIN: CPT | Performed by: OBSTETRICS & GYNECOLOGY

## 2020-02-24 PROCEDURE — 82670 ASSAY OF TOTAL ESTRADIOL: CPT | Performed by: OBSTETRICS & GYNECOLOGY

## 2020-02-25 LAB — MIS SERPL-MCNC: 1.2 NG/ML (ref 0.18–11.71)

## 2020-02-27 ENCOUNTER — ANCILLARY PROCEDURE (OUTPATIENT)
Dept: ULTRASOUND IMAGING | Facility: CLINIC | Age: 35
End: 2020-02-27
Attending: OBSTETRICS & GYNECOLOGY
Payer: COMMERCIAL

## 2020-02-27 DIAGNOSIS — R93.89 ENDOMETRIAL THICKENING ON ULTRASOUND: ICD-10-CM

## 2020-02-27 PROCEDURE — 76830 TRANSVAGINAL US NON-OB: CPT | Performed by: OBSTETRICS & GYNECOLOGY

## 2020-02-28 ENCOUNTER — MYC MEDICAL ADVICE (OUTPATIENT)
Dept: OBGYN | Facility: CLINIC | Age: 35
End: 2020-02-28

## 2020-02-28 DIAGNOSIS — N97.9 SECONDARY FEMALE INFERTILITY: Primary | ICD-10-CM

## 2020-03-02 RX ORDER — CLOMIPHENE CITRATE 50 MG/1
50 TABLET ORAL DAILY
Qty: 5 TABLET | Refills: 2 | Status: SHIPPED | OUTPATIENT
Start: 2020-03-02 | End: 2020-10-30

## 2020-03-31 ENCOUNTER — MYC MEDICAL ADVICE (OUTPATIENT)
Dept: INTERNAL MEDICINE | Facility: CLINIC | Age: 35
End: 2020-03-31

## 2020-03-31 ENCOUNTER — TELEPHONE (OUTPATIENT)
Dept: INTERNAL MEDICINE | Facility: CLINIC | Age: 35
End: 2020-03-31

## 2020-03-31 NOTE — TELEPHONE ENCOUNTER
ACT sent via [employer]    Sol Felipe CMA      Summary:    Patient is due/failing the following:   ACT    Type of outreach:  Sent [employer] message.  Action needed: n/a    If need for provider review:    Please indicate OV, lab, MTM, or nurse appt if needed.  Indicate fasting or not fasting.                                                                                                                                          Sol Felipe CMA

## 2020-04-13 ENCOUNTER — MYC MEDICAL ADVICE (OUTPATIENT)
Dept: ENDOCRINOLOGY | Facility: CLINIC | Age: 35
End: 2020-04-13

## 2020-04-13 DIAGNOSIS — C73 PAPILLARY CARCINOMA, FOLLICULAR VARIANT (H): Primary | ICD-10-CM

## 2020-04-13 DIAGNOSIS — E89.0 POSTOPERATIVE HYPOTHYROIDISM: ICD-10-CM

## 2020-04-14 NOTE — TELEPHONE ENCOUNTER
Needs labs first, then phone visit or reschedule.    I left a message for the patient to return our call.     Valentina Lafleur, [facility]  [place]

## 2020-04-16 NOTE — TELEPHONE ENCOUNTER
See my chart.    Valentina Lafleur [facility]  [place]

## 2020-04-16 NOTE — TELEPHONE ENCOUNTER
Labs in place- she can get labs done.  She can check with insurance if phone visits are covered or not- mostly they are covered now but it depends on insurance.

## 2020-04-16 NOTE — TELEPHONE ENCOUNTER
Message sent via [employer].      Valentina Lafleur CMA  [facility]  [place]

## 2020-04-24 ENCOUNTER — MYC MEDICAL ADVICE (OUTPATIENT)
Dept: INTERNAL MEDICINE | Facility: CLINIC | Age: 35
End: 2020-04-24

## 2020-04-24 DIAGNOSIS — R10.84 ABDOMINAL PAIN, GENERALIZED: Primary | ICD-10-CM

## 2020-04-27 NOTE — TELEPHONE ENCOUNTER
Please see patient mychart message.     Does she need to follow up with GI at this time?     Appears she did have a US in 11/2019

## 2020-04-27 NOTE — TELEPHONE ENCOUNTER
Referral faxed to [facility] at [phone].  MC sent.  No further action needed at this time.

## 2020-04-27 NOTE — TELEPHONE ENCOUNTER
Probably would be prudent to have the patient see [facility] for further evaluation as at this point it appears an evaluation has not identified any obvious cause thus further assessment is warranted, please note I did send a referral and you may contact the patient and give her the phone number to call for an appointment

## 2020-05-04 DIAGNOSIS — E89.0 POSTOPERATIVE HYPOTHYROIDISM: ICD-10-CM

## 2020-05-04 DIAGNOSIS — C73 PAPILLARY CARCINOMA, FOLLICULAR VARIANT (H): ICD-10-CM

## 2020-05-04 LAB
T4 FREE SERPL-MCNC: 1.11 NG/DL (ref 0.76–1.46)
TSH SERPL DL<=0.005 MIU/L-ACNC: 0.94 MU/L (ref 0.4–4)

## 2020-05-04 PROCEDURE — 84443 ASSAY THYROID STIM HORMONE: CPT | Performed by: INTERNAL MEDICINE

## 2020-05-04 PROCEDURE — 00000344 ZZHCL STATISTIC REMEASURE THYROGLOBULIN: Mod: 90 | Performed by: INTERNAL MEDICINE

## 2020-05-04 PROCEDURE — 84439 ASSAY OF FREE THYROXINE: CPT | Performed by: INTERNAL MEDICINE

## 2020-05-04 PROCEDURE — 86800 THYROGLOBULIN ANTIBODY: CPT | Mod: 90 | Performed by: INTERNAL MEDICINE

## 2020-05-04 PROCEDURE — 36415 COLL VENOUS BLD VENIPUNCTURE: CPT | Performed by: INTERNAL MEDICINE

## 2020-05-04 PROCEDURE — 84432 ASSAY OF THYROGLOBULIN: CPT | Mod: 90 | Performed by: INTERNAL MEDICINE

## 2020-05-04 PROCEDURE — 99000 SPECIMEN HANDLING OFFICE-LAB: CPT | Performed by: INTERNAL MEDICINE

## 2020-05-05 DIAGNOSIS — E89.0 POSTOPERATIVE HYPOTHYROIDISM: ICD-10-CM

## 2020-05-05 DIAGNOSIS — C73 PAPILLARY CARCINOMA, FOLLICULAR VARIANT (H): ICD-10-CM

## 2020-05-06 RX ORDER — LEVOTHYROXINE SODIUM 137 UG/1
TABLET ORAL
Qty: 90 TABLET | Refills: 0 | Status: SHIPPED | OUTPATIENT
Start: 2020-05-06 | End: 2020-08-11

## 2020-05-08 LAB — LAB SCANNED RESULT: NORMAL

## 2020-05-12 ENCOUNTER — HOSPITAL ENCOUNTER (OUTPATIENT)
Dept: ULTRASOUND IMAGING | Facility: CLINIC | Age: 35
Discharge: HOME | End: 2020-05-12
Attending: INTERNAL MEDICINE | Admitting: INTERNAL MEDICINE
Payer: COMMERCIAL

## 2020-05-12 DIAGNOSIS — C73 PAPILLARY CARCINOMA, FOLLICULAR VARIANT (H): ICD-10-CM

## 2020-05-12 DIAGNOSIS — E89.0 POSTOPERATIVE HYPOTHYROIDISM: ICD-10-CM

## 2020-05-12 PROCEDURE — 76536 US EXAM OF HEAD AND NECK: CPT

## 2020-05-15 ENCOUNTER — MYC MEDICAL ADVICE (OUTPATIENT)
Dept: ENDOCRINOLOGY | Facility: CLINIC | Age: 35
End: 2020-05-15

## 2020-05-15 ENCOUNTER — MYC MEDICAL ADVICE (OUTPATIENT)
Dept: OBGYN | Facility: CLINIC | Age: 35
End: 2020-05-15

## 2020-05-18 ENCOUNTER — TELEPHONE (OUTPATIENT)
Dept: ENDOCRINOLOGY | Facility: CLINIC | Age: 35
End: 2020-05-18

## 2020-05-18 NOTE — TELEPHONE ENCOUNTER
Pt calling to report that she is pregnant. She was told to let Dr. Costello know this and stated that her Levothyroxine dosage may need to be changed so she doesn't have a miscarriage. Pt can be reached at [phone]. Please advise. Thanks.

## 2020-05-21 ENCOUNTER — TELEPHONE (OUTPATIENT)
Dept: OBGYN | Facility: CLINIC | Age: 35
End: 2020-05-21

## 2020-05-21 DIAGNOSIS — O26.859 SPOTTING IN EARLY PREGNANCY: ICD-10-CM

## 2020-05-21 DIAGNOSIS — O26.859 SPOTTING IN EARLY PREGNANCY: Primary | ICD-10-CM

## 2020-05-21 LAB — B-HCG SERPL-ACNC: 186 IU/L (ref 0–5)

## 2020-05-21 PROCEDURE — 84702 CHORIONIC GONADOTROPIN TEST: CPT | Performed by: OBSTETRICS & GYNECOLOGY

## 2020-05-21 PROCEDURE — 36415 COLL VENOUS BLD VENIPUNCTURE: CPT | Performed by: OBSTETRICS & GYNECOLOGY

## 2020-05-21 NOTE — TELEPHONE ENCOUNTER
Pt calling with bleeding starting sometime last week.  Denies cramping, notes low back pain.  Last week it started as very small amount of older brown bleeding.  Yesterday it turned to bright red and has changed to brown today.      LMP 4/16/20 making pt 5 weeks today.  Pt has hx of 3 miscarriages.      Lab scheduled for HCG quant today and Saturday.  Pt blood type is O +.    Advised to seek care in ED if increase in bleeding where she is soaking through a pad in an hour or severe abdominal pain.  Pt verbalizes understanding.    Please advise of any further recommendations.    Hiwot Stevens RN

## 2020-05-22 ENCOUNTER — MYC MEDICAL ADVICE (OUTPATIENT)
Dept: OBGYN | Facility: CLINIC | Age: 35
End: 2020-05-22

## 2020-05-23 DIAGNOSIS — O26.859 SPOTTING IN EARLY PREGNANCY: ICD-10-CM

## 2020-05-23 LAB — B-HCG SERPL-ACNC: 162 IU/L (ref 0–5)

## 2020-05-23 PROCEDURE — 84702 CHORIONIC GONADOTROPIN TEST: CPT | Performed by: OBSTETRICS & GYNECOLOGY

## 2020-05-23 PROCEDURE — 36415 COLL VENOUS BLD VENIPUNCTURE: CPT | Performed by: OBSTETRICS & GYNECOLOGY

## 2020-05-23 NOTE — RESULT ENCOUNTER NOTE
Pepper sent the patient a [employer] message.Please let her know about the results.     Rodney Crabtree MD

## 2020-05-26 DIAGNOSIS — O26.859 SPOTTING IN EARLY PREGNANCY: ICD-10-CM

## 2020-05-26 LAB — B-HCG SERPL-ACNC: 47 IU/L (ref 0–5)

## 2020-05-26 PROCEDURE — 84702 CHORIONIC GONADOTROPIN TEST: CPT | Performed by: OBSTETRICS & GYNECOLOGY

## 2020-05-26 PROCEDURE — 36415 COLL VENOUS BLD VENIPUNCTURE: CPT | Performed by: OBSTETRICS & GYNECOLOGY

## 2020-06-03 DIAGNOSIS — O26.859 SPOTTING IN EARLY PREGNANCY: ICD-10-CM

## 2020-06-03 PROCEDURE — 36415 COLL VENOUS BLD VENIPUNCTURE: CPT | Performed by: INTERNAL MEDICINE

## 2020-06-03 PROCEDURE — 84702 CHORIONIC GONADOTROPIN TEST: CPT | Performed by: INTERNAL MEDICINE

## 2020-06-04 LAB — B-HCG SERPL-ACNC: 1 IU/L (ref 0–5)

## 2020-06-06 ENCOUNTER — MYC MEDICAL ADVICE (OUTPATIENT)
Dept: OBGYN | Facility: CLINIC | Age: 35
End: 2020-06-06

## 2020-08-02 ENCOUNTER — MYC MEDICAL ADVICE (OUTPATIENT)
Dept: ENDOCRINOLOGY | Facility: CLINIC | Age: 35
End: 2020-08-02

## 2020-08-02 DIAGNOSIS — C73 PAPILLARY CARCINOMA, FOLLICULAR VARIANT (H): Primary | ICD-10-CM

## 2020-08-02 DIAGNOSIS — E89.0 POSTOPERATIVE HYPOTHYROIDISM: ICD-10-CM

## 2020-08-03 NOTE — TELEPHONE ENCOUNTER
I am sorry to hear about miscarriage.  Labs in place.  Please make a lab appointment for blood work and follow up clinic appointment in 1 week after that to discuss results.    Please inform patient.         Initial / Assessment/Plan of Care Note     Baseline Assessment  66year old admitted 2/21/2018 as Inpatient with a diagnosis of hyperosmolarity. Prior to admission patient was living with Spouse/significant other and residing at [facility]. Patient does  have a Power of  for [employer]. Document is not activated. Patientâs Primary Care Provider is Bang Phillips MD.     Medical History  Past Medical History:   Diagnosis Date   â¢ COPD (chronic obstructive pulmonary disease) (CMS/HCC)    â¢ Essential hypertension 5/17/2016   â¢ Gastrostomy tube dependent (CMS/HCC) 5/2/2014   â¢ Malignant neoplasm of pharynx, unspecified (CMS/HCC)    â¢ PVD (peripheral vascular disease) (CMS/HCC)    â¢ Severe protein-calorie malnutrition (CMS/HCC)    â¢ Stroke (CMS/HCC) 2006   â¢ Tobacco use disorder    â¢ Type 1 diabetes mellitus (CMS/HCC)        Prior to Admission Status  Functional Status  Ambulation: Cane  Bathing: Significant Other  Dressing: Independent/Self, Significant Other  Toileting: Independent/Self, Significant Other  Meal Preparation: Significant Other  Shopping: Significant Other  Medication Preparation: Significant Other  Medication Administration: Significant Other  Housekeeping: Significant Other  Laundry: Significant Other  Transportation: Significant Other    Agency/Support  Type of Services Prior to Hospitalization: None  Support Systems: Spouse/Significant other  Home Devices/Equipment: Tube feeding equipment/pump  Mobility Assist Devices: Cane  Sensory Support Devices: None    Current Status  Current Mental Status: Pleasant, Cooperative  Stressors: Health Status    Insurance  Primary: MEDICARE  Secondary: WPS    Barriers to Discharge  Identified Barriers to Discharge/Transition Planning: Assessment/stabilization in progress    Progress Note  MSW met with reviewed MD notes, collaborated with RN, and met with pt to review plan of care/discharge needs. Pt being tx for multiple issues this date.   Pt does have hx CVA with left sided weakness and uses quad cane at home. He states that wife assist with cares and transportation. Pt does have TF and notes that he was independent with this at home also. Will continue to follow. Plan  SW/CM - Recommendations for Discharge: Home, Home care  Anticipate patient will need post-hospital services. Necessary services are available. Anticipate patient can return to the environment from which patient entered the hospital.   Anticipate patient cannot provide self-care at discharge. Refer to SW/CM Flowsheet for Goals and objective data.

## 2020-08-03 NOTE — TELEPHONE ENCOUNTER
Message sent via [employer].      Valentina Lafleur CMA  [facility]  [place]

## 2020-08-07 DIAGNOSIS — C73 PAPILLARY CARCINOMA, FOLLICULAR VARIANT (H): ICD-10-CM

## 2020-08-07 DIAGNOSIS — E89.0 POSTOPERATIVE HYPOTHYROIDISM: ICD-10-CM

## 2020-08-07 PROCEDURE — 36415 COLL VENOUS BLD VENIPUNCTURE: CPT | Performed by: INTERNAL MEDICINE

## 2020-08-07 PROCEDURE — 84443 ASSAY THYROID STIM HORMONE: CPT | Performed by: INTERNAL MEDICINE

## 2020-08-07 PROCEDURE — 84439 ASSAY OF FREE THYROXINE: CPT | Performed by: INTERNAL MEDICINE

## 2020-08-08 LAB
T4 FREE SERPL-MCNC: 1.49 NG/DL (ref 0.76–1.46)
TSH SERPL DL<=0.005 MIU/L-ACNC: 0.55 MU/L (ref 0.4–4)

## 2020-08-10 ENCOUNTER — TELEPHONE (OUTPATIENT)
Dept: ENDOCRINOLOGY | Facility: CLINIC | Age: 35
End: 2020-08-10

## 2020-08-10 ENCOUNTER — MYC MEDICAL ADVICE (OUTPATIENT)
Dept: ENDOCRINOLOGY | Facility: CLINIC | Age: 35
End: 2020-08-10

## 2020-08-10 DIAGNOSIS — E89.0 POSTOPERATIVE HYPOTHYROIDISM: ICD-10-CM

## 2020-08-10 DIAGNOSIS — C73 PAPILLARY CARCINOMA, FOLLICULAR VARIANT (H): ICD-10-CM

## 2020-08-10 NOTE — TELEPHONE ENCOUNTER
ENDO THYROID LABS-[facility] Latest Ref Rng & Units 8/7/2020 5/4/2020   TSH 0.40 - 4.00 mU/L 0.55 0.94   T4 TOTAL 5.0 - 11.0 ug/dL     T4 FREE 0.76 - 1.46 ng/dL 1.49 (H) 1.11    last visit 10/2019.    Please help schedule virtual visit in next 1-3 weeks to discuss labs and next steps.  Let me know if you have any questions or if needs to be added on to schedule.    Thank you.    Vandana Costello MD

## 2020-08-11 ENCOUNTER — MYC MEDICAL ADVICE (OUTPATIENT)
Dept: ENDOCRINOLOGY | Facility: CLINIC | Age: 35
End: 2020-08-11

## 2020-08-11 RX ORDER — LEVOTHYROXINE SODIUM 137 UG/1
137 TABLET ORAL DAILY
Qty: 90 TABLET | Refills: 1 | Status: SHIPPED | OUTPATIENT
Start: 2020-08-11 | End: 2021-02-12

## 2020-08-11 NOTE — TELEPHONE ENCOUNTER
She can do a video visit on 08/17th at 10 or 11:30am, do either of those work?    LM and Message sent via [employer].      Valentina Lafleur CMA  [facility]  [place]

## 2020-08-11 NOTE — TELEPHONE ENCOUNTER
Rx sent.    Please inform patient.      8/20 labs showing Ft4 slightly high- 1.49- if she is feeling oK then will continue same dose.

## 2020-08-11 NOTE — TELEPHONE ENCOUNTER
The soonest appointment is 9/8/20. If this is ok no need to contact patient. If you want sooner please advise new date and time.

## 2020-08-11 NOTE — TELEPHONE ENCOUNTER
Patient requesting refill on medication, patient informed to schedule follow-up appointment multiple time. No future appointment scheduled yet. Sent patient another reminder to schedule follow-up appointment.     Patient does not have any medication left, please address pended medication refill.   Last Written Prescription Date:  5/6/20  Last Fill Quantity: 90,  # refills: 0   Last office visit: 10/31/2019 with prescribing provider:  10/31/19   Future Office Visit:      Please advise,     Thank you

## 2020-08-11 NOTE — TELEPHONE ENCOUNTER
Message sent via [employer].      Valentina Lafleur CMA  [facility]  [place]

## 2020-08-12 NOTE — TELEPHONE ENCOUNTER
I left a message for the patient to return our call.     Valentina Lafleur, SAVI  [facility]  [place]

## 2020-08-17 ENCOUNTER — VIRTUAL VISIT (OUTPATIENT)
Dept: ENDOCRINOLOGY | Facility: CLINIC | Age: 35
End: 2020-08-17
Payer: COMMERCIAL

## 2020-08-17 DIAGNOSIS — C73 PAPILLARY CARCINOMA, FOLLICULAR VARIANT (H): Primary | ICD-10-CM

## 2020-08-17 DIAGNOSIS — E89.0 POSTOPERATIVE HYPOTHYROIDISM: ICD-10-CM

## 2020-08-17 PROCEDURE — 99214 OFFICE O/P EST MOD 30 MIN: CPT | Mod: 95 | Performed by: INTERNAL MEDICINE

## 2020-08-17 NOTE — PROGRESS NOTES
"THIS IS A VIDEO VISIT:    Phone call visit/virtual visit encounter:    Name of patient: Aleyda Nicole    Date of encounter: 8/17/2020    Time of start of video visit: 10:12    Video started: 10:20    Video ended: 10:30    Time visit video ended:10:37    Provider location: working from [place]/ [facility]    Patient location: patients home.    Mode of transmission: video/ Doximity    Verbal consent: obtained before starting visit. Pt is agreeable.      The patient has been notified of following:      \"This VIDEO visit will be conducted via a call between you and your physician/provider. We have found that certain health care needs can be provided without the need for a physical exam.  This service lets us provide the care you need with a short phone conversation.  If a prescription is necessary we can send it directly to your pharmacy.  If lab work is needed we can place an order for that and you can then stop by our lab to have the test done at a later time.     With new updates with corona virus patient might be billed as clinic visit.     If during the course of the call the physician/provider feels a telephone visit is not appropriate, you will not be charged for this service.\"      Past medical history, social history, family history, allergy and medications were reviewed and updated as appropriate.  Reviewed pertinent labs, notes, imaging studies personally.    Name: Aleyda Nicole  Seen for f/u of papillary thyroid cancer and postoperative hypothyroidism    No chief complaint on file.    HPI:  Aleyda Nicole is a 34 year old female who presents for the evaluation of:      1.  Papillary thyroid cancer:  Thyroid nodule was noticed in February 2011 which was followed by thyroid nodule biopsy which showed suspicious for papillary thyroid cancer.  She underwent total thyroidectomy 3/2011 and pathology showed papillary thyroid cancer with follicular pattern.  Tumor was about 2.2 cm on the left lobe.  No lymph node " involvement.  S/p 78.7 mCi of I131 HERNANDEZ  8/2011. No evidence for distant metastatic disease on post therapy scan.  Neck US 6/2016- showed new lymph node in neck along with rising Tg levels  S/p FNA lymph node: 6/2016- c/w papillary thyroid cancer    8/2016: underwent left modified neck dissection.  1/27 lymph node positive one left modified neck dissection.  1 cm in greatest diameter.  Negative for external involvement at level II.    Tg decreased post left neck dissection from 1.3 to < 0.1    Follow up I123 4/2017: no mets.    Postop course was complicated by hypocalcemia and was started on calcium supplement.  Taking calcium- tries to take 2 tabs per day + 1 glass of milk daily.    Delivered baby 1/2017. No longer breast feeding.    She was followed by endocrinology before and then lost to follow-up with endocrinology in 2012.  No history of neck radiation prior to diagnosis of thyroid cancer.  No family history of thyroid cancer.    Thyroglobulin levels appear stable and suppressed.  Neck ultrasound June 2018, 5/2020 did not show any evidence of metastases.    2.  Hypothyroidism (postoperative):  Was started on levothyroxine following thyroidectomy.    Currently taking levothyroxine 137 mcg X 7 days a week.  Reports compliance.    Denies hyperthyroid symptoms.  Had miscarriage 2 months back.  Planning one more pregnancy in future.  + hair loss.  + fatigue.    Wt Readings from Last 2 Encounters:   02/13/20 75.8 kg (167 lb)   11/27/19 74.4 kg (164 lb)     No diarrhea, tremors. sometimes palpitations- once in a week X 1 year.  No difficulty with swallowing, no pain during swallowing.  Missed 2 periods. Home pregnancy test was neg.  Mood- some irritability.  Weight: stable.  PMH/PSH:  Past Medical History:   Diagnosis Date     Gestational diabetes 8/20/2013     Hypocalcemia 11/12/2016     Influenza A 2009     Papillary thyroid carcinoma 2011    Papillary carcinoma, follicular variant with     Pneumonia 2009    LLL      PONV (postoperative nausea and vomiting)      Postoperative hypothyroidism       labor      Uncomplicated asthma      Past Surgical History:   Procedure Laterality Date      SECTION  10/26/2013    Procedure:  SECTION;  primary  section;  Surgeon: Rodney Mckee MD;  Location: [facility]      SECTION N/A 2017    Procedure:  SECTION;  Surgeon: Hakeem Combs MD;  Location: [facility]     DISSECTION RADICAL NECK Left 2016    Procedure: DISSECTION RADICAL NECK;  Surgeon: Vy Theodore MD;  Location: [facility] OR     DISSECTION RADICAL NECK MODIFIED Left 2016    Procedure: DISSECTION RADICAL NECK MODIFIED;  Surgeon: Luis Brown MD;  Location: [facility] OR     THYROIDECTOMY      Total, for cancer.      Family Hx:  Family History   Problem Relation Age of Onset     Diabetes Mother      Cerebrovascular Disease Mother      Hypertension Mother      Diabetes Father 50     Hypertension Father      Genitourinary Problems Father         kidney disease     Coronary Artery Disease Father      Diabetes Brother      Thyroid disease: No        Social Hx:  Social History     Socioeconomic History     Marital status:      Spouse name: Brock     Number of children: 2     Years of education: Not on file     Highest education level: Not on file   Occupational History     Occupation:       Employer: [employer]   Social Needs     Financial resource strain: Not on file     Food insecurity     Worry: Not on file     Inability: Not on file     Transportation needs     Medical: Not on file     Non-medical: Not on file   Tobacco Use     Smoking status: Never Smoker     Smokeless tobacco: Never Used   Substance and Sexual Activity     Alcohol use: No     Alcohol/week: 0.0 standard drinks     Drug use: No     Sexual activity: Yes     Partners: Male     Birth control/protection: None   Lifestyle     Physical activity     Days per week: Not on file     Minutes per session:  Not on file     Stress: Not on file   Relationships     Social connections     Talks on phone: Not on file     Gets together: Not on file     Attends Yarsani service: Not on file     Active member of club or organization: Not on file     Attends meetings of clubs or organizations: Not on file     Relationship status: Not on file     Intimate partner violence     Fear of current or ex partner: Not on file     Emotionally abused: Not on file     Physically abused: Not on file     Forced sexual activity: Not on file   Other Topics Concern     Parent/sibling w/ CABG, MI or angioplasty before 65F 55M? Not Asked      Service No     Blood Transfusions No     Caffeine Concern No     Occupational Exposure Not Asked     Hobby Hazards No     Sleep Concern No     Stress Concern No     Weight Concern Yes     Special Diet No     Back Care No     Exercise No     Bike Helmet No     Seat Belt Yes     Self-Exams No   Social History Narrative    Pt lives with  and father.          MEDICATIONS:  has a current medication list which includes the following prescription(s): albuterol, fluticasone, levothyroxine, prenatal vit-fe fumarate-fa, and clomiphene.    ROS     ROS: 10 point ROS neg other than the symptoms noted above in the HPI.      Physical Exam   VS: There were no vitals taken for this visit.  GENERAL: healthy, alert and no distress  EYES: Eyes grossly normal to inspection, conjunctivae and sclerae normal  RESP: no audible wheeze, cough, or visible cyanosis.  No visible retractions or increased work of breathing.  Able to speak fully in complete sentences.  NEURO: Cranial nerves grossly intact, mentation intact and speech normal  PSYCH: mentation appears normal, affect normal/bright, judgement and insight intact, normal speech and appearance well-groomed      LABS:  5/2020:  TgAB: <0.4  TG: <0.10    6/2019:  TgAB: <0.4  TG: <0.10    3/6/2018:  TSH: 0.07  TgAB: <0.4  TG: <0.10    4/2017:  TSH : 47.89 (post  thyrogen)  TgAB: <0.4  TG: <0.10    17:  TSH: 0.04  TgAB: <0.4  TG: <0.10    11/10/16:  TSH 0.74  TgAb: <0.4  TG: <0.10     2016:  TSH: 0.08  TgAb: <0.4  T.30     2012:  TSH: 12.40  TGAB: 1.2  T.6    ENDO THYROID LABS-[facility] Latest Ref Rng & Units 2020   TSH 0.40 - 4.00 mU/L 0.55 0.94   T4 TOTAL 5.0 - 11.0 ug/dL     T4 FREE 0.76 - 1.46 ng/dL 1.49 (H) 1.11     ENDO THYROID LABS-[facility] Latest Ref Rng & Units 2019   TSH 0.40 - 4.00 mU/L 0.66   T4 TOTAL 5.0 - 11.0 ug/dL    T4 FREE 0.76 - 1.46 ng/dL 1.19     ENDO THYROID LABS-[facility] Latest Ref Rng & Units 10/23/2019 2019   TSH 0.40 - 4.00 mU/L 0.04 (L) 0.05 (L)   T4 TOTAL 5.0 - 11.0 ug/dL     T4 FREE 0.76 - 1.46 ng/dL 1.72 (H) 1.47 (H)     ENDO THYROID LABS-[facility] Latest Ref Rng & Units 2019   TSH 0.40 - 4.00 mU/L 0.04 (L)   T4 TOTAL 5.0 - 11.0 ug/dL    T4 FREE 0.76 - 1.46 ng/dL 1.38     ENDO THYROID LABS-[facility] Latest Ref Rng & Units 2018   TSH 0.40 - 4.00 mU/L 0.46 0.10 (L)   T4 TOTAL 5.0 - 11.0 ug/dL     T4 FREE 0.76 - 1.46 ng/dL  1.26     ENDO THYROID LABS-[facility] Latest Ref Rng & Units 3/6/2018   TSH 0.40 - 4.00 mU/L 0.07 (L)   T4 TOTAL 5.0 - 11.0 ug/dL    T4 FREE 0.76 - 1.46 ng/dL 1.69 (H)     !THYROID Latest Ref Rng & Units 2018 10/17/2017 8/3/2017   TSH 0.40 - 4.00 mU/L <0.01 (L) 2.58 5.31 (H)   T4 FREE 0.76 - 1.46 ng/dL 1.61 (H) 1.24 1.31     !THYROID Latest Ref Rng & Units 2017 2017 11/10/2016   TSH 0.40 - 4.00 mU/L 47.89 (H) 0.04 (L) 0.74   T4 FREE 0.76 - 1.46 ng/dL 1.29 1.80 (H) 1.30       ENDO THYROID LABS-[facility] Latest Ref Rng 2016   TSH 0.40 - 4.00 mU/L 0.08 (L)   T4 TOTAL 5.0 - 11.0 ug/dL    T4 FREE 0.76 - 1.46 ng/dL 1.50 (H)   FREE T3 2.3 - 4.2 pg/mL    TRIIODOTHYRONINE(T3) 60 - 181 ng/dL 112     ENDO THYROID LABS-[facility] Latest Ref Rng 2016   TSH 0.40 - 4.00 mU/L 0.16 (L) 0.37 (L) 0.06 (L)   T4 TOTAL 5.0 - 11.0 ug/dL      T4 FREE 0.76 - 1.46 ng/dL 1.42 1.24 1.51 (H)   FREE T3  2.3 - 4.2 pg/mL        ENDO THYROID LABS-[facility] Latest Ref Rng 6/26/2015 11/14/2014   TSH 0.40 - 4.00 mU/L 0.32 (L) 7.86 (H)   T4 TOTAL 5.0 - 11.0 ug/dL     T4 FREE 0.76 - 1.46 ng/dL 1.33 1.37   FREE T3 2.3 - 4.2 pg/mL       ENDO THYROID LABS-[facility] Latest Ref Rng 7/29/2014 4/3/2014   TSH 0.40 - 4.00 mU/L 3.67 7.63 (H)   T4 TOTAL 5.0 - 11.0 ug/dL     T4 FREE 0.76 - 1.46 ng/dL  1.39   FREE T3 2.3 - 4.2 pg/mL       Component      Latest Ref Rng & Units 10/8/2012 2/23/2017 8/3/2017   Vitamin D Deficiency screening      20 - 75 ug/L 18 (L) 23 28     !COMPREHENSIVE Latest Ref Rng & Units 8/31/2016 8/31/2016 9/1/2016   CALCIUM 8.5 - 10.1 mg/dL 7.7 (L) 9.0 8.6     !COMPREHENSIVE Latest Ref Rng & Units 11/10/2016 2/23/2017 8/3/2017   CALCIUM 8.5 - 10.1 mg/dL 8.2 (L) 9.2 7.8 (L)     !COMPREHENSIVE Latest Ref Rng & Units 10/17/2017   CALCIUM 8.5 - 10.1 mg/dL 8.3 (L)     NM THYROID UPTAKE/SCAN   Feb 4, 2011 2:47:00 PM      COMPARISON: None.     HISTORY: Hyperthyroidism.     TECHNIQUE:  The patient was given 173 uCi of I-123 orally, followed by  24-hour thyroid scan and radioiodine uptake evaluation.     FINDINGS:  The thyroid gland appears normal in size. 24-hour uptake  was calculated at 46.4%, which is above the normal range. Normal range  is 10-30% 24-hour uptake. Focal area of decreased uptake in the mid  left thyroid lobe may represent a cold thyroid nodule or cyst.     IMPRESSION:    1. Elevated 24-hour radioiodine uptake in the thyroid at 46.4%.  2. Possible cold nodule or cyst in the mid left thyroid lobe. Thyroid  ultrasound is recommended for further evaluation.    NUCLEAR MEDICINE I-131 SCAN    Aug 10, 2011 8:04:00 AM      TECHNIQUE: 78.7 mCi I-131 ablation scan. Five days post therapy  imaging performed today.     HISTORY: Thyroid cancer.     COMPARISON:   Nuclear Study: Thyroid uptake and scan 2/4/2011.  Other Relevant Studies: Ultrasound neck 2/17/2011.     FINDINGS: Intense radiotracer uptake at the left greater than  right  neck at the thyroid level. No evidence for distant metastatic disease.     IMPRESSION: Intense radiotracer uptake localizing to the thyroidectomy  bed, left greater than right. This is consistent with residual thyroid  tissue. No distant metastatic disease identified.    ULTRASOUND THYROID  Feb 17, 2011      HISTORY: Hyperthyroidism. Thyroid nodule.      COMPARISON: Nuclear Medicine thyroid scan 2/4/2011.     FINDINGS: The thyroid gland is enlarged. The right lobe measures 5.4 x  1.6 x 2.1 cm. The left lobe measures 5.9 x 2.3 x 2.7 cm. There are 2  right thyroid nodules and 3 left thyroid nodules.  1. Right upper lobe, hypoechoic solid measuring 0.7 x 0.6 x 0.6 cm.  2. Right lower pole, solid measuring 0.7 x 0.5 x 0.6 cm.  3. Left upper pole, cystic and solid measuring 1.1 x 0.6 x 1.0 cm.  4. Left mid thyroid lobe, primarily solid with small cystic areas  measuring 2.7 x 1.9 x 2.1 cm.  5. Left lower pole, cystic and solid measuring 1.4 x 0.9 x 1.2 cm.     The primarily solid left mid thyroid nodule appears to correspond to  the cold nodule on thyroid uptake and scan.  IMPRESSION: Multinodular thyroid gland.    FNA thyroid nodule:  Copath Report       FNA-thyroid, left mid lobe nodule:   Suspicious for malignancy.  Suspicious for papillary carcinoma  Specimen Adequacy: Satisfactory for evaluation.           Surgical pathology:     SPECIMEN(S):  A: Thyroid, left lobe  B: Parathyroid gland, biopsy of right inferior  C: Thyroid, right lobe    FINAL DIAGNOSIS:  A. and C.  Thyroid, right and left lobes, total thyroidectomy -  Specimen/Procedure(s) and Laterality:   Right and left thyroid lobes,  total thyroidectomy.  Specimen Integrity:   Intact.  Specimen Size and Weight:   See Gross Description.  Histologic Tumor Type(s):   Papillary carcinoma, follicular variant with  focal papillary morphology.  Tumor Focality: Unifocal.  Tumor Laterality:   Left lobe.  Tumor Size(s):   Left lobe: 2.2 cm (greatest  "dimension).  Margins:   Close, but uninvolved.            Distance to closest margin, if negative:   Tumor 0.05 cm from  nearest paratracheal surface and 1.75 cm from nearest anterolateral  surface.  Tumor Capsular Invasion: Present.    Tumor Capsule: Partial.  Extrathyroidal Invasion:   Not identified.  Lymph-Vascular Invasion:   Not identified.  Lymph Nodes:   Two nodes without evidence of metastatic carcinoma (0/2;  one at isthmus and one adjacent to right lobe).  Pathologic Staging:   pT2, pN0, pM not applicable.  Additional Pathologic Findings:   Right thyroid lobe without evidence of  malignancy.  Background nodular hyperplasia and thyroiditis with  lymphoid follicles.  Left lobe with focal previous biopsy site changes.  No parathyroid tissue identified.  CAP Protocol Based on AJCC/UICC TNM, 7th edition; Protocol Effective  Date:  January 2010    B.  \"Parathyroid gland\", right inferior, biopsy - Thyroid tissue; no  parathyroid glandular tissue identified.    8/2016: left modified neck Dissection:  Copath Report      SPECIMEN(S):   A: Scar, left neck   B: Parathyroid gland, left inferior   C: Left central neck dissection, para and pretracheal lymph nodes   D: apical jugular lymph node   E: Left modified neck dissection     FINAL DIAGNOSIS:   A. Skin, neck/scar, excision:   - Hypertrophic scar; benign.     B. Parathyroid gland, left inferior, biopsy:   - Parathyroid tissue present.     C. Left central neck dissection with para-and pretracheal lymph nodes:   - One lymph node; no evidence of malignancy (0/1).   - Thymus and parathyroid tissue present.     D. Lymph node, apical jugular, excision:   - One lymph node; no evidence of malignancy (0/1).     E. Left modified neck dissection:   - 27 lymph nodes identified (level II- 10, level III- 9, level IV- 5 and   lymph nodes associated with jugular vein- 3).   - One (of 27) lymph node positive for metastatic papillary thyroid   carcinoma.  1 cm in greatest diameter. "  Negative for extranodal   involvement  (Level II).   - Jugular vein negative for tumor.        NUCLEAR MEDICINE THYROID WHOLE BODY SCAN I-123   4/20/2017 11:38 AM      TECHNIQUE:  The patient was given 1.8 mCi iodine 123 per oral on  4/19/2017. SPECT-CT with fusion was performed at the level of the neck  and chest.     HISTORY:  Malignant neoplasm of thyroid gland. Postprocedural  hypothyroidism.     COMPARISON:   Nuclear Study: None.     Other Relevant Studies: None.     FINDINGS:  Thyroidectomy. No suspicious focus of abnormal radiotracer  is seen at the thyroidectomy bed identified. Physiologic tracer uptake  seen at the salivary glands. There are several small subcentimeter  lymph nodes involving the bilateral neck without conspicuous focal  tracer uptake outside of the background tracer distribution. There  appears to be left neck postoperative change causing some  inconspicuity of the left sternocleidomastoid muscle. No convincing  worrisome airspace disease or mediastinal abnormality is seen.         IMPRESSION: No worrisome focal tracer uptake is identified to suggest  recurrent neoplasm or remote metastatic disease. Some postoperative  changes at the left neck noted, likely accounting for inconspicuity of  the left sternocleidomastoid muscle. Small bilateral subcentimeter  neck lymph nodes noted.     Neck US 6/2018:  ULTRASOUND HEAD AND NECK SOFT TISSUE  6/4/2018 11:11 AM     HISTORY:  Papillary carcinoma, follicular variant (H).     COMPARISON: None.     FINDINGS:  No evidence for residual thyroid tissue in the  thyroidectomy bed. There is no evidence for cervical adenopathy  bilaterally by size or morphology.          IMPRESSION:  Negative soft tissue neck study.    Neck US 5/2020:  FINDINGS:    No evidence for residual thyroid tissue in the  thyroidectomy bed. There is no evidence for cervical adenopathy  bilaterally by size or morphology.                                                                    IMPRESSION:    Negative soft tissue neck study.    All pertinent notes, labs, and images personally reviewed by me.     A/P  Ms.Nhu Mounika Nicole is a 34 year old here for the evaluation of thyroid cancer:    1. 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  It was 2.2 cm unifocal, left lobe tumor with no lymph node involvement.  S/p  total thyroidectomy in 2011 and 78.7 mCi of I-131 HERNANDEZ. No evidence for distant metastatic disease on post therapy scan.  2016- new lymph node s/p FNA with PTC with rising TG  S/p 8/2016- left modified neck radiation. 1/27 lymph node + ( level2). Postop course complicated by hypocalcemia.  Delivered 1/2017.   Follow up I123 WBS 4/2017- no mets  Thyroglobulin levels appear stable and suppressed.  Neck ultrasound June 2018, 5/2020 did not show any evidence of metastases.  Plan:  Continue to monitor.   Neck US and Tg in 1 year.  2.  Hypothyroidism (postoperative following total thyroidectomy for thyroid cancer):  Currently taking levothyroxine 137 mcg X day. ( on this dose X 10/2019)  Taking generic levothyroxine.  Reports compliance.  Labs as noted above with slightly high FT4.  Clinically looks euthyroid. Sometimes palpitations X 1 year.  Plan:  Continue levothyroxine at current dose 137 mcg/day (10/31/2019)  Labs in 3 months or sooner if you get pregnant/ start clomed.  Please make a lab appointment for blood work and follow up clinic appointment in 1 week after that to discuss results.  Discussed s/s of hypothyroidism and hyperthyroidism to watch for.  The patient indicates understanding of these issues and agrees with the plan.      3.  History of vitamin D deficiency and hypocalcemia, resolved.  Continue to monitor.    More than 50% of the time spent with Ms. Nicole on counseling / coordinating her care.      Follow-up:  3 months.    Vandana Costello MD  Endocrinology   [facility]  8/17/2020  CC: Evan Zamora

## 2020-08-17 NOTE — PROGRESS NOTES
"Aleyda Nicole is a 34 year old female who is being evaluated via a billable video visit.      The patient has been notified of following:     \"This video visit will be conducted via a call between you and your physician/provider. We have found that certain health care needs can be provided without the need for an in-person physical exam.  This service lets us provide the care you need with a video conversation.  If a prescription is necessary we can send it directly to your pharmacy.  If lab work is needed we can place an order for that and you can then stop by our lab to have the test done at a later time.    Video visits are billed at different rates depending on your insurance coverage.  Please reach out to your insurance provider with any questions.    If during the course of the call the physician/provider feels a video visit is not appropriate, you will not be charged for this service.\"    Patient has given verbal consent for Video visit? Yes  How would you like to obtain your AVS? MyChart  If you are dropped from the video visit, the video invite should be resent to: Text to cell phone: [phone]  Will anyone else be joining your video visit? No    "

## 2020-08-17 NOTE — LETTER
"    8/17/2020         RE: Aleyda Nicole  [address] [facility] [id number]        Dear Colleague,    Thank you for referring your patient, Aleyda Nicole, to the [facility]. Please see a copy of my visit note below.    Aleyda Nicole is a 34 year old female who is being evaluated via a billable video visit.      The patient has been notified of following:     \"This video visit will be conducted via a call between you and your physician/provider. We have found that certain health care needs can be provided without the need for an in-person physical exam.  This service lets us provide the care you need with a video conversation.  If a prescription is necessary we can send it directly to your pharmacy.  If lab work is needed we can place an order for that and you can then stop by our lab to have the test done at a later time.    Video visits are billed at different rates depending on your insurance coverage.  Please reach out to your insurance provider with any questions.    If during the course of the call the physician/provider feels a video visit is not appropriate, you will not be charged for this service.\"    Patient has given verbal consent for Video visit? Yes  How would you like to obtain your AVS? MyChart  If you are dropped from the video visit, the video invite should be resent to: Text to cell phone: [phone]  Will anyone else be joining your video visit? No      THIS IS A VIDEO VISIT:    Phone call visit/virtual visit encounter:    Name of patient: Aleyda Nicole    Date of encounter: 8/17/2020    Time of start of video visit: 10:12    Video started: 10:20    Video ended: 10:30    Time visit video ended:10:37    Provider location: working from home/ [facility]    Patient location: patients home.    Mode of transmission: video/ Doximity    Verbal consent: obtained before starting visit. Pt is agreeable.      The patient has been notified of following:      \"This VIDEO visit " "will be conducted via a call between you and your physician/provider. We have found that certain health care needs can be provided without the need for a physical exam.  This service lets us provide the care you need with a short phone conversation.  If a prescription is necessary we can send it directly to your pharmacy.  If lab work is needed we can place an order for that and you can then stop by our lab to have the test done at a later time.     With new updates with corona virus patient might be billed as clinic visit.     If during the course of the call the physician/provider feels a telephone visit is not appropriate, you will not be charged for this service.\"      Past medical history, social history, family history, allergy and medications were reviewed and updated as appropriate.  Reviewed pertinent labs, notes, imaging studies personally.    Name: Aleyda Nicole  Seen for f/u of papillary thyroid cancer and postoperative hypothyroidism    No chief complaint on file.    HPI:  Aleyda Nicole is a 34 year old female who presents for the evaluation of:      1.  Papillary thyroid cancer:  Thyroid nodule was noticed in February 2011 which was followed by thyroid nodule biopsy which showed suspicious for papillary thyroid cancer.  She underwent total thyroidectomy 3/2011 and pathology showed papillary thyroid cancer with follicular pattern.  Tumor was about 2.2 cm on the left lobe.  No lymph node involvement.  S/p 78.7 mCi of I131 HERNANDEZ  8/2011. No evidence for distant metastatic disease on post therapy scan.  Neck US 6/2016- showed new lymph node in neck along with rising Tg levels  S/p FNA lymph node: 6/2016- c/w papillary thyroid cancer    8/2016: underwent left modified neck dissection.  1/27 lymph node positive one left modified neck dissection.  1 cm in greatest diameter.  Negative for external involvement at level II.    Tg decreased post left neck dissection from 1.3 to < 0.1    Follow up I123 4/2017: no " mets.    Postop course was complicated by hypocalcemia and was started on calcium supplement.  Taking calcium- tries to take 2 tabs per day + 1 glass of milk daily.    Delivered baby 2017. No longer breast feeding.    She was followed by endocrinology before and then lost to follow-up with endocrinology in .  No history of neck radiation prior to diagnosis of thyroid cancer.  No family history of thyroid cancer.    Thyroglobulin levels appear stable and suppressed.  Neck ultrasound 2018, 2020 did not show any evidence of metastases.    2.  Hypothyroidism (postoperative):  Was started on levothyroxine following thyroidectomy.    Currently taking levothyroxine 137 mcg X 7 days a week.  Reports compliance.    Denies hyperthyroid symptoms.  Had miscarriage 2 months back.  Planning one more pregnancy in future.  + hair loss.  + fatigue.    Wt Readings from Last 2 Encounters:   20 75.8 kg (167 lb)   19 74.4 kg (164 lb)     No diarrhea, tremors. sometimes palpitations- once in a week X 1 year.  No difficulty with swallowing, no pain during swallowing.  Missed 2 periods. Home pregnancy test was neg.  Mood- some irritability.  Weight: stable.  PMH/PSH:  Past Medical History:   Diagnosis Date     Gestational diabetes 2013     Hypocalcemia 2016     Influenza A      Papillary thyroid carcinoma     Papillary carcinoma, follicular variant with     Pneumonia     LLL     PONV (postoperative nausea and vomiting)      Postoperative hypothyroidism       labor      Uncomplicated asthma      Past Surgical History:   Procedure Laterality Date      SECTION  10/26/2013    Procedure:  SECTION;  primary  section;  Surgeon: Rodney Mckee MD;  Location: [facility] L[facility]      SECTION N/A 2017    Procedure:  SECTION;  Surgeon: Hakeem Combs MD;  Location: [facility] L[facility]     DISSECTION RADICAL NECK Left 2016    Procedure: DISSECTION RADICAL NECK;   Surgeon: Vy Theodore MD;  Location: [facility] OR     DISSECTION RADICAL NECK MODIFIED Left 8/29/2016    Procedure: DISSECTION RADICAL NECK MODIFIED;  Surgeon: Luis Brown MD;  Location: [facility] OR     THYROIDECTOMY  2011    Total, for cancer.      Family Hx:  Family History   Problem Relation Age of Onset     Diabetes Mother      Cerebrovascular Disease Mother      Hypertension Mother      Diabetes Father 50     Hypertension Father      Genitourinary Problems Father         kidney disease     Coronary Artery Disease Father      Diabetes Brother      Thyroid disease: No        Social Hx:  Social History     Socioeconomic History     Marital status:      Spouse name: Brock     Number of children: 2     Years of education: Not on file     Highest education level: Not on file   Occupational History     Occupation:       Employer: [employer]   Social Needs     Financial resource strain: Not on file     Food insecurity     Worry: Not on file     Inability: Not on file     Transportation needs     Medical: Not on file     Non-medical: Not on file   Tobacco Use     Smoking status: Never Smoker     Smokeless tobacco: Never Used   Substance and Sexual Activity     Alcohol use: No     Alcohol/week: 0.0 standard drinks     Drug use: No     Sexual activity: Yes     Partners: Male     Birth control/protection: None   Lifestyle     Physical activity     Days per week: Not on file     Minutes per session: Not on file     Stress: Not on file   Relationships     Social connections     Talks on phone: Not on file     Gets together: Not on file     Attends Mosque service: Not on file     Active member of club or organization: Not on file     Attends meetings of clubs or organizations: Not on file     Relationship status: Not on file     Intimate partner violence     Fear of current or ex partner: Not on file     Emotionally abused: Not on file     Physically abused: Not on file     Forced sexual activity: Not on file    Other Topics Concern     Parent/sibling w/ CABG, MI or angioplasty before 65F 55M? Not Asked      Service No     Blood Transfusions No     Caffeine Concern No     Occupational Exposure Not Asked     Hobby Hazards No     Sleep Concern No     Stress Concern No     Weight Concern Yes     Special Diet No     Back Care No     Exercise No     Bike Helmet No     Seat Belt Yes     Self-Exams No   Social History Narrative    Pt lives with  and father.          MEDICATIONS:  has a current medication list which includes the following prescription(s): albuterol, fluticasone, levothyroxine, prenatal vit-fe fumarate-fa, and clomiphene.    ROS     ROS: 10 point ROS neg other than the symptoms noted above in the HPI.      Physical Exam   VS: There were no vitals taken for this visit.  GENERAL: healthy, alert and no distress  EYES: Eyes grossly normal to inspection, conjunctivae and sclerae normal  RESP: no audible wheeze, cough, or visible cyanosis.  No visible retractions or increased work of breathing.  Able to speak fully in complete sentences.  NEURO: Cranial nerves grossly intact, mentation intact and speech normal  PSYCH: mentation appears normal, affect normal/bright, judgement and insight intact, normal speech and appearance well-groomed      LABS:  2020:  TgAB: <0.4  TG: <0.10    2019:  TgAB: <0.4  TG: <0.10    3/6/2018:  TSH: 0.07  TgAB: <0.4  TG: <0.10    2017:  TSH : 47.89 (post thyrogen)  TgAB: <0.4  TG: <0.10    17:  TSH: 0.04  TgAB: <0.4  TG: <0.10    11/10/16:  TSH 0.74  TgAb: <0.4  TG: <0.10     2016:  TSH: 0.08  TgAb: <0.4  T.30     2012:  TSH: 12.40  TGAB: 1.2  T.6    ENDO THYROID LABS-[facility] Latest Ref Rng & Units 2020   TSH 0.40 - 4.00 mU/L 0.55 0.94   T4 TOTAL 5.0 - 11.0 ug/dL     T4 FREE 0.76 - 1.46 ng/dL 1.49 (H) 1.11     ENDO THYROID LABS-[facility] Latest Ref Rng & Units 2019   TSH 0.40 - 4.00 mU/L 0.66   T4 TOTAL 5.0 - 11.0 ug/dL    T4 FREE 0.76 - 1.46  ng/dL 1.19     [facility] 10/23/2019 6/12/2019   TSH 0.40 - 4.00 mU/L 0.04 (L) 0.05 (L)   T4 TOTAL 5.0 - 11.0 ug/dL     T4 FREE 0.76 - 1.46 ng/dL 1.72 (H) 1.47 (H)     [facility] 2/26/2019   TSH 0.40 - 4.00 mU/L 0.04 (L)   T4 TOTAL 5.0 - 11.0 ug/dL    T4 FREE 0.76 - 1.46 ng/dL 1.38     [facility] 8/28/2018 5/7/2018   TSH 0.40 - 4.00 mU/L 0.46 0.10 (L)   T4 TOTAL 5.0 - 11.0 ug/dL     T4 FREE 0.76 - 1.46 ng/dL  1.26     [facility] 3/6/2018   TSH 0.40 - 4.00 mU/L 0.07 (L)   T4 TOTAL 5.0 - 11.0 ug/dL    T4 FREE 0.76 - 1.46 ng/dL 1.69 (H)     !THYROID Latest Ref Rng & Units 1/25/2018 10/17/2017 8/3/2017   TSH 0.40 - 4.00 mU/L <0.01 (L) 2.58 5.31 (H)   T4 FREE 0.76 - 1.46 ng/dL 1.61 (H) 1.24 1.31     !THYROID Latest Ref Rng & Units 4/19/2017 2/23/2017 11/10/2016   TSH 0.40 - 4.00 mU/L 47.89 (H) 0.04 (L) 0.74   T4 FREE 0.76 - 1.46 ng/dL 1.29 1.80 (H) 1.30       [facility] 6/7/2016   TSH 0.40 - 4.00 mU/L 0.08 (L)   T4 TOTAL 5.0 - 11.0 ug/dL    T4 FREE 0.76 - 1.46 ng/dL 1.50 (H)   FREE T3 2.3 - 4.2 pg/mL    TRIIODOTHYRONINE(T3) 60 - 181 ng/dL 112     ENDO THYROID LABS-[facility] Latest Ref Rng 5/19/2016 2/5/2016 11/6/2015   TSH 0.40 - 4.00 mU/L 0.16 (L) 0.37 (L) 0.06 (L)   T4 TOTAL 5.0 - 11.0 ug/dL      T4 FREE 0.76 - 1.46 ng/dL 1.42 1.24 1.51 (H)   FREE T3 2.3 - 4.2 pg/mL        ENDO THYROID LABS-[facility] Latest Ref Rng 6/26/2015 11/14/2014   TSH 0.40 - 4.00 mU/L 0.32 (L) 7.86 (H)   T4 TOTAL 5.0 - 11.0 ug/dL     T4 FREE 0.76 - 1.46 ng/dL 1.33 1.37   FREE T3 2.3 - 4.2 pg/mL       ENDO THYROID LABS-[facility] Latest Ref Rng 7/29/2014 4/3/2014   TSH 0.40 - 4.00 mU/L 3.67 7.63 (H)   T4 TOTAL 5.0 - 11.0 ug/dL     T4 FREE 0.76 - 1.46 ng/dL  1.39   FREE T3 2.3 - 4.2 pg/mL       Component      Latest Ref Rng & Units 10/8/2012 2/23/2017 8/3/2017   Vitamin D Deficiency screening      20 - 75  ug/L 18 (L) 23 28     !COMPREHENSIVE Latest Ref Rng & Units 8/31/2016 8/31/2016 9/1/2016   CALCIUM 8.5 - 10.1 mg/dL 7.7 (L) 9.0 8.6     !COMPREHENSIVE Latest Ref Rng & Units 11/10/2016 2/23/2017 8/3/2017   CALCIUM 8.5 - 10.1 mg/dL 8.2 (L) 9.2 7.8 (L)     !COMPREHENSIVE Latest Ref Rng & Units 10/17/2017   CALCIUM 8.5 - 10.1 mg/dL 8.3 (L)     NM THYROID UPTAKE/SCAN   Feb 4, 2011 2:47:00 PM      COMPARISON: None.     HISTORY: Hyperthyroidism.     TECHNIQUE:  The patient was given 173 uCi of I-123 orally, followed by  24-hour thyroid scan and radioiodine uptake evaluation.     FINDINGS:  The thyroid gland appears normal in size. 24-hour uptake  was calculated at 46.4%, which is above the normal range. Normal range  is 10-30% 24-hour uptake. Focal area of decreased uptake in the mid  left thyroid lobe may represent a cold thyroid nodule or cyst.     IMPRESSION:    1. Elevated 24-hour radioiodine uptake in the thyroid at 46.4%.  2. Possible cold nodule or cyst in the mid left thyroid lobe. Thyroid  ultrasound is recommended for further evaluation.    NUCLEAR MEDICINE I-131 SCAN    Aug 10, 2011 8:04:00 AM      TECHNIQUE: 78.7 mCi I-131 ablation scan. Five days post therapy  imaging performed today.     HISTORY: Thyroid cancer.     COMPARISON:   Nuclear Study: Thyroid uptake and scan 2/4/2011.  Other Relevant Studies: Ultrasound neck 2/17/2011.     FINDINGS: Intense radiotracer uptake at the left greater than right  neck at the thyroid level. No evidence for distant metastatic disease.     IMPRESSION: Intense radiotracer uptake localizing to the thyroidectomy  bed, left greater than right. This is consistent with residual thyroid  tissue. No distant metastatic disease identified.    ULTRASOUND THYROID  Feb 17, 2011      HISTORY: Hyperthyroidism. Thyroid nodule.      COMPARISON: Nuclear Medicine thyroid scan 2/4/2011.     FINDINGS: The thyroid gland is enlarged. The right lobe measures 5.4 x  1.6 x 2.1 cm. The left lobe  measures 5.9 x 2.3 x 2.7 cm. There are 2  right thyroid nodules and 3 left thyroid nodules.  1. Right upper lobe, hypoechoic solid measuring 0.7 x 0.6 x 0.6 cm.  2. Right lower pole, solid measuring 0.7 x 0.5 x 0.6 cm.  3. Left upper pole, cystic and solid measuring 1.1 x 0.6 x 1.0 cm.  4. Left mid thyroid lobe, primarily solid with small cystic areas  measuring 2.7 x 1.9 x 2.1 cm.  5. Left lower pole, cystic and solid measuring 1.4 x 0.9 x 1.2 cm.     The primarily solid left mid thyroid nodule appears to correspond to  the cold nodule on thyroid uptake and scan.  IMPRESSION: Multinodular thyroid gland.    FNA thyroid nodule:  Copath Report       FNA-thyroid, left mid lobe nodule:   Suspicious for malignancy.  Suspicious for papillary carcinoma  Specimen Adequacy: Satisfactory for evaluation.           Surgical pathology:     SPECIMEN(S):  A: Thyroid, left lobe  B: Parathyroid gland, biopsy of right inferior  C: Thyroid, right lobe    FINAL DIAGNOSIS:  A. and C.  Thyroid, right and left lobes, total thyroidectomy -  Specimen/Procedure(s) and Laterality:   Right and left thyroid lobes,  total thyroidectomy.  Specimen Integrity:   Intact.  Specimen Size and Weight:   See Gross Description.  Histologic Tumor Type(s):   Papillary carcinoma, follicular variant with  focal papillary morphology.  Tumor Focality: Unifocal.  Tumor Laterality:   Left lobe.  Tumor Size(s):   Left lobe: 2.2 cm (greatest dimension).  Margins:   Close, but uninvolved.            Distance to closest margin, if negative:   Tumor 0.05 cm from  nearest paratracheal surface and 1.75 cm from nearest anterolateral  surface.  Tumor Capsular Invasion: Present.    Tumor Capsule: Partial.  Extrathyroidal Invasion:   Not identified.  Lymph-Vascular Invasion:   Not identified.  Lymph Nodes:   Two nodes without evidence of metastatic carcinoma (0/2;  one at isthmus and one adjacent to right lobe).  Pathologic Staging:   pT2, pN0, pM not  "applicable.  Additional Pathologic Findings:   Right thyroid lobe without evidence of  malignancy.  Background nodular hyperplasia and thyroiditis with  lymphoid follicles.  Left lobe with focal previous biopsy site changes.  No parathyroid tissue identified.  CAP Protocol Based on AJCC/UICC TNM, 7th edition; Protocol Effective  Date:  January 2010    B.  \"Parathyroid gland\", right inferior, biopsy - Thyroid tissue; no  parathyroid glandular tissue identified.    8/2016: left modified neck Dissection:  Copath Report      SPECIMEN(S):   A: Scar, left neck   B: Parathyroid gland, left inferior   C: Left central neck dissection, para and pretracheal lymph nodes   D: apical jugular lymph node   E: Left modified neck dissection     FINAL DIAGNOSIS:   A. Skin, neck/scar, excision:   - Hypertrophic scar; benign.     B. Parathyroid gland, left inferior, biopsy:   - Parathyroid tissue present.     C. Left central neck dissection with para-and pretracheal lymph nodes:   - One lymph node; no evidence of malignancy (0/1).   - Thymus and parathyroid tissue present.     D. Lymph node, apical jugular, excision:   - One lymph node; no evidence of malignancy (0/1).     E. Left modified neck dissection:   - 27 lymph nodes identified (level II- 10, level III- 9, level IV- 5 and   lymph nodes associated with jugular vein- 3).   - One (of 27) lymph node positive for metastatic papillary thyroid   carcinoma.  1 cm in greatest diameter.  Negative for extranodal   involvement  (Level II).   - Jugular vein negative for tumor.        NUCLEAR MEDICINE THYROID WHOLE BODY SCAN I-123   4/20/2017 11:38 AM      TECHNIQUE:  The patient was given 1.8 mCi iodine 123 per oral on  4/19/2017. SPECT-CT with fusion was performed at the level of the neck  and chest.     HISTORY:  Malignant neoplasm of thyroid gland. Postprocedural  hypothyroidism.     COMPARISON:   Nuclear Study: None.     Other Relevant Studies: None.     FINDINGS:  Thyroidectomy. No " suspicious focus of abnormal radiotracer  is seen at the thyroidectomy bed identified. Physiologic tracer uptake  seen at the salivary glands. There are several small subcentimeter  lymph nodes involving the bilateral neck without conspicuous focal  tracer uptake outside of the background tracer distribution. There  appears to be left neck postoperative change causing some  inconspicuity of the left sternocleidomastoid muscle. No convincing  worrisome airspace disease or mediastinal abnormality is seen.         IMPRESSION: No worrisome focal tracer uptake is identified to suggest  recurrent neoplasm or remote metastatic disease. Some postoperative  changes at the left neck noted, likely accounting for inconspicuity of  the left sternocleidomastoid muscle. Small bilateral subcentimeter  neck lymph nodes noted.     Neck US 6/2018:  ULTRASOUND HEAD AND NECK SOFT TISSUE  6/4/2018 11:11 AM     HISTORY:  Papillary carcinoma, follicular variant (H).     COMPARISON: None.     FINDINGS:  No evidence for residual thyroid tissue in the  thyroidectomy bed. There is no evidence for cervical adenopathy  bilaterally by size or morphology.          IMPRESSION:  Negative soft tissue neck study.    Neck US 5/2020:  FINDINGS:    No evidence for residual thyroid tissue in the  thyroidectomy bed. There is no evidence for cervical adenopathy  bilaterally by size or morphology.                                                                   IMPRESSION:    Negative soft tissue neck study.    All pertinent notes, labs, and images personally reviewed by me.     A/P  Ms.Nhu Mounika Nicole is a 34 year old here for the evaluation of thyroid cancer:    1. 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  It was 2.2 cm unifocal, left lobe tumor with no lymph node involvement.  S/p  total thyroidectomy in 2011 and 78.7 mCi of I-131 HERNANDEZ. No evidence for distant  metastatic disease on post therapy scan.  2016- new lymph node s/p FNA with PTC with rising TG  S/p 8/2016- left modified neck radiation. 1/27 lymph node + ( level2). Postop course complicated by hypocalcemia.  Delivered 1/2017.   Follow up I123 WBS 4/2017- no mets  Thyroglobulin levels appear stable and suppressed.  Neck ultrasound June 2018, 5/2020 did not show any evidence of metastases.  Plan:  Continue to monitor.   Neck US and Tg in 1 year.  2.  Hypothyroidism (postoperative following total thyroidectomy for thyroid cancer):  Currently taking levothyroxine 137 mcg X day. ( on this dose X 10/2019)  Taking generic levothyroxine.  Reports compliance.  Labs as noted above with slightly high FT4.  Clinically looks euthyroid. Sometimes palpitations X 1 year.  Plan:  Continue levothyroxine at current dose 137 mcg/day (10/31/2019)  Labs in 3 months or sooner if you get pregnant/ start clomed.  Please make a lab appointment for blood work and follow up clinic appointment in 1 week after that to discuss results.  Discussed s/s of hypothyroidism and hyperthyroidism to watch for.  The patient indicates understanding of these issues and agrees with the plan.      3.  History of vitamin D deficiency and hypocalcemia, resolved.  Continue to monitor.    More than 50% of the time spent with Ms. Nicole on counseling / coordinating her care.      Follow-up:  3 months.    Vandana Costello MD  Endocrinology   [facility]  8/17/2020  CC: Evan Zamora           Again, thank you for allowing me to participate in the care of your patient.        Sincerely,        Vandana Costello MD

## 2020-10-30 ENCOUNTER — OFFICE VISIT (OUTPATIENT)
Dept: INTERNAL MEDICINE | Facility: CLINIC | Age: 35
End: 2020-10-30
Payer: COMMERCIAL

## 2020-10-30 VITALS
HEIGHT: 61 IN | OXYGEN SATURATION: 96 % | TEMPERATURE: 98.3 F | SYSTOLIC BLOOD PRESSURE: 126 MMHG | BODY MASS INDEX: 31.11 KG/M2 | WEIGHT: 164.8 LBS | DIASTOLIC BLOOD PRESSURE: 80 MMHG | HEART RATE: 85 BPM

## 2020-10-30 DIAGNOSIS — J45.901 MODERATE ASTHMA WITH EXACERBATION, UNSPECIFIED WHETHER PERSISTENT: Primary | ICD-10-CM

## 2020-10-30 DIAGNOSIS — J45.30 MILD PERSISTENT ASTHMA WITHOUT COMPLICATION: ICD-10-CM

## 2020-10-30 PROCEDURE — 99214 OFFICE O/P EST MOD 30 MIN: CPT | Performed by: INTERNAL MEDICINE

## 2020-10-30 RX ORDER — FLUTICASONE PROPIONATE 44 UG/1
1 AEROSOL, METERED RESPIRATORY (INHALATION) 2 TIMES DAILY PRN
Qty: 1 INHALER | Refills: 11 | Status: SHIPPED | OUTPATIENT
Start: 2020-10-30 | End: 2020-11-16

## 2020-10-30 RX ORDER — ALBUTEROL SULFATE 90 UG/1
2 AEROSOL, METERED RESPIRATORY (INHALATION) EVERY 4 HOURS PRN
Qty: 1 INHALER | Refills: 11 | Status: SHIPPED | OUTPATIENT
Start: 2020-10-30 | End: 2022-02-21

## 2020-10-30 RX ORDER — ALBUTEROL SULFATE 1.25 MG/3ML
1.25 SOLUTION RESPIRATORY (INHALATION) EVERY 6 HOURS PRN
Qty: 3 ML | Refills: 1 | Status: SHIPPED | OUTPATIENT
Start: 2020-10-30 | End: 2020-10-30

## 2020-10-30 RX ORDER — PREDNISONE 20 MG/1
40 TABLET ORAL DAILY
Qty: 10 TABLET | Refills: 0 | Status: SHIPPED | OUTPATIENT
Start: 2020-10-30 | End: 2020-11-04

## 2020-10-30 RX ORDER — FLUTICASONE PROPIONATE AND SALMETEROL XINAFOATE 115; 21 UG/1; UG/1
2 AEROSOL, METERED RESPIRATORY (INHALATION) 2 TIMES DAILY
Qty: 1 INHALER | Refills: 3 | Status: CANCELLED | OUTPATIENT
Start: 2020-10-30

## 2020-10-30 RX ORDER — ALBUTEROL SULFATE 0.83 MG/ML
2.5 SOLUTION RESPIRATORY (INHALATION) EVERY 6 HOURS PRN
Qty: 1 VIAL | Refills: 3 | Status: SHIPPED | OUTPATIENT
Start: 2020-10-30 | End: 2022-03-09

## 2020-10-30 ASSESSMENT — ASTHMA QUESTIONNAIRES
ACT_TOTALSCORE: 6
QUESTION_2 LAST FOUR WEEKS HOW OFTEN HAVE YOU HAD SHORTNESS OF BREATH: MORE THAN ONCE A DAY
QUESTION_5 LAST FOUR WEEKS HOW WOULD YOU RATE YOUR ASTHMA CONTROL: POORLY CONTROLLED
QUESTION_3 LAST FOUR WEEKS HOW OFTEN DID YOUR ASTHMA SYMPTOMS (WHEEZING, COUGHING, SHORTNESS OF BREATH, CHEST TIGHTNESS OR PAIN) WAKE YOU UP AT NIGHT OR EARLIER THAN USUAL IN THE MORNING: FOUR OR MORE NIGHTS A WEEK
QUESTION_4 LAST FOUR WEEKS HOW OFTEN HAVE YOU USED YOUR RESCUE INHALER OR NEBULIZER MEDICATION (SUCH AS ALBUTEROL): THREE OR MORE TIMES PER DAY
QUESTION_1 LAST FOUR WEEKS HOW MUCH OF THE TIME DID YOUR ASTHMA KEEP YOU FROM GETTING AS MUCH DONE AT WORK, SCHOOL OR AT HOME: ALL OF THE TIME

## 2020-10-30 ASSESSMENT — MIFFLIN-ST. JEOR: SCORE: 1379.91

## 2020-10-30 NOTE — PATIENT INSTRUCTIONS
- Take the Flovent every day twice a day  - Albuterol inhaler and neb solution refilled  - Prednisone burst prescribed (take it in the morning)  - If you don't feel better in a week after this burst is done, please let us know

## 2020-10-30 NOTE — PROGRESS NOTES
Subjective     Aleyda Mounika Nicole is a 35 year old female who presents to clinic today for the following health issues:    HPI         Asthma Follow-Up  Was ACT completed today?  Yes    ACT Total Scores 10/30/2020   ACT TOTAL SCORE (Goal Greater than or Equal to 20) 6   In the past 12 months, how many times did you visit the emergency room for your asthma without being admitted to the hospital? 0   In the past 12 months, how many times were you hospitalized overnight because of your asthma? 0     {Provider Documentation  Patient had ACT/CACT less than 19- Link to exacerbation documentation :[id number]}    How many days per week do you miss taking your asthma controller medication?  0    Please describe any recent triggers for your asthma: dust mites and mold    Have you had any Emergency Room Visits, Urgent Care Visits, or Hospital Admissions since your last office visit?  No      How many servings of fruits and vegetables do you eat daily?  2-3    On average, how many sweetened beverages do you drink each day (Examples: soda, juice, sweet tea, etc.  Do NOT count diet or artificially sweetened beverages)?   0    How many days per week do you exercise enough to make your heart beat faster? 3 or less    How many minutes a day do you exercise enough to make your heart beat faster? 9 or less    How many days per week do you miss taking your medication? 0    Aleyda presents today to discuss her asthma. She states in the last two weeks it has been quite bad. She noticed it happened after her  did some work on her basement and a bunch of dust was released. She things it's been getting worse since then. She is now using her albuterol nebulizer every 6 hours or so. Last night she woke up to use it in the night. She doesn't use her Flovent daily as she wasn't aware that she should be using it daily. Has been taking that and her albuterol inhaler PRN. She needs refills on all asthma meds.    Review of Systems   Constitutional,  "HEENT, cardiovascular, pulmonary, gi and gu systems are negative, except as otherwise noted.      Objective    /80   Pulse 85   Temp 98.3  F (36.8  C) (Oral)   Ht 1.549 m (5' 1\")   Wt 74.8 kg (164 lb 12.8 oz)   SpO2 96%   BMI 31.14 kg/m    Body mass index is 31.14 kg/m .  Physical Exam   GENERAL: alert and in no distress.  EYES: conjunctivae/corneas clear. EOMs grossly intact  HENT: NC/AT, facies symmetric. Neck supple. No cervical LAD appreciated.  RESP: Reduced air movement throughout all lung fields. Some expiratory wheezing heard with auscultation.  CV: RRR, no m/r/g.  GI: NT, ND  MSK: No edema. No cyanosis or clubbing noted bilaterally in upper and/or lower extremities.  SKIN: No significant ulcers, lesions, or rashes on the visualized portions of the skin  NEURO: Alert. Oriented. Sensation grossly WNL.    No results found for this or any previous visit (from the past 24 hour(s)).        Assessment & Plan     Moderate asthma with exacerbation, unspecified whether persistent  Sounds like some housework upset her asthma. In no acute distress today but chest definitely tight. Will treat her with prednisone burst for this acute exacerbation. Discussed pred side effects. Also did some teaching about Step Up therapy and educated her about the importance of using the Flovent BID every day and not PRN.  - predniSONE (DELTASONE) 20 MG tablet; Take 2 tablets (40 mg) by mouth daily for 5 days  - albuterol (PROAIR HFA/PROVENTIL HFA/VENTOLIN HFA) 108 (90 Base) MCG/ACT inhaler; Inhale 2 puffs into the lungs every 4 hours as needed for shortness of breath / dyspnea or wheezing  - albuterol (ACCUNEB) 1.25 MG/3ML neb solution; Take 1 vial (1.25 mg) by nebulization every 6 hours as needed for shortness of breath / dyspnea or wheezing  - fluticasone (FLOVENT HFA) 44 MCG/ACT inhaler; Inhale 1 puff into the lungs 2 times daily as needed (asthma exacerbation)     F/u in 1-2 months with me or Dr. Sera ALVARADO " MD Ruth  [facility]

## 2020-10-31 ASSESSMENT — ASTHMA QUESTIONNAIRES: ACT_TOTALSCORE: 6

## 2020-11-16 ENCOUNTER — OFFICE VISIT (OUTPATIENT)
Dept: INTERNAL MEDICINE | Facility: CLINIC | Age: 35
End: 2020-11-16
Payer: COMMERCIAL

## 2020-11-16 ENCOUNTER — HEALTH MAINTENANCE LETTER (OUTPATIENT)
Age: 35
End: 2020-11-16

## 2020-11-16 VITALS
HEART RATE: 79 BPM | SYSTOLIC BLOOD PRESSURE: 110 MMHG | TEMPERATURE: 97.8 F | BODY MASS INDEX: 30.93 KG/M2 | WEIGHT: 163.8 LBS | OXYGEN SATURATION: 96 % | DIASTOLIC BLOOD PRESSURE: 82 MMHG | HEIGHT: 61 IN

## 2020-11-16 DIAGNOSIS — J45.30 MILD PERSISTENT ASTHMA WITHOUT COMPLICATION: ICD-10-CM

## 2020-11-16 DIAGNOSIS — C73 PAPILLARY CARCINOMA, FOLLICULAR VARIANT (H): ICD-10-CM

## 2020-11-16 DIAGNOSIS — Z00.00 ROUTINE GENERAL MEDICAL EXAMINATION AT A HEALTH CARE FACILITY: Primary | ICD-10-CM

## 2020-11-16 PROCEDURE — 99395 PREV VISIT EST AGE 18-39: CPT | Performed by: INTERNAL MEDICINE

## 2020-11-16 RX ORDER — FLUTICASONE PROPIONATE 44 UG/1
1 AEROSOL, METERED RESPIRATORY (INHALATION) 2 TIMES DAILY
Qty: 1 INHALER | Refills: 11
Start: 2020-11-16 | End: 2022-02-21

## 2020-11-16 ASSESSMENT — MIFFLIN-ST. JEOR: SCORE: 1375.37

## 2020-11-16 NOTE — PATIENT INSTRUCTIONS

## 2020-11-16 NOTE — NURSING NOTE
"Chief Complaint   Patient presents with     Physical     /82   Pulse 79   Temp 97.8  F (36.6  C) (Temporal)   Ht 1.549 m (5' 1\")   Wt 74.3 kg (163 lb 12.8 oz)   SpO2 96%   BMI 30.95 kg/m   Estimated body mass index is 30.95 kg/m  as calculated from the following:    Height as of this encounter: 1.549 m (5' 1\").    Weight as of this encounter: 74.3 kg (163 lb 12.8 oz).        Health Maintenance due pending provider review:  Will send ACT via N[employer] in 2+ weeks, pt agreeable with plan    Sol Felipe CMA  "

## 2020-11-16 NOTE — PROGRESS NOTES
SUBJECTIVE:   CC: Aleyda Nicole is an 35 year old woman who presents for preventive health visit.       Patient has been advised of split billing requirements and indicates understanding: Yes  Healthy Habits:     Getting at least 3 servings of Calcium per day:  Yes    Bi-annual eye exam:  NO    Dental care twice a year:  NO    Sleep apnea or symptoms of sleep apnea:  None    Diet:  Regular (no restrictions)    Frequency of exercise:  None    Taking medications regularly:  Yes    Medication side effects:  None    PHQ-2 Total Score: 0    Additional concerns today:  No      Today's PHQ-2 Score:   PHQ-2 ( 1999 [employer]) 11/16/2020   Q1: Little interest or pleasure in doing things 0   Q2: Feeling down, depressed or hopeless 0   PHQ-2 Score 0   Q1: Little interest or pleasure in doing things Not at all   Q2: Feeling down, depressed or hopeless Not at all   PHQ-2 Score 0       Abuse: Current or Past (Physical, Sexual or Emotional) - NO  Do you feel safe in your environment? YES        Social History     Tobacco Use     Smoking status: Never Smoker     Smokeless tobacco: Never Used   Substance Use Topics     Alcohol use: No     Alcohol/week: 0.0 standard drinks         Alcohol Use 11/16/2020   Prescreen: >3 drinks/day or >7 drinks/week? No   Prescreen: >3 drinks/day or >7 drinks/week? -   No flowsheet data found.    Reviewed orders with patient.  Reviewed health maintenance and updated orders accordingly - Yes  Labs reviewed in EPIC        Pertinent mammograms are reviewed under the imaging tab.  History of abnormal Pap smear: per gyn   PAP / HPV Latest Ref Rng & Units 2/13/2020 3/2/2017 2/27/2014   PAP - NIL NIL NIL   HPV 16 DNA NEG:Negative Negative Negative -   HPV 18 DNA NEG:Negative Negative Negative -   OTHER HR HPV NEG:Negative Negative Negative -     Reviewed and updated as needed this visit by clinical staff  Tobacco  Allergies  Meds   Med Hx  Surg Hx  Fam Hx  Soc Hx        Reviewed and updated as needed  "this visit by Provider                    Review of Systems  CONSTITUTIONAL: NEGATIVE for fever, chills, change in weight  INTEGUMENTARU/SKIN: NEGATIVE for worrisome rashes, moles or lesions  EYES: NEGATIVE for vision changes or irritation  ENT: NEGATIVE for ear, mouth and throat problems  RESP: NEGATIVE for significant cough or SOB  BREAST: NEGATIVE for masses, tenderness or discharge  CV: NEGATIVE for chest pain, palpitations or peripheral edema  GI: NEGATIVE for nausea, abdominal pain, heartburn, or change in bowel habits  : NEGATIVE for unusual urinary or vaginal symptoms. Periods are regular.  MUSCULOSKELETAL: NEGATIVE for significant arthralgias or myalgia  NEURO: NEGATIVE for weakness, dizziness or paresthesias  ENDOCRINE: NEGATIVE for temperature intolerance, skin/hair changes  HEME/ALLERGY/IMMUNE: NEGATIVE for bleeding problems  PSYCHIATRIC: NEGATIVE for changes in mood or affect     OBJECTIVE:   /82   Pulse 79   Temp 97.8  F (36.6  C) (Temporal)   Ht 1.549 m (5' 1\")   Wt 74.3 kg (163 lb 12.8 oz)   SpO2 96%   BMI 30.95 kg/m    Physical Exam  GENERAL: alert, no distress and over weight  EYES: Eyes grossly normal to inspection, PERRL and conjunctivae and sclerae normal  HENT: ear canals and TM's normal, nose and mouth without ulcers or lesions  NECK: no adenopathy, no asymmetry, masses,  Keloid L side of neck   RESP: lungs clear to auscultation - no rales, rhonchi or wheezes  CV: regular rate and rhythm, normal S1 S2, no S3 or S4, no murmur, click or rub, no peripheral edema and peripheral pulses strong  ABDOMEN: soft, nontender, no hepatosplenomegaly, no masses and bowel sounds normal  MS: no gross musculoskeletal defects noted, no edema  SKIN: no suspicious lesions or rashes  NEURO: Normal strength and tone, mentation intact and speech normal  PSYCH: mentation appears normal, affect normal/bright  LYMPH: no cervical, supraclavicular, axillary, or inguinal adenopathy    Labs reviewed in " "Epic    ASSESSMENT/PLAN:   1. Routine general medical examination at a health care facility  Updated screening, immunizations, prevention.  Please see health maintenance list, care gaps     2. Mild persistent asthma without complication  Use controller daily   - fluticasone (FLOVENT HFA) 44 MCG/ACT inhaler; Inhale 1 puff into the lungs 2 times daily  Dispense: 1 Inhaler; Refill: 11    3. Papillary Thyroid carcinoma, follicular variant  Per endo cont suppressive levothyroxine      Patient has been advised of split billing requirements and indicates understanding: Yes  COUNSELING:  Reviewed preventive health counseling, as reflected in patient instructions    Estimated body mass index is 30.95 kg/m  as calculated from the following:    Height as of this encounter: 1.549 m (5' 1\").    Weight as of this encounter: 74.3 kg (163 lb 12.8 oz).        She reports that she has never smoked. She has never used smokeless tobacco.      Counseling Resources:  ATP IV Guidelines  Pooled Cohorts Equation Calculator  Breast Cancer Risk Calculator  BRCA-Related Cancer Risk Assessment: FHS-7 Tool  FRAX Risk Assessment  ICSI Preventive Guidelines  Dietary Guidelines for Americans, 2010  USDA's MyPlate  ASA Prophylaxis  Lung CA Screening    Evan Zamora MD  [facility]  "

## 2020-11-23 DIAGNOSIS — N97.9 SECONDARY FEMALE INFERTILITY: Primary | ICD-10-CM

## 2020-11-23 RX ORDER — CLOMIPHENE CITRATE 50 MG/1
50 TABLET ORAL DAILY
Qty: 5 TABLET | Refills: 2 | Status: SHIPPED | OUTPATIENT
Start: 2020-11-23 | End: 2021-11-08

## 2020-11-23 RX ORDER — CLOMIPHENE CITRATE 50 MG/1
50 TABLET ORAL DAILY
COMMUNITY
End: 2020-11-23

## 2020-11-23 NOTE — TELEPHONE ENCOUNTER
She is to have follow-up visit with me so that I know if she was able to ovulate on this dose and if not whether I would have to increase the dose to 100 mg.     Rodney Crabtree MD

## 2020-12-15 ENCOUNTER — OFFICE VISIT (OUTPATIENT)
Dept: URGENT CARE | Facility: URGENT CARE | Age: 35
End: 2020-12-15
Payer: COMMERCIAL

## 2020-12-15 VITALS
HEIGHT: 62 IN | BODY MASS INDEX: 30.18 KG/M2 | DIASTOLIC BLOOD PRESSURE: 77 MMHG | WEIGHT: 164 LBS | OXYGEN SATURATION: 98 % | RESPIRATION RATE: 18 BRPM | SYSTOLIC BLOOD PRESSURE: 118 MMHG | TEMPERATURE: 98 F | HEART RATE: 83 BPM

## 2020-12-15 DIAGNOSIS — B02.9 HERPES ZOSTER WITHOUT COMPLICATION: Primary | ICD-10-CM

## 2020-12-15 PROCEDURE — 99213 OFFICE O/P EST LOW 20 MIN: CPT | Performed by: PHYSICIAN ASSISTANT

## 2020-12-15 RX ORDER — IBUPROFEN 800 MG/1
800 TABLET, FILM COATED ORAL EVERY 8 HOURS PRN
Qty: 100 TABLET | Refills: 0 | Status: SHIPPED | OUTPATIENT
Start: 2020-12-15 | End: 2022-02-21

## 2020-12-15 RX ORDER — HYDROCODONE BITARTRATE AND ACETAMINOPHEN 5; 325 MG/1; MG/1
1 TABLET ORAL EVERY 6 HOURS PRN
Qty: 18 TABLET | Refills: 0 | Status: SHIPPED | OUTPATIENT
Start: 2020-12-15 | End: 2020-12-18

## 2020-12-15 RX ORDER — VALACYCLOVIR HYDROCHLORIDE 1 G/1
1000 TABLET, FILM COATED ORAL 3 TIMES DAILY
Qty: 21 TABLET | Refills: 0 | Status: SHIPPED | OUTPATIENT
Start: 2020-12-15 | End: 2021-09-15

## 2020-12-15 ASSESSMENT — MIFFLIN-ST. JEOR: SCORE: 1392.15

## 2020-12-15 NOTE — PROGRESS NOTES
"Patient presents with:  Urgent Care  Consult: right side of the rash under chin, scalp and lymph nodes swelling, and painful since       SUBJECTIVE:  Aleyda Nicole is a 35 year old female who presents to the clinic today for a rash on the right side of her neck and scalp since yesterday.  Painful.  Right sided neck lymph node tenderness.  Onset 48 hours ago.      Denies any new topical products.      Past Medical History:   Diagnosis Date     Gestational diabetes 2013     Hypocalcemia 2016     Influenza A      Papillary thyroid carcinoma     Papillary carcinoma, follicular variant with     Pneumonia     LLL     PONV (postoperative nausea and vomiting)      Postoperative hypothyroidism       labor      Uncomplicated asthma         Allergies   Allergen Reactions     No Known Drug Allergies      Social History     Tobacco Use     Smoking status: Never Smoker     Smokeless tobacco: Never Used   Substance Use Topics     Alcohol use: No     Alcohol/week: 0.0 standard drinks       ROS:  CONSTITUTIONAL:NEGATIVE for fever, chills, change in weight  INTEGUMENTARY/SKIN: as per HPI  EYES: NEGATIVE for vision changes or irritation  ENT/MOUTH: NEGATIVE for ear, mouth and throat problems  RESP:NEGATIVE for significant cough or SOB  CV: NEGATIVE for chest pain, palpitations or peripheral edema  GI: NEGATIVE for nausea, abdominal pain, heartburn, or change in bowel habits  MUSCULOSKELETAL: NEGATIVE for significant arthralgias or myalgia  NEURO: NEGATIVE for weakness, dizziness or paresthesias  HEME/ALLERGY/IMMUNE: NEGATIVE for bleeding problems  Review of systems negative except as stated above.    EXAM:   /77 (BP Location: Right arm, Patient Position: Sitting, Cuff Size: Adult Regular)   Pulse 83   Temp 98  F (36.7  C) (Tympanic)   Resp 18   Ht 1.575 m (5' 2\")   Wt 74.4 kg (164 lb)   SpO2 98%   BMI 30.00 kg/m    GENERAL: alert, no acute distress.  SKIN: grouped vesicular rash on " right neck, posterior to ear and right side of scalp.   NECK: tender right sided anterior lymphadenopathy    (B02.9) Herpes zoster without complication  (primary encounter diagnosis)  Comment:   Plan: valACYclovir (VALTREX) 1000 mg tablet,         HYDROcodone-acetaminophen (NORCO) 5-325 MG         tablet, ibuprofen (ADVIL/MOTRIN) 800 MG tablet          See handout on shingles.  Considered contagious to those who have not had varicella and the immunosuppressed.

## 2020-12-15 NOTE — PATIENT INSTRUCTIONS
Patient Education     Shingles  Shingles is a viral infection caused by the same virus that causes chicken pox. Anyone who has had chicken pox may get shingles later in life. The virus stays in the body, but remains asleep (dormant). Shingles often occurs in older persons or persons with lowered immunity. But it can affect anyone at any age.  Shingles starts as a tingling patch of skin on one side of the body. Small, painful blisters may then appear. The rash rarely spreads to other parts of the body.  Exposure to shingles can't cause shingles. However, it can cause chicken pox in anyone who has not had chicken pox or has not been vaccinated. The contagious period ends when all blisters have crusted over, generally 1 to 2 weeks after the illness starts.  After the blisters heal, the affected skin may be sensitive or painful for weeks or months, gradually resolving over time. But, sometimes this can last longer and be permanent (called postherpetic neuralgia.)  Shingles vaccines are available. Vaccination can help prevent shingles or make it less painful. It is generally recommended for adults older than 50, even if you've had singles in the past. Talk with your healthcare provider about when to get vaccinated and which vaccine is best for you.  Home care    Medicines may be prescribed to help relieve pain. Take these medicines as directed. Ask your healthcare provider or pharmacist before using over-the-counter medicines for helping treat pain and itching.    In certain cases, antiviral medicines may be prescribed to reduce pain, shorten the illness, and prevent neuralgia. Take these medicines as directed.    Compresses made from a solution of cool water mixed with cornstarch or baking soda may help relieve pain and itching.     Gently wash skin daily with soap and water to help prevent infection. Be certain to rinse off all of the soap, which can be irritating.    Trim fingernails and try not to scratch.  Scratching the sores may leave scars.    Stay home from work or school until all blisters have formed a crust and you are no longer contagious.  Follow-up care  Follow up with your healthcare provider, or as directed.  When to seek medical advice    Fever of 100.4 F (38 C) or higher, or as directed by your healthcare provider    Affected skin is on the face or neck and any of the following occur:  ? Headache  ? Eye pain  ? Changes in vision  ? Sores near the eye  ? Weakness of facial muscles    Blisters occurring on new areas of the body    Pain, redness, or swelling of a joint    Signs of skin infection: colored drainage from the sores, warmth, increasing redness, fever, or increasing pain  [employer] last reviewed this educational content on 4/1/2018 2000-2020 [employer]. [address] [place] [address], [place] [address]. All rights reserved. This information is not intended as a substitute for professional medical care. Always follow your healthcare professional's instructions.

## 2020-12-15 NOTE — LETTER
[facility]  [address] [address]  [phone]      December 15, 2020    RE:  Aleyda Nicole                                                                                                                                                       [address] [facility] [id number]            To whom it may concern:    Aleyda Nicole was seen in clinic today and was diagnosed with shingles.  She is considered contagious until the lesions have crusted over.          Sincerely,        Demetrice Salomon PA-C    [facility]

## 2021-02-09 ENCOUNTER — MYC MEDICAL ADVICE (OUTPATIENT)
Dept: ENDOCRINOLOGY | Facility: CLINIC | Age: 36
End: 2021-02-09

## 2021-02-09 DIAGNOSIS — E89.0 POSTOPERATIVE HYPOTHYROIDISM: ICD-10-CM

## 2021-02-09 DIAGNOSIS — C73 PAPILLARY CARCINOMA, FOLLICULAR VARIANT (H): ICD-10-CM

## 2021-02-09 PROCEDURE — 84443 ASSAY THYROID STIM HORMONE: CPT | Performed by: INTERNAL MEDICINE

## 2021-02-09 PROCEDURE — 84439 ASSAY OF FREE THYROXINE: CPT | Performed by: INTERNAL MEDICINE

## 2021-02-09 PROCEDURE — 36415 COLL VENOUS BLD VENIPUNCTURE: CPT | Performed by: INTERNAL MEDICINE

## 2021-02-10 LAB
T4 FREE SERPL-MCNC: 1.38 NG/DL (ref 0.76–1.46)
TSH SERPL DL<=0.005 MIU/L-ACNC: 0.64 MU/L (ref 0.4–4)

## 2021-02-12 RX ORDER — LEVOTHYROXINE SODIUM 137 UG/1
137 TABLET ORAL DAILY
Qty: 90 TABLET | Refills: 1 | Status: SHIPPED | OUTPATIENT
Start: 2021-02-12 | End: 2021-08-24

## 2021-02-12 NOTE — TELEPHONE ENCOUNTER
"Requested Prescriptions   Pending Prescriptions Disp Refills     levothyroxine (SYNTHROID/LEVOTHROID) 137 MCG tablet 90 tablet 1     Sig: Take 1 tablet (137 mcg) by mouth daily       Thyroid Protocol Passed - 2/12/2021  8:01 AM        Passed - Patient is 12 years or older        Passed - Recent (12 mo) or future (30 days) visit within the authorizing provider's specialty     Patient has had an office visit with the authorizing provider or a provider within the authorizing providers department within the previous 12 mos or has a future within next 30 days. See \"Patient Info\" tab in inbasket, or \"Choose Columns\" in Meds & Orders section of the refill encounter.              Passed - Medication is active on med list        Passed - Normal TSH on file in past 12 months     Recent Labs   Lab Test 02/09/21  1534   TSH 0.64              Passed - No active pregnancy on record     If patient is pregnant or has had a positive pregnancy test, please check TSH.          Passed - No positive pregnancy test in past 12 months     If patient is pregnant or has had a positive pregnancy test, please check TSH.               "

## 2021-07-13 ENCOUNTER — MYC MEDICAL ADVICE (OUTPATIENT)
Dept: ENDOCRINOLOGY | Facility: CLINIC | Age: 36
End: 2021-07-13

## 2021-08-21 DIAGNOSIS — E89.0 POSTOPERATIVE HYPOTHYROIDISM: ICD-10-CM

## 2021-08-21 DIAGNOSIS — C73 PAPILLARY CARCINOMA, FOLLICULAR VARIANT (H): ICD-10-CM

## 2021-08-24 RX ORDER — LEVOTHYROXINE SODIUM 137 UG/1
137 TABLET ORAL DAILY
Qty: 90 TABLET | Refills: 0 | Status: SHIPPED | OUTPATIENT
Start: 2021-08-24 | End: 2021-11-23

## 2021-08-24 NOTE — TELEPHONE ENCOUNTER
Medication is being filled for 1 time refill only due to:  Patient needs to be seen because overdue for follow up. [employer] message sent.

## 2021-09-07 DIAGNOSIS — E89.0 POSTOPERATIVE HYPOTHYROIDISM: Primary | ICD-10-CM

## 2021-09-07 DIAGNOSIS — C73 PAPILLARY CARCINOMA, FOLLICULAR VARIANT (H): ICD-10-CM

## 2021-09-15 ENCOUNTER — OFFICE VISIT (OUTPATIENT)
Dept: ENDOCRINOLOGY | Facility: CLINIC | Age: 36
End: 2021-09-15
Payer: COMMERCIAL

## 2021-09-15 VITALS
DIASTOLIC BLOOD PRESSURE: 70 MMHG | SYSTOLIC BLOOD PRESSURE: 116 MMHG | HEIGHT: 62 IN | BODY MASS INDEX: 30.18 KG/M2 | RESPIRATION RATE: 20 BRPM | OXYGEN SATURATION: 97 % | HEART RATE: 69 BPM | WEIGHT: 164 LBS | TEMPERATURE: 97.2 F

## 2021-09-15 DIAGNOSIS — E89.0 POSTOPERATIVE HYPOTHYROIDISM: ICD-10-CM

## 2021-09-15 DIAGNOSIS — C73 PAPILLARY CARCINOMA, FOLLICULAR VARIANT (H): Primary | ICD-10-CM

## 2021-09-15 PROCEDURE — 84432 ASSAY OF THYROGLOBULIN: CPT | Mod: 90 | Performed by: INTERNAL MEDICINE

## 2021-09-15 PROCEDURE — 84443 ASSAY THYROID STIM HORMONE: CPT | Performed by: INTERNAL MEDICINE

## 2021-09-15 PROCEDURE — 99000 SPECIMEN HANDLING OFFICE-LAB: CPT | Performed by: INTERNAL MEDICINE

## 2021-09-15 PROCEDURE — 99214 OFFICE O/P EST MOD 30 MIN: CPT | Performed by: INTERNAL MEDICINE

## 2021-09-15 PROCEDURE — 36415 COLL VENOUS BLD VENIPUNCTURE: CPT | Performed by: INTERNAL MEDICINE

## 2021-09-15 PROCEDURE — 86800 THYROGLOBULIN ANTIBODY: CPT | Mod: 90 | Performed by: INTERNAL MEDICINE

## 2021-09-15 PROCEDURE — 84439 ASSAY OF FREE THYROXINE: CPT | Performed by: INTERNAL MEDICINE

## 2021-09-15 ASSESSMENT — MIFFLIN-ST. JEOR: SCORE: 1387.15

## 2021-09-15 NOTE — PROGRESS NOTES
Name: Aleyda Nicole  Seen for f/u of papillary thyroid cancer and postoperative hypothyroidism.  Last visit 8/2020.    Chief Complaint   Patient presents with     Follow Up     Thyroid     HPI:  Aleyda Nicole is a 36 year old female who presents for the evaluation of:      1.  Papillary thyroid cancer:  Thyroid nodule was noticed in February 2011 which was followed by thyroid nodule biopsy which showed suspicious for papillary thyroid cancer.  She underwent total thyroidectomy 3/2011 and pathology showed papillary thyroid cancer with follicular pattern.  Tumor was about 2.2 cm on the left lobe.  No lymph node involvement.  S/p 78.7 mCi of I131 HERNANDEZ  8/2011. No evidence for distant metastatic disease on post therapy scan.  Neck US 6/2016- showed new lymph node in neck along with rising Tg levels  S/p FNA lymph node: 6/2016- c/w papillary thyroid cancer    8/2016: underwent left modified neck dissection.  1/27 lymph node positive one left modified neck dissection.  1 cm in greatest diameter.  Negative for external involvement at level II.    Tg decreased post left neck dissection from 1.3 to < 0.1    Follow up I123 4/2017: no mets.    Postop course was complicated by hypocalcemia and was started on calcium supplement.  Taking calcium- tries to take 2 tabs per day + 1 glass of milk daily.    Delivered baby 1/2017. No longer breast feeding.    She was followed by endocrinology before and then lost to follow-up with endocrinology in 2012.  No history of neck radiation prior to diagnosis of thyroid cancer.  No family history of thyroid cancer.    Thyroglobulin levels appear stable and suppressed.  Neck ultrasound June 2018, 5/2020 did not show any evidence of metastases.    Due for labs and neck ultrasound.    2.  Hypothyroidism (postoperative):  Was started on levothyroxine following thyroidectomy.    Currently taking levothyroxine 137 mcg X 7 days a week.  Reports compliance.    Had miscarriage 2 months back.  Actively  planning pregnancy.  No lymphadenopathy.  Taking clomed. Followed by OBGYN.  + hair loss.  + fatigue.  + sometime palpitations  + Mo0d changes X 2 months.    No diarrhea, tremors. sometimes palpitations- once in a week X 1 year.  No difficulty with swallowing, no pain during swallowing.  Missed 2 periods. Home pregnancy test was neg.  Mood- some irritability.  Weight: stable.  Wt Readings from Last 2 Encounters:   09/15/21 74.4 kg (164 lb)   12/15/20 74.4 kg (164 lb)     PMH/PSH:  Past Medical History:   Diagnosis Date     Gestational diabetes 2013     Hypocalcemia 2016     Influenza A      Papillary thyroid carcinoma     Papillary carcinoma, follicular variant with     Pneumonia     LLL     PONV (postoperative nausea and vomiting)      Postoperative hypothyroidism       labor      Uncomplicated asthma      Past Surgical History:   Procedure Laterality Date      SECTION  10/26/2013    Procedure:  SECTION;  primary  section;  Surgeon: Rodney Mckee MD;  Location: [facility]      SECTION N/A 2017    Procedure:  SECTION;  Surgeon: Hakeem Combs MD;  Location: [facility]     DISSECTION RADICAL NECK Left 2016    Procedure: DISSECTION RADICAL NECK;  Surgeon: Vy Theodore MD;  Location: [facility] OR     DISSECTION RADICAL NECK MODIFIED Left 2016    Procedure: DISSECTION RADICAL NECK MODIFIED;  Surgeon: Luis Brown MD;  Location: [facility] OR     THYROIDECTOMY      Total, for cancer.      Family Hx:  Family History   Problem Relation Age of Onset     Diabetes Mother      Cerebrovascular Disease Mother      Hypertension Mother      Diabetes Father 50     Hypertension Father      Genitourinary Problems Father         kidney disease     Coronary Artery Disease Father      Diabetes Brother      Thyroid disease: No        Social Hx:  Social History     Socioeconomic History     Marital status:      Spouse name: Brock     Number of  children: 2     Years of education: Not on file     Highest education level: Not on file   Occupational History     Occupation:       Employer: [employer]   Tobacco Use     Smoking status: Never Smoker     Smokeless tobacco: Never Used   Substance and Sexual Activity     Alcohol use: No     Alcohol/week: 0.0 standard drinks     Drug use: No     Sexual activity: Yes     Partners: Male     Birth control/protection: None   Other Topics Concern     Parent/sibling w/ CABG, MI or angioplasty before 65F 55M? Not Asked      Service No     Blood Transfusions No     Caffeine Concern No     Occupational Exposure Not Asked     Hobby Hazards No     Sleep Concern No     Stress Concern No     Weight Concern Yes     Special Diet No     Back Care No     Exercise No     Bike Helmet No     Seat Belt Yes     Self-Exams No   Social History Narrative    Pt lives with  and father.     Social Determinants of Health     Financial Resource Strain:      Difficulty of Paying Living Expenses:    Food Insecurity:      Worried About Running Out of Food in the Last Year:      Ran Out of Food in the Last Year:    Transportation Needs:      Lack of Transportation (Medical):      Lack of Transportation (Non-Medical):    Physical Activity:      Days of Exercise per Week:      Minutes of Exercise per Session:    Stress:      Feeling of Stress :    Social Connections:      Frequency of Communication with Friends and Family:      Frequency of Social Gatherings with Friends and Family:      Attends Anabaptist Services:      Active Member of Clubs or Organizations:      Attends Club or Organization Meetings:      Marital Status:    Intimate Partner Violence:      Fear of Current or Ex-Partner:      Emotionally Abused:      Physically Abused:      Sexually Abused:           MEDICATIONS:  has a current medication list which includes the following prescription(s): albuterol, albuterol, clomiphene, fluticasone, ibuprofen, levothyroxine,  prenatal vit-fe fumarate-fa, and valacyclovir.    ROS     ROS: 10 point ROS neg other than the symptoms noted above in the HPI.      Physical Exam   VS: There were no vitals taken for this visit.  GENERAL: healthy, alert and no distress  EYES: Eyes grossly normal to inspection, conjunctivae and sclerae normal  ENT: no nose swelling, nasal discharge.  Thyroid: s/p thyroidectomy.  No lymphadenopathy.  CV: RRR, no rubs, gallops, no murmurs  RESP: CTAB, no wheezes, rales, or ronchi  ABDO: +BS  EXTREMITIES: no hand tremors.  NEURO: Cranial nerves grossly intact, mentation intact and speech normal  SKIN: No apparent skin lesions, rash or edema seen   PSYCH: mentation appears normal, affect normal/bright, judgement and insight intact, normal speech and appearance well-groomed      LABS:  2020:  TgAB: <0.4  TG: <0.10    2019:  TgAB: <0.4  TG: <0.10    3/6/2018:  TSH: 0.07  TgAB: <0.4  TG: <0.10    2017:  TSH : 47.89 (post thyrogen)  TgAB: <0.4  TG: <0.10    17:  TSH: 0.04  TgAB: <0.4  TG: <0.10    11/10/16:  TSH 0.74  TgAb: <0.4  TG: <0.10     2016:  TSH: 0.08  TgAb: <0.4  T.30     2012:  TSH: 12.40  TGAB: 1.2  T.6    TFTs:  ENDO THYROID LABS-[facility] Latest Ref Rng & Units 2021   TSH 0.40 - 4.00 mU/L 0.64   FREE T3 2.3 - 4.2 pg/mL    T3 60 - 181 ng/dL    T4 0.76 - 1.46 ng/dL 1.38     ENDO THYROID LABS-[facility] Latest Ref Rng & Units 2020   TSH 0.40 - 4.00 mU/L 0.55 0.94   T4 TOTAL 5.0 - 11.0 ug/dL     T4 FREE 0.76 - 1.46 ng/dL 1.49 (H) 1.11     NM THYROID UPTAKE/SCAN   2011 2:47:00 PM      COMPARISON: None.     HISTORY: Hyperthyroidism.     TECHNIQUE:  The patient was given 173 uCi of I-123 orally, followed by  24-hour thyroid scan and radioiodine uptake evaluation.     FINDINGS:  The thyroid gland appears normal in size. 24-hour uptake  was calculated at 46.4%, which is above the normal range. Normal range  is 10-30% 24-hour uptake. Focal area of decreased uptake in the mid  left  thyroid lobe may represent a cold thyroid nodule or cyst.     IMPRESSION:    1. Elevated 24-hour radioiodine uptake in the thyroid at 46.4%.  2. Possible cold nodule or cyst in the mid left thyroid lobe. Thyroid  ultrasound is recommended for further evaluation.    NUCLEAR MEDICINE I-131 SCAN    Aug 10, 2011 8:04:00 AM      TECHNIQUE: 78.7 mCi I-131 ablation scan. Five days post therapy  imaging performed today.     HISTORY: Thyroid cancer.     COMPARISON:   Nuclear Study: Thyroid uptake and scan 2/4/2011.  Other Relevant Studies: Ultrasound neck 2/17/2011.     FINDINGS: Intense radiotracer uptake at the left greater than right  neck at the thyroid level. No evidence for distant metastatic disease.     IMPRESSION: Intense radiotracer uptake localizing to the thyroidectomy  bed, left greater than right. This is consistent with residual thyroid  tissue. No distant metastatic disease identified.    ULTRASOUND THYROID  Feb 17, 2011      HISTORY: Hyperthyroidism. Thyroid nodule.      COMPARISON: Nuclear Medicine thyroid scan 2/4/2011.     FINDINGS: The thyroid gland is enlarged. The right lobe measures 5.4 x  1.6 x 2.1 cm. The left lobe measures 5.9 x 2.3 x 2.7 cm. There are 2  right thyroid nodules and 3 left thyroid nodules.  1. Right upper lobe, hypoechoic solid measuring 0.7 x 0.6 x 0.6 cm.  2. Right lower pole, solid measuring 0.7 x 0.5 x 0.6 cm.  3. Left upper pole, cystic and solid measuring 1.1 x 0.6 x 1.0 cm.  4. Left mid thyroid lobe, primarily solid with small cystic areas  measuring 2.7 x 1.9 x 2.1 cm.  5. Left lower pole, cystic and solid measuring 1.4 x 0.9 x 1.2 cm.     The primarily solid left mid thyroid nodule appears to correspond to  the cold nodule on thyroid uptake and scan.  IMPRESSION: Multinodular thyroid gland.    FNA thyroid nodule:  Copath Report       FNA-thyroid, left mid lobe nodule:   Suspicious for malignancy.  Suspicious for papillary carcinoma  Specimen Adequacy: Satisfactory for  "evaluation.           Surgical pathology:     SPECIMEN(S):  A: Thyroid, left lobe  B: Parathyroid gland, biopsy of right inferior  C: Thyroid, right lobe    FINAL DIAGNOSIS:  A. and C.  Thyroid, right and left lobes, total thyroidectomy -  Specimen/Procedure(s) and Laterality:   Right and left thyroid lobes,  total thyroidectomy.  Specimen Integrity:   Intact.  Specimen Size and Weight:   See Gross Description.  Histologic Tumor Type(s):   Papillary carcinoma, follicular variant with  focal papillary morphology.  Tumor Focality: Unifocal.  Tumor Laterality:   Left lobe.  Tumor Size(s):   Left lobe: 2.2 cm (greatest dimension).  Margins:   Close, but uninvolved.            Distance to closest margin, if negative:   Tumor 0.05 cm from  nearest paratracheal surface and 1.75 cm from nearest anterolateral  surface.  Tumor Capsular Invasion: Present.    Tumor Capsule: Partial.  Extrathyroidal Invasion:   Not identified.  Lymph-Vascular Invasion:   Not identified.  Lymph Nodes:   Two nodes without evidence of metastatic carcinoma (0/2;  one at isthmus and one adjacent to right lobe).  Pathologic Staging:   pT2, pN0, pM not applicable.  Additional Pathologic Findings:   Right thyroid lobe without evidence of  malignancy.  Background nodular hyperplasia and thyroiditis with  lymphoid follicles.  Left lobe with focal previous biopsy site changes.  No parathyroid tissue identified.  CAP Protocol Based on AJCC/UICC TNM, 7th edition; Protocol Effective  Date:  January 2010    B.  \"Parathyroid gland\", right inferior, biopsy - Thyroid tissue; no  parathyroid glandular tissue identified.    8/2016: left modified neck Dissection:  Copath Report      SPECIMEN(S):   A: Scar, left neck   B: Parathyroid gland, left inferior   C: Left central neck dissection, para and pretracheal lymph nodes   D: apical jugular lymph node   E: Left modified neck dissection     FINAL DIAGNOSIS:   A. Skin, neck/scar, excision:   - Hypertrophic scar; " benign.     B. Parathyroid gland, left inferior, biopsy:   - Parathyroid tissue present.     C. Left central neck dissection with para-and pretracheal lymph nodes:   - One lymph node; no evidence of malignancy (0/1).   - Thymus and parathyroid tissue present.     D. Lymph node, apical jugular, excision:   - One lymph node; no evidence of malignancy (0/1).     E. Left modified neck dissection:   - 27 lymph nodes identified (level II- 10, level III- 9, level IV- 5 and   lymph nodes associated with jugular vein- 3).   - One (of 27) lymph node positive for metastatic papillary thyroid   carcinoma.  1 cm in greatest diameter.  Negative for extranodal   involvement  (Level II).   - Jugular vein negative for tumor.        NUCLEAR MEDICINE THYROID WHOLE BODY SCAN I-123   4/20/2017 11:38 AM      TECHNIQUE:  The patient was given 1.8 mCi iodine 123 per oral on  4/19/2017. SPECT-CT with fusion was performed at the level of the neck  and chest.     HISTORY:  Malignant neoplasm of thyroid gland. Postprocedural  hypothyroidism.     COMPARISON:   Nuclear Study: None.     Other Relevant Studies: None.     FINDINGS:  Thyroidectomy. No suspicious focus of abnormal radiotracer  is seen at the thyroidectomy bed identified. Physiologic tracer uptake  seen at the salivary glands. There are several small subcentimeter  lymph nodes involving the bilateral neck without conspicuous focal  tracer uptake outside of the background tracer distribution. There  appears to be left neck postoperative change causing some  inconspicuity of the left sternocleidomastoid muscle. No convincing  worrisome airspace disease or mediastinal abnormality is seen.         IMPRESSION: No worrisome focal tracer uptake is identified to suggest  recurrent neoplasm or remote metastatic disease. Some postoperative  changes at the left neck noted, likely accounting for inconspicuity of  the left sternocleidomastoid muscle. Small bilateral subcentimeter  neck lymph nodes  noted.     Neck US 6/2018:  ULTRASOUND HEAD AND NECK SOFT TISSUE  6/4/2018 11:11 AM     HISTORY:  Papillary carcinoma, follicular variant (H).     COMPARISON: None.     FINDINGS:  No evidence for residual thyroid tissue in the  thyroidectomy bed. There is no evidence for cervical adenopathy  bilaterally by size or morphology.          IMPRESSION:  Negative soft tissue neck study.    Neck US 5/2020:  FINDINGS:    No evidence for residual thyroid tissue in the  thyroidectomy bed. There is no evidence for cervical adenopathy  bilaterally by size or morphology.                                                                   IMPRESSION:    Negative soft tissue neck study.    All pertinent notes, labs, and images personally reviewed by me.     A/P  Ms.Nhu Mounika Nicole is a 36 year old here for the evaluation of thyroid cancer:    1. 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  It was 2.2 cm unifocal, left lobe tumor with no lymph node involvement.  S/p  total thyroidectomy in 2011 and 78.7 mCi of I-131 HERNANDEZ. No evidence for distant metastatic disease on post therapy scan.  2016- new lymph node s/p FNA with PTC with rising TG  S/p 8/2016- left modified neck radiation. 1/27 lymph node + ( level2). Postop course complicated by hypocalcemia.  Delivered 1/2017.   Follow up I123 WBS 4/2017- no mets  Thyroglobulin levels appear stable and suppressed.  Neck ultrasound June 2018, 5/2020 did not show any evidence of metastases.  Plan:  Discussed diagnosis, pathophysiology, management and treatment options of condition with pt.  Recommend repeat Neck US and thyroglobulin levels.  2.  Hypothyroidism (postoperative following total thyroidectomy for thyroid cancer):  Currently taking levothyroxine 137 mcg X day. ( on this dose X 10/2019)  Taking generic levothyroxine.  Reports compliance.  Labs as noted above with slightly high  FT4.  Clinically looks euthyroid. Sometimes palpitations X 1 year.  Plan:  Discussed diagnosis, pathophysiology, management and treatment options of condition with pt.  Labs today.  Dose change based on that.  You will need close follow up if you get pregnant.  Discussed importance of euthyroidism during pregnancy.  Discussed s/s of hypothyroidism and hyperthyroidism to watch for.  The patient indicates understanding of these issues and agrees with the plan.      3.  History of vitamin D deficiency and hypocalcemia, resolved.  Continue to monitor.    More than 50% of the time spent with Ms. Nicole on counseling / coordinating her care.      Follow-up:  Based on labs    Vandana Costello MD  Endocrinology   [facility]  9/15/2021  CC: Evan Zamora

## 2021-09-15 NOTE — PATIENT INSTRUCTIONS
-[facility]  Dr Costello, Endocrinology Department    [facility]   [address] [id number]  [place] [address]  Appointment [phone]  Fax: [phone]  Equality: Monday - Thursday      Labs today.  Dose change based on that.  Neck ultrasound with radiology.     [facility] radiology scheduleing   [place]  [phone]   [facility] Radiology scheduling  [place]  [phone]     Please call and schedule the recommended test as discussed in clinic visit. These are the numbers to call.    Take Levothyroxine on an empty stomach. Take it with a full glass of water at least 30 minutes to 1 hour before eating breakfast.   This medicine should be taken at least 4 hours before or 4 hours after these medicines: antacids (Maalox , Mylanta , Tums ), calcium supplements, cholestyramine (Prevalite , Questran ), colestipol (Colestid ), iron supplements, orlistat (Kaden , Xenical ), simethicone (Gas-X , Mylicon ), and sucralfate (Carafate ).   Swallow the capsule whole. Do not cut or crush it.       Please note that schedule gets booked out fast and it is difficult to add on patients when schedule is full.  So it is highly advised that you make appointment ahead of time so that we can follow up on labs/imaging studies in timely manner. This will help us to serve you better.  Please call the clinic to make appropriate appointments.  Let us know if you have any questions or if you need any help with scheduling.

## 2021-09-15 NOTE — LETTER
9/15/2021         RE: Aleyda Nicole  [address] [place] [address]        Dear Colleague,    Thank you for referring your patient, Aleyda Nicole, to the [facility]. Please see a copy of my visit note below.    Name: Aleyda Nicole  Seen for f/u of papillary thyroid cancer and postoperative hypothyroidism.  Last visit 8/2020.    Chief Complaint   Patient presents with     Follow Up     Thyroid     HPI:  Aleyda Nicole is a 36 year old female who presents for the evaluation of:      1.  Papillary thyroid cancer:  Thyroid nodule was noticed in February 2011 which was followed by thyroid nodule biopsy which showed suspicious for papillary thyroid cancer.  She underwent total thyroidectomy 3/2011 and pathology showed papillary thyroid cancer with follicular pattern.  Tumor was about 2.2 cm on the left lobe.  No lymph node involvement.  S/p 78.7 mCi of I131 HERNANDEZ  8/2011. No evidence for distant metastatic disease on post therapy scan.  Neck US 6/2016- showed new lymph node in neck along with rising Tg levels  S/p FNA lymph node: 6/2016- c/w papillary thyroid cancer    8/2016: underwent left modified neck dissection.  1/27 lymph node positive one left modified neck dissection.  1 cm in greatest diameter.  Negative for external involvement at level II.    Tg decreased post left neck dissection from 1.3 to < 0.1    Follow up I123 4/2017: no mets.    Postop course was complicated by hypocalcemia and was started on calcium supplement.  Taking calcium- tries to take 2 tabs per day + 1 glass of milk daily.    Delivered baby 1/2017. No longer breast feeding.    She was followed by endocrinology before and then lost to follow-up with endocrinology in 2012.  No history of neck radiation prior to diagnosis of thyroid cancer.  No family history of thyroid cancer.    Thyroglobulin levels appear stable and suppressed.  Neck ultrasound June 2018, 5/2020 did not show any evidence of  metastases.    Due for labs and neck ultrasound.    2.  Hypothyroidism (postoperative):  Was started on levothyroxine following thyroidectomy.    Currently taking levothyroxine 137 mcg X 7 days a week.  Reports compliance.    Had miscarriage 2 months back.  Actively planning pregnancy.  No lymphadenopathy.  Taking clomed. Followed by OBGYN.  + hair loss.  + fatigue.  + sometime palpitations  + Mo0d changes X 2 months.    No diarrhea, tremors. sometimes palpitations- once in a week X 1 year.  No difficulty with swallowing, no pain during swallowing.  Missed 2 periods. Home pregnancy test was neg.  Mood- some irritability.  Weight: stable.  Wt Readings from Last 2 Encounters:   09/15/21 74.4 kg (164 lb)   12/15/20 74.4 kg (164 lb)     PMH/PSH:  Past Medical History:   Diagnosis Date     Gestational diabetes 2013     Hypocalcemia 2016     Influenza A      Papillary thyroid carcinoma     Papillary carcinoma, follicular variant with     Pneumonia     LLL     PONV (postoperative nausea and vomiting)      Postoperative hypothyroidism       labor      Uncomplicated asthma      Past Surgical History:   Procedure Laterality Date      SECTION  10/26/2013    Procedure:  SECTION;  primary  section;  Surgeon: Rodney Mckee MD;  Location: [facility] L[facility]      SECTION N/A 2017    Procedure:  SECTION;  Surgeon: Hakeem Combs MD;  Location: [facility] L[facility]     DISSECTION RADICAL NECK Left 2016    Procedure: DISSECTION RADICAL NECK;  Surgeon: Vy Theodore MD;  Location: [facility] OR     DISSECTION RADICAL NECK MODIFIED Left 2016    Procedure: DISSECTION RADICAL NECK MODIFIED;  Surgeon: Luis Brown MD;  Location: [facility] OR     THYROIDECTOMY      Total, for cancer.      Family Hx:  Family History   Problem Relation Age of Onset     Diabetes Mother      Cerebrovascular Disease Mother      Hypertension Mother      Diabetes Father 50     Hypertension Father       Genitourinary Problems Father         kidney disease     Coronary Artery Disease Father      Diabetes Brother      Thyroid disease: No        Social Hx:  Social History     Socioeconomic History     Marital status:      Spouse name: Brock     Number of children: 2     Years of education: Not on file     Highest education level: Not on file   Occupational History     Occupation:       Employer: [employer]   Tobacco Use     Smoking status: Never Smoker     Smokeless tobacco: Never Used   Substance and Sexual Activity     Alcohol use: No     Alcohol/week: 0.0 standard drinks     Drug use: No     Sexual activity: Yes     Partners: Male     Birth control/protection: None   Other Topics Concern     Parent/sibling w/ CABG, MI or angioplasty before 65F 55M? Not Asked      Service No     Blood Transfusions No     Caffeine Concern No     Occupational Exposure Not Asked     Hobby Hazards No     Sleep Concern No     Stress Concern No     Weight Concern Yes     Special Diet No     Back Care No     Exercise No     Bike Helmet No     Seat Belt Yes     Self-Exams No   Social History Narrative    Pt lives with  and father.     Social Determinants of Health     Financial Resource Strain:      Difficulty of Paying Living Expenses:    Food Insecurity:      Worried About Running Out of Food in the Last Year:      Ran Out of Food in the Last Year:    Transportation Needs:      Lack of Transportation (Medical):      Lack of Transportation (Non-Medical):    Physical Activity:      Days of Exercise per Week:      Minutes of Exercise per Session:    Stress:      Feeling of Stress :    Social Connections:      Frequency of Communication with Friends and Family:      Frequency of Social Gatherings with Friends and Family:      Attends [employer] Services:      Active Member of Clubs or Organizations:      Attends Club or Organization Meetings:      Marital Status:    Intimate Partner Violence:      Fear of  Current or Ex-Partner:      Emotionally Abused:      Physically Abused:      Sexually Abused:           MEDICATIONS:  has a current medication list which includes the following prescription(s): albuterol, albuterol, clomiphene, fluticasone, ibuprofen, levothyroxine, prenatal vit-fe fumarate-fa, and valacyclovir.    ROS     ROS: 10 point ROS neg other than the symptoms noted above in the HPI.      Physical Exam   VS: There were no vitals taken for this visit.  GENERAL: healthy, alert and no distress  EYES: Eyes grossly normal to inspection, conjunctivae and sclerae normal  ENT: no nose swelling, nasal discharge.  Thyroid: s/p thyroidectomy.  No lymphadenopathy.  CV: RRR, no rubs, gallops, no murmurs  RESP: CTAB, no wheezes, rales, or ronchi  ABDO: +BS  EXTREMITIES: no hand tremors.  NEURO: Cranial nerves grossly intact, mentation intact and speech normal  SKIN: No apparent skin lesions, rash or edema seen   PSYCH: mentation appears normal, affect normal/bright, judgement and insight intact, normal speech and appearance well-groomed      LABS:  2020:  TgAB: <0.4  TG: <0.10    2019:  TgAB: <0.4  TG: <0.10    3/6/2018:  TSH: 0.07  TgAB: <0.4  TG: <0.10    2017:  TSH : 47.89 (post thyrogen)  TgAB: <0.4  TG: <0.10    17:  TSH: 0.04  TgAB: <0.4  TG: <0.10    11/10/16:  TSH 0.74  TgAb: <0.4  TG: <0.10     2016:  TSH: 0.08  TgAb: <0.4  T.30     2012:  TSH: 12.40  TGAB: 1.2  T.6    TFTs:  ENDO THYROID LABS-[facility] Latest Ref Rng & Units 2021   TSH 0.40 - 4.00 mU/L 0.64   FREE T3 2.3 - 4.2 pg/mL    T3 60 - 181 ng/dL    T4 0.76 - 1.46 ng/dL 1.38     ENDO THYROID LABS-[facility] Latest Ref Rng & Units 2020   TSH 0.40 - 4.00 mU/L 0.55 0.94   T4 TOTAL 5.0 - 11.0 ug/dL     T4 FREE 0.76 - 1.46 ng/dL 1.49 (H) 1.11     NM THYROID UPTAKE/SCAN   2011 2:47:00 PM      COMPARISON: None.     HISTORY: Hyperthyroidism.     TECHNIQUE:  The patient was given 173 uCi of I-123 orally, followed by  24-hour  thyroid scan and radioiodine uptake evaluation.     FINDINGS:  The thyroid gland appears normal in size. 24-hour uptake  was calculated at 46.4%, which is above the normal range. Normal range  is 10-30% 24-hour uptake. Focal area of decreased uptake in the mid  left thyroid lobe may represent a cold thyroid nodule or cyst.     IMPRESSION:    1. Elevated 24-hour radioiodine uptake in the thyroid at 46.4%.  2. Possible cold nodule or cyst in the mid left thyroid lobe. Thyroid  ultrasound is recommended for further evaluation.    NUCLEAR MEDICINE I-131 SCAN    Aug 10, 2011 8:04:00 AM      TECHNIQUE: 78.7 mCi I-131 ablation scan. Five days post therapy  imaging performed today.     HISTORY: Thyroid cancer.     COMPARISON:   Nuclear Study: Thyroid uptake and scan 2/4/2011.  Other Relevant Studies: Ultrasound neck 2/17/2011.     FINDINGS: Intense radiotracer uptake at the left greater than right  neck at the thyroid level. No evidence for distant metastatic disease.     IMPRESSION: Intense radiotracer uptake localizing to the thyroidectomy  bed, left greater than right. This is consistent with residual thyroid  tissue. No distant metastatic disease identified.    ULTRASOUND THYROID  Feb 17, 2011      HISTORY: Hyperthyroidism. Thyroid nodule.      COMPARISON: Nuclear Medicine thyroid scan 2/4/2011.     FINDINGS: The thyroid gland is enlarged. The right lobe measures 5.4 x  1.6 x 2.1 cm. The left lobe measures 5.9 x 2.3 x 2.7 cm. There are 2  right thyroid nodules and 3 left thyroid nodules.  1. Right upper lobe, hypoechoic solid measuring 0.7 x 0.6 x 0.6 cm.  2. Right lower pole, solid measuring 0.7 x 0.5 x 0.6 cm.  3. Left upper pole, cystic and solid measuring 1.1 x 0.6 x 1.0 cm.  4. Left mid thyroid lobe, primarily solid with small cystic areas  measuring 2.7 x 1.9 x 2.1 cm.  5. Left lower pole, cystic and solid measuring 1.4 x 0.9 x 1.2 cm.     The primarily solid left mid thyroid nodule appears to correspond to  the  "cold nodule on thyroid uptake and scan.  IMPRESSION: Multinodular thyroid gland.    FNA thyroid nodule:  Copath Report       FNA-thyroid, left mid lobe nodule:   Suspicious for malignancy.  Suspicious for papillary carcinoma  Specimen Adequacy: Satisfactory for evaluation.           Surgical pathology:     SPECIMEN(S):  A: Thyroid, left lobe  B: Parathyroid gland, biopsy of right inferior  C: Thyroid, right lobe    FINAL DIAGNOSIS:  A. and C.  Thyroid, right and left lobes, total thyroidectomy -  Specimen/Procedure(s) and Laterality:   Right and left thyroid lobes,  total thyroidectomy.  Specimen Integrity:   Intact.  Specimen Size and Weight:   See Gross Description.  Histologic Tumor Type(s):   Papillary carcinoma, follicular variant with  focal papillary morphology.  Tumor Focality: Unifocal.  Tumor Laterality:   Left lobe.  Tumor Size(s):   Left lobe: 2.2 cm (greatest dimension).  Margins:   Close, but uninvolved.            Distance to closest margin, if negative:   Tumor 0.05 cm from  nearest paratracheal surface and 1.75 cm from nearest anterolateral  surface.  Tumor Capsular Invasion: Present.    Tumor Capsule: Partial.  Extrathyroidal Invasion:   Not identified.  Lymph-Vascular Invasion:   Not identified.  Lymph Nodes:   Two nodes without evidence of metastatic carcinoma (0/2;  one at isthmus and one adjacent to right lobe).  Pathologic Staging:   pT2, pN0, pM not applicable.  Additional Pathologic Findings:   Right thyroid lobe without evidence of  malignancy.  Background nodular hyperplasia and thyroiditis with  lymphoid follicles.  Left lobe with focal previous biopsy site changes.  No parathyroid tissue identified.  CAP Protocol Based on AJCC/UICC TNM, 7th edition; Protocol Effective  Date:  January 2010    B.  \"Parathyroid gland\", right inferior, biopsy - Thyroid tissue; no  parathyroid glandular tissue identified.    8/2016: left modified neck Dissection:  Copath Report      SPECIMEN(S):   A: Scar, " left neck   B: Parathyroid gland, left inferior   C: Left central neck dissection, para and pretracheal lymph nodes   D: apical jugular lymph node   E: Left modified neck dissection     FINAL DIAGNOSIS:   A. Skin, neck/scar, excision:   - Hypertrophic scar; benign.     B. Parathyroid gland, left inferior, biopsy:   - Parathyroid tissue present.     C. Left central neck dissection with para-and pretracheal lymph nodes:   - One lymph node; no evidence of malignancy (0/1).   - Thymus and parathyroid tissue present.     D. Lymph node, apical jugular, excision:   - One lymph node; no evidence of malignancy (0/1).     E. Left modified neck dissection:   - 27 lymph nodes identified (level II- 10, level III- 9, level IV- 5 and   lymph nodes associated with jugular vein- 3).   - One (of 27) lymph node positive for metastatic papillary thyroid   carcinoma.  1 cm in greatest diameter.  Negative for extranodal   involvement  (Level II).   - Jugular vein negative for tumor.        NUCLEAR MEDICINE THYROID WHOLE BODY SCAN I-123   4/20/2017 11:38 AM      TECHNIQUE:  The patient was given 1.8 mCi iodine 123 per oral on  4/19/2017. SPECT-CT with fusion was performed at the level of the neck  and chest.     HISTORY:  Malignant neoplasm of thyroid gland. Postprocedural  hypothyroidism.     COMPARISON:   Nuclear Study: None.     Other Relevant Studies: None.     FINDINGS:  Thyroidectomy. No suspicious focus of abnormal radiotracer  is seen at the thyroidectomy bed identified. Physiologic tracer uptake  seen at the salivary glands. There are several small subcentimeter  lymph nodes involving the bilateral neck without conspicuous focal  tracer uptake outside of the background tracer distribution. There  appears to be left neck postoperative change causing some  inconspicuity of the left sternocleidomastoid muscle. No convincing  worrisome airspace disease or mediastinal abnormality is seen.         IMPRESSION: No worrisome focal tracer  uptake is identified to suggest  recurrent neoplasm or remote metastatic disease. Some postoperative  changes at the left neck noted, likely accounting for inconspicuity of  the left sternocleidomastoid muscle. Small bilateral subcentimeter  neck lymph nodes noted.     Neck US 6/2018:  ULTRASOUND HEAD AND NECK SOFT TISSUE  6/4/2018 11:11 AM     HISTORY:  Papillary carcinoma, follicular variant (H).     COMPARISON: None.     FINDINGS:  No evidence for residual thyroid tissue in the  thyroidectomy bed. There is no evidence for cervical adenopathy  bilaterally by size or morphology.          IMPRESSION:  Negative soft tissue neck study.    Neck US 5/2020:  FINDINGS:    No evidence for residual thyroid tissue in the  thyroidectomy bed. There is no evidence for cervical adenopathy  bilaterally by size or morphology.                                                                   IMPRESSION:    Negative soft tissue neck study.    All pertinent notes, labs, and images personally reviewed by me.     A/P  Ms.Nhu Mounika Nicole is a 36 year old here for the evaluation of thyroid cancer:    1. 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  It was 2.2 cm unifocal, left lobe tumor with no lymph node involvement.  S/p  total thyroidectomy in 2011 and 78.7 mCi of I-131 HERNANDEZ. No evidence for distant metastatic disease on post therapy scan.  2016- new lymph node s/p FNA with PTC with rising TG  S/p 8/2016- left modified neck radiation. 1/27 lymph node + ( level2). Postop course complicated by hypocalcemia.  Delivered 1/2017.   Follow up I123 WBS 4/2017- no mets  Thyroglobulin levels appear stable and suppressed.  Neck ultrasound June 2018, 5/2020 did not show any evidence of metastases.  Plan:  Discussed diagnosis, pathophysiology, management and treatment options of condition with pt.  Recommend repeat Neck US and thyroglobulin levels.  2.   Hypothyroidism (postoperative following total thyroidectomy for thyroid cancer):  Currently taking levothyroxine 137 mcg X day. ( on this dose X 10/2019)  Taking generic levothyroxine.  Reports compliance.  Labs as noted above with slightly high FT4.  Clinically looks euthyroid. Sometimes palpitations X 1 year.  Plan:  Discussed diagnosis, pathophysiology, management and treatment options of condition with pt.  Labs today.  Dose change based on that.  You will need close follow up if you get pregnant.  Discussed importance of euthyroidism during pregnancy.  Discussed s/s of hypothyroidism and hyperthyroidism to watch for.  The patient indicates understanding of these issues and agrees with the plan.      3.  History of vitamin D deficiency and hypocalcemia, resolved.  Continue to monitor.    More than 50% of the time spent with Ms. Nicole on counseling / coordinating her care.      Follow-up:  Based on labs    Vandana Costello MD  Endocrinology   [facility]/[place]  9/15/2021  CC: Evan Zamora             Again, thank you for allowing me to participate in the care of your patient.        Sincerely,        Vandana Costello MD

## 2021-09-16 LAB
T4 FREE SERPL-MCNC: 1.25 NG/DL (ref 0.76–1.46)
TSH SERPL DL<=0.005 MIU/L-ACNC: 0.82 MU/L (ref 0.4–4)

## 2021-09-16 NOTE — RESULT ENCOUNTER NOTE
Aleyda    Recently done endocrinology lab test/ imaging test showed:  Thyroid labs are in acceptable range.  Continue current dose of thyroid hormone replacement.  One more lab is pending.    Here is a copy for your records.    Follow up as discussed in last clinic visit.    Please call endocrinology clinic (nurse line: [phone]) if questions.    Vandana Costello MD  Endocrinology   [facility]/[place]  September 16, 2021

## 2021-09-18 ENCOUNTER — HEALTH MAINTENANCE LETTER (OUTPATIENT)
Age: 36
End: 2021-09-18

## 2021-09-21 ENCOUNTER — HOSPITAL ENCOUNTER (OUTPATIENT)
Dept: ULTRASOUND IMAGING | Facility: CLINIC | Age: 36
Discharge: HOME | End: 2021-09-21
Attending: INTERNAL MEDICINE | Admitting: INTERNAL MEDICINE
Payer: COMMERCIAL

## 2021-09-21 LAB — SCANNED LAB RESULT: NORMAL

## 2021-09-21 PROCEDURE — 76536 US EXAM OF HEAD AND NECK: CPT

## 2021-09-22 NOTE — RESULT ENCOUNTER NOTE
Aleyda    Recently done endocrinology lab test/ imaging test showed:  Thyroid cancer tumor markers are low.  This is reassuring.    Here is a copy for your records.    Follow up as discussed in last clinic visit.    Please call endocrinology clinic (nurse line: [phone]) if questions.    Vandana Costello MD  Endocrinology   [facility]/[place]  September 22, 2021

## 2021-09-22 NOTE — RESULT ENCOUNTER NOTE
Aleyda    Recently done endocrinology lab test/ imaging test showed:  No suspicious nodules. No significant interval change.  This is reassuring.  Plan to continue to monitor.    Here is a copy for your records.    Follow up as discussed in last clinic visit.    Please call endocrinology clinic (nurse line: [phone]) if questions.    Vandana Costello MD  Endocrinology   [facility]/[place]  September 22, 2021

## 2021-11-08 ENCOUNTER — OFFICE VISIT (OUTPATIENT)
Dept: OBGYN | Facility: CLINIC | Age: 36
End: 2021-11-08
Payer: COMMERCIAL

## 2021-11-08 VITALS — BODY MASS INDEX: 30 KG/M2 | WEIGHT: 164 LBS | SYSTOLIC BLOOD PRESSURE: 116 MMHG | DIASTOLIC BLOOD PRESSURE: 74 MMHG

## 2021-11-08 DIAGNOSIS — N97.9 SECONDARY FEMALE INFERTILITY: ICD-10-CM

## 2021-11-08 PROCEDURE — 99214 OFFICE O/P EST MOD 30 MIN: CPT | Performed by: OBSTETRICS & GYNECOLOGY

## 2021-11-08 RX ORDER — CLOMIPHENE CITRATE 50 MG/1
50 TABLET ORAL DAILY
Qty: 5 TABLET | Refills: 2 | Status: SHIPPED | OUTPATIENT
Start: 2021-11-08 | End: 2022-02-21

## 2021-11-08 NOTE — NURSING NOTE
"Chief Complaint   Patient presents with     RECHECK     clomid       Initial /74 (BP Location: Left arm, Cuff Size: Adult Regular)   Wt 74.4 kg (164 lb)   LMP 10/24/2021 (Approximate)   BMI 30.00 kg/m   Estimated body mass index is 30 kg/m  as calculated from the following:    Height as of 9/15/21: 1.575 m (5' 2\").    Weight as of this encounter: 74.4 kg (164 lb).  BP completed using cuff size: regular    Questioned patient about current smoking habits.  Pt. has never smoked.          "

## 2021-11-08 NOTE — PROGRESS NOTES
Secondary infertility    Ms. Aleyda Nicole 36 year old returns for management of her secondary infertility.   Apparently, she did not fill the prescription for Clomid at 50 mg that was prescribed to her on November 2020. However, she did become pregnant in June 2020 following a cycle of Clomid therapy at the 50 mg dose. Unfortunately, that pregnancy led to a miscarriage. She reports multiple miscarriages throughout her pregnancy history. She even eludes to antiphospholipid testing done back in [id number] ordered by Dr. Combs, her previous gynecologist, that were negative. In fact, the first lupus anticoagulant test returned positive but the follow-up testing done 12 weeks later returned negative.  She has heard from other friends that have also had miscarriages that they were prescribed medication in the first trimester to prevent against a recurrent miscarriages. She inquires about what that medication is and if she is eligible to take it.     /74 (BP Location: Left arm, Cuff Size: Adult Regular)   Wt 74.4 kg (164 lb)   LMP 10/24/2021 (Approximate)   BMI 30.00 kg/m      General Appearance: NAD    A/P: Secondary infertility  -- reviewed Clomid 50 mg administration in regards to day#3-7 protocol followed by use of ovulation predictor kit use and timed intercourse; refill prescribed for 3 cycles; she is to call back if 50 mg dose did not result in ovulation to increase dosing to 100 mg  -- inquiry addressed above as to the likely medications she is referring to which would be progesterone supplementation for luteal phase defect and aspirin PO 81 mg; reviewed rationale for use of these medications in the first trimester but also emphasized that these medications do not provide a complete guarantee in preventing a miscarriage; however, offered patient that if she were to become pregnant to inform me of pregnancy so as to prescribe these medications  -- also strongly recommended that if recurrent miscarriage  were to occur, that given her advanced maternal age, she should seek consultation from fertility clinic with a J[facility] specialist   -- all other questions and concerns were addressed    Total time spent was 30 minutes on the date of the encounter in chart review, patient visit, review of tests, documentation and counseling on the above medical condition.    Rodney Crabtree MD  [facility]

## 2022-01-08 ENCOUNTER — HEALTH MAINTENANCE LETTER (OUTPATIENT)
Age: 37
End: 2022-01-08

## 2022-02-14 DIAGNOSIS — N96 HISTORY OF RECURRENT MISCARRIAGES: Primary | ICD-10-CM

## 2022-02-14 RX ORDER — PROGESTERONE 200 MG/1
200 CAPSULE ORAL DAILY
Qty: 90 CAPSULE | Refills: 0 | Status: SHIPPED | OUTPATIENT
Start: 2022-02-14 | End: 2022-06-02

## 2022-02-18 ENCOUNTER — LAB (OUTPATIENT)
Dept: LAB | Facility: CLINIC | Age: 37
End: 2022-02-18
Payer: COMMERCIAL

## 2022-02-18 DIAGNOSIS — C73 PAPILLARY CARCINOMA, FOLLICULAR VARIANT (H): ICD-10-CM

## 2022-02-18 DIAGNOSIS — E89.0 POSTOPERATIVE HYPOTHYROIDISM: ICD-10-CM

## 2022-02-18 PROCEDURE — 84443 ASSAY THYROID STIM HORMONE: CPT

## 2022-02-18 PROCEDURE — 36415 COLL VENOUS BLD VENIPUNCTURE: CPT

## 2022-02-18 PROCEDURE — 84439 ASSAY OF FREE THYROXINE: CPT

## 2022-02-19 LAB
T4 FREE SERPL-MCNC: 1.22 NG/DL (ref 0.76–1.46)
TSH SERPL DL<=0.005 MIU/L-ACNC: 2.64 MU/L (ref 0.4–4)

## 2022-02-21 ENCOUNTER — OFFICE VISIT (OUTPATIENT)
Dept: INTERNAL MEDICINE | Facility: CLINIC | Age: 37
End: 2022-02-21
Payer: COMMERCIAL

## 2022-02-21 VITALS
HEART RATE: 85 BPM | TEMPERATURE: 98.3 F | WEIGHT: 168.1 LBS | DIASTOLIC BLOOD PRESSURE: 82 MMHG | OXYGEN SATURATION: 100 % | SYSTOLIC BLOOD PRESSURE: 122 MMHG | HEIGHT: 62 IN | BODY MASS INDEX: 30.93 KG/M2

## 2022-02-21 DIAGNOSIS — N96 HISTORY OF RECURRENT MISCARRIAGES: ICD-10-CM

## 2022-02-21 DIAGNOSIS — Z00.00 ROUTINE GENERAL MEDICAL EXAMINATION AT A HEALTH CARE FACILITY: Primary | ICD-10-CM

## 2022-02-21 DIAGNOSIS — Z13.1 SCREENING FOR DIABETES MELLITUS: ICD-10-CM

## 2022-02-21 DIAGNOSIS — J45.20 MILD INTERMITTENT ASTHMA WITHOUT COMPLICATION: ICD-10-CM

## 2022-02-21 DIAGNOSIS — Z11.59 NEED FOR HEPATITIS C SCREENING TEST: ICD-10-CM

## 2022-02-21 DIAGNOSIS — Z86.32 HX GESTATIONAL DIABETES: ICD-10-CM

## 2022-02-21 DIAGNOSIS — Z33.1 PREGNANCY, INCIDENTAL: ICD-10-CM

## 2022-02-21 PROBLEM — J45.30 MILD PERSISTENT ASTHMA: Status: RESOLVED | Noted: 2017-04-18 | Resolved: 2022-02-21

## 2022-02-21 PROCEDURE — 99213 OFFICE O/P EST LOW 20 MIN: CPT | Mod: 25 | Performed by: INTERNAL MEDICINE

## 2022-02-21 PROCEDURE — 99395 PREV VISIT EST AGE 18-39: CPT | Performed by: INTERNAL MEDICINE

## 2022-02-21 RX ORDER — ALBUTEROL SULFATE 90 UG/1
2 AEROSOL, METERED RESPIRATORY (INHALATION) EVERY 4 HOURS PRN
Qty: 18 G | Refills: 11 | Status: SHIPPED | OUTPATIENT
Start: 2022-02-21 | End: 2022-11-30

## 2022-02-21 ASSESSMENT — ENCOUNTER SYMPTOMS
PALPITATIONS: 0
PARESTHESIAS: 0
HEADACHES: 1
NAUSEA: 1
EYE PAIN: 0
FREQUENCY: 1
DYSURIA: 0
BREAST MASS: 0
FEVER: 0
ABDOMINAL PAIN: 0
MYALGIAS: 0
WEAKNESS: 0
HEMATURIA: 0
DIZZINESS: 1
HEARTBURN: 0
CONSTIPATION: 0
SORE THROAT: 0
ARTHRALGIAS: 0
COUGH: 0
NERVOUS/ANXIOUS: 0
HEMATOCHEZIA: 0
JOINT SWELLING: 0
SHORTNESS OF BREATH: 0
CHILLS: 0
DIARRHEA: 0

## 2022-02-21 ASSESSMENT — ASTHMA QUESTIONNAIRES
QUESTION_5 LAST FOUR WEEKS HOW WOULD YOU RATE YOUR ASTHMA CONTROL: COMPLETELY CONTROLLED
QUESTION_4 LAST FOUR WEEKS HOW OFTEN HAVE YOU USED YOUR RESCUE INHALER OR NEBULIZER MEDICATION (SUCH AS ALBUTEROL): NOT AT ALL
QUESTION_3 LAST FOUR WEEKS HOW OFTEN DID YOUR ASTHMA SYMPTOMS (WHEEZING, COUGHING, SHORTNESS OF BREATH, CHEST TIGHTNESS OR PAIN) WAKE YOU UP AT NIGHT OR EARLIER THAN USUAL IN THE MORNING: NOT AT ALL
ACT_TOTALSCORE: 25
ACT_TOTALSCORE: 25
QUESTION_1 LAST FOUR WEEKS HOW MUCH OF THE TIME DID YOUR ASTHMA KEEP YOU FROM GETTING AS MUCH DONE AT WORK, SCHOOL OR AT HOME: NONE OF THE TIME
QUESTION_2 LAST FOUR WEEKS HOW OFTEN HAVE YOU HAD SHORTNESS OF BREATH: NOT AT ALL

## 2022-02-21 NOTE — NURSING NOTE
"Chief Complaint   Patient presents with     Physical     /82   Pulse 85   Temp 98.3  F (36.8  C) (Temporal)   Ht 1.575 m (5' 2\")   Wt 76.2 kg (168 lb 1.6 oz)   LMP 01/28/2022 (Approximate)   SpO2 100%   BMI 30.75 kg/m   Estimated body mass index is 30.75 kg/m  as calculated from the following:    Height as of this encounter: 1.575 m (5' 2\").    Weight as of this encounter: 76.2 kg (168 lb 1.6 oz).        Health Maintenance due pending provider review:  ACT completed        Sol Felipe CMA  "

## 2022-02-21 NOTE — PROGRESS NOTES
SUBJECTIVE:   CC: Aleyda Nicole is an 36 year old woman who presents for preventive health visit.     Patient has been advised of split billing requirements and indicates understanding: Yes  Healthy Habits:     Getting at least 3 servings of Calcium per day:  Yes    Bi-annual eye exam:  NO    Dental care twice a year:  Yes    Sleep apnea or symptoms of sleep apnea:  None    Diet:  Regular (no restrictions)    Frequency of exercise:  None    Taking medications regularly:  Yes    Medication side effects:  Not applicable    PHQ-2 Total Score: 0    Additional concerns today:  No        Today's PHQ-2 Score:   PHQ-2 ( 1999 Pfizer) 2/21/2022   Q1: Little interest or pleasure in doing things 0   Q2: Feeling down, depressed or hopeless 0   PHQ-2 Score 0   PHQ-2 Total Score (12-17 Years)- Positive if 3 or more points; Administer PHQ-A if positive -   Q1: Little interest or pleasure in doing things Not at all   Q2: Feeling down, depressed or hopeless Not at all   PHQ-2 Score 0       Abuse: Current or Past (Physical, Sexual or Emotional) - NO  Do you feel safe in your environment? YES    Have you ever done Advance Care Planning? (For example, a Health Directive, POLST, or a discussion with a medical provider or your loved ones about your wishes): No, advance care planning information given to patient to review.  Patient plans to discuss their wishes with loved ones or provider.      Social History     Tobacco Use     Smoking status: Never Smoker     Smokeless tobacco: Never Used   Substance Use Topics     Alcohol use: No     Alcohol/week: 0.0 standard drinks         Alcohol Use 2/21/2022   Prescreen: >3 drinks/day or >7 drinks/week? No   Prescreen: >3 drinks/day or >7 drinks/week? -       Reviewed orders with patient.  Reviewed health maintenance and updated orders accordingly - Yes  Labs reviewed in EPIC    Breast Cancer Screening:    Breast CA Risk Assessment (FHS-7) 2/21/2022   Do you have a family history of breast,  "colon, or ovarian cancer? No / Unknown     Patient under 40 years of age: Routine Mammogram Screening not recommended.   Pertinent mammograms are reviewed under the imaging tab.    History of abnormal Pap smear: NO - age 30-65 PAP every 5 years with negative HPV co-testing recommended  PAP / HPV Latest Ref Rng & Units 2/13/2020 3/2/2017 2/27/2014   PAP (Historical) - NIL NIL NIL   HPV16 NEG:Negative Negative Negative -   HPV18 NEG:Negative Negative Negative -   HRHPV NEG:Negative Negative Negative -     Reviewed and updated as needed this visit by clinical staff   Tobacco  Allergies  Meds              Reviewed and updated as needed this visit by Provider                     Review of Systems   Constitutional: Negative for chills and fever.   HENT: Positive for congestion. Negative for ear pain, hearing loss and sore throat.    Eyes: Negative for pain and visual disturbance.   Respiratory: Negative for cough and shortness of breath.    Cardiovascular: Negative for chest pain, palpitations and peripheral edema.   Gastrointestinal: Positive for nausea. Negative for abdominal pain, constipation, diarrhea, heartburn and hematochezia.   Breasts:  Positive for tenderness. Negative for breast mass and discharge.   Genitourinary: Positive for frequency and vaginal discharge. Negative for dysuria, genital sores, hematuria, pelvic pain, urgency and vaginal bleeding.   Musculoskeletal: Negative for arthralgias, joint swelling and myalgias.   Skin: Negative for rash.   Neurological: Positive for dizziness and headaches. Negative for weakness and paresthesias.   Psychiatric/Behavioral: Positive for mood changes. The patient is not nervous/anxious.         OBJECTIVE:   /82   Pulse 85   Temp 98.3  F (36.8  C) (Temporal)   Ht 1.575 m (5' 2\")   Wt 76.2 kg (168 lb 1.6 oz)   LMP 01/28/2022 (Approximate)   SpO2 100%   BMI 30.75 kg/m    Physical Exam  GENERAL: healthy, alert and over weight  EYES: Eyes grossly normal to " "inspection, PERRL and conjunctivae and sclerae normal  HENT: ear canals and TM's normal, nose and mouth without ulcers or lesions  NECK: no adenopathy and surgical scars + keloids  RESP: lungs clear to auscultation - no rales, rhonchi or wheezes  CV: regular rate and rhythm, normal S1 S2, no S3 or S4, no murmur, click or rub, no peripheral edema and peripheral pulses strong  ABDOMEN: soft, nontender, no hepatosplenomegaly, no masses and bowel sounds normal  MS: no gross musculoskeletal defects noted, no edema  SKIN: keloid L side of neck  NEURO: Normal strength and tone, mentation intact and speech normal  PSYCH: mentation appears normal, affect normal/bright  LYMPH: no cervical, supraclavicular, axillary, or inguinal adenopathy    Labs reviewed in Epic    ASSESSMENT/PLAN:       ICD-10-CM    1. Routine general medical examination at a health care facility  Z00.00    2. Moderate asthma with exacerbation, unspecified whether persistent  J45.901 albuterol (PROAIR HFA/PROVENTIL HFA/VENTOLIN HFA) 108 (90 Base) MCG/ACT inhaler   3. Hx gestational diabetes  Z86.32    4. Pregnancy, incidental  Z33.1    5. History of recurrent miscarriages  N96    6. Need for hepatitis C screening test  Z11.59 Hepatitis C Screen Reflex to HCV RNA Quant and Genotype   7. Screening for diabetes mellitus  Z13.1 BASIC METABOLIC PANEL     -Updated screening, immunizations, prevention.  Please see health maintenance list, care gaps  -Lifestyle modifications for reducing the risk of gestational diabetes  -Continue asthma inhalers as needed    Patient has been advised of split billing requirements and indicates understanding: Yes    COUNSELING:  Reviewed preventive health counseling, as reflected in patient instructions    Estimated body mass index is 30.75 kg/m  as calculated from the following:    Height as of this encounter: 1.575 m (5' 2\").    Weight as of this encounter: 76.2 kg (168 lb 1.6 oz).    She reports that she has never smoked. She has " never used smokeless tobacco.      Counseling Resources:  ATP IV Guidelines  Pooled Cohorts Equation Calculator  Breast Cancer Risk Calculator  BRCA-Related Cancer Risk Assessment: FHS-7 Tool  FRAX Risk Assessment  ICSI Preventive Guidelines  Dietary Guidelines for Americans, 2010  USDA's MyPlate  ASA Prophylaxis  Lung CA Screening    Evan Zamora MD  [facility]

## 2022-02-21 NOTE — RESULT ENCOUNTER NOTE
Labs results/imaging studies results noted. Will discuss in next clinic visit 3/2/22.    Here is a copy for your records.  Follow-up in endocrinology Clinic as scheduled/discussed. We will review these studies/results in further detail at that visit, but feel free to contact us with any other questions in the interim.    Please call endocrinology clinic (nurse line: [phone]) if questions.    Vadnana Costello MD

## 2022-03-02 ENCOUNTER — OFFICE VISIT (OUTPATIENT)
Dept: ENDOCRINOLOGY | Facility: CLINIC | Age: 37
End: 2022-03-02
Payer: COMMERCIAL

## 2022-03-02 VITALS
SYSTOLIC BLOOD PRESSURE: 118 MMHG | HEART RATE: 96 BPM | OXYGEN SATURATION: 98 % | BODY MASS INDEX: 31.28 KG/M2 | WEIGHT: 170 LBS | DIASTOLIC BLOOD PRESSURE: 72 MMHG | TEMPERATURE: 98.1 F | RESPIRATION RATE: 14 BRPM | HEIGHT: 62 IN

## 2022-03-02 DIAGNOSIS — C73 RECURRENT THYROID CANCER (H): Primary | ICD-10-CM

## 2022-03-02 DIAGNOSIS — C73 PAPILLARY CARCINOMA, FOLLICULAR VARIANT (H): ICD-10-CM

## 2022-03-02 DIAGNOSIS — E89.0 POSTOPERATIVE HYPOTHYROIDISM: ICD-10-CM

## 2022-03-02 PROCEDURE — 99214 OFFICE O/P EST MOD 30 MIN: CPT | Performed by: INTERNAL MEDICINE

## 2022-03-02 RX ORDER — LEVOTHYROXINE SODIUM 150 UG/1
150 TABLET ORAL DAILY
Qty: 90 TABLET | Refills: 1 | Status: SHIPPED | OUTPATIENT
Start: 2022-03-02 | End: 2022-08-26

## 2022-03-02 NOTE — LETTER
3/2/2022         RE: Aleyda Nicole  [address] [id number]        Dear Colleague,    Thank you for referring your patient, Aleyda Nicole, to the [facility]. Please see a copy of my visit note below.    .      Name: Aleyda Nicole  Seen for f/u of papillary thyroid cancer and postoperative hypothyroidism.    Chief Complaint   Patient presents with     Thyroid Problem     HPI:  Aleyda Nicole is a 36 year old female who presents for the evaluation of above.    Currently pregnant.  Home pregnancy test was positive.  Has upcoming OBGYN appointment.    1.  Papillary thyroid cancer:  Thyroid nodule was noticed in February 2011 which was followed by thyroid nodule biopsy which showed suspicious for papillary thyroid cancer.  She underwent total thyroidectomy 3/2011 and pathology showed papillary thyroid cancer with follicular pattern.  Tumor was about 2.2 cm on the left lobe.  No lymph node involvement.  S/p 78.7 mCi of I131 HERNANDEZ  8/2011. No evidence for distant metastatic disease on post therapy scan.  Neck US 6/2016- showed new lymph node in neck along with rising Tg levels  S/p FNA lymph node: 6/2016- c/w papillary thyroid cancer    8/2016: underwent left modified neck dissection.  1/27 lymph node positive one left modified neck dissection.  1 cm in greatest diameter.  Negative for external involvement at level II.    Tg decreased post left neck dissection from 1.3 to < 0.1    Follow up I123 4/2017: no mets.    Postop course was complicated by hypocalcemia and was started on calcium supplement.  Taking calcium- tries to take 2 tabs per day + 1 glass of milk daily.    Delivered baby 1/2017. No longer breast feeding.    She was followed by endocrinology before and then lost to follow-up with endocrinology in 2012.  No history of neck radiation prior to diagnosis of thyroid cancer.  No family history of thyroid cancer.    Thyroglobulin levels appear stable and  suppressed.  Neck ultrasound 2018, 2020. 2021 did not show any evidence of metastases.        2.  Hypothyroidism (postoperative):  Was started on levothyroxine following thyroidectomy.    Currently taking levothyroxine 137 mcg X 7 days a week.  Reports compliance.    Had miscarriage 2 months back.  Currently pregnant. LMP 2022.    Followed by OBGYN.  + hair loss.  + fatigue. More sleepy.  + Mood changes X 2 months.  + some nausea.    No diarrhea, tremors, palpitations.  No difficulty with swallowing, no pain during swallowing.  Mood- some irritability.  Weight: stable.  Wt Readings from Last 2 Encounters:   22 77.1 kg (170 lb)   22 76.2 kg (168 lb 1.6 oz)     PMH/PSH:  Past Medical History:   Diagnosis Date     Gestational diabetes 2013     Hypocalcemia 2016     Influenza A      Papillary thyroid carcinoma     Papillary carcinoma, follicular variant with     Pneumonia     LLL     PONV (postoperative nausea and vomiting)      Postoperative hypothyroidism       labor      Uncomplicated asthma      Past Surgical History:   Procedure Laterality Date      SECTION  10/26/2013    Procedure:  SECTION;  primary  section;  Surgeon: Rodney Mckee MD;  Location: [facility] L[facility]      SECTION N/A 2017    Procedure:  SECTION;  Surgeon: Hakeem Combs MD;  Location: [facility] L[facility]     DISSECTION RADICAL NECK Left 2016    Procedure: DISSECTION RADICAL NECK;  Surgeon: Vy Theodore MD;  Location: [facility] OR     DISSECTION RADICAL NECK MODIFIED Left 2016    Procedure: DISSECTION RADICAL NECK MODIFIED;  Surgeon: uLis Brown MD;  Location: [facility] OR     THYROIDECTOMY      Total, for cancer.      Family Hx:  Family History   Problem Relation Age of Onset     Diabetes Mother      Cerebrovascular Disease Mother      Hypertension Mother      Diabetes Father 50     Hypertension Father      Genitourinary Problems Father         kidney  "disease     Coronary Artery Disease Father      Diabetes Brother      Thyroid disease: No        Social Hx:  Social History     Socioeconomic History     Marital status:      Spouse name: Brock     Number of children: 2     Years of education: Not on file     Highest education level: Not on file   Occupational History     Occupation:       Employer: [employer]   Tobacco Use     Smoking status: Never Smoker     Smokeless tobacco: Never Used   Vaping Use     Vaping Use: Never used   Substance and Sexual Activity     Alcohol use: No     Alcohol/week: 0.0 standard drinks     Drug use: No     Sexual activity: Yes     Partners: Male     Birth control/protection: None   Other Topics Concern     Parent/sibling w/ CABG, MI or angioplasty before 65F 55M? Not Asked      Service No     Blood Transfusions No     Caffeine Concern No     Occupational Exposure Not Asked     Hobby Hazards No     Sleep Concern No     Stress Concern No     Weight Concern Yes     Special Diet No     Back Care No     Exercise No     Bike Helmet No     Seat Belt Yes     Self-Exams No   Social History Narrative    Pt lives with  and father.     Social Determinants of Health     Financial Resource Strain: Not on file   Food Insecurity: Not on file   Transportation Needs: Not on file   Physical Activity: Not on file   Stress: Not on file   Social Connections: Not on file   Intimate Partner Violence: Not on file   Housing Stability: Not on file          MEDICATIONS:  has a current medication list which includes the following prescription(s): albuterol, aspirin, levothyroxine, prenatal vit-fe fumarate-fa, progesterone, and albuterol.    ROS     ROS: 10 point ROS neg other than the symptoms noted above in the HPI.      Physical Exam   VS: /72   Pulse 96   Temp 98.1  F (36.7  C) (Tympanic)   Resp 14   Ht 1.575 m (5' 2\")   Wt 77.1 kg (170 lb)   LMP 01/28/2022 (Approximate)   SpO2 98%   Breastfeeding No   BMI 31.09 " kg/m    GENERAL: healthy, alert and no distress  EYES: Eyes grossly normal to inspection, conjunctivae and sclerae normal  ENT: no nose swelling, nasal discharge.  Thyroid: s/p thyroidectomy.  No lymphadenopathy.  CV: RRR, no rubs, gallops, no murmurs  RESP: CTAB, no wheezes, rales, or ronchi  ABDO: +BS  EXTREMITIES: no hand tremors.  NEURO: Cranial nerves grossly intact, mentation intact and speech normal  SKIN: No apparent skin lesions, rash or edema seen   PSYCH: mentation appears normal, affect normal/bright, judgement and insight intact, normal speech and appearance well-groomed      LABS:  2021:  TgAB: <0.4  TG: <0.10    2020:  TgAB: <0.4  TG: <0.10    2019:  TgAB: <0.4  TG: <0.10    3/6/2018:  TSH: 0.07  TgAB: <0.4  TG: <0.10    2017:  TSH : 47.89 (post thyrogen)  TgAB: <0.4  TG: <0.10    17:  TSH: 0.04  TgAB: <0.4  TG: <0.10    11/10/16:  TSH 0.74  TgAb: <0.4  TG: <0.10     2016:  TSH: 0.08  TgAb: <0.4  T.30     2012:  TSH: 12.40  TGAB: 1.2  T.6    TFTs:  ENDO THYROID LABS-P Latest Ref Rng & Units 2022 9/15/2021   TSH 0.40 - 4.00 mU/L 2.64 0.82   FREE T3 2.3 - 4.2 pg/mL     T3 60 - 181 ng/dL     T4 5.0 - 11.0 ug/dL     FREE T4 0.76 - 1.46 ng/dL 1.22 1.25       NM THYROID UPTAKE/SCAN   2011 2:47:00 PM   FINDINGS:  The thyroid gland appears normal in size. 24-hour uptake  was calculated at 46.4%, which is above the normal range. Normal range  is 10-30% 24-hour uptake. Focal area of decreased uptake in the mid  left thyroid lobe may represent a cold thyroid nodule or cyst.     IMPRESSION:    1. Elevated 24-hour radioiodine uptake in the thyroid at 46.4%.  2. Possible cold nodule or cyst in the mid left thyroid lobe. Thyroid  ultrasound is recommended for further evaluation.    NUCLEAR MEDICINE I-131 SCAN    Aug 10, 2011 8:04:00 AM      TECHNIQUE: 78.7 mCi I-131 ablation scan. Five days post therapy  imaging performed today.  FINDINGS: Intense radiotracer uptake at the left  greater than right  neck at the thyroid level. No evidence for distant metastatic disease.     IMPRESSION: Intense radiotracer uptake localizing to the thyroidectomy  bed, left greater than right. This is consistent with residual thyroid  tissue. No distant metastatic disease identified.    ULTRASOUND THYROID  Feb 17, 2011   IMPRESSION: Multinodular thyroid gland.    FNA thyroid nodule:  Copath Report       FNA-thyroid, left mid lobe nodule:   Suspicious for malignancy.  Suspicious for papillary carcinoma  Specimen Adequacy: Satisfactory for evaluation.           Surgical pathology:     SPECIMEN(S):  A: Thyroid, left lobe  B: Parathyroid gland, biopsy of right inferior  C: Thyroid, right lobe    FINAL DIAGNOSIS:  A. and C.  Thyroid, right and left lobes, total thyroidectomy -  Specimen/Procedure(s) and Laterality:   Right and left thyroid lobes,  total thyroidectomy.  Specimen Integrity:   Intact.  Specimen Size and Weight:   See Gross Description.  Histologic Tumor Type(s):   Papillary carcinoma, follicular variant with  focal papillary morphology.  Tumor Focality: Unifocal.  Tumor Laterality:   Left lobe.  Tumor Size(s):   Left lobe: 2.2 cm (greatest dimension).  Margins:   Close, but uninvolved.            Distance to closest margin, if negative:   Tumor 0.05 cm from  nearest paratracheal surface and 1.75 cm from nearest anterolateral  surface.  Tumor Capsular Invasion: Present.    Tumor Capsule: Partial.  Extrathyroidal Invasion:   Not identified.  Lymph-Vascular Invasion:   Not identified.  Lymph Nodes:   Two nodes without evidence of metastatic carcinoma (0/2;  one at isthmus and one adjacent to right lobe).  Pathologic Staging:   pT2, pN0, pM not applicable.  Additional Pathologic Findings:   Right thyroid lobe without evidence of  malignancy.  Background nodular hyperplasia and thyroiditis with  lymphoid follicles.  Left lobe with focal previous biopsy site changes.  No parathyroid tissue identified.  CAP  "Protocol Based on AJCC/UICC TNM, 7th edition; Protocol Effective  Date:  January 2010    B.  \"Parathyroid gland\", right inferior, biopsy - Thyroid tissue; no  parathyroid glandular tissue identified.    8/2016: left modified neck Dissection:  Copath Report      SPECIMEN(S):   A: Scar, left neck   B: Parathyroid gland, left inferior   C: Left central neck dissection, para and pretracheal lymph nodes   D: apical jugular lymph node   E: Left modified neck dissection     FINAL DIAGNOSIS:   A. Skin, neck/scar, excision:   - Hypertrophic scar; benign.     B. Parathyroid gland, left inferior, biopsy:   - Parathyroid tissue present.     C. Left central neck dissection with para-and pretracheal lymph nodes:   - One lymph node; no evidence of malignancy (0/1).   - Thymus and parathyroid tissue present.     D. Lymph node, apical jugular, excision:   - One lymph node; no evidence of malignancy (0/1).     E. Left modified neck dissection:   - 27 lymph nodes identified (level II- 10, level III- 9, level IV- 5 and   lymph nodes associated with jugular vein- 3).   - One (of 27) lymph node positive for metastatic papillary thyroid   carcinoma.  1 cm in greatest diameter.  Negative for extranodal   involvement  (Level II).   - Jugular vein negative for tumor.        NUCLEAR MEDICINE THYROID WHOLE BODY SCAN I-123   4/20/2017 11:38 AM   IMPRESSION: No worrisome focal tracer uptake is identified to suggest  recurrent neoplasm or remote metastatic disease. Some postoperative  changes at the left neck noted, likely accounting for inconspicuity of  the left sternocleidomastoid muscle. Small bilateral subcentimeter  neck lymph nodes noted.     Neck US 6/2018:  IMPRESSION:  Negative soft tissue neck study.    Neck US 5/2020:                                                        IMPRESSION:    Negative soft tissue neck study.    Neck US 9/2021:  FINDINGS:  Thyroidectomy. No cervical lymphadenopathy. No suspicious  nodules in the " thyroidectomy bed. No significant interval change.                                                           IMPRESSION: No cervical lymphadenopathy.     All pertinent notes, labs, and images personally reviewed by me.     A/P  Ms.Nhu Mounika Nicole is a 36 year old here for the evaluation of thyroid cancer:    1. 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  It was 2.2 cm unifocal, left lobe tumor with no lymph node involvement.  S/p  total thyroidectomy in 2011 and 78.7 mCi of I-131 HERNANDEZ. No evidence for distant metastatic disease on post therapy scan.  2016- new lymph node s/p FNA with PTC with rising TG  S/p 8/2016- left modified neck radiation. 1/27 lymph node + ( level2). Postop course complicated by hypocalcemia.  Delivered 1/2017.   Follow up I123 WBS 4/2017- no mets  Thyroglobulin levels appear stable and suppressed.  Neck ultrasound June 2018, 5/2020, 9/2021 did not show any evidence of metastases.  Plan:  Discussed diagnosis, pathophysiology, management and treatment options of condition with pt.  Recommend repeat Neck US and thyroglobulin levels in 9/2022.  2.  Hypothyroidism (postoperative following total thyroidectomy for thyroid cancer):  Currently taking levothyroxine 137 mcg X day. ( on this dose X 10/2019)  Taking generic levothyroxine.  Reports compliance.  Labs as noted above with slightly high FT4.  Clinically looks euthyroid. Sometimes palpitations X 1 year.  currently pregnant..  Plan:  Discussed diagnosis, pathophysiology, management and treatment options of condition with pt.  In the setting of pregnancy and h/o thyroid caner recommend to increase dose of levothyroxine to 150 mcg/day (3/2/2022)  Labs in 6 weeks.  Needs close follow up during pregnancy.  Discussed importance of euthyroidism during pregnancy.  Discussed s/s of hypothyroidism and hyperthyroidism to watch for.  The patient indicates  understanding of these issues and agrees with the plan.      3.  History of vitamin D deficiency and hypocalcemia, resolved.  Continue to monitor.    More than 50% of the time spent with Ms. Nicole on counseling / coordinating her care.      Follow-up:  6 weeks.    Vandana Costello MD  Endocrinology   [facility]  CC: Evan Zamora           Again, thank you for allowing me to participate in the care of your patient.        Sincerely,        Vandana Costello MD

## 2022-03-02 NOTE — PROGRESS NOTES
Name: Aleyda Nicole  Seen for f/u of papillary thyroid cancer and postoperative hypothyroidism.    Chief Complaint   Patient presents with     Thyroid Problem     HPI:  Aleyda Nicole is a 36 year old female who presents for the evaluation of above.    Currently pregnant.  Home pregnancy test was positive.  Has upcoming OBGYN appointment.    1.  Papillary thyroid cancer:  Thyroid nodule was noticed in February 2011 which was followed by thyroid nodule biopsy which showed suspicious for papillary thyroid cancer.  She underwent total thyroidectomy 3/2011 and pathology showed papillary thyroid cancer with follicular pattern.  Tumor was about 2.2 cm on the left lobe.  No lymph node involvement.  S/p 78.7 mCi of I131 HERNANDEZ  8/2011. No evidence for distant metastatic disease on post therapy scan.  Neck US 6/2016- showed new lymph node in neck along with rising Tg levels  S/p FNA lymph node: 6/2016- c/w papillary thyroid cancer    8/2016: underwent left modified neck dissection.  1/27 lymph node positive one left modified neck dissection.  1 cm in greatest diameter.  Negative for external involvement at level II.    Tg decreased post left neck dissection from 1.3 to < 0.1    Follow up I123 4/2017: no mets.    Postop course was complicated by hypocalcemia and was started on calcium supplement.  Taking calcium- tries to take 2 tabs per day + 1 glass of milk daily.    Delivered baby 1/2017. No longer breast feeding.    She was followed by endocrinology before and then lost to follow-up with endocrinology in 2012.  No history of neck radiation prior to diagnosis of thyroid cancer.  No family history of thyroid cancer.    Thyroglobulin levels appear stable and suppressed.  Neck ultrasound June 2018, 5/2020. 9/2021 did not show any evidence of metastases.        2.  Hypothyroidism (postoperative):  Was started on levothyroxine following thyroidectomy.    Currently taking levothyroxine 137 mcg X 7 days a week.  Reports  compliance.    Had miscarriage 2 months back.  Currently pregnant. LMP 2022.    Followed by OBGYN.  + hair loss.  + fatigue. More sleepy.  + Mood changes X 2 months.  + some nausea.    No diarrhea, tremors, palpitations.  No difficulty with swallowing, no pain during swallowing.  Mood- some irritability.  Weight: stable.  Wt Readings from Last 2 Encounters:   22 77.1 kg (170 lb)   22 76.2 kg (168 lb 1.6 oz)     PMH/PSH:  Past Medical History:   Diagnosis Date     Gestational diabetes 2013     Hypocalcemia 2016     Influenza A      Papillary thyroid carcinoma     Papillary carcinoma, follicular variant with     Pneumonia     LLL     PONV (postoperative nausea and vomiting)      Postoperative hypothyroidism       labor      Uncomplicated asthma      Past Surgical History:   Procedure Laterality Date      SECTION  10/26/2013    Procedure:  SECTION;  primary  section;  Surgeon: Rodney Mckee MD;  Location: [facility]      SECTION N/A 2017    Procedure:  SECTION;  Surgeon: Hakeem Combs MD;  Location: [facility]     DISSECTION RADICAL NECK Left 2016    Procedure: DISSECTION RADICAL NECK;  Surgeon: Vy Theodore MD;  Location: [facility] OR     DISSECTION RADICAL NECK MODIFIED Left 2016    Procedure: DISSECTION RADICAL NECK MODIFIED;  Surgeon: Luis Brown MD;  Location: [facility] OR     THYROIDECTOMY      Total, for cancer.      Family Hx:  Family History   Problem Relation Age of Onset     Diabetes Mother      Cerebrovascular Disease Mother      Hypertension Mother      Diabetes Father 50     Hypertension Father      Genitourinary Problems Father         kidney disease     Coronary Artery Disease Father      Diabetes Brother      Thyroid disease: No        Social Hx:  Social History     Socioeconomic History     Marital status:      Spouse name: Brock     Number of children: 2     Years of education: Not on file      "Highest education level: Not on file   Occupational History     Occupation:       Employer: [employer]   Tobacco Use     Smoking status: Never Smoker     Smokeless tobacco: Never Used   Vaping Use     Vaping Use: Never used   Substance and Sexual Activity     Alcohol use: No     Alcohol/week: 0.0 standard drinks     Drug use: No     Sexual activity: Yes     Partners: Male     Birth control/protection: None   Other Topics Concern     Parent/sibling w/ CABG, MI or angioplasty before 65F 55M? Not Asked      Service No     Blood Transfusions No     Caffeine Concern No     Occupational Exposure Not Asked     Hobby Hazards No     Sleep Concern No     Stress Concern No     Weight Concern Yes     Special Diet No     Back Care No     Exercise No     Bike Helmet No     Seat Belt Yes     Self-Exams No   Social History Narrative    Pt lives with  and father.     Social Determinants of Health     Financial Resource Strain: Not on file   Food Insecurity: Not on file   Transportation Needs: Not on file   Physical Activity: Not on file   Stress: Not on file   Social Connections: Not on file   Intimate Partner Violence: Not on file   Housing Stability: Not on file          MEDICATIONS:  has a current medication list which includes the following prescription(s): albuterol, aspirin, levothyroxine, prenatal vit-fe fumarate-fa, progesterone, and albuterol.    ROS     ROS: 10 point ROS neg other than the symptoms noted above in the HPI.      Physical Exam   VS: /72   Pulse 96   Temp 98.1  F (36.7  C) (Tympanic)   Resp 14   Ht 1.575 m (5' 2\")   Wt 77.1 kg (170 lb)   LMP 01/28/2022 (Approximate)   SpO2 98%   Breastfeeding No   BMI 31.09 kg/m    GENERAL: healthy, alert and no distress  EYES: Eyes grossly normal to inspection, conjunctivae and sclerae normal  ENT: no nose swelling, nasal discharge.  Thyroid: s/p thyroidectomy.  No lymphadenopathy.  CV: RRR, no rubs, gallops, no murmurs  RESP: CTAB, no " wheezes, rales, or ronchi  ABDO: +BS  EXTREMITIES: no hand tremors.  NEURO: Cranial nerves grossly intact, mentation intact and speech normal  SKIN: No apparent skin lesions, rash or edema seen   PSYCH: mentation appears normal, affect normal/bright, judgement and insight intact, normal speech and appearance well-groomed      LABS:  2021:  TgAB: <0.4  TG: <0.10    2020:  TgAB: <0.4  TG: <0.10    2019:  TgAB: <0.4  TG: <0.10    3/6/2018:  TSH: 0.07  TgAB: <0.4  TG: <0.10    2017:  TSH : 47.89 (post thyrogen)  TgAB: <0.4  TG: <0.10    17:  TSH: 0.04  TgAB: <0.4  TG: <0.10    11/10/16:  TSH 0.74  TgAb: <0.4  TG: <0.10     2016:  TSH: 0.08  TgAb: <0.4  T.30     2012:  TSH: 12.40  TGAB: 1.2  T.6    TFTs:  ENDO THYROID LABS-[facility] Latest Ref Rng & Units 2022 9/15/2021   TSH 0.40 - 4.00 mU/L 2.64 0.82   FREE T3 2.3 - 4.2 pg/mL     T3 60 - 181 ng/dL     T4 5.0 - 11.0 ug/dL     FREE T4 0.76 - 1.46 ng/dL 1.22 1.25       NM THYROID UPTAKE/SCAN   2011 2:47:00 PM   FINDINGS:  The thyroid gland appears normal in size. 24-hour uptake  was calculated at 46.4%, which is above the normal range. Normal range  is 10-30% 24-hour uptake. Focal area of decreased uptake in the mid  left thyroid lobe may represent a cold thyroid nodule or cyst.     IMPRESSION:    1. Elevated 24-hour radioiodine uptake in the thyroid at 46.4%.  2. Possible cold nodule or cyst in the mid left thyroid lobe. Thyroid  ultrasound is recommended for further evaluation.    NUCLEAR MEDICINE I-131 SCAN    Aug 10, 2011 8:04:00 AM      TECHNIQUE: 78.7 mCi I-131 ablation scan. Five days post therapy  imaging performed today.  FINDINGS: Intense radiotracer uptake at the left greater than right  neck at the thyroid level. No evidence for distant metastatic disease.     IMPRESSION: Intense radiotracer uptake localizing to the thyroidectomy  bed, left greater than right. This is consistent with residual thyroid  tissue. No distant  "metastatic disease identified.    ULTRASOUND THYROID  Feb 17, 2011   IMPRESSION: Multinodular thyroid gland.    FNA thyroid nodule:  Copath Report       FNA-thyroid, left mid lobe nodule:   Suspicious for malignancy.  Suspicious for papillary carcinoma  Specimen Adequacy: Satisfactory for evaluation.           Surgical pathology:     SPECIMEN(S):  A: Thyroid, left lobe  B: Parathyroid gland, biopsy of right inferior  C: Thyroid, right lobe    FINAL DIAGNOSIS:  A. and C.  Thyroid, right and left lobes, total thyroidectomy -  Specimen/Procedure(s) and Laterality:   Right and left thyroid lobes,  total thyroidectomy.  Specimen Integrity:   Intact.  Specimen Size and Weight:   See Gross Description.  Histologic Tumor Type(s):   Papillary carcinoma, follicular variant with  focal papillary morphology.  Tumor Focality: Unifocal.  Tumor Laterality:   Left lobe.  Tumor Size(s):   Left lobe: 2.2 cm (greatest dimension).  Margins:   Close, but uninvolved.            Distance to closest margin, if negative:   Tumor 0.05 cm from  nearest paratracheal surface and 1.75 cm from nearest anterolateral  surface.  Tumor Capsular Invasion: Present.    Tumor Capsule: Partial.  Extrathyroidal Invasion:   Not identified.  Lymph-Vascular Invasion:   Not identified.  Lymph Nodes:   Two nodes without evidence of metastatic carcinoma (0/2;  one at isthmus and one adjacent to right lobe).  Pathologic Staging:   pT2, pN0, pM not applicable.  Additional Pathologic Findings:   Right thyroid lobe without evidence of  malignancy.  Background nodular hyperplasia and thyroiditis with  lymphoid follicles.  Left lobe with focal previous biopsy site changes.  No parathyroid tissue identified.  CAP Protocol Based on AJCC/UICC TNM, 7th edition; Protocol Effective  Date:  January 2010    B.  \"Parathyroid gland\", right inferior, biopsy - Thyroid tissue; no  parathyroid glandular tissue identified.    8/2016: left modified neck Dissection:  Copath Report  "     SPECIMEN(S):   A: Scar, left neck   B: Parathyroid gland, left inferior   C: Left central neck dissection, para and pretracheal lymph nodes   D: apical jugular lymph node   E: Left modified neck dissection     FINAL DIAGNOSIS:   A. Skin, neck/scar, excision:   - Hypertrophic scar; benign.     B. Parathyroid gland, left inferior, biopsy:   - Parathyroid tissue present.     C. Left central neck dissection with para-and pretracheal lymph nodes:   - One lymph node; no evidence of malignancy (0/1).   - Thymus and parathyroid tissue present.     D. Lymph node, apical jugular, excision:   - One lymph node; no evidence of malignancy (0/1).     E. Left modified neck dissection:   - 27 lymph nodes identified (level II- 10, level III- 9, level IV- 5 and   lymph nodes associated with jugular vein- 3).   - One (of 27) lymph node positive for metastatic papillary thyroid   carcinoma.  1 cm in greatest diameter.  Negative for extranodal   involvement  (Level II).   - Jugular vein negative for tumor.        NUCLEAR MEDICINE THYROID WHOLE BODY SCAN I-123   4/20/2017 11:38 AM   IMPRESSION: No worrisome focal tracer uptake is identified to suggest  recurrent neoplasm or remote metastatic disease. Some postoperative  changes at the left neck noted, likely accounting for inconspicuity of  the left sternocleidomastoid muscle. Small bilateral subcentimeter  neck lymph nodes noted.     Neck US 6/2018:  IMPRESSION:  Negative soft tissue neck study.    Neck US 5/2020:                                                        IMPRESSION:    Negative soft tissue neck study.    Neck US 9/2021:  FINDINGS:  Thyroidectomy. No cervical lymphadenopathy. No suspicious  nodules in the thyroidectomy bed. No significant interval change.                                                           IMPRESSION: No cervical lymphadenopathy.     All pertinent notes, labs, and images personally reviewed by me.     A/P  Ms.Nhu Mounika Niocle is a 36 year old here  for the evaluation of thyroid cancer:    1. 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  It was 2.2 cm unifocal, left lobe tumor with no lymph node involvement.  S/p  total thyroidectomy in 2011 and 78.7 mCi of I-131 HERNANDEZ. No evidence for distant metastatic disease on post therapy scan.  2016- new lymph node s/p FNA with PTC with rising TG  S/p 8/2016- left modified neck radiation. 1/27 lymph node + ( level2). Postop course complicated by hypocalcemia.  Delivered 1/2017.   Follow up I123 WBS 4/2017- no mets  Thyroglobulin levels appear stable and suppressed.  Neck ultrasound June 2018, 5/2020, 9/2021 did not show any evidence of metastases.  Plan:  Discussed diagnosis, pathophysiology, management and treatment options of condition with pt.  Recommend repeat Neck US and thyroglobulin levels in 9/2022.  2.  Hypothyroidism (postoperative following total thyroidectomy for thyroid cancer):  Currently taking levothyroxine 137 mcg X day. ( on this dose X 10/2019)  Taking generic levothyroxine.  Reports compliance.  Labs as noted above with slightly high FT4.  Clinically looks euthyroid. Sometimes palpitations X 1 year.  currently pregnant..  Plan:  Discussed diagnosis, pathophysiology, management and treatment options of condition with pt.  In the setting of pregnancy and h/o thyroid caner recommend to increase dose of levothyroxine to 150 mcg/day (3/2/2022)  Labs in 6 weeks.  Needs close follow up during pregnancy.  Discussed importance of euthyroidism during pregnancy.  Discussed s/s of hypothyroidism and hyperthyroidism to watch for.  The patient indicates understanding of these issues and agrees with the plan.      3.  History of vitamin D deficiency and hypocalcemia, resolved.  Continue to monitor.    More than 50% of the time spent with Ms. Nicole on counseling / coordinating her care.      Follow-up:  6 weeks.    Vandana  MD Trang  Endocrinology   [facility]  CC: Evan Zamora

## 2022-03-02 NOTE — PATIENT INSTRUCTIONS
[facility]  Dr Costello, Endocrinology Department    [place] [address] [id number]  [place] [address]  Appointment [phone]  Fax: [phone]  Mckenna: Monday - Thursday      Increase levothyroxine to 150 mcg/day.  Labs in 6 weeks.  Please make a lab appointment for blood work and follow up clinic appointment in 1 week after that to discuss results.    Take Levothyroxine on an empty stomach. Take it with a full glass of water at least 30 minutes to 1 hour before eating breakfast.   This medicine should be taken at least 4 hours before or 4 hours after these medicines: antacids (Maalox , Mylanta , Tums ), calcium supplements, cholestyramine (Prevalite , Questran ), colestipol (Colestid ), iron supplements, orlistat (Kaden , Xenical ), simethicone (Gas-X , Mylicon ), and sucralfate (Carafate ).   Swallow the capsule whole. Do not cut or crush it.

## 2022-03-09 ENCOUNTER — PRENATAL OFFICE VISIT (OUTPATIENT)
Dept: NURSING | Facility: CLINIC | Age: 37
End: 2022-03-09
Payer: COMMERCIAL

## 2022-03-09 DIAGNOSIS — Z34.80 PRENATAL CARE, SUBSEQUENT PREGNANCY, UNSPECIFIED TRIMESTER: Primary | ICD-10-CM

## 2022-03-09 PROCEDURE — 99207 PR NO CHARGE NURSE ONLY: CPT

## 2022-03-09 NOTE — PROGRESS NOTES
NPN nurse visit done over the phone. Pt will be given NPN folder and book at her upcoming appt.   Discussed optional screening available to assess chromosomal anomalies. Questions answered. Pt advised to call the clinic if she has any questions or concerns related to her pregnancy. Prenatal labs will be obtained at her upcoming appt. New prenatal visit scheduled on 4/11/22 with Dr Galindo    5w5d    Lab Results   Component Value Date    PAP NIL 02/13/2020           Patient supplied answers from flow sheet for:  Prenatal OB Questionnaire.  Past Medical History  Have you ever recieved care for your mental health? : No  Have you ever been in a major accident or suffered serious trauma?: No  Within the last year, has anyone hit, slapped, kicked or otherwise hurt you?: No  In the last year, has anyone forced you to have sex when you didn't want to?: No    Past Medical History 2   Would you accept a blood transfusion if was medically recommended?: (!) No  Does anyone in your home smoke?: No   Is your blood type Rh negative?: (!) Yes  Have you ever ?: (!) Yes  Have you been hospitalized for a nonsurgical reason excluding normal delivery?: (!) Yes  Have you ever had an abnormal pap smear?: No    Past Medical History (Continued)  Do you have a history of abnormalities of the uterus?: No  Did your mother take MALENA or any other hormones when she was pregnant with you?: No  Do you have any other problems we have not asked about which you feel may be important to this pregnancy?: No

## 2022-03-21 ENCOUNTER — ANCILLARY PROCEDURE (OUTPATIENT)
Dept: ULTRASOUND IMAGING | Facility: CLINIC | Age: 37
End: 2022-03-21
Payer: COMMERCIAL

## 2022-03-21 ENCOUNTER — LAB (OUTPATIENT)
Dept: LAB | Facility: CLINIC | Age: 37
End: 2022-03-21
Payer: COMMERCIAL

## 2022-03-21 DIAGNOSIS — Z34.80 PRENATAL CARE, SUBSEQUENT PREGNANCY, UNSPECIFIED TRIMESTER: ICD-10-CM

## 2022-03-21 DIAGNOSIS — Z34.90 SUPERVISION OF NORMAL PREGNANCY: Primary | ICD-10-CM

## 2022-03-21 LAB
ABO/RH(D): NORMAL
ANTIBODY SCREEN: NEGATIVE
ERYTHROCYTE [DISTWIDTH] IN BLOOD BY AUTOMATED COUNT: 15.1 % (ref 10–15)
HBV SURFACE AG SERPL QL IA: NONREACTIVE
HCT VFR BLD AUTO: 37.1 % (ref 35–47)
HCV AB SERPL QL IA: NONREACTIVE
HGB BLD-MCNC: 11.9 G/DL (ref 11.7–15.7)
HIV 1+2 AB+HIV1 P24 AG SERPL QL IA: NONREACTIVE
MCH RBC QN AUTO: 25.1 PG (ref 26.5–33)
MCHC RBC AUTO-ENTMCNC: 32.1 G/DL (ref 31.5–36.5)
MCV RBC AUTO: 78 FL (ref 78–100)
PLATELET # BLD AUTO: 264 10E3/UL (ref 150–450)
RBC # BLD AUTO: 4.74 10E6/UL (ref 3.8–5.2)
RUBV IGG SERPL QL IA: 5.76 INDEX
RUBV IGG SERPL QL IA: POSITIVE
SPECIMEN EXPIRATION DATE: NORMAL
T PALLIDUM AB SER QL: NONREACTIVE
WBC # BLD AUTO: 10.5 10E3/UL (ref 4–11)

## 2022-03-21 PROCEDURE — 86901 BLOOD TYPING SEROLOGIC RH(D): CPT

## 2022-03-21 PROCEDURE — 86762 RUBELLA ANTIBODY: CPT

## 2022-03-21 PROCEDURE — 86850 RBC ANTIBODY SCREEN: CPT

## 2022-03-21 PROCEDURE — 86803 HEPATITIS C AB TEST: CPT

## 2022-03-21 PROCEDURE — 36415 COLL VENOUS BLD VENIPUNCTURE: CPT

## 2022-03-21 PROCEDURE — 86900 BLOOD TYPING SEROLOGIC ABO: CPT

## 2022-03-21 PROCEDURE — 87389 HIV-1 AG W/HIV-1&-2 AB AG IA: CPT

## 2022-03-21 PROCEDURE — 86780 TREPONEMA PALLIDUM: CPT

## 2022-03-21 PROCEDURE — 85027 COMPLETE CBC AUTOMATED: CPT

## 2022-03-21 PROCEDURE — 87340 HEPATITIS B SURFACE AG IA: CPT

## 2022-03-21 PROCEDURE — 76801 OB US < 14 WKS SINGLE FETUS: CPT | Performed by: OBSTETRICS & GYNECOLOGY

## 2022-03-21 PROCEDURE — 87086 URINE CULTURE/COLONY COUNT: CPT

## 2022-03-23 LAB — BACTERIA UR CULT: NORMAL

## 2022-03-25 DIAGNOSIS — Z34.90 SUPERVISION OF NORMAL PREGNANCY: Primary | ICD-10-CM

## 2022-04-04 ENCOUNTER — ANCILLARY PROCEDURE (OUTPATIENT)
Dept: ULTRASOUND IMAGING | Facility: CLINIC | Age: 37
End: 2022-04-04
Attending: OBSTETRICS & GYNECOLOGY
Payer: COMMERCIAL

## 2022-04-04 DIAGNOSIS — Z34.90 SUPERVISION OF NORMAL PREGNANCY: ICD-10-CM

## 2022-04-04 PROCEDURE — 76801 OB US < 14 WKS SINGLE FETUS: CPT | Performed by: OBSTETRICS & GYNECOLOGY

## 2022-04-19 ENCOUNTER — TRANSCRIBE ORDERS (OUTPATIENT)
Dept: MATERNAL FETAL MEDICINE | Facility: CLINIC | Age: 37
End: 2022-04-19

## 2022-04-19 ENCOUNTER — PRENATAL OFFICE VISIT (OUTPATIENT)
Dept: OBGYN | Facility: CLINIC | Age: 37
End: 2022-04-19
Payer: COMMERCIAL

## 2022-04-19 VITALS — WEIGHT: 170.7 LBS | BODY MASS INDEX: 31.22 KG/M2 | SYSTOLIC BLOOD PRESSURE: 126 MMHG | DIASTOLIC BLOOD PRESSURE: 82 MMHG

## 2022-04-19 DIAGNOSIS — O09.899 SUPERVISION OF OTHER HIGH RISK PREGNANCY, ANTEPARTUM: ICD-10-CM

## 2022-04-19 DIAGNOSIS — Z11.3 SCREENING FOR STD (SEXUALLY TRANSMITTED DISEASE): Primary | ICD-10-CM

## 2022-04-19 DIAGNOSIS — O09.529 ANTEPARTUM MULTIGRAVIDA OF ADVANCED MATERNAL AGE: ICD-10-CM

## 2022-04-19 DIAGNOSIS — O26.90 PREGNANCY RELATED CONDITION, ANTEPARTUM: Primary | ICD-10-CM

## 2022-04-19 DIAGNOSIS — Z98.891 PREVIOUS CESAREAN SECTION: ICD-10-CM

## 2022-04-19 PROCEDURE — 87591 N.GONORRHOEAE DNA AMP PROB: CPT | Performed by: OBSTETRICS & GYNECOLOGY

## 2022-04-19 PROCEDURE — 36415 COLL VENOUS BLD VENIPUNCTURE: CPT | Performed by: OBSTETRICS & GYNECOLOGY

## 2022-04-19 PROCEDURE — 99207 PR FIRST OB VISIT: CPT | Performed by: OBSTETRICS & GYNECOLOGY

## 2022-04-19 PROCEDURE — 87491 CHLMYD TRACH DNA AMP PROBE: CPT | Performed by: OBSTETRICS & GYNECOLOGY

## 2022-04-19 NOTE — NURSING NOTE
"Chief Complaint   Patient presents with     Prenatal Care     New Prenatal visit   11 weeks 4 days   Pap UTD  G/C due        Initial /82 (BP Location: Right arm, Cuff Size: Adult Regular)   Wt 77.4 kg (170 lb 11.2 oz)   LMP 2022 (Within Days)   Breastfeeding No   BMI 31.22 kg/m   Estimated body mass index is 31.22 kg/m  as calculated from the following:    Height as of 3/2/22: 1.575 m (5' 2\").    Weight as of this encounter: 77.4 kg (170 lb 11.2 oz).  BP completed using cuff size: regular    Questioned patient about current smoking habits.  Pt. has never smoked.      11w4d        The following HM Due: G/C due today       +Heartburn  With some vomiting   +Nausea         Carmen Howard, CMA on 2022 at 1:32 PM                 "

## 2022-04-20 PROBLEM — Z98.891 PREVIOUS CESAREAN SECTION: Status: ACTIVE | Noted: 2017-01-11

## 2022-04-20 PROBLEM — O09.529 ANTEPARTUM MULTIGRAVIDA OF ADVANCED MATERNAL AGE: Status: ACTIVE | Noted: 2022-04-20

## 2022-04-20 LAB
C TRACH DNA SPEC QL NAA+PROBE: NEGATIVE
N GONORRHOEA DNA SPEC QL NAA+PROBE: NEGATIVE

## 2022-04-20 NOTE — PATIENT INSTRUCTIONS
You can reach your [facility] Care Team any time of the day by calling [phone]. This number will put you in touch with the 24 hour nurse line if the clinic is closed.    To contact your OB/GYN Station Coordinator/Surgery Scheduler please call [phone]. This is a direct number for your care team between 8 a.m. and 4 p.m. Monday through Friday.    [facility] is open for your convenience:  Monday through Friday 8 a.m. to 6 p.m.  Closed weekends and all major holidays.

## 2022-04-20 NOTE — PROGRESS NOTES
Aleyda Nicole is a 36 year old [place]  female  previous  section x2 with an uncertain LMP with an EDC of 10/18/2022 confirmed by 9-week ultrasound and subsequent follow-up scan  who presents to the clinic for an new ob visit.    The patient's first  section was done 10/26/2013 for intrauterine pregnancy at 40 weeks gestation and arrest of descent with a failed vacuum with the birth of a 7 pound 4 ounce female infant and the second was an elective repeat C birth.  I discussed with her mode of delivery and she is opting for a repeat  section.  Risk benefits and alternatives were discussed at length she understands and accepts.  AMA issues have been reviewed.  testing including the MIPS screen, the  1st trimester screen, the quad test, the AFP test, the modified sequential test, cystic fibrosis screening and Ultrasound amniocentesis and chorionic villous sampling and the time frames for each of these were reviewed.  The patient is opting for an NIPS screening and comprehensive 18 to 20-week MFM ultrasound    She has not had bleeding since her LMP.   She has had mild nausea. Weigh loss has not occurred.   This was a planned pregnancy.   FOB is involved, same as previous  OTHER CONCERNS: As noted in the sticky note  INFECTION HISTORY  HIV: no  Hepatitis B: no  Hepatitis C: no  Syphilis:  no  Tuberculosis: no   PPD- no  Herpes self: no  Herpes partner:  no  Chlamydia:  no  Gonorrhea:  no  HPV: no  BV:  no  Trichomonis:  no  Chicken Pox:  YES  ====================================================  PERSONAL/SOCIAL HISTORY  Lives lives with their spouse.  Employment: Full time.  Her job involves moderate activity .  Additional items: None  =====================================================   REVIEW OF SYSTEMS  CONSTITUTIONAL: NEGATIVE for fever, chills  EYES: NEGATIVE for vision changes   RESP: NEGATIVE for significant cough or SOB  CV: NEGATIVE for chest pain,  palpitations   GI: NEGATIVE for nausea, abdominal pain, heartburn, or change in bowel habits  : NEGATIVE for frequency, dysuria, or hematuria  MUSCULOSKELETAL: NEGATIVE for significant arthralgias or myalgia  NEURO: NEGATIVE for weakness, dizziness or paresthesias or headache  ====================================================  PHYSICAL EXAM:  /82 (BP Location: Right arm, Cuff Size: Adult Regular)   Wt 77.4 kg (170 lb 11.2 oz)   LMP 2022 (Within Days)   Breastfeeding No   BMI 31.22 kg/m    BMI- Body mass index is 31.22 kg/m .,     RECOMMENDED WEIGHT GAIN: 25-35 lbs.  GENERAL:  Pleasant pregnant female, alert, well groomed.  SKIN:  Warm and dry, without lesions or rashes  HEAD: Symmetrical features.  EYES:  PERRLA,   MOUTH:  Buccal mucosa pink, moist without lesions.    NECK:  Thyroid without enlargement and nodules.  Lymph nodes not palpable.  Previous radical neck scar with keloid and otherwise well-healed  LUNGS:  Clear to auscultation.  BREAST:  Symmetrical.  No dominant, fixed or suspicious masses are noted.  No skin or nipple changes or axillary nodes.  Self exam is taught and encouraged.  Nipples everted.      HEART:  RRR without murmur.  ABDOMEN: Soft without masses , tenderness or organomegaly.  No CVA tenderness. Well healed scar from  section.  with a Doptone  MUSCULOSKELETAL:  Full range of motion  EXTREMITIES:  No edema. No significant varicosities.   GENITALIA:  BUS WNL, no lesions noted   VAGINA:  Pink, normal rugae and discharge normal and physiologic, no bleeding  CERVIX:  smooth, without discharge or CMT and parous os,   firm/ closed 2 cm long.  UTERUS: Midposition, nontender 14- weeks in size.  ADNEXA: Without masses or tenderness  PELVIS:  Arch; wide . Sacrum; deep. Spines;blunt.  Side walls; straight. Type; gynecoid  PELVIS:   Previous arrest of descent of 7 pound infant with failed vacuum, Pelvis proven to as above.  RECTAL:  Normal appearance.  Digital exam  deferred.  WET PREP:Not done  gonorrhea  =========================================  ASSESSMENT:  (Z11.3) Screening for STD (sexually transmitted disease)  (primary encounter diagnosis)  Comment: No evidence of disease testing done  Plan: NEISSERIA GONORRHOEA PCR, CHLAMYDIA TRACHOMATIS        PCR        results are negative    (Z34.81) Encounter for supervision of other normal pregnancy in first trimester  Comment: MFM referral given with genetic counseling  Plan: [facility] Non-Invasive Prenatal Screening, [facility] Referral - Pregnancy, Process and        hold DNA        detailed written outline given    (O09.529) Antepartum multigravida of advanced maternal age  Comment: As above  Plan: [facility] Referral - Pregnancy        return to clinic 4 weeks or as needed concerns    PREGNANCY AT RISK? yes  ==========================================  PLAN:  Instructed on use of triage nurse line and contacting the on call MD after hours for an urgent need such as fever, vagina bleeding, bladder or vaginal infection, rupture of membranes,  or term labor.    .  Instructed on best evidence for: weight gain for her BMI for pregnancy; healthy diet and foods to avoid; exercise and activity during pregnancy;avoiding exposure to toxoplasmosis; and maintenance of a generally healthy lifestyle.   Discussed the harms, benefits, side effects and alternative therapies for current prescribed and OTC medications.  Written plan given

## 2022-04-25 LAB — SCANNED LAB RESULT: NORMAL

## 2022-04-28 ENCOUNTER — LAB (OUTPATIENT)
Dept: LAB | Facility: CLINIC | Age: 37
End: 2022-04-28
Payer: COMMERCIAL

## 2022-04-28 DIAGNOSIS — E89.0 POSTOPERATIVE HYPOTHYROIDISM: ICD-10-CM

## 2022-04-28 DIAGNOSIS — Z11.59 NEED FOR HEPATITIS C SCREENING TEST: ICD-10-CM

## 2022-04-28 DIAGNOSIS — Z13.1 SCREENING FOR DIABETES MELLITUS: ICD-10-CM

## 2022-04-28 DIAGNOSIS — C73 PAPILLARY CARCINOMA, FOLLICULAR VARIANT (H): ICD-10-CM

## 2022-04-28 PROCEDURE — 86803 HEPATITIS C AB TEST: CPT

## 2022-04-28 PROCEDURE — 80048 BASIC METABOLIC PNL TOTAL CA: CPT

## 2022-04-28 PROCEDURE — 84439 ASSAY OF FREE THYROXINE: CPT

## 2022-04-28 PROCEDURE — 36415 COLL VENOUS BLD VENIPUNCTURE: CPT

## 2022-04-28 PROCEDURE — 84443 ASSAY THYROID STIM HORMONE: CPT

## 2022-04-29 LAB
ANION GAP SERPL CALCULATED.3IONS-SCNC: 8 MMOL/L (ref 3–14)
BUN SERPL-MCNC: 13 MG/DL (ref 7–30)
CALCIUM SERPL-MCNC: 8.9 MG/DL (ref 8.5–10.1)
CHLORIDE BLD-SCNC: 107 MMOL/L (ref 94–109)
CO2 SERPL-SCNC: 22 MMOL/L (ref 20–32)
CREAT SERPL-MCNC: 0.59 MG/DL (ref 0.52–1.04)
GFR SERPL CREATININE-BSD FRML MDRD: >90 ML/MIN/1.73M2
GLUCOSE BLD-MCNC: 117 MG/DL (ref 70–99)
POTASSIUM BLD-SCNC: 3.9 MMOL/L (ref 3.4–5.3)
SODIUM SERPL-SCNC: 137 MMOL/L (ref 133–144)
T4 FREE SERPL-MCNC: 1.17 NG/DL (ref 0.76–1.46)
TSH SERPL DL<=0.005 MIU/L-ACNC: 1.65 MU/L (ref 0.4–4)

## 2022-05-02 LAB — HCV AB SERPL QL IA: NONREACTIVE

## 2022-05-04 ENCOUNTER — TELEPHONE (OUTPATIENT)
Dept: ENDOCRINOLOGY | Facility: CLINIC | Age: 37
End: 2022-05-04
Payer: COMMERCIAL

## 2022-05-04 DIAGNOSIS — C73 RECURRENT THYROID CANCER (H): Primary | ICD-10-CM

## 2022-05-04 DIAGNOSIS — E89.0 POSTOPERATIVE HYPOTHYROIDISM: ICD-10-CM

## 2022-05-04 NOTE — TELEPHONE ENCOUNTER
ENDO THYROID LABS-[facility] Latest Ref Rng & Units 4/28/2022 2/18/2022   TSH 0.40 - 4.00 mU/L 1.65 2.64   FREE T3 2.3 - 4.2 pg/mL     T3 60 - 181 ng/dL     T4 5.0 - 11.0 ug/dL     FREE T4 0.76 - 1.46 ng/dL 1.17 1.22     Last endocrine visit 3/2/2023.    History of thyroid cancer  Currently patient is pregnant    Thyroid labs are in acceptable range.  Continue current dose of thyroid hormone replacement.  Recommend repeat labs in 6-8 weeks  Please make a lab appointment for blood work and follow up clinic appointment in 1 week after that to discuss results.    Please inform patient and help schedule virtual (video preferred)/ clinic visit.

## 2022-05-18 ENCOUNTER — PRE VISIT (OUTPATIENT)
Dept: MATERNAL FETAL MEDICINE | Facility: CLINIC | Age: 37
End: 2022-05-18
Payer: COMMERCIAL

## 2022-05-25 ENCOUNTER — OFFICE VISIT (OUTPATIENT)
Dept: MATERNAL FETAL MEDICINE | Facility: CLINIC | Age: 37
End: 2022-05-25
Attending: OBSTETRICS & GYNECOLOGY
Payer: COMMERCIAL

## 2022-05-25 ENCOUNTER — HOSPITAL ENCOUNTER (OUTPATIENT)
Dept: ULTRASOUND IMAGING | Facility: CLINIC | Age: 37
Discharge: HOME | End: 2022-05-25
Attending: OBSTETRICS & GYNECOLOGY
Payer: COMMERCIAL

## 2022-05-25 DIAGNOSIS — O26.90 PREGNANCY RELATED CONDITION, ANTEPARTUM: ICD-10-CM

## 2022-05-25 DIAGNOSIS — N96 HISTORY OF RECURRENT MISCARRIAGES: ICD-10-CM

## 2022-05-25 DIAGNOSIS — O09.522 AMA (ADVANCED MATERNAL AGE) MULTIGRAVIDA 35+, SECOND TRIMESTER: Primary | ICD-10-CM

## 2022-05-25 DIAGNOSIS — O09.522 MULTIGRAVIDA OF ADVANCED MATERNAL AGE IN SECOND TRIMESTER: Primary | ICD-10-CM

## 2022-05-25 DIAGNOSIS — N96 RECURRENT PREGNANCY LOSS: ICD-10-CM

## 2022-05-25 PROCEDURE — 76811 OB US DETAILED SNGL FETUS: CPT

## 2022-05-25 PROCEDURE — 76811 OB US DETAILED SNGL FETUS: CPT | Mod: 26 | Performed by: OBSTETRICS & GYNECOLOGY

## 2022-05-25 PROCEDURE — 96040 HC GENETIC COUNSELING, EACH 30 MINUTES: CPT

## 2022-05-25 NOTE — PROGRESS NOTES
[facility]  Genetic Counseling Consult    Patient:  Aleyda Nicole YOB: 1985   Date of Service:  22      Aleyda Nicole was seen at the [facility] for genetic consultation as part of her appointment for comprehensive ultrasound.  The indication for genetic counseling is advanced maternal age. Aleyda was unaccompanied to the appointment today.       Impression/Plan:   1. Aleyda had a cell-free fetal DNA test earlier in pregnancy, which was normal.    2. Aleyda had a comprehensive (level II) ultrasound today.  Please see the ultrasound report for further details.    3. The patient declines genetic amniocentesis today.     Pregnancy History:   /Parity:    Age at Delivery: 37 year old  ZEHRA: 10/18/2022, by Ultrasound  Gestational Age: 19w1d    Aleyda reported during our appointment that she tested positive for COVID on . Symptoms initially developed on 5/15. She is currently symptom-free.     Aleyda s pregnancy history is significant for:  o SAB x4  o 10/2013: Term , female  o 2017: Term , female    Recurrent Pregnancy Loss  We briefly discussed Aleyda's history of pregnancy losses today. Recurrent pregnancy loss is defined as three or more clinically recognized pregnancy losses occurring prior to fetal viability (<20 weeks). Aleyda's personal history is significant for four pregnancy losses at approximately 6-10 weeks. All of these pregnancies were conceived with her partner, Brock.      Miscarriages can have a number of different etiologies including genetic causes (e.g. chromosome abnormalities, X-linked male lethal conditions, alpha thalassemia major/hemoglobin Barts disease), fetal congenital anomalies, maternal anatomical abnormalities, teratogenic exposures, and maternal conditions (e.g. endocrine conditions, autoimmune conditions, infections). Genetic explanations, such as a balanced chromosomal  rearrangements in a parent, account for approximately 2-5% of all cases of recurrent pregnancy loss. Although the presence of a balanced chromosome rearrangement in a parent generally does not cause health problems, it is associated with an increased risk for pregnancy loss and possibly for having a child with birth defects and/or developmental delays because a child may have extra or missing genetic material. Discussed that chromosome analysis is available to rule out this possibility if the couple is interested at any point. This was declined today given that Aleyda is not planning any additional pregnancies.    Medical History:   Aleyda has a history of metastatic papillary thyroid carcinoma, s/p total thyroidectomy and neck resection. She is on levothyroxine due to this history. Aleyda had an [facility] consultation during her last pregnancy to discuss this history. Please see Dr. Brown's note from 7/20/2016.        Family History:   A three-generation pedigree was obtained, and is scanned under the  Media  tab.   The following significant findings were reported by Aleyda:    The father of the pregnancy, Brock, is 41 and has hepatitis B and a gallbladder stone. He is otherwise healthy.     Aleyda and Brock have 8 year old and 5 year old daughters, both of whom are healthy and typically developing.     Aleyda reports that her nephew's daughter had a congenital heart defect that required surgical repair at birth. Congenital heart defects can be isolated or part of a broader genetic syndrome. When isolated, congenital heart defects are usually multifactorial, meaning they are often caused by a combination of genetic and environmental factors. In general, the recurrence risk is likely increased for first and second degree relatives of an individual with a congenital heart defect. This pregnancy is a fourth degree relative to the family member with a CHD; therefore, the recurrence risk is likely equal to the population risk.      Aleyda's father   from a stroke/brain aneurysm at age 81. Aleyda's paternal half-sister  from a brain aneurysm at age 52. Aleyda's mother also  from a stroke at age 69. There is no other family history of aneurysms. Intracranial aneurysms can occur sporadically, in a familiar pattern, or as part of a genetic syndrome. Aleyda may have an increased risk of intracranial aneurysm given this history. Encouraged Aleyda to share this family history information with her primary care provider to ensure appropriate monitoring.      Aleyda's mother, father, and brother have diabetes. Diabetes is a complex genetic trait (a multifactorial condition) caused by the combination of genetic and environmental factors. Having a family history of diabetes can increase one's risk of also developing diabetes. Therefore, it is important for individuals with a family history of type II diabetes to let their physicians to know about this family history, so they can be appropriate screened for diabetes.    Otherwise, the reported family history is negative for multiple miscarriages, stillbirths, birth defects, intellectual disability, known genetic conditions, and consanguinity.       Carrier Screening:   The patient reports that she and the father of the pregnancy have  ancestry:     The hemoglobinopathies are a group of genetic blood diseases that occur with increased frequency in individuals of  ancestry and carrier screening for these conditions is available.  In addition,  screening in the [facility] includes many of the hemoglobinopathies.      Expanded carrier screening for mutations in a large panel of genes associated with autosomal recessive conditions including cystic fibrosis, spinal muscular atrophy, and others, is now available.      The patient has declined the carrier screening options reviewed today.       Risk Assessment for Chromosome Conditions:   We explained that the risk for fetal chromosome abnormalities increases  with maternal age. We discussed specific features of common chromosome abnormalities, including Down syndrome, trisomy 13, trisomy 18, and sex chromosome trisomies.      - At age 37 at midtrimester, the risk to have a baby with Down syndrome is 1 in 168.     - At age 37 at midtrimester, the risk to have a baby with any chromosome abnormality is 1 in 82.       [facility] had maternal serum screening earlier in pregnancy.     Non-invasive Prenatal Testing (NIPT)    Maternal plasma cell-free DNA testing    Screens for fetal trisomy 21, trisomy 13, trisomy 18, and sex chromosome aneuploidy    First trimester ultrasound with nuchal translucency and nasal bone assessment was not performed in this pregnancy, to our knowledge.    [facility] had an [employer] NIPS test earlier in pregnancy; we reviewed the results today, which are normal for chromosome 13, chromosome 18, chromosome 21, and sex chromosomes (no aneuploidy detected)    Given the accuracy of this test, these results greatly decrease the chance for certain fetal chromosome abnormalities    We discussed the limitations of normal NIPT results    MSAFP (after 15 weeks for open neural tube defect screening) results were not available for our review today.    Testing Options:   We discussed the following options:   Genetic Amniocentesis    Invasive procedure typically performed in the second trimester by which amniotic fluid is obtained for the purpose of chromosome analysis and/or other prenatal genetic analysis    Diagnostic results; >99% sensitivity for fetal chromosome abnormalities    AFAFP measurement tests for open neural tube defects     Comprehensive (Level II) ultrasound: Detailed ultrasound performed between 18-22 weeks gestation to screen for major birth defects and markers for aneuploidy.    We reviewed the benefits and limitations of this testing.  Screening tests provide a risk assessment specific to the pregnancy for certain fetal chromosome abnormalities, but cannot  definitively diagnose or exclude a fetal chromosome abnormality.  Follow-up genetic counseling and consideration of diagnostic testing is recommended with any abnormal screening result.     Diagnostic tests carry inherent risks- including risk of miscarriage- that require careful consideration.  These tests can detect fetal chromosome abnormalities with greater than 99% certainty.  Results can be compromised by maternal cell contamination or mosaicism, and are limited by the resolution of cytogenetic G-banding technology.  There is no screening nor diagnostic test that can detect all forms of birth defects or mental disability.    It was a pleasure to be involved with [facility] care. Face-to-face time of the meeting was 30 minutes.      Hannah Collazo [facility], [facility]  Licensed Genetic Counselor  [facility]  Maternal Fetal Medicine  Phone: [phone]  [email or web address]

## 2022-05-25 NOTE — PROGRESS NOTES
Please see full imaging report from View[employer] program under imaging tab.    Micah Villegas MD  Maternal Fetal Medicine

## 2022-05-26 ENCOUNTER — OFFICE VISIT (OUTPATIENT)
Dept: INTERNAL MEDICINE | Facility: CLINIC | Age: 37
End: 2022-05-26
Payer: COMMERCIAL

## 2022-05-26 ENCOUNTER — NURSE TRIAGE (OUTPATIENT)
Dept: NURSING | Facility: CLINIC | Age: 37
End: 2022-05-26

## 2022-05-26 VITALS
SYSTOLIC BLOOD PRESSURE: 107 MMHG | BODY MASS INDEX: 30.18 KG/M2 | OXYGEN SATURATION: 99 % | TEMPERATURE: 98.7 F | HEART RATE: 85 BPM | DIASTOLIC BLOOD PRESSURE: 74 MMHG | WEIGHT: 165 LBS

## 2022-05-26 DIAGNOSIS — R19.7 DIARRHEA, UNSPECIFIED TYPE: Primary | ICD-10-CM

## 2022-05-26 DIAGNOSIS — Z3A.19 19 WEEKS GESTATION OF PREGNANCY: ICD-10-CM

## 2022-05-26 DIAGNOSIS — U07.1 COVID-19 AFFECTING PREGNANCY IN FIRST TRIMESTER: ICD-10-CM

## 2022-05-26 DIAGNOSIS — O98.511 COVID-19 AFFECTING PREGNANCY IN FIRST TRIMESTER: ICD-10-CM

## 2022-05-26 LAB
ALBUMIN SERPL-MCNC: 2.9 G/DL (ref 3.4–5)
ALP SERPL-CCNC: 56 U/L (ref 40–150)
ALT SERPL W P-5'-P-CCNC: 17 U/L (ref 0–50)
ANION GAP SERPL CALCULATED.3IONS-SCNC: 6 MMOL/L (ref 3–14)
AST SERPL W P-5'-P-CCNC: 4 U/L (ref 0–45)
BASOPHILS # BLD AUTO: 0 10E3/UL (ref 0–0.2)
BASOPHILS NFR BLD AUTO: 0 %
BILIRUB SERPL-MCNC: 0.3 MG/DL (ref 0.2–1.3)
BUN SERPL-MCNC: 6 MG/DL (ref 7–30)
C DIFF TOX B STL QL: NEGATIVE
CALCIUM SERPL-MCNC: 8.3 MG/DL (ref 8.5–10.1)
CHLORIDE BLD-SCNC: 107 MMOL/L (ref 94–109)
CO2 SERPL-SCNC: 24 MMOL/L (ref 20–32)
CREAT SERPL-MCNC: 0.5 MG/DL (ref 0.52–1.04)
EOSINOPHIL # BLD AUTO: 0.2 10E3/UL (ref 0–0.7)
EOSINOPHIL NFR BLD AUTO: 3 %
ERYTHROCYTE [DISTWIDTH] IN BLOOD BY AUTOMATED COUNT: 13.9 % (ref 10–15)
GFR SERPL CREATININE-BSD FRML MDRD: >90 ML/MIN/1.73M2
GLUCOSE BLD-MCNC: 90 MG/DL (ref 70–99)
HCT VFR BLD AUTO: 38.1 % (ref 35–47)
HGB BLD-MCNC: 12.5 G/DL (ref 11.7–15.7)
IMM GRANULOCYTES # BLD: 0 10E3/UL
IMM GRANULOCYTES NFR BLD: 0 %
LYMPHOCYTES # BLD AUTO: 1.4 10E3/UL (ref 0.8–5.3)
LYMPHOCYTES NFR BLD AUTO: 15 %
MAGNESIUM SERPL-MCNC: 1.7 MG/DL (ref 1.6–2.3)
MCH RBC QN AUTO: 27.3 PG (ref 26.5–33)
MCHC RBC AUTO-ENTMCNC: 32.8 G/DL (ref 31.5–36.5)
MCV RBC AUTO: 83 FL (ref 78–100)
MONOCYTES # BLD AUTO: 0.6 10E3/UL (ref 0–1.3)
MONOCYTES NFR BLD AUTO: 7 %
NEUTROPHILS # BLD AUTO: 6.6 10E3/UL (ref 1.6–8.3)
NEUTROPHILS NFR BLD AUTO: 75 %
NRBC # BLD AUTO: 0 10E3/UL
NRBC BLD AUTO-RTO: 0 /100
PHOSPHATE SERPL-MCNC: 4.5 MG/DL (ref 2.5–4.5)
PLATELET # BLD AUTO: 250 10E3/UL (ref 150–450)
POTASSIUM BLD-SCNC: 3.9 MMOL/L (ref 3.4–5.3)
PROT SERPL-MCNC: 7.3 G/DL (ref 6.8–8.8)
RBC # BLD AUTO: 4.58 10E6/UL (ref 3.8–5.2)
SODIUM SERPL-SCNC: 137 MMOL/L (ref 133–144)
TSH SERPL DL<=0.005 MIU/L-ACNC: 1.72 MU/L (ref 0.4–4)
WBC # BLD AUTO: 8.9 10E3/UL (ref 4–11)

## 2022-05-26 PROCEDURE — 80050 GENERAL HEALTH PANEL: CPT | Performed by: INTERNAL MEDICINE

## 2022-05-26 PROCEDURE — 84100 ASSAY OF PHOSPHORUS: CPT | Performed by: INTERNAL MEDICINE

## 2022-05-26 PROCEDURE — 99214 OFFICE O/P EST MOD 30 MIN: CPT | Performed by: INTERNAL MEDICINE

## 2022-05-26 PROCEDURE — 87493 C DIFF AMPLIFIED PROBE: CPT | Mod: 59 | Performed by: INTERNAL MEDICINE

## 2022-05-26 PROCEDURE — 83735 ASSAY OF MAGNESIUM: CPT | Performed by: INTERNAL MEDICINE

## 2022-05-26 PROCEDURE — 87506 IADNA-DNA/RNA PROBE TQ 6-11: CPT | Performed by: INTERNAL MEDICINE

## 2022-05-26 PROCEDURE — 36415 COLL VENOUS BLD VENIPUNCTURE: CPT | Performed by: INTERNAL MEDICINE

## 2022-05-26 NOTE — RESULT ENCOUNTER NOTE
The results of this ultrasound were reviewed by the maternal-fetal medicine specialist with the patient at the time of her exam

## 2022-05-26 NOTE — PROGRESS NOTES
ASSESSMENT/PLAN                                                      (R19.7) Diarrhea, unspecified type  (primary encounter diagnosis)  (O98.511, U07.1) COVID-19 affecting pregnancy in first trimester  (Z3A.19) 19 weeks gestation of pregnancy  Comment: suspect diarrhea is infectious in nature, possibly related to recent diagnosis of COVID-19, her presentation seems somewhat delayed.  Plan: CBC, CMP, magnesium, phosphorus, and TSH reflex today; stool gets ordered - patient to complete and return ASAP; recommendations to follow.    Valencia Gupta MD   [facility]  [address] [place] [id number]  T: [phone], F: [phone]    SUBJECTIVE                                                      Aleyda Mounika Nicole is a very pleasant 36 year old female who presents with diarrhea:    Accompanied by .    Diarrhea started on Monday (3 days ago).  Having a bowel movement every 30-60 minutes.  Bowel movements were initially watery, but are now loose.  Associated lower abdominal cramping and more recently pain. No nausea or vomiting but patient does report poor appetite and a 5lb weight loss in the last several days. No fevers or chills. No melena or hematochezia.    No recent unusual food intake.  No recent medication changes or adjustments.  No known sick contacts.  No recent antibiotics or hospitalization.  No recent travel.    Of note, patient was diagnosed with COVID-19 5/16/2022 (but diarrhea did not start until a week later).    Patient is 19 weeks pregnant.    OBJECTIVE                                                      /74   Pulse 85   Temp 98.7  F (37.1  C) (Oral)   Wt 74.8 kg (165 lb)   LMP 01/28/2022 (Within Days)   SpO2 99%   BMI 30.18 kg/m    Constitutional: well-appearing  Musculoskeletal: normal gait and station  Psych: normal judgment and insight; normal mood and affect; recent and remote memory intact    ---    (Note documentation was completed, in part, with [employer]  voice-recognition software. Documentation was reviewed, but some grammatical, spelling, and word errors may remain.)

## 2022-05-26 NOTE — TELEPHONE ENCOUNTER
Triage Call:    Patient called to report new onset diarrhea.  Patient is 19 weeks, 2 days pregnant.  She reports onset of loose stools on 5/23/22.  She reports increase in severity this evening with abdominal pain prior to stools.  Patient reports she has been having diarrhea every 30 minutes to an hour since 1900.  She denies blood or black stool.  She denies dizziness, lightheadedness, or weakness, severe or constat abdominal pain, or fevers.    According to the protocol, patient should see physician within 24 hours.  Care advice given including staying well hydrated, do not take pepto-bismol, imodium is okay to take during pregnancy, hand hygiene, and when to call back. Patient verbalizes understanding and agrees with plan of care. Transferred to [facility], scheduled an appointment for this morning in [facility].    Sharita Dougherty RN  05/26/22 1:43 AM  [facility] Nurse Advisor    COVID 19 Nurse Triage Plan/Patient Instructions    Please be aware that novel coronavirus (COVID-19) may be circulating in the community. If you develop symptoms such as fever, cough, or SOB or if you have concerns about the presence of another infection including coronavirus (COVID-19), please contact your health care provider or visit [email or web address].     Disposition/Instructions    In-Person Visit with provider recommended. Reference Visit Selection Guide.    Thank you for taking steps to prevent the spread of this virus.  o Limit your contact with others.  o Wear a simple mask to cover your cough.  o Wash your hands well and often.    Resources    [facility]: About COVID-19: [email or web address]    CDC: What to Do If You're Sick: www.cdc.gov/coronavirus/2019-ncov/about/steps-when-sick.html    CDC: Ending Home Isolation: www.cdc.gov/coronavirus/2019-ncov/hcp/disposition-in-home-patients.html     CDC: Caring for Someone:  www.cdc.gov/coronavirus/2019-ncov/if-you-are-sick/care-for-someone.html     [facility]: Interim Guidance for Hospital Discharge to Home: www.health.[facility].mn.us/diseases/coronavirus/hcp/hospdischarge.pdf    [facility] clinical trials (COVID-19 research studies): [email or web address].[facility]/umn-clinical-trials     Below are the COVID-19 hotlines at the [place] Department of Health ([facility]). Interpreters are available.   o For health questions: Call 704-123-4011 or 1-462.578.6390 (7 a.m. to 7 p.m.)  o For questions about schools and childcare: Call 105-127-5635 or 1-382.820.8840 (7 a.m. to 7 p.m.)       Reason for Disposition    [1] SEVERE diarrhea (e.g., 7 or more times / day more than normal) AND [2] present > 24 hours (1 day)    Treating diarrhea, questions about    Additional Information    Negative: Shock suspected (e.g., cold/pale/clammy skin, too weak to stand, low BP, rapid pulse)    Negative: Difficult to awaken or acting confused (e.g., disoriented, slurred speech)    Negative: Sounds like a life-threatening emergency to the triager    Negative: Vomiting also present and worse than the diarrhea    Negative: [1] Blood in stool AND [2] without diarrhea    Negative: Diarrhea in a cancer patient who is currently (or recently) receiving chemotherapy or radiation therapy, or cancer patient who has metastatic or end-stage cancer and is receiving palliative care    Negative: [1] SEVERE abdominal pain (e.g., excruciating) AND [2] present > 1 hour    Negative: [1] SEVERE abdominal pain AND [2] age > 60    Negative: [1] Blood in the stool AND [2] moderate or large amount of blood    Negative: Black or tarry bowel movements  (Exception: chronic-unchanged  black-grey bowel movements AND is taking iron pills or Pepto-bismol)    Negative: [1] Drinking very little AND [2] dehydration suspected (e.g., no urine > 12 hours, very dry mouth, very lightheaded)    Negative: Patient sounds very sick or weak to the triager     Negative: [1] SEVERE diarrhea (e.g., 7 or more times / day more than normal) AND [2] age > 60 years    Negative: [1] Constant abdominal pain AND [2] present > 2 hours    Negative: [1] Fever > 103 F (39.4 C) AND [2] not able to get the fever down using Fever Care Advice    Protocols used: DIARRHEA-A-AH, PREGNANCY - ABDOMINAL PAIN GREATER THAN 20 WEEKS EGA-A-AH

## 2022-05-27 DIAGNOSIS — A04.5 CAMPYLOBACTER DIARRHEA: Primary | ICD-10-CM

## 2022-05-27 RX ORDER — AZITHROMYCIN 250 MG/1
500 TABLET, FILM COATED ORAL DAILY
Qty: 6 TABLET | Refills: 0 | Status: SHIPPED | OUTPATIENT
Start: 2022-05-27 | End: 2022-05-30

## 2022-06-02 ENCOUNTER — TELEPHONE (OUTPATIENT)
Dept: INTERNAL MEDICINE | Facility: CLINIC | Age: 37
End: 2022-06-02

## 2022-06-02 ENCOUNTER — PRENATAL OFFICE VISIT (OUTPATIENT)
Dept: OBGYN | Facility: CLINIC | Age: 37
End: 2022-06-02
Payer: COMMERCIAL

## 2022-06-02 VITALS — WEIGHT: 169 LBS | DIASTOLIC BLOOD PRESSURE: 68 MMHG | BODY MASS INDEX: 30.91 KG/M2 | SYSTOLIC BLOOD PRESSURE: 106 MMHG

## 2022-06-02 DIAGNOSIS — J45.20 MILD INTERMITTENT ASTHMA WITHOUT COMPLICATION: Primary | ICD-10-CM

## 2022-06-02 DIAGNOSIS — O09.899 SUPERVISION OF OTHER HIGH RISK PREGNANCY, ANTEPARTUM: ICD-10-CM

## 2022-06-02 PROCEDURE — 99207 PR PRENATAL VISIT: CPT | Performed by: OBSTETRICS & GYNECOLOGY

## 2022-06-02 NOTE — TELEPHONE ENCOUNTER
Reason for Call:  Other appointment    Detailed comments: asthma check poss change to inhaler    Phone Number Patient can be reached at: Home number on file [phone] (home)    Best Time: any    Can we leave a detailed message on this number? YES    Call taken on 6/2/2022 at 2:12 PM by Sabrina Cabezas

## 2022-06-02 NOTE — NURSING NOTE
"Chief Complaint   Patient presents with     Prenatal Care     Breathing is worse since COVID. Is using inhaler but only last a couple hours       Initial /68   Wt 76.7 kg (169 lb)   LMP 2022 (Within Days)   BMI 30.91 kg/m   Estimated body mass index is 30.91 kg/m  as calculated from the following:    Height as of 3/2/22: 1.575 m (5' 2\").    Weight as of this encounter: 76.7 kg (169 lb).  BP completed using cuff size: regular    Questioned patient about current smoking habits.  Pt. has never smoked.          20w2d        Sarah Severino, [facility]          "

## 2022-06-02 NOTE — PATIENT INSTRUCTIONS
You can reach your [facility] Care Team any time of the day by calling [phone]. This number will put you in touch with the 24 hour nurse line if the clinic is closed.    To contact your OB/GYN Station Coordinator/Surgery Scheduler please call [phone]. This is a direct number for your care team between 8 a.m. and 4 p.m. Monday through Friday.    [facility] is open for your convenience:  Monday through Friday 8 a.m. to 6 p.m.  Closed weekends and all major holidays.

## 2022-06-02 NOTE — TELEPHONE ENCOUNTER
On call back triage as to why wanting new inhaler just seen 2/21 for asthma and inhaler filled for 1 year     Patient Contact    Attempt # 1    Was call answered?  No.  Left message on voicemail with information to call me back.

## 2022-06-02 NOTE — PROGRESS NOTES
Aleyda Nicole is a 36 year old female, 20w2d, Estimated Date of Delivery: Oct 18, 2022 who presents for prenatal care.    Patient feels fetal movement.  Patient is recovering from breakthrough COVID infection.  She is feeling better but still does have some respiratory difficulty.  She has been using her ProAir inhaler for asthma symptoms and finds that she gets relief only for an hour or 2.  I have asked that she call and talk with her primary care physician about this.  Patient has a follow-up ultrasound with MFM scheduled to evaluate the N IPS result  A/P: Intrauterine pregnancy 20-2/7 weeks gestation other than above issues is doing well.  Signs and symptoms of  labor and concern discussed.  She will follow-up with MFM and her primary care as noted above

## 2022-06-02 NOTE — TELEPHONE ENCOUNTER
"Notes from OB appt today:  \"Patient is recovering from breakthrough COVID infection.  She is feeling better but still does have some respiratory difficulty.  She has been using her ProAir inhaler for asthma symptoms and finds that she gets relief only for an hour or 2.  I have asked that she call and talk with her primary care physician about this.\"    Video visit scheduled with Dr. Zamora for tomorrow. 6/3/2022 4:00 PM.  "

## 2022-06-03 ENCOUNTER — VIRTUAL VISIT (OUTPATIENT)
Dept: INTERNAL MEDICINE | Facility: CLINIC | Age: 37
End: 2022-06-03
Payer: COMMERCIAL

## 2022-06-03 DIAGNOSIS — U07.1 INFECTION DUE TO 2019 NOVEL CORONAVIRUS: ICD-10-CM

## 2022-06-03 DIAGNOSIS — J45.21 MILD INTERMITTENT ASTHMA WITH ACUTE EXACERBATION: Primary | ICD-10-CM

## 2022-06-03 PROCEDURE — 99214 OFFICE O/P EST MOD 30 MIN: CPT | Mod: GT | Performed by: INTERNAL MEDICINE

## 2022-06-03 NOTE — PROGRESS NOTES
Aleyda is a 36 year old who is being evaluated via a billable video visit.      How would you like to obtain your AVS? MyChart  If the video visit is dropped, the invitation should be resent by: Text to cell phone: [phone]  Will anyone else be joining your video visit? No      Video Start Time: 406    Assessment & Plan     Mild intermittent asthma with acute exacerbation  Increase in sx last week or so- gets good relief with rescue inh. But needs to use more freq-   Start inhaled steroid for next 1-3 months , prefer this to any oral steroids given pregnancy  - fluticasone (ARNUITY ELLIPTA) 100 MCG/ACT inhaler; Inhale 1 puff into the lungs daily    Infection due to 2019 novel coronavirus  Recovered.     Prescription drug management             No follow-ups on file.    Evan Zamora MD  [facility]   Aleyda is a 36 year old who presents for the following health issues     HPI       COVID-19 Symptom Review--currently 20w pregnant  How many days ago did these symptoms start? 05/15/2022    Are any of the following symptoms significant for you?  New or worsening difficulty breathing? Yes    Please describe what kind of difficulty you are having breathing:Moderate dyspnea (short of breath with ADLs, shortness of breath that limits the ability to walk up one flight of stairs without needing to rest)    Worsening cough? No    Fever or chills? No    Headache: no    Sore throat: no    Chest pain: no    Diarrhea: no    Body aches? no    What treatments has patient tried? Inhaler as needed, using every 4 hours, does get relief but only for a couple hours, using nebulizer just one time- with increased relief    Does patient live in a nursing home, group home, or shelter? no  Does patient have a way to get food/medications during quarantined? Yes, I have a friend or family member who can help me.                  Review of Systems         Objective           Vitals:  No vitals  were obtained today due to virtual visit.    Physical Exam   GENERAL: Healthy, alert and no distress  EYES: Eyes grossly normal to inspection.  No discharge or erythema, or obvious scleral/conjunctival abnormalities.  RESP: No audible wheeze, cough, or visible cyanosis.  No visible retractions or increased work of breathing.                  Video-Visit Details    Type of service:  Video Visit    Video End Time:4:21 PM    Originating Location (pt. Location): Home    Distant Location (provider location):  [facility]     Platform used for Video Visit: [employer]

## 2022-06-05 LAB
C COLI+JEJUNI+LARI FUSA STL QL NAA+PROBE: DETECTED
EC STX1 GENE STL QL NAA+PROBE: NOT DETECTED
EC STX2 GENE STL QL NAA+PROBE: NOT DETECTED
NOROV GI+II ORF1-ORF2 JNC STL QL NAA+PR: NOT DETECTED
RVA NSP5 STL QL NAA+PROBE: NOT DETECTED
SALMONELLA SP RPOD STL QL NAA+PROBE: NOT DETECTED
SHIGELLA SP+EIEC IPAH STL QL NAA+PROBE: NOT DETECTED
V CHOL+PARA RFBL+TRKH+TNAA STL QL NAA+PR: NOT DETECTED
Y ENTERO RECN STL QL NAA+PROBE: NOT DETECTED

## 2022-06-06 ENCOUNTER — TELEPHONE (OUTPATIENT)
Dept: LAB | Facility: CLINIC | Age: 37
End: 2022-06-06
Payer: COMMERCIAL

## 2022-06-06 DIAGNOSIS — J45.20 MILD INTERMITTENT ASTHMA WITHOUT COMPLICATION: Primary | ICD-10-CM

## 2022-06-06 RX ORDER — FLUTICASONE PROPIONATE 44 UG/1
2 AEROSOL, METERED RESPIRATORY (INHALATION) 2 TIMES DAILY
Qty: 10.6 G | Refills: 3 | Status: SHIPPED | OUTPATIENT
Start: 2022-06-06 | End: 2022-06-06

## 2022-06-06 RX ORDER — FLUTICASONE PROPIONATE 44 UG/1
2 AEROSOL, METERED RESPIRATORY (INHALATION) 2 TIMES DAILY
Qty: 10.6 G | Refills: 3 | Status: SHIPPED | OUTPATIENT
Start: 2022-06-06 | End: 2022-11-30

## 2022-06-06 NOTE — TELEPHONE ENCOUNTER
Per therapeutic substitution protocol:     Medication: Arnuity 100 mcg/act is not covered by insurance. Reviewed pharmacy records, and dicussed therapeutic option with patient.     Sent new prescription for Flovent 44mcg HFA.      Routing to the prescriber as an  FYI to inform of change.      Updated medication list in [facility].    ~Thank you  Britt Ma, PharmD  Retail Pharmacist  For [facility]

## 2022-06-13 ENCOUNTER — HOSPITAL ENCOUNTER (EMERGENCY)
Facility: CLINIC | Age: 37
Discharge: HOME | End: 2022-06-13
Attending: EMERGENCY MEDICINE | Admitting: EMERGENCY MEDICINE
Payer: COMMERCIAL

## 2022-06-13 ENCOUNTER — MYC MEDICAL ADVICE (OUTPATIENT)
Dept: INTERNAL MEDICINE | Facility: CLINIC | Age: 37
End: 2022-06-13
Payer: COMMERCIAL

## 2022-06-13 VITALS
RESPIRATION RATE: 18 BRPM | DIASTOLIC BLOOD PRESSURE: 78 MMHG | TEMPERATURE: 98.4 F | SYSTOLIC BLOOD PRESSURE: 123 MMHG | OXYGEN SATURATION: 97 % | HEART RATE: 96 BPM

## 2022-06-13 DIAGNOSIS — R06.02 SHORTNESS OF BREATH: ICD-10-CM

## 2022-06-13 DIAGNOSIS — J45.21 EXACERBATION OF INTERMITTENT ASTHMA, UNSPECIFIED ASTHMA SEVERITY: Primary | ICD-10-CM

## 2022-06-13 DIAGNOSIS — R06.2 WHEEZING: ICD-10-CM

## 2022-06-13 PROCEDURE — 250N000009 HC RX 250: Performed by: EMERGENCY MEDICINE

## 2022-06-13 PROCEDURE — 99284 EMERGENCY DEPT VISIT MOD MDM: CPT | Mod: 25

## 2022-06-13 PROCEDURE — 250N000012 HC RX MED GY IP 250 OP 636 PS 637: Performed by: EMERGENCY MEDICINE

## 2022-06-13 PROCEDURE — 94640 AIRWAY INHALATION TREATMENT: CPT

## 2022-06-13 RX ORDER — PREDNISONE 20 MG/1
40 TABLET ORAL DAILY
Qty: 10 TABLET | Refills: 0 | Status: SHIPPED | OUTPATIENT
Start: 2022-06-14 | End: 2022-06-19

## 2022-06-13 RX ORDER — PREDNISONE 20 MG/1
60 TABLET ORAL ONCE
Status: COMPLETED | OUTPATIENT
Start: 2022-06-13 | End: 2022-06-13

## 2022-06-13 RX ORDER — IPRATROPIUM BROMIDE AND ALBUTEROL SULFATE 2.5; .5 MG/3ML; MG/3ML
6 SOLUTION RESPIRATORY (INHALATION) ONCE
Status: COMPLETED | OUTPATIENT
Start: 2022-06-13 | End: 2022-06-13

## 2022-06-13 RX ADMIN — IPRATROPIUM BROMIDE AND ALBUTEROL SULFATE 6 ML: 2.5; .5 SOLUTION RESPIRATORY (INHALATION) at 11:17

## 2022-06-13 RX ADMIN — PREDNISONE 60 MG: 20 TABLET ORAL at 11:17

## 2022-06-13 ASSESSMENT — ENCOUNTER SYMPTOMS
FEVER: 0
SHORTNESS OF BREATH: 1
DIARRHEA: 0
CHEST TIGHTNESS: 1
DYSURIA: 0
SORE THROAT: 0
VOMITING: 0
COUGH: 1

## 2022-06-13 NOTE — ED TRIAGE NOTES
Patient presents to the ED reporting an asthma exacerbation. States that had a virtual visit for symptoms and was started on a steroid inhaler, which has not helped. 21 weeks pregnant.

## 2022-06-13 NOTE — ED PROVIDER NOTES
History   Chief Complaint:  Asthma     HPI   Aleyda Nicole is a 36 year old female with history of asthma and diabetes who presents with shortness of breath. Per the patient, she tested positive for Covid on . Two weeks later she felt her Covid symptoms were resolved but her asthma worsened. Last night she was short of breath so took her albuterol, an hour later she tried her albuterol again but saw no improvement. She reports her shortness of breath has been worsening since yesterday and is accompanied by coughing and chest tightness. She denies fever, chest pain, vomiting, sore throat, diarrhea, dysuria or excessive urination. She reports that she is 22 weeks pregnant. She has a history of miscarriages but those were all in weeks 5-10 of pregnancy.     Review of Systems   Constitutional: Negative for fever.   HENT: Negative for sore throat.    Respiratory: Positive for cough, chest tightness and shortness of breath.    Cardiovascular: Negative for chest pain.   Gastrointestinal: Negative for diarrhea and vomiting.   Genitourinary: Negative for dysuria.   All other systems reviewed and are negative.    Allergies:  The patient has no known allergies.     Medications:  Albuterol  Fluticasone  Levothyroxine    Past Medical History:     Asthma  Gestational Diabetes  Hypocalcemia  Papillary Thyroid Carcinoma  Pneumonia   Labor    Past Surgical History:     Section  Dissection Radical Neck, left  Thyroidectomy    Family History:    Diabetes, Cerebrovascular Disease, Hypertension - Mother  Diabetes, Hypertension, Kidney Disease, CAD - Father  Diabetes - Brother    Social History:  Patient is accompanied by her .   Patient has several children.     Physical Exam     Patient Vitals for the past 24 hrs:   BP Temp Pulse Resp SpO2   22 1200 123/78 -- 96 -- 97 %   22 1145 -- -- -- -- 94 %   22 1130 -- -- -- -- 97 %   22 0841 133/88 98.4  F (36.9  C) 95 18 97 %     Physical  Exam  General: Alert, appears well-developed and well-nourished. Cooperative.     In mild respiratory distress  HEENT:  Head:  Atraumatic  Ears:  External ears are normal  Mouth/Throat:  Oropharynx is without erythema or exudate and mucous membranes are moist.   Eyes:   Conjunctivae normal and EOM are normal. No scleral icterus.  CV:  Normal rate, regular rhythm, normal heart sounds and radial pulses are 2+ and symmetric.  No murmur.  Resp:  Breath sounds are coarse bilaterally with diffuse expiratory wheezing.     Non-labored, no retractions or accessory muscle use  GI:  Abdomen is soft, no distension, no tenderness. No rebound or guarding.  No CVA tenderness bilaterally  MS:  Normal range of motion. No edema.    Normal strength in all 4 extremities.     Back atraumatic.    No midline cervical, thoracic, or lumbar tenderness  Skin:  Warm and dry.  No rash or lesions noted.  Neuro: Alert. Normal strength.  GCS: 15  Psych:  Normal mood and affect.    Emergency Department Course   Emergency Department Course:  Reviewed:  I reviewed nursing notes, vitals and past medical history    Assessments:  1010 I obtained history and examined the patient as noted above.   1155 I rechecked the patient and explained findings.     Interventions:  1117 predniSONE (DELTASONE) tablet 60 mg  1117 ipratropium - albuterol 0.5 mg/2.5 mg/3 mL (DUONEB) neb solution 6 mL    Disposition:  The patient was discharged to home.     Impression & Plan     CMS Diagnoses: None    Medical Decision Making:  Aleyda Nicole is a 36 year old female with a PMH of asthma who presents for evaluation of shortness of breath and wheezing.  Signs and symptoms are consistent with asthma exacerbation.  A broad differential was considered including foreign body, asthma, pneumonia, bronchitis, reactive airway disease, pneumothorax, cardiac equivalent, viral induced wheezing, allergic phenomena, etc.  She feels improved after interventions here in ED.  There are no  signs at this point of any serious etiologies including those mentioned above.  No indication for hospitalization at this time including no hypoxia, no marked increase in respiratory rate, minimal to no retractions.   Supportive outpatient management is indicated, medications for discharge noted above.  Close followup with primary care physician/OBGYN.  Return if increased wheezing, progressive shortness of breath, develops fever greater than 102.      Diagnosis:    ICD-10-CM    1. Exacerbation of intermittent asthma, unspecified asthma severity  J45.21    2. Wheezing  R06.2    3. Shortness of breath  R06.02      Discharge Medications:  Discharge Medication List as of 6/13/2022 11:59 AM      START taking these medications    Details   predniSONE (DELTASONE) 20 MG tablet Take 2 tablets (40 mg) by mouth daily for 5 days, Disp-10 tablet, R-0, E-Prescribe           Scribe Disclosure:  José CHAO, am serving as a scribe at 11:08 AM on 6/13/2022 to document services personally performed by Heber Moyer MD based on my observations and the provider's statements to me.            Heber Moyer MD  06/13/22 [id number]

## 2022-06-14 NOTE — TELEPHONE ENCOUNTER
Pt went to [facility]   yesterday for Exacerbation of intermittent asthma, unspecified asthma severity , and prescribed prednisone.

## 2022-06-15 ENCOUNTER — HOSPITAL ENCOUNTER (OUTPATIENT)
Dept: ULTRASOUND IMAGING | Facility: CLINIC | Age: 37
Discharge: HOME | End: 2022-06-15
Attending: OBSTETRICS & GYNECOLOGY
Payer: COMMERCIAL

## 2022-06-15 ENCOUNTER — OFFICE VISIT (OUTPATIENT)
Dept: MATERNAL FETAL MEDICINE | Facility: CLINIC | Age: 37
End: 2022-06-15
Attending: OBSTETRICS & GYNECOLOGY
Payer: COMMERCIAL

## 2022-06-15 DIAGNOSIS — O09.522 AMA (ADVANCED MATERNAL AGE) MULTIGRAVIDA 35+, SECOND TRIMESTER: ICD-10-CM

## 2022-06-15 DIAGNOSIS — O09.529 AMA (ADVANCED MATERNAL AGE) MULTIGRAVIDA 35+, UNSPECIFIED TRIMESTER: ICD-10-CM

## 2022-06-15 DIAGNOSIS — U07.1 COVID-19 AFFECTING PREGNANCY IN SECOND TRIMESTER: Primary | ICD-10-CM

## 2022-06-15 DIAGNOSIS — O98.512 COVID-19 AFFECTING PREGNANCY IN SECOND TRIMESTER: Primary | ICD-10-CM

## 2022-06-15 PROCEDURE — 76816 OB US FOLLOW-UP PER FETUS: CPT | Mod: 26 | Performed by: OBSTETRICS & GYNECOLOGY

## 2022-06-15 PROCEDURE — 76816 OB US FOLLOW-UP PER FETUS: CPT

## 2022-06-15 NOTE — PROGRESS NOTES
Aleyda Nicole was seen for an ultrasound today at the [facility]etal Medicine center.      For the details of the ultrasound please see the report which can be found under the imaging tab.      Yocasta Vergara MD  , OB/GYN  Maternal-Fetal Medicine  [email or web address]  [phone] (Main MFM Office)  [phone] or [phone] (for 24 hour MFM questions)  [phone] (Pager)

## 2022-06-19 NOTE — RESULT ENCOUNTER NOTE
The results of this ultrasound scan were reviewed with the patient at the time of the exam  Luis Garcia MD FACOG

## 2022-07-01 ENCOUNTER — LAB (OUTPATIENT)
Dept: LAB | Facility: CLINIC | Age: 37
End: 2022-07-01
Payer: COMMERCIAL

## 2022-07-01 ENCOUNTER — TELEPHONE (OUTPATIENT)
Dept: ENDOCRINOLOGY | Facility: CLINIC | Age: 37
End: 2022-07-01

## 2022-07-01 DIAGNOSIS — E89.0 POSTOPERATIVE HYPOTHYROIDISM: ICD-10-CM

## 2022-07-01 DIAGNOSIS — C73 RECURRENT THYROID CANCER (H): ICD-10-CM

## 2022-07-01 DIAGNOSIS — C73 RECURRENT THYROID CANCER (H): Primary | ICD-10-CM

## 2022-07-01 PROCEDURE — 84443 ASSAY THYROID STIM HORMONE: CPT

## 2022-07-01 PROCEDURE — 84439 ASSAY OF FREE THYROXINE: CPT

## 2022-07-01 PROCEDURE — 36415 COLL VENOUS BLD VENIPUNCTURE: CPT

## 2022-07-01 NOTE — TELEPHONE ENCOUNTER
Please advise first available MEG slot is 8/10.     Per notes on 5/4/22:    Thyroid labs are in acceptable range.  Continue current dose of thyroid hormone replacement.  Recommend repeat labs in 6-8 weeks  Please make a lab appointment for blood work and follow up clinic appointment in 1 week after that to discuss results.

## 2022-07-01 NOTE — TELEPHONE ENCOUNTER
[facility] Call Center    Phone Message    May a detailed message be left on voicemail: yes     Reason for Call: Other: .      Per Patient is wanting to get a call back. Patient states she is pregnant and due in October. Patient wanted to schedule an appt with Dr. Costello. Writer had pulled up the schedule for both in person and video, First available for both was November 2022. Patient states Dr. Costello wanted to see patient sooner. Please advise.     Action Taken: Message routed to:  [facility] ([facility] Endo    Travel Screening: Not Applicable

## 2022-07-02 LAB
T4 FREE SERPL-MCNC: 1.2 NG/DL (ref 0.76–1.46)
TSH SERPL DL<=0.005 MIU/L-ACNC: 1.13 MU/L (ref 0.4–4)

## 2022-07-04 NOTE — TELEPHONE ENCOUNTER
ENDO THYROID LABS-[facility] Latest Ref Rng & Units 7/1/2022   TSH 0.40 - 4.00 mU/L 1.13   FREE T3 2.3 - 4.2 pg/mL    T3 60 - 181 ng/dL    T4 5.0 - 11.0 ug/dL    FREE T4 0.76 - 1.46 ng/dL 1.20     H/o thyroid cancer.  Thyroid labs are in acceptable range in the setting of pregnancy.  Continue current dose of thyroid hormone replacement.  Will need close follow up.  Repeat labs in 6-8 weeks (TSH, FT4).  Please place orders.  Please make a lab appointment for blood work and follow up clinic appointment in 1 week after that to discuss results.    OK to book in next available open EMG slot. Please make sure that one other MEG slot is open on the given day. There are 2 MEG slots/day- please keep one slot open for urgent needs.  Let me know if you have any questions.

## 2022-07-11 ENCOUNTER — PRENATAL OFFICE VISIT (OUTPATIENT)
Dept: OBGYN | Facility: CLINIC | Age: 37
End: 2022-07-11
Payer: COMMERCIAL

## 2022-07-11 VITALS — DIASTOLIC BLOOD PRESSURE: 70 MMHG | SYSTOLIC BLOOD PRESSURE: 110 MMHG | BODY MASS INDEX: 31.48 KG/M2 | WEIGHT: 172.1 LBS

## 2022-07-11 DIAGNOSIS — C73 MALIGNANT NEOPLASM OF THYROID GLAND (H): ICD-10-CM

## 2022-07-11 DIAGNOSIS — O09.899 SUPERVISION OF OTHER HIGH RISK PREGNANCY, ANTEPARTUM: Primary | ICD-10-CM

## 2022-07-11 PROCEDURE — 99207 PR PRENATAL VISIT: CPT | Performed by: OBSTETRICS & GYNECOLOGY

## 2022-07-11 NOTE — PROGRESS NOTES
Aleyda Nicole is a 36 year old female, 25w6d, Estimated Date of Delivery: Oct 18, 2022 who presents for prenatal care.    Good fetal movement.  Normal TSH results reviewed.  Future order placed for follow-up in 6 weeks.  Note of endocrine appreciated.  We reviewed her past history of A1 GDM and will do a 1 hour blood sugar screen at her next visit in 3 weeks.  We reviewed her past history of vitamin D deficiency with the most recent value of 32 on 10/23/2019 that is within normal range.  I recommend continuing with a prenatal multivitamin.  We discussed the physiology of vitamin D and adequate calcium intake    A/P: Intrauterine pregnancy 25-6/7 weeks gestation confirmed by first trimester ultrasound presently doing well.  Signs and symptoms of concern discussed the patient will call.  Patient states that she was positive for COVID on 5/16/2022 and has some decreased appetite.  I do not see results of testing in the chart.  She has been vaccinated x2 and boosted against COVID

## 2022-07-11 NOTE — PATIENT INSTRUCTIONS
You can reach your [facility] Care Team any time of the day by calling [phone]. This number will put you in touch with the 24 hour nurse line if the clinic is closed.    To contact your OB/GYN Station Coordinator/Surgery Scheduler please call [phone]. This is a direct number for your care team between 8 a.m. and 4 p.m. Monday through Friday.    [facility] is open for your convenience:  Monday through Friday 8 a.m. to 6 p.m.  Closed weekends and all major holidays.

## 2022-07-13 ENCOUNTER — ANCILLARY PROCEDURE (OUTPATIENT)
Dept: ULTRASOUND IMAGING | Facility: CLINIC | Age: 37
End: 2022-07-13
Attending: OBSTETRICS & GYNECOLOGY
Payer: COMMERCIAL

## 2022-07-13 DIAGNOSIS — O09.899 SUPERVISION OF OTHER HIGH RISK PREGNANCY, ANTEPARTUM: ICD-10-CM

## 2022-07-13 PROCEDURE — 76816 OB US FOLLOW-UP PER FETUS: CPT | Performed by: OBSTETRICS & GYNECOLOGY

## 2022-07-25 ENCOUNTER — OFFICE VISIT (OUTPATIENT)
Dept: URGENT CARE | Facility: URGENT CARE | Age: 37
End: 2022-07-25
Payer: COMMERCIAL

## 2022-07-25 VITALS
TEMPERATURE: 98.8 F | SYSTOLIC BLOOD PRESSURE: 125 MMHG | OXYGEN SATURATION: 98 % | HEART RATE: 89 BPM | DIASTOLIC BLOOD PRESSURE: 80 MMHG

## 2022-07-25 DIAGNOSIS — J45.901 MODERATE ASTHMA WITH EXACERBATION, UNSPECIFIED WHETHER PERSISTENT: ICD-10-CM

## 2022-07-25 DIAGNOSIS — R06.02 SOB (SHORTNESS OF BREATH): Primary | ICD-10-CM

## 2022-07-25 PROCEDURE — 99214 OFFICE O/P EST MOD 30 MIN: CPT | Mod: 25 | Performed by: FAMILY MEDICINE

## 2022-07-25 PROCEDURE — 94640 AIRWAY INHALATION TREATMENT: CPT | Performed by: FAMILY MEDICINE

## 2022-07-25 RX ORDER — IPRATROPIUM BROMIDE AND ALBUTEROL SULFATE 2.5; .5 MG/3ML; MG/3ML
3 SOLUTION RESPIRATORY (INHALATION) ONCE
Status: COMPLETED | OUTPATIENT
Start: 2022-07-25 | End: 2022-07-25

## 2022-07-25 RX ORDER — PREDNISONE 20 MG/1
40 TABLET ORAL DAILY
Qty: 8 TABLET | Refills: 0 | Status: SHIPPED | OUTPATIENT
Start: 2022-07-26 | End: 2022-07-30

## 2022-07-25 RX ORDER — PREDNISONE 20 MG/1
40 TABLET ORAL ONCE
Status: COMPLETED | OUTPATIENT
Start: 2022-07-25 | End: 2022-07-25

## 2022-07-25 RX ADMIN — PREDNISONE 40 MG: 20 TABLET ORAL at 10:17

## 2022-07-25 RX ADMIN — IPRATROPIUM BROMIDE AND ALBUTEROL SULFATE 3 ML: 2.5; .5 SOLUTION RESPIRATORY (INHALATION) at 10:18

## 2022-07-25 NOTE — PROGRESS NOTES
Chief Complaint   Patient presents with     Asthma     Wheezing, shortness of breath, is also 27 wk pregnant     Sinus Problem     Congested, usually has seasonal sinus problems        ASSESMENT AND PLAN   Aleyda was seen today for asthma and sinus problem.    Diagnoses and all orders for this visit:    SOB (shortness of breath)    Moderate asthma with exacerbation, unspecified whether persistent  -     ipratropium - albuterol 0.5 mg/2.5 mg/3 mL (DUONEB) neb solution 3 mL  -     predniSONE (DELTASONE) tablet 40 mg  -     predniSONE (DELTASONE) 20 MG tablet; Take 2 tablets (40 mg) by mouth daily for 4 days      Continue on alb inhalor  As she felt better oxygen sat was better   She was discharged home            Initial differential diagnosis included but was not restricted to   Differential Diagnosis:  URI Adult/Peds:  Asthma, Asthma exacerbation, Bronchitis-viral, Influenza, Pneumonia, Sinusitis, Viral pharyngitis, Viral syndrome and Viral upper respiratory illness  Medical Decision Making:  Aleyda Nicole is a 36 year old female presents with symptoms of sob and wheezing   As no fever and cough not productive no further testing needed         Routine discharge counseling was given to the patient and the patient understands that worsening, changing or persistent symptoms should prompt an immediate call or follow up with their primary physician or the emergency department. The importance of appropriate follow up was also discussed with the patient.     I have reviewed the nursing notes.  Review of the result(s) of each unique test -   X-Ray was not done.    Time  spent on the date of the encounter doing chart review, patient visit and documentation   see orders in Epic  Pt verbalized and agreed with the plan and is aware of the worsening symptoms for which would need to follow up .  Pt was stable during time of discharge from the clinic     SUBJECTIVE     Aleyda Nicole is a 36 year old female presenting with a chief  complaint of    Chief Complaint   Patient presents with     Asthma     Wheezing, shortness of breath, is also 27 wk pregnant     Sinus Problem     Congested, usually has seasonal sinus problems            Aleyda Nicole is a 36 year old female 27 weeks pregnant with h/o asthma presenting with a chief complaint of wheezing and shortness of breath. She is an established patient of [facility].  Onset of symptoms was 2 week(s) ago.  Course of illness is worsening.    Severity moderate  Current and Associated symptoms: wheezing and shortness of breath  Treatment measures tried include Inhaler (name: albuterol and flovent ).  Predisposing factors include recent illness .    Past Medical History:   Diagnosis Date     Gestational diabetes 2013     Hypocalcemia 2016     Influenza A      Papillary thyroid carcinoma     Papillary carcinoma, follicular variant with     Pneumonia     LLL     PONV (postoperative nausea and vomiting)      Postoperative hypothyroidism       labor      Uncomplicated asthma      Current Outpatient Medications   Medication Sig Dispense Refill     albuterol (PROAIR HFA/PROVENTIL HFA/VENTOLIN HFA) 108 (90 Base) MCG/ACT inhaler Inhale 2 puffs into the lungs every 4 hours as needed for shortness of breath / dyspnea or wheezing 18 g 11     aspirin (ASA) 81 MG EC tablet Take 1 tablet (81 mg) by mouth daily 90 tablet 0     fluticasone (FLOVENT HFA) 44 MCG/ACT inhaler Inhale 2 puffs into the lungs 2 times daily 10.6 g 3     levothyroxine (SYNTHROID/LEVOTHROID) 150 MCG tablet Take 1 tablet (150 mcg) by mouth daily 90 tablet 1     [START ON 2022] predniSONE (DELTASONE) 20 MG tablet Take 2 tablets (40 mg) by mouth daily for 4 days 8 tablet 0     Prenatal Vit-Fe Fumarate-FA (PRENATAL PO)        Social History     Tobacco Use     Smoking status: Never Smoker     Smokeless tobacco: Never Used   Substance Use Topics     Alcohol use: No     Alcohol/week: 0.0 standard drinks     Family  History   Problem Relation Age of Onset     Diabetes Mother      Cerebrovascular Disease Mother      Hypertension Mother      Diabetes Father 50     Hypertension Father      Genitourinary Problems Father         kidney disease     Coronary Artery Disease Father      Diabetes Brother          ROS:    10 point ROS of systems including Constitutional, Eyes, Cardiovascular, Gastroenterology, Genitourinary, Integumentary, Muscularskeletal, Psychiatric ,neurological were all negative except for pertinent positives noted in my HPI         OBJECTIVE:    /80 (BP Location: Left arm, Patient Position: Sitting, Cuff Size: Adult Regular)   Pulse 89   Temp 98.8  F (37.1  C) (Oral)   LMP 01/28/2022 (Within Days)   SpO2 98%   GENERAL APPEARANCE: healthy, alert and no distress  EYES: EOMI,  PERRL, conjunctiva clear  HENT: ear canals and TM's normal.  Nose and mouth without ulcers, erythema or lesions  NECK: supple, nontender, no lymphadenopathy  RESP: lungs good air entry positive for wheezes  CV: regular rates and rhythm, normal S1 S2, no murmur noted  PSYCH: mentation appears normal  Physical Exam      (Note was completed, in part, with [employer] voice-recognition software. Documentation reviewed, but some grammatical, spelling, and word errors may remain.)  Lynette Regalado MD.

## 2022-07-28 ENCOUNTER — PRENATAL OFFICE VISIT (OUTPATIENT)
Dept: OBGYN | Facility: CLINIC | Age: 37
End: 2022-07-28
Payer: COMMERCIAL

## 2022-07-28 VITALS — DIASTOLIC BLOOD PRESSURE: 80 MMHG | SYSTOLIC BLOOD PRESSURE: 122 MMHG | BODY MASS INDEX: 31.29 KG/M2 | WEIGHT: 171.1 LBS

## 2022-07-28 DIAGNOSIS — O09.899 SUPERVISION OF OTHER HIGH RISK PREGNANCY, ANTEPARTUM: Primary | ICD-10-CM

## 2022-07-28 DIAGNOSIS — Z23 NEED FOR TDAP VACCINATION: ICD-10-CM

## 2022-07-28 LAB
ERYTHROCYTE [DISTWIDTH] IN BLOOD BY AUTOMATED COUNT: 14.5 % (ref 10–15)
GLUCOSE 1H P 50 G GLC PO SERPL-MCNC: 184 MG/DL (ref 70–129)
HCT VFR BLD AUTO: 34.9 % (ref 35–47)
HGB BLD-MCNC: 11.2 G/DL (ref 11.7–15.7)
MCH RBC QN AUTO: 26 PG (ref 26.5–33)
MCHC RBC AUTO-ENTMCNC: 32.1 G/DL (ref 31.5–36.5)
MCV RBC AUTO: 81 FL (ref 78–100)
PLATELET # BLD AUTO: 232 10E3/UL (ref 150–450)
RBC # BLD AUTO: 4.31 10E6/UL (ref 3.8–5.2)
T PALLIDUM AB SER QL: NONREACTIVE
WBC # BLD AUTO: 12.7 10E3/UL (ref 4–11)

## 2022-07-28 PROCEDURE — 90715 TDAP VACCINE 7 YRS/> IM: CPT | Performed by: OBSTETRICS & GYNECOLOGY

## 2022-07-28 PROCEDURE — 82950 GLUCOSE TEST: CPT | Performed by: OBSTETRICS & GYNECOLOGY

## 2022-07-28 PROCEDURE — 99207 PR PRENATAL VISIT: CPT | Performed by: OBSTETRICS & GYNECOLOGY

## 2022-07-28 PROCEDURE — 85027 COMPLETE CBC AUTOMATED: CPT | Performed by: OBSTETRICS & GYNECOLOGY

## 2022-07-28 PROCEDURE — 36415 COLL VENOUS BLD VENIPUNCTURE: CPT | Performed by: OBSTETRICS & GYNECOLOGY

## 2022-07-28 PROCEDURE — 86780 TREPONEMA PALLIDUM: CPT | Performed by: OBSTETRICS & GYNECOLOGY

## 2022-07-28 PROCEDURE — 90471 IMMUNIZATION ADMIN: CPT | Performed by: OBSTETRICS & GYNECOLOGY

## 2022-07-28 NOTE — PROGRESS NOTES
Aleyda Nicole is a 36 year old female, 28w2d, Estimated Date of Delivery: Oct 18, 2022 who presents for prenatal care.    Good fetal movement.  Patient was recently seen in urgent care for shortness of breath and an acute exacerbation of asthma and placed on prednisone.  Her breathing is better today.  We discussed asthma in pregnancy.  I also reviewed her normal thyroid functions    A/P: Intrauterine pregnancy 28-2/7 weeks gestation    Weight gain reviewed with the patient today    History of metastatic papillary thyroid carcinoma status post radical neck dissection postoperative hypothyroidism on replacement doing well    Advanced maternal age    Previous  section x2 patient desires a repeat    History of A1 GDM in her previous pregnancy we will do a blood sugar screen today    Signs and symptoms of  labor and concern discussed the patient will call Tdap to be done today

## 2022-07-28 NOTE — RESULT ENCOUNTER NOTE
Please inform the patient of the abnormal 1 hour blood sugar screen and schedule her for a 3-hour OGTT  Thank you for your help with this

## 2022-07-28 NOTE — PATIENT INSTRUCTIONS
You can reach your [facility] Care Team any time of the day by calling [phone]. This number will put you in touch with the 24 hour nurse line if the clinic is closed.    To contact your OB/GYN Surgery Scheduler please call [phone]. This is a direct number for your care team between 8 a.m. and 4 p.m. Monday through Friday.    [facility] is open for your convenience: [phone]  Monday through Friday 8 a.m. to 8:30 p.m.  Saturday 9 a.m. to 6 p.m.  Sunday Noon to 6 p.m.    They are closed on all major holidays.

## 2022-07-28 NOTE — NURSING NOTE
"Chief Complaint   Patient presents with     Prenatal Care     28 weeks 2 days, no c/o VB, LoF, cramping/contractions. Feeling FM daily. Patient is doing 1 hour GTT and labs today, as well as getting tdap.        Initial /80   Wt 77.6 kg (171 lb 1.6 oz)   LMP 2022 (Within Days)   BMI 31.29 kg/m   Estimated body mass index is 31.29 kg/m  as calculated from the following:    Height as of 3/2/22: 1.575 m (5' 2\").    Weight as of this encounter: 77.6 kg (171 lb 1.6 oz).  BP completed using cuff size: regular    Questioned patient about current smoking habits.  Pt. has never smoked.          The following HM Due: NONE      Geraldo Lay CMA               "

## 2022-08-03 ENCOUNTER — LAB (OUTPATIENT)
Dept: LAB | Facility: CLINIC | Age: 37
End: 2022-08-03
Payer: COMMERCIAL

## 2022-08-03 DIAGNOSIS — O09.899 SUPERVISION OF OTHER HIGH RISK PREGNANCY, ANTEPARTUM: ICD-10-CM

## 2022-08-03 LAB
GESTATIONAL GTT 1 HR POST DOSE: 159 MG/DL (ref 60–179)
GESTATIONAL GTT 2 HR POST DOSE: 139 MG/DL (ref 60–154)
GESTATIONAL GTT 3 HR POST DOSE: 149 MG/DL (ref 60–139)
GLUCOSE P FAST SERPL-MCNC: 99 MG/DL (ref 60–94)

## 2022-08-03 PROCEDURE — 82952 GTT-ADDED SAMPLES: CPT

## 2022-08-03 PROCEDURE — 36415 COLL VENOUS BLD VENIPUNCTURE: CPT

## 2022-08-03 PROCEDURE — 82951 GLUCOSE TOLERANCE TEST (GTT): CPT

## 2022-08-04 ENCOUNTER — TELEPHONE (OUTPATIENT)
Dept: OBGYN | Facility: CLINIC | Age: 37
End: 2022-08-04

## 2022-08-04 DIAGNOSIS — O24.419 GESTATIONAL DIABETES MELLITUS (GDM) IN THIRD TRIMESTER, GESTATIONAL DIABETES METHOD OF CONTROL UNSPECIFIED: Primary | ICD-10-CM

## 2022-08-04 NOTE — RESULT ENCOUNTER NOTE
Please advise the patient of the abnormal 3-hour OGTT and helper obtain an appointment with the diabetic educator to begin gestational diabetic teaching and blood sugar monitoring

## 2022-08-05 NOTE — TELEPHONE ENCOUNTER
Diabetes Education Scheduling Outreach #2:    Call to patient to schedule. No answer/busy.        Adela Boles  [facility]  Diabetes and Nutrition Scheduling

## 2022-08-12 ENCOUNTER — VIRTUAL VISIT (OUTPATIENT)
Dept: EDUCATION SERVICES | Facility: CLINIC | Age: 37
End: 2022-08-12
Payer: COMMERCIAL

## 2022-08-12 DIAGNOSIS — O24.419 GESTATIONAL DIABETES MELLITUS (GDM) IN THIRD TRIMESTER, GESTATIONAL DIABETES METHOD OF CONTROL UNSPECIFIED: ICD-10-CM

## 2022-08-12 PROCEDURE — G0108 DIAB MANAGE TRN  PER INDIV: HCPCS | Mod: AE

## 2022-08-12 RX ORDER — LANCETS
EACH MISCELLANEOUS
Qty: 100 EACH | Refills: 6 | Status: SHIPPED | OUTPATIENT
Start: 2022-08-12 | End: 2022-11-07

## 2022-08-12 NOTE — LETTER
8/12/2022         RE: Aleyda Nicole  [address]  [place] [address]        Dear Colleague,    Thank you for referring your patient, Aleyda Nicole, to the [facility] DIABETES EDUC[facility] [place]. Please see a copy of my visit note below.    Diabetes Self-Management Education & Support  Video Visit: 36 minutes    SUBJECTIVE/OBJECTIVE:  Presents for education related to gestational diabetes.    Accompanied by: Self  Diabetes management related comments/concerns: is more sleepy  Gestational weeks: 30 weeks  Hospital planned [facility] delivery: [facility]  Next OB Visit Date: 08/25/22  Number of previous pregnancies: 7  Had any babies over 9 lbs: No  Previously had Gestational Diabetes: Yes  Had Diabetes Education before: Yes  Previous insulin or other diabetes medication during that pregnancy: No  Have you ever had thyroid problems or taken thyroid medication?: (!) Yes  Heart disease, mitral valve prolapse or rheumatic fever?: No  Hypertension : No  High Cholesterol: No  High Triglycerides: No  Do you use tobacco products?: No  Do you drink beer, wine or hard liquor?: No    Cultural Influences/Ethnic Background:  Not  or       Estimated Date of Delivery: Oct 18, 2022    1 hour OGTT  Lab Results   Component Value Date    GLU1 184 (H) 07/28/2022         3 hour OGTT    Fasting  Lab Results   Component Value Date    GTTGF 99 (H) 08/03/2022       1 hour  Lab Results   Component Value Date    GTTG1 159 08/03/2022       2 hour  Lab Results   Component Value Date    GTTG2 139 08/03/2022       3 hour  Lab Results   Component Value Date    GTTG3 149 (H) 08/03/2022       Lifestyle and Health Behaviors:  Pre-pregnancy weight (lbs): 162  Exercise:: Yes  On average, minutes per day of exercise at this level: 10  How intense was your typical exercise? : Light (like stretching or slow walking)  Meals include: Lunch, Dinner, Evening Snack  Lunch: small amount of rice 1/4-1/2 C with steamed veggies-  sometimes pork, chicken or beef occasional fish  Dinner: rice 1/4-1/2C steamed veggies- sometimes pork, chicken or beef occasional fish  Snacks: apple or peach OR yogurt with almond and raisin  Beverages: Water, Milk (almond milk 1/2-1C at a time)  Pre-malik vitamin?: Yes  Supplements?: No  Experiencing nausea?: No  Experiencing heartburn?: No    Healthy Coping:  Stage of change: ACTION (Actively working towards change)    Current Management:  Taking medications for gestational diabetes?: No    ASSESSMENT:  Aleyda has a Hx of GDM, has 2 children.  Previous GDM controlled with diet and exercise.  Has gained about 10 lbs so far this pregnancy.  Had COVID and food poisoning during the beginning of pregnancy.  Tries to eat larger portions of veggies instead of rice.  Remembers portion control and the benefits of exercise.  Meter kit with supplies and ketone strips ordered.    INTERVENTION:  Patient was instructed on generic meter and was able to provide an accurate return demonstration.     Educational topics covered today:  GDM diagnosis, pathophysiology, Risks and Complications of GDM, Means of controlling GDM, Using a Blood Glucose Monitor, Blood Glucose Goals, Logging and Interpreting Glucose Results, Ketone Testing, When to Call a Diabetes Educator or OB Provider, Healthy Eating During Pregnancy, Counting Carbohydrates, Meal Planning for GDM, and Physical Activity    Educational materials provided today:   [facility] Understanding Gestational Diabetes  GDM Log Book  [employer]      Pt verbalized understanding of concepts discussed and recommendations provided today.     PLAN:  Check glucose 4 times daily, before breakfast and 1 hour after each meal.     Check Ketones daily for one week, if negative, reduce testing to once a week.     Physical activity recommended: walking or low impact activity as able.    Meal plan: 2-3 carbs (30-45 grams) at breakfast, 3-4 carbs (45-60 grams) at lunch, 3-4 carbs (45-60 grams) at  supper, 1-2 carbs (15-30 grams) at 3 snacks a day.  Follow consistent CHO meal plan, eat CHO and protein/fat at all meals/snacks.    Call/e-mail/MyChart message diabetes educator if 3 or more blood sugars are above the goal in 1 week, if ketones are positive, or with questions/concerns.    Tami Almonte RDN, LD  Outpatient Diabetes Education  Adult Diabetes Education Triage [phone]    Time Spent: 36 minutes  Encounter Type: Individual    Any diabetes medication dose changes were made via the CDE Protocol and Collaborative Practice Agreement with the patient's referring provider and OB/GYN provider. A copy of this encounter was shared with the provider.

## 2022-08-12 NOTE — PROGRESS NOTES
Diabetes Self-Management Education & Support  Video Visit: 36 minutes    SUBJECTIVE/OBJECTIVE:  Presents for education related to gestational diabetes.    Accompanied by: Self  Diabetes management related comments/concerns: is more sleepy  Gestational weeks: 30 weeks  [facility]  Next OB Visit Date: 22  Number of previous pregnancies: 7  Had any babies over 9 lbs: No  Previously had Gestational Diabetes: Yes  Had Diabetes Education before: Yes  Previous insulin or other diabetes medication during that pregnancy: No  Have you ever had thyroid problems or taken thyroid medication?: (!) Yes  Heart disease, mitral valve prolapse or rheumatic fever?: No  Hypertension : No  High Cholesterol: No  High Triglycerides: No  Do you use tobacco products?: No  Do you drink beer, wine or hard liquor?: No    Cultural Influences/Ethnic Background:  Not  or       Estimated Date of Delivery: Oct 18, 2022    1 hour OGTT  Lab Results   Component Value Date    GLU1 184 (H) 2022         3 hour OGTT    Fasting  Lab Results   Component Value Date    GTTGF 99 (H) 2022       1 hour  Lab Results   Component Value Date    GTTG1 159 2022       2 hour  Lab Results   Component Value Date    GTTG2 139 2022       3 hour  Lab Results   Component Value Date    GTTG3 149 (H) 2022       Lifestyle and Health Behaviors:  Pre-pregnancy weight (lbs): 162  Exercise:: Yes  On average, minutes per day of exercise at this level: 10  How intense was your typical exercise? : Light (like stretching or slow walking)  Meals include: Lunch, Dinner, Evening Snack  Lunch: small amount of rice 1/4-1/2 C with steamed veggies- sometimes pork, chicken or beef occasional fish  Dinner: rice 1/4-1/2C steamed veggies- sometimes pork, chicken or beef occasional fish  Snacks: apple or peach OR yogurt with almond and raisin  Beverages: Water, Milk (almond milk 1/2-1C at a time)  Pre-  vitamin?: Yes  Supplements?: No  Experiencing nausea?: No  Experiencing heartburn?: No    Healthy Coping:  Stage of change: ACTION (Actively working towards change)    Current Management:  Taking medications for gestational diabetes?: No    ASSESSMENT:  Aleyda has a Hx of GDM, has 2 children.  Previous GDM controlled with diet and exercise.  Has gained about 10 lbs so far this pregnancy.  Had COVID and food poisoning during the beginning of pregnancy.  Tries to eat larger portions of veggies instead of rice.  Remembers portion control and the benefits of exercise.  Meter kit with supplies and ketone strips ordered.    INTERVENTION:  Patient was instructed on generic meter and was able to provide an accurate return demonstration.     Educational topics covered today:  GDM diagnosis, pathophysiology, Risks and Complications of GDM, Means of controlling GDM, Using a Blood Glucose Monitor, Blood Glucose Goals, Logging and Interpreting Glucose Results, Ketone Testing, When to Call a Diabetes Educator or OB Provider, Healthy Eating During Pregnancy, Counting Carbohydrates, Meal Planning for GDM, and Physical Activity    Educational materials provided today:   Mouthcard Understanding Gestational Diabetes  GDM Log Book  Sharps Disposal      Pt verbalized understanding of concepts discussed and recommendations provided today.     PLAN:  Check glucose 4 times daily, before breakfast and 1 hour after each meal.     Check Ketones daily for one week, if negative, reduce testing to once a week.     Physical activity recommended: walking or low impact activity as able.    Meal plan: 2-3 carbs (30-45 grams) at breakfast, 3-4 carbs (45-60 grams) at lunch, 3-4 carbs (45-60 grams) at supper, 1-2 carbs (15-30 grams) at 3 snacks a day.  Follow consistent CHO meal plan, eat CHO and protein/fat at all meals/snacks.    Call/e-mail/MyChart message diabetes educator if 3 or more blood sugars are above the goal in 1 week, if ketones are positive,  or with questions/concerns.    Tami Almonte RDN, LD  Outpatient Diabetes Education  Adult Diabetes Education Triage [phone]    Time Spent: 36 minutes  Encounter Type: Individual    Any diabetes medication dose changes were made via the CDE Protocol and Collaborative Practice Agreement with the patient's referring provider and OB/GYN provider. A copy of this encounter was shared with the provider.

## 2022-08-22 ENCOUNTER — VIRTUAL VISIT (OUTPATIENT)
Dept: EDUCATION SERVICES | Facility: CLINIC | Age: 37
End: 2022-08-22
Payer: COMMERCIAL

## 2022-08-22 DIAGNOSIS — O24.419 GESTATIONAL DIABETES MELLITUS (GDM) IN THIRD TRIMESTER, GESTATIONAL DIABETES METHOD OF CONTROL UNSPECIFIED: Primary | ICD-10-CM

## 2022-08-22 PROCEDURE — 98967 PH1 ASSMT&MGMT NQHP 11-20: CPT | Mod: 95

## 2022-08-22 NOTE — PROGRESS NOTES
Diabetes Self-Management Education & Support  Type of service:  Video Visit    If the video visit is dropped, the video visit invitation should be resent by: Text to cell phone: [phone]    Originating Location (pt. Location): Home  Distant Location (provider location): Home  Mode of Communication:  Video Conference via [place]    Video Start Time: 10:00  Video End Time (time video stopped): 10:12    How would patient like to obtain AVS? MyChart    SUBJECTIVE/OBJECTIVE:  Presents for education related to gestational diabetes.    Accompanied by: Self  Gestational weeks: 32w  Next OB Visit Date: 08/25/22  Number of previous pregnancies: 7  Had any babies over 9 lbs: No  Previously had Gestational Diabetes: Yes  Had Diabetes Education before: Yes  Previous insulin or other diabetes medication during that pregnancy: No  Have you ever had thyroid problems or taken thyroid medication?: (!) Yes  Heart disease, mitral valve prolapse or rheumatic fever?: No  Hypertension : No  High Cholesterol: No  High Triglycerides: No  Do you use tobacco products?: No  Do you drink beer, wine or hard liquor?: No    Cultural Influences/Ethnic Background:  Not  or     LMP 01/28/2022 (Within Days)         Estimated Date of Delivery: Oct 18, 2022    Blood Glucose/Ketone Log:    Date Ketones Fasting Post Breakfast Post Lunch Post Supper   8/16 Neg/trace daily 83 110 125 131   8/17  83 115 119 111   8/18  90 138 94 112   8/19  90 118 128 128   8/20  89 126 133 132   8/21  89 112 137 138   8/22            Lifestyle and Health Behaviors:  Pre-pregnancy weight (lbs): 162  Exercise:: Yes  On average, minutes per day of exercise at this level: 10  How intense was your typical exercise? : Light (like stretching or slow walking)  Meal planning/habits: Carb counting  Meals include: Lunch, Dinner, Evening Snack, Morning Snack, Afternoon Snack, Breakfast  Lunch: small amount of rice 1/4-1/2 C with steamed veggies- sometimes pork,  chicken or beef occasional fish  Dinner: rice 1/4-1/2C steamed veggies- sometimes pork, chicken or beef occasional fish  Snacks: apple or peach OR yogurt with almond and raisin  Beverages: Water, Milk (almond milk 1/2-1C at a time)  Pre-malik vitamin?: Yes  Supplements?: No  Experiencing nausea?: No  Experiencing heartburn?: No    Healthy Coping:  Stage of change: ACTION (Actively working towards change)    Current Management:  Taking medications for gestational diabetes?: No    ASSESSMENT:  Ketones: neg.   Fasting blood glucoses: 100% in target.  After breakfast: 100% in target.  After lunch: 100% in target.  After dinner: 100% in target.    Doing well with meal plan. No questions at this time    INTERVENTION:  Educational topics covered today:  What to expect after delivery, Future testing for Type 2 diabetes (2 hour OGTT at 6 week post-partum check-up and annual fasting blood glucose level), Risk of GDM and planning ahead for future pregnancies, Recommended lifestyle interventions for reducing the risk of Type 2 Diabetes, When to Call a Diabetes Educator or OB Provider    Educational Materials provided today:  [place] Preventing Diabetes    PLAN:  Check glucose 4 times daily.  Check ketones once a week when readings are consistently negative.  Continue with recommended physical activity.  Continue to follow recommended meal plan: 2-3 carbs at breakfast, 3-4 carbs at lunch, 3-4 carbs at supper, 1-2 carbs at snacks.  Follow consistent CHO meal plan, eat CHO and protein/fat at all meals/snacks.    Call/e-mail/MyChart message diabetes educator if 3 or more blood sugars are above the goal in 1 week or if ketones are positive.    NIRMAL Glass  Time Spent: 12 minutes  Encounter Type: Individual    Any diabetes medication dose changes were made via the CDE Protocol and Collaborative Practice Agreement with the patient's OB/GYN provider. A copy of this encounter was shared with the provider.

## 2022-08-24 ENCOUNTER — HOSPITAL ENCOUNTER (OUTPATIENT)
Dept: ULTRASOUND IMAGING | Facility: CLINIC | Age: 37
Discharge: HOME | End: 2022-08-24
Attending: OBSTETRICS & GYNECOLOGY
Payer: COMMERCIAL

## 2022-08-24 ENCOUNTER — OFFICE VISIT (OUTPATIENT)
Dept: MATERNAL FETAL MEDICINE | Facility: CLINIC | Age: 37
End: 2022-08-24
Attending: OBSTETRICS & GYNECOLOGY
Payer: COMMERCIAL

## 2022-08-24 DIAGNOSIS — O98.512 COVID-19 AFFECTING PREGNANCY IN SECOND TRIMESTER: ICD-10-CM

## 2022-08-24 DIAGNOSIS — U07.1 COVID-19 AFFECTING PREGNANCY IN SECOND TRIMESTER: ICD-10-CM

## 2022-08-24 DIAGNOSIS — O24.410 DIET CONTROLLED GESTATIONAL DIABETES MELLITUS (GDM) IN THIRD TRIMESTER: ICD-10-CM

## 2022-08-24 DIAGNOSIS — U07.1 COVID-19 AFFECTING PREGNANCY IN THIRD TRIMESTER: Primary | ICD-10-CM

## 2022-08-24 DIAGNOSIS — O98.513 COVID-19 AFFECTING PREGNANCY IN THIRD TRIMESTER: Primary | ICD-10-CM

## 2022-08-24 PROCEDURE — 76816 OB US FOLLOW-UP PER FETUS: CPT | Mod: 26 | Performed by: OBSTETRICS & GYNECOLOGY

## 2022-08-24 PROCEDURE — 76816 OB US FOLLOW-UP PER FETUS: CPT

## 2022-08-24 NOTE — PROGRESS NOTES
"Please see \"Imaging\" tab under \"Chart Review\" for details of today's US at the [facility].    Carlos Brown MD  Maternal-Fetal Medicine    "

## 2022-08-25 ENCOUNTER — PRENATAL OFFICE VISIT (OUTPATIENT)
Dept: OBGYN | Facility: CLINIC | Age: 37
End: 2022-08-25
Payer: COMMERCIAL

## 2022-08-25 VITALS — DIASTOLIC BLOOD PRESSURE: 76 MMHG | SYSTOLIC BLOOD PRESSURE: 120 MMHG | WEIGHT: 170 LBS | BODY MASS INDEX: 31.09 KG/M2

## 2022-08-25 DIAGNOSIS — Z98.891 PREVIOUS CESAREAN SECTION: ICD-10-CM

## 2022-08-25 DIAGNOSIS — O24.410 GDM (GESTATIONAL DIABETES MELLITUS), CLASS A1: ICD-10-CM

## 2022-08-25 DIAGNOSIS — O09.899 SUPERVISION OF OTHER HIGH RISK PREGNANCY, ANTEPARTUM: ICD-10-CM

## 2022-08-25 DIAGNOSIS — O09.529 ANTEPARTUM MULTIGRAVIDA OF ADVANCED MATERNAL AGE: Primary | ICD-10-CM

## 2022-08-25 PROCEDURE — 99207 PR PRENATAL VISIT: CPT | Performed by: OBSTETRICS & GYNECOLOGY

## 2022-08-25 NOTE — PROGRESS NOTES
Aleyda Nicole is a 36 year old female, 32w2d, Estimated Date of Delivery: Oct 18, 2022 who presents for prenatal care.    Good fetal movement.  Patient states her blood sugar this morning was 83.  Her 1 hour postprandials have ranged from 110-132.  She is controlling her blood sugars with diet.  We discussed timing of a repeat  section.  The patient was 39 weeks on 10/11/2022 but she would like to have her baby born on an even day.  We discussed scheduling her surgery for Friday, 10/14/2022.  Recent M ultrasound from yesterday reviewed.  She is scheduled for a follow-up on 2022    A/P: Intrauterine pregnancy 32-2/7 weeks gestation    A1 GDM blood sugars in good control.    History of COVID this pregnancy.  Patient states that when she lays down at night she needs to use her inhaler otherwise during the day is doing well.  No fevers chills or other concerns.    Signs and symptoms of labor and concern discussed the patient will call

## 2022-08-25 NOTE — Clinical Note
Surgeon: Dr Luis Garcia Assist:   Location: [facility] Date/time preference:  10/14/22  Surgery: Repeat  section Length of Surgery: 1 hour Diagnosis: Intrauterine pregnancy at term, previous  section x2, gestational diabetes Anesthesia type:  SPINAL  Special instructions / equipment:   Am admit or same day: AM ADMIT Bowel prep: No Pre op: SURGEON Office visit with surgeon prior to surgery: No Schedule Post Op: 1-2 weeks

## 2022-08-25 NOTE — PATIENT INSTRUCTIONS
You can reach your [facility] Care Team any time of the day by calling [phone]. This number will put you in touch with the 24 hour nurse line if the clinic is closed.    To contact your OB/GYN Station Coordinator/Surgery Scheduler please call [phone]. This is a direct number for your care team between 8 a.m. and 4 p.m. Monday through Friday.    [facility] is open for your convenience:  Monday through Friday 8 a.m. to 6 p.m.  Closed weekends and all major holidays.

## 2022-08-25 NOTE — NURSING NOTE
"Chief Complaint   Patient presents with     Prenatal Care       Initial /76   Wt 77.1 kg (170 lb)   LMP 2022 (Within Days)   BMI 31.09 kg/m   Estimated body mass index is 31.09 kg/m  as calculated from the following:    Height as of 3/2/22: 1.575 m (5' 2\").    Weight as of this encounter: 77.1 kg (170 lb).  BP completed using cuff size: regular    Questioned patient about current smoking habits.  Pt. has never smoked.          32w2d      Sarah Severino, [facility]              "

## 2022-08-26 ENCOUNTER — LAB (OUTPATIENT)
Dept: LAB | Facility: CLINIC | Age: 37
End: 2022-08-26
Payer: COMMERCIAL

## 2022-08-26 DIAGNOSIS — C73 RECURRENT THYROID CANCER (H): ICD-10-CM

## 2022-08-26 DIAGNOSIS — E89.0 POSTOPERATIVE HYPOTHYROIDISM: ICD-10-CM

## 2022-08-26 LAB
T4 FREE SERPL-MCNC: 1.28 NG/DL (ref 0.76–1.46)
TSH SERPL DL<=0.005 MIU/L-ACNC: 0.56 MU/L (ref 0.4–4)

## 2022-08-26 PROCEDURE — 36415 COLL VENOUS BLD VENIPUNCTURE: CPT

## 2022-08-26 PROCEDURE — 84439 ASSAY OF FREE THYROXINE: CPT

## 2022-08-26 PROCEDURE — 84443 ASSAY THYROID STIM HORMONE: CPT

## 2022-08-29 ENCOUNTER — TELEPHONE (OUTPATIENT)
Dept: OBGYN | Facility: CLINIC | Age: 37
End: 2022-08-29

## 2022-08-29 ENCOUNTER — PREP FOR PROCEDURE (OUTPATIENT)
Dept: OBGYN | Facility: CLINIC | Age: 37
End: 2022-08-29

## 2022-08-29 DIAGNOSIS — Z98.891 PREVIOUS CESAREAN SECTION: Primary | ICD-10-CM

## 2022-08-29 DIAGNOSIS — O24.419 GESTATIONAL DIABETES: ICD-10-CM

## 2022-08-29 NOTE — TELEPHONE ENCOUNTER
Surgeon: Dr Luis Garcia   Assist:     Location: [facility]   Date/time preference:  10/14/22     Surgery: Repeat  section   Length of Surgery: 1 hour   Diagnosis: Intrauterine pregnancy at term, previous  section x2, gestational diabetes   Anesthesia type:  SPINAL     Special instructions / equipment:     Am admit or same day: AM ADMIT   Bowel prep: No   Pre op: SURGEON   Office visit with surgeon prior to surgery: No   Schedule Post Op: 1-2 weeks

## 2022-08-29 NOTE — TELEPHONE ENCOUNTER
Type of surgery: repeat  section  Location of surgery: [facility]  Date and time of surgery: 10/14/22 @ 9:00 am  Surgeon: Dr. Garcia  Pre-Op Appt Date: @ prenatal appointment  Post-Op Appt Date: 22   Packet sent out: Yes  Pre-cert/Authorization completed:  No  Date: 22

## 2022-09-09 ENCOUNTER — PREP FOR PROCEDURE (OUTPATIENT)
Dept: OBGYN | Facility: CLINIC | Age: 37
End: 2022-09-09

## 2022-09-09 ENCOUNTER — PRENATAL OFFICE VISIT (OUTPATIENT)
Dept: OBGYN | Facility: CLINIC | Age: 37
End: 2022-09-09
Payer: COMMERCIAL

## 2022-09-09 VITALS — BODY MASS INDEX: 31.64 KG/M2 | WEIGHT: 173 LBS | SYSTOLIC BLOOD PRESSURE: 112 MMHG | DIASTOLIC BLOOD PRESSURE: 70 MMHG

## 2022-09-09 DIAGNOSIS — O24.410 GDM (GESTATIONAL DIABETES MELLITUS), CLASS A1: Primary | ICD-10-CM

## 2022-09-09 DIAGNOSIS — O09.529 ANTEPARTUM MULTIGRAVIDA OF ADVANCED MATERNAL AGE: ICD-10-CM

## 2022-09-09 DIAGNOSIS — O09.899 SUPERVISION OF OTHER HIGH RISK PREGNANCY, ANTEPARTUM: ICD-10-CM

## 2022-09-09 PROCEDURE — 99207 PR PRENATAL VISIT: CPT | Performed by: OBSTETRICS & GYNECOLOGY

## 2022-09-09 NOTE — NURSING NOTE
"Chief Complaint   Patient presents with     Prenatal Care     34 3/7       Initial /70   Wt 78.5 kg (173 lb)   LMP 2022 (Within Days)   BMI 31.64 kg/m   Estimated body mass index is 31.64 kg/m  as calculated from the following:    Height as of 3/2/22: 1.575 m (5' 2\").    Weight as of this encounter: 78.5 kg (173 lb).  BP completed using cuff size: regular    Questioned patient about current smoking habits.  Pt. has never smoked.          Sarah Severino, [facility]            "

## 2022-09-09 NOTE — PROGRESS NOTES
Aleyda Nicole is a 36 year old female, 34w3d, Estimated Date of Delivery: Oct 18, 2022 who presents for prenatal care.    Good fetal movement.  Patient desires a bilateral salpingectomy for permanent sterilization at the time of her repeat  section.  The risks benefits and alt to perm sterilization including the <100% effectiveness were thoroughly discussed.  I think she has a good understanding and all her quesitons have been answered.  We discussed bilateral salpingectomy.  The MA tubal consent form was signed today 2022    A/P: Thyroid functions checked 2022 normal    A1 gestational diabetes.  Her fasting blood sugar this morning was 91 her 1 hour postprandials have been less than 140.    Signs and symptoms of labor and concern discussed the patient will call.  Follow-up [facility] ultrasound is scheduled for 2022

## 2022-09-09 NOTE — PATIENT INSTRUCTIONS
You can reach your [facility] Care Team any time of the day by calling [phone]. This number will put you in touch with the 24 hour nurse line if the clinic is closed.    To contact your OB/GYN Station Coordinator/Surgery Scheduler please call [phone]. This is a direct number for your care team between 8 a.m. and 4 p.m. Monday through Friday.    [facility] is open for your convenience:  Monday through Friday 8 a.m. to 6 p.m.  Closed weekends and all major holidays.

## 2022-09-09 NOTE — TELEPHONE ENCOUNTER
Luis Garcia MD Kamleiter, Jody L Jody   can you add bilateral salpingectomy to her scheduled repeat  section thank you    Spoke with Kristi @ [facility] Surgery Scheduling and added bilateral salpingectomy to the  scheduled on 10/14/22.

## 2022-09-21 ENCOUNTER — HOSPITAL ENCOUNTER (OUTPATIENT)
Dept: ULTRASOUND IMAGING | Facility: CLINIC | Age: 37
Discharge: HOME | End: 2022-09-21
Attending: OBSTETRICS & GYNECOLOGY
Payer: COMMERCIAL

## 2022-09-21 ENCOUNTER — OFFICE VISIT (OUTPATIENT)
Dept: MATERNAL FETAL MEDICINE | Facility: CLINIC | Age: 37
End: 2022-09-21
Attending: OBSTETRICS & GYNECOLOGY
Payer: COMMERCIAL

## 2022-09-21 DIAGNOSIS — U07.1 COVID-19 AFFECTING PREGNANCY IN THIRD TRIMESTER: Primary | ICD-10-CM

## 2022-09-21 DIAGNOSIS — O24.410 DIET CONTROLLED GESTATIONAL DIABETES MELLITUS (GDM) IN THIRD TRIMESTER: ICD-10-CM

## 2022-09-21 DIAGNOSIS — O98.513 COVID-19 AFFECTING PREGNANCY IN THIRD TRIMESTER: Primary | ICD-10-CM

## 2022-09-21 PROCEDURE — 76816 OB US FOLLOW-UP PER FETUS: CPT | Mod: 26 | Performed by: OBSTETRICS & GYNECOLOGY

## 2022-09-21 PROCEDURE — 76816 OB US FOLLOW-UP PER FETUS: CPT

## 2022-09-21 NOTE — RESULT ENCOUNTER NOTE
The results of this MFM ultrasound were discussed with the patient by the MFM specialist at the time of the exam

## 2022-09-21 NOTE — PROGRESS NOTES
The patient was seen for an ultrasound in the Maternal-Fetal Medicine Center at the [facility] today.  For a detailed report of the ultrasound examination, please see the ultrasound report which can be found under the imaging tab.    Ju Ricci MD  , OB/GYN  Maternal-Fetal Medicine  [phone] (Pager)

## 2022-09-22 ENCOUNTER — PRENATAL OFFICE VISIT (OUTPATIENT)
Dept: OBGYN | Facility: CLINIC | Age: 37
End: 2022-09-22
Payer: COMMERCIAL

## 2022-09-22 VITALS — DIASTOLIC BLOOD PRESSURE: 72 MMHG | BODY MASS INDEX: 32.19 KG/M2 | WEIGHT: 176 LBS | SYSTOLIC BLOOD PRESSURE: 110 MMHG

## 2022-09-22 DIAGNOSIS — O24.410 GDM (GESTATIONAL DIABETES MELLITUS), CLASS A1: ICD-10-CM

## 2022-09-22 DIAGNOSIS — O09.529 ANTEPARTUM MULTIGRAVIDA OF ADVANCED MATERNAL AGE: ICD-10-CM

## 2022-09-22 DIAGNOSIS — Z11.3 SCREEN FOR STD (SEXUALLY TRANSMITTED DISEASE): ICD-10-CM

## 2022-09-22 DIAGNOSIS — O09.899 SUPERVISION OF OTHER HIGH RISK PREGNANCY, ANTEPARTUM: Primary | ICD-10-CM

## 2022-09-22 PROCEDURE — 99207 PR PRENATAL VISIT: CPT | Performed by: OBSTETRICS & GYNECOLOGY

## 2022-09-22 PROCEDURE — 87653 STREP B DNA AMP PROBE: CPT | Performed by: OBSTETRICS & GYNECOLOGY

## 2022-09-22 NOTE — PATIENT INSTRUCTIONS
You can reach your [facility] Care Team any time of the day by calling [phone]. This number will put you in touch with the 24 hour nurse line if the clinic is closed.    To contact your OB/GYN Station Coordinator/Surgery Scheduler please call [phone]. This is a direct number for your care team between 8 a.m. and 4 p.m. Monday through Friday.    [facility] is open for your convenience:  Monday through Friday 8 a.m. to 6 p.m.  Closed weekends and all major holidays.

## 2022-09-22 NOTE — NURSING NOTE
"Chief Complaint   Patient presents with     Prenatal Care       Initial /72   Wt 79.8 kg (176 lb)   LMP 2022 (Within Days)   BMI 32.19 kg/m   Estimated body mass index is 32.19 kg/m  as calculated from the following:    Height as of 3/2/22: 1.575 m (5' 2\").    Weight as of this encounter: 79.8 kg (176 lb).  BP completed using cuff size: regular    Questioned patient about current smoking habits.  Pt. has never smoked.          36w2d        Sarah Severino, [facility]          "

## 2022-09-22 NOTE — PROGRESS NOTES
Aleyda Nicole is a 37 year old female, 36w2d, Estimated Date of Delivery: Oct 18, 2022 who presents for prenatal care.    Good fetal movement.  Patient states that her fasting blood sugars have been less than 90 and her postprandial blood sugars have been less than 140.  She is controlling her gestational diabetes with diet alone.  Patient states that oftentimes this time of year she notices some exacerbation of her asthma and uses an albuterol inhaler at bedtime.  I discussed with her that she can use the inhaler up to 4 times a day and if she notices symptoms getting worse to use it more frequently rather than waiting    A/P:  IUP 36.2 weeks GBS done signs and symptoms of concern discussed patient will call

## 2022-09-23 LAB — GP B STREP DNA SPEC QL NAA+PROBE: POSITIVE

## 2022-09-24 NOTE — RESULT ENCOUNTER NOTE
Aleyda, the culture that we did for the bacteria group B strep is returned positive.  I will give you some additional antibiotics at the time of your surgery to cover this organism.  Please let me know if you have any questions  Thank you  Luis Garcia M.D.

## 2022-09-29 ENCOUNTER — PRENATAL OFFICE VISIT (OUTPATIENT)
Dept: OBGYN | Facility: CLINIC | Age: 37
End: 2022-09-29
Payer: COMMERCIAL

## 2022-09-29 VITALS — DIASTOLIC BLOOD PRESSURE: 76 MMHG | WEIGHT: 174 LBS | BODY MASS INDEX: 31.83 KG/M2 | SYSTOLIC BLOOD PRESSURE: 106 MMHG

## 2022-09-29 DIAGNOSIS — O09.899 SUPERVISION OF OTHER HIGH RISK PREGNANCY, ANTEPARTUM: ICD-10-CM

## 2022-09-29 DIAGNOSIS — O24.410 GDM (GESTATIONAL DIABETES MELLITUS), CLASS A1: Primary | ICD-10-CM

## 2022-09-29 DIAGNOSIS — Z98.891 PREVIOUS CESAREAN SECTION: ICD-10-CM

## 2022-09-29 PROCEDURE — 99207 PR PRENATAL VISIT: CPT | Performed by: OBSTETRICS & GYNECOLOGY

## 2022-09-29 NOTE — NURSING NOTE
"Chief Complaint   Patient presents with     Prenatal Care       Initial /76   Wt 78.9 kg (174 lb)   LMP 2022 (Within Days)   BMI 31.83 kg/m   Estimated body mass index is 31.83 kg/m  as calculated from the following:    Height as of 3/2/22: 1.575 m (5' 2\").    Weight as of this encounter: 78.9 kg (174 lb).  BP completed using cuff size: regular    Questioned patient about current smoking habits.  Pt. has never smoked.          37w2d      Sarah Severino, [facility]            "

## 2022-09-29 NOTE — PATIENT INSTRUCTIONS
You can reach your [facility] Care Team any time of the day by calling [phone]. This number will put you in touch with the 24 hour nurse line if the clinic is closed.    To contact your OB/GYN Station Coordinator/Surgery Scheduler please call [phone]. This is a direct number for your care team between 8 a.m. and 4 p.m. Monday through Friday.    [facility] is open for your convenience:  Monday through Friday 8 a.m. to 6 p.m.  Closed weekends and all major holidays.

## 2022-09-29 NOTE — PROGRESS NOTES
Aleyda Nicole is a 37 year old female, 37w2d, Estimated Date of Delivery: Oct 18, 2022 who presents for prenatal care.    Good fetal movement.  The patient states that her fasting blood sugars have all been less than 95 and her postprandials are less than 140.  Her fasting blood sugar this morning was 91 and her most recent 1 hour postprandial was 135.  She is still controlling her blood sugars with diet alone.  Good fetal movement.  I reviewed the results again of the most recent MFM ultrasound from 2022    A/P: Intrauterine pregnancy 37-2/7 weeks gestation  Whites classification A1 gestational diabetes in good control    Positive GBS.  Results reviewed treatment recommendations reviewed with the patient    Previous  section x2 desires a repeat  section with bilateral salpingectomy.  Informed consent is signed on 2022.  The risks benefits and alt to perm sterilization including the <100% effectiveness were thoroughly discussed.  I think she has a good understanding and all her quesitons have been answered.  Risks, benefits, and alternative modes of therapy discussed at length. Pathophysiology of the disease process reviewed, all of the patients questions answered and informed consent obtained.

## 2022-10-06 ENCOUNTER — PRENATAL OFFICE VISIT (OUTPATIENT)
Dept: OBGYN | Facility: CLINIC | Age: 37
End: 2022-10-06
Payer: COMMERCIAL

## 2022-10-06 VITALS — DIASTOLIC BLOOD PRESSURE: 72 MMHG | SYSTOLIC BLOOD PRESSURE: 110 MMHG | BODY MASS INDEX: 32.19 KG/M2 | WEIGHT: 176 LBS

## 2022-10-06 DIAGNOSIS — O09.529 ANTEPARTUM MULTIGRAVIDA OF ADVANCED MATERNAL AGE: ICD-10-CM

## 2022-10-06 DIAGNOSIS — O24.410 GDM (GESTATIONAL DIABETES MELLITUS), CLASS A1: Primary | ICD-10-CM

## 2022-10-06 DIAGNOSIS — Z98.891 PREVIOUS CESAREAN SECTION: ICD-10-CM

## 2022-10-06 DIAGNOSIS — O09.899 SUPERVISION OF OTHER HIGH RISK PREGNANCY, ANTEPARTUM: ICD-10-CM

## 2022-10-06 PROCEDURE — 99207 PR PRENATAL VISIT: CPT | Performed by: OBSTETRICS & GYNECOLOGY

## 2022-10-06 NOTE — PROGRESS NOTES
Aleyda Nicole is a 37 year old female, 38w2d, Estimated Date of Delivery: Oct 18, 2022 who presents for prenatal care.    Good fetal movement.  Patient states that her fasting blood sugar this morning was 94.  Yesterday it was 108.  Her 1 hours of all been less than 140.  The patient had a frontal headache for 2 days that now has spontaneously resolved.  She denied any visual change or right upper quadrant discomfort.  Denies any known history of hypertension.  She feels it may have been a tension headache.  I also reviewed with the patient today pre and postop instructions and the risk benefits and alternatives to her scheduled repeat  section and bilateral salpingectomy scheduled for 2022.  Risks, benefits, and alternative modes of therapy discussed at length. Pathophysiology of the disease process reviewed, all of the patients questions answered and informed consent obtained.  The risks benefits and alt to perm sterilization including the <100% effectiveness were thoroughly discussed.  I think she has a good understanding and all her quesitons have been answered.  I gave the patient a detailed written outline of our discussion    Past Medical History:   Diagnosis Date     Gestational diabetes 2013     Hypocalcemia 2016     Influenza A      Papillary thyroid carcinoma     Papillary carcinoma, follicular variant with     Pneumonia     LLL     PONV (postoperative nausea and vomiting)      Postoperative hypothyroidism       labor      Uncomplicated asthma      Current Outpatient Medications   Medication     acetone urine (KETOSTIX) test strip     albuterol (PROAIR HFA/PROVENTIL HFA/VENTOLIN HFA) 108 (90 Base) MCG/ACT inhaler     blood glucose (NO BRAND SPECIFIED) test strip     blood glucose monitoring (NO BRAND SPECIFIED) meter device kit     fluticasone (FLOVENT HFA) 44 MCG/ACT inhaler     levothyroxine (SYNTHROID/LEVOTHROID) 150 MCG tablet     Prenatal  Vit-Fe Fumarate-FA (PRENATAL PO)     thin (NO BRAND SPECIFIED) lancets     No current facility-administered medications for this visit.     Past Surgical History:   Procedure Laterality Date      SECTION  10/26/2013    Procedure:  SECTION;  primary  section;  Surgeon: Rodney Mckee MD;  Location: [facility] L[facility]      SECTION N/A 2017    Procedure:  SECTION;  Surgeon: Hakeem Combs MD;  Location: [facility] L[facility]     DISSECTION RADICAL NECK Left 2016    Procedure: DISSECTION RADICAL NECK;  Surgeon: Vy Theodore MD;  Location: [facility] OR     DISSECTION RADICAL NECK MODIFIED Left 2016    Procedure: DISSECTION RADICAL NECK MODIFIED;  Surgeon: Luis Brown MD;  Location: [facility] OR     THYROIDECTOMY      Total, for cancer.      Family History   Problem Relation Age of Onset     Diabetes Mother      Cerebrovascular Disease Mother      Hypertension Mother      Diabetes Father 50     Hypertension Father      Genitourinary Problems Father         kidney disease     Coronary Artery Disease Father      Diabetes Brother      /72   Wt 79.8 kg (176 lb)   LMP 2022 (Within Days)   BMI 32.19 kg/m    Constitutional: healthy, alert and no distress  Head: Normocephalic. No masses, lesions, tenderness or abnormalities  Neck: Neck supple. No adenopathy. Thyroid symmetric, normal size,, Carotids without bruits.  Cardiovascular: negative, PMI normal. No lifts, heaves, or thrills. RRR. No murmurs, clicks gallops or rub  Respiratory: negative, Percussion normal. Good diaphragmatic excursion. Lungs clear  Gastrointestinal: Abdomen soft, non-tender. BS normal.  Gravid uterus consistent with dates.  [place] infant vertex presentation estimated fetal weight 7.4 pounds.  Fetal heart tones is noted  Genitourinary: Normal external genitalia without lesions and cervix a centimeter and a half dilated 75% effaced bag garcia intact [place] infant vertex presentation -2  station  Musculoskeletalextremities normal- no gross deformities noted, gait normal and normal muscle tone  Integument: no suspicious lesions or rashes  Neurologic: Gait normal. Reflexes normal and symmetric. Sensation grossly WNL.  Psychiatric: mentation appears normal and affect normal/bright    (O24.410) GDM (gestational diabetes mellitus), class A1  (primary encounter diagnosis)  Comment: Doing well acceptable control with diet  Plan: She will continue with blood sugar monitoring and call for aberrations.  No signs of diabetic fetopathy at this time    (O09.899) Supervision of other high risk pregnancy, antepartum  Comment: Patient scheduled for repeat  section with bilateral salpingectomy.  Pre and postop instructions reviewed and written outline given all questions answered informed consent obtained.  Plan: H&P done.  If patient has labor symptoms prior she will call otherwise we will present for her scheduled  on  as noted    (O09.529) Antepartum multigravida of advanced maternal age  Comment: As above  Plan: As above    (Z98.891) Previous  section  Comment: As above  Plan: As above

## 2022-10-06 NOTE — NURSING NOTE
"Chief Complaint   Patient presents with     Prenatal Care       Initial /72   Wt 79.8 kg (176 lb)   LMP 2022 (Within Days)   BMI 32.19 kg/m   Estimated body mass index is 32.19 kg/m  as calculated from the following:    Height as of 3/2/22: 1.575 m (5' 2\").    Weight as of this encounter: 79.8 kg (176 lb).  BP completed using cuff size: regular    Questioned patient about current smoking habits.  Pt. has never smoked.          38w2d      Saarh Severino, [facility]            "

## 2022-10-06 NOTE — PATIENT INSTRUCTIONS
You can reach your [place] Care Team any time of the day by calling [phone]. This number will put you in touch with the 24 hour nurse line if the clinic is closed.    To contact your OB/GYN Station Coordinator/Surgery Scheduler please call [phone]. This is a direct number for your care team between 8 a.m. and 4 p.m. Monday through Friday.    [facility] is open for your convenience:  Monday through Friday 8 a.m. to 6 p.m.  Closed weekends and all major holidays.

## 2022-10-12 ENCOUNTER — LAB (OUTPATIENT)
Dept: URGENT CARE | Facility: URGENT CARE | Age: 37
End: 2022-10-12
Payer: COMMERCIAL

## 2022-10-12 DIAGNOSIS — Z20.822 ENCOUNTER FOR LABORATORY TESTING FOR COVID-19 VIRUS: ICD-10-CM

## 2022-10-12 LAB — SARS-COV-2 RNA RESP QL NAA+PROBE: NEGATIVE

## 2022-10-12 PROCEDURE — U0003 INFECTIOUS AGENT DETECTION BY NUCLEIC ACID (DNA OR RNA); SEVERE ACUTE RESPIRATORY SYNDROME CORONAVIRUS 2 (SARS-COV-2) (CORONAVIRUS DISEASE [COVID-19]), AMPLIFIED PROBE TECHNIQUE, MAKING USE OF HIGH THROUGHPUT TECHNOLOGIES AS DESCRIBED BY CMS-2020-01-R: HCPCS

## 2022-10-12 PROCEDURE — U0005 INFEC AGEN DETEC AMPLI PROBE: HCPCS

## 2022-10-12 NOTE — PHARMACY-ADMISSION MEDICATION HISTORY
Med rec completed by preadmitting RN.    Nurse Complete Sharita Price RN u Oct 6, 2022 11:16 AM      no further clarifications noted     Prior to Admission medications    Medication Sig Last Dose Taking? Auth Provider Long Term End Date   albuterol (PROAIR HFA/PROVENTIL HFA/VENTOLIN HFA) 108 (90 Base) MCG/ACT inhaler Inhale 2 puffs into the lungs every 4 hours as needed for shortness of breath / dyspnea or wheezing  Yes Evan Zamora MD Yes    fluticasone (FLOVENT HFA) 44 MCG/ACT inhaler Inhale 2 puffs into the lungs 2 times daily  Yes Evan Zamora MD Yes    levothyroxine (SYNTHROID/LEVOTHROID) 150 MCG tablet Take 1 tablet (150 mcg) by mouth daily  Yes Vandana Costello MD Yes    Prenatal Vit-Fe Fumarate-FA (PRENATAL PO) Take by mouth daily  Yes Reported, Patient     acetone urine (KETOSTIX) test strip Use to test first morning urine for ketones.   Luis Garcia MD     blood glucose (NO BRAND SPECIFIED) test strip Use to test blood sugar 4 times daily or as directed.   Luis Garcia MD     blood glucose monitoring (NO BRAND SPECIFIED) meter device kit Use to test blood sugar 4 times daily or as directed.   Luis Garcia MD     thin (NO BRAND SPECIFIED) lancets Use with lanceting device.   Luis Garcia MD

## 2022-10-14 ENCOUNTER — ANESTHESIA (OUTPATIENT)
Dept: OBGYN | Facility: CLINIC | Age: 37
End: 2022-10-14
Payer: COMMERCIAL

## 2022-10-14 ENCOUNTER — ANESTHESIA EVENT (OUTPATIENT)
Dept: OBGYN | Facility: CLINIC | Age: 37
End: 2022-10-14
Payer: COMMERCIAL

## 2022-10-14 ENCOUNTER — HOSPITAL ENCOUNTER (INPATIENT)
Facility: CLINIC | Age: 37
LOS: 3 days | Discharge: HOME | End: 2022-10-17
Attending: OBSTETRICS & GYNECOLOGY | Admitting: OBSTETRICS & GYNECOLOGY
Payer: COMMERCIAL

## 2022-10-14 DIAGNOSIS — O09.529 ANTEPARTUM MULTIGRAVIDA OF ADVANCED MATERNAL AGE: Primary | ICD-10-CM

## 2022-10-14 DIAGNOSIS — Z98.890 POSTOPERATIVE STATE: ICD-10-CM

## 2022-10-14 DIAGNOSIS — Z98.890 POST-OPERATIVE STATE: ICD-10-CM

## 2022-10-14 DIAGNOSIS — O09.899 SUPERVISION OF OTHER HIGH RISK PREGNANCY, ANTEPARTUM: ICD-10-CM

## 2022-10-14 LAB
ABO/RH(D): NORMAL
ANION GAP SERPL CALCULATED.3IONS-SCNC: 12 MMOL/L (ref 7–15)
ANTIBODY SCREEN: NEGATIVE
BASOPHILS # BLD AUTO: 0 10E3/UL (ref 0–0.2)
BASOPHILS NFR BLD AUTO: 0 %
BUN SERPL-MCNC: 10.1 MG/DL (ref 6–20)
CALCIUM SERPL-MCNC: 7.9 MG/DL (ref 8.6–10)
CHLORIDE SERPL-SCNC: 102 MMOL/L (ref 98–107)
CREAT SERPL-MCNC: 0.48 MG/DL (ref 0.51–0.95)
DEPRECATED HCO3 PLAS-SCNC: 21 MMOL/L (ref 22–29)
EOSINOPHIL # BLD AUTO: 0 10E3/UL (ref 0–0.7)
EOSINOPHIL NFR BLD AUTO: 0 %
ERYTHROCYTE [DISTWIDTH] IN BLOOD BY AUTOMATED COUNT: 14.9 % (ref 10–15)
GFR SERPL CREATININE-BSD FRML MDRD: >90 ML/MIN/1.73M2
GLUCOSE SERPL-MCNC: 149 MG/DL (ref 70–99)
GLUCOSE SERPL-MCNC: 90 MG/DL (ref 70–99)
HCT VFR BLD AUTO: 34.6 % (ref 35–47)
HGB BLD-MCNC: 10.8 G/DL (ref 11.7–15.7)
HGB BLD-MCNC: 12.5 G/DL (ref 11.7–15.7)
IMM GRANULOCYTES # BLD: 0.1 10E3/UL
IMM GRANULOCYTES NFR BLD: 0 %
LYMPHOCYTES # BLD AUTO: 0.8 10E3/UL (ref 0.8–5.3)
LYMPHOCYTES NFR BLD AUTO: 5 %
MCH RBC QN AUTO: 26.3 PG (ref 26.5–33)
MCHC RBC AUTO-ENTMCNC: 31.2 G/DL (ref 31.5–36.5)
MCV RBC AUTO: 84 FL (ref 78–100)
MONOCYTES # BLD AUTO: 0.6 10E3/UL (ref 0–1.3)
MONOCYTES NFR BLD AUTO: 4 %
NEUTROPHILS # BLD AUTO: 14.2 10E3/UL (ref 1.6–8.3)
NEUTROPHILS NFR BLD AUTO: 91 %
NRBC # BLD AUTO: 0 10E3/UL
NRBC BLD AUTO-RTO: 0 /100
PLATELET # BLD AUTO: 164 10E3/UL (ref 150–450)
POTASSIUM SERPL-SCNC: 4.4 MMOL/L (ref 3.4–5.3)
RBC # BLD AUTO: 4.11 10E6/UL (ref 3.8–5.2)
SODIUM SERPL-SCNC: 135 MMOL/L (ref 136–145)
SPECIMEN EXPIRATION DATE: NORMAL
T PALLIDUM AB SER QL: NONREACTIVE
WBC # BLD AUTO: 15.7 10E3/UL (ref 4–11)

## 2022-10-14 PROCEDURE — 86780 TREPONEMA PALLIDUM: CPT | Performed by: OBSTETRICS & GYNECOLOGY

## 2022-10-14 PROCEDURE — 360N000076 HC SURGERY LEVEL 3, PER MIN: Performed by: OBSTETRICS & GYNECOLOGY

## 2022-10-14 PROCEDURE — 59510 CESAREAN DELIVERY: CPT | Performed by: OBSTETRICS & GYNECOLOGY

## 2022-10-14 PROCEDURE — 370N000017 HC ANESTHESIA TECHNICAL FEE, PER MIN: Performed by: OBSTETRICS & GYNECOLOGY

## 2022-10-14 PROCEDURE — 88302 TISSUE EXAM BY PATHOLOGIST: CPT | Mod: 26 | Performed by: PATHOLOGY

## 2022-10-14 PROCEDURE — 82947 ASSAY GLUCOSE BLOOD QUANT: CPT | Performed by: OBSTETRICS & GYNECOLOGY

## 2022-10-14 PROCEDURE — 88332 PATH CONSLTJ SURG EA ADD BLK: CPT | Mod: 26 | Performed by: PATHOLOGY

## 2022-10-14 PROCEDURE — 88331 PATH CONSLTJ SURG 1 BLK 1SPC: CPT | Mod: TC | Performed by: OBSTETRICS & GYNECOLOGY

## 2022-10-14 PROCEDURE — 36415 COLL VENOUS BLD VENIPUNCTURE: CPT | Performed by: OBSTETRICS & GYNECOLOGY

## 2022-10-14 PROCEDURE — 85018 HEMOGLOBIN: CPT | Performed by: OBSTETRICS & GYNECOLOGY

## 2022-10-14 PROCEDURE — 0UT70ZZ RESECTION OF BILATERAL FALLOPIAN TUBES, OPEN APPROACH: ICD-10-PCS | Performed by: OBSTETRICS & GYNECOLOGY

## 2022-10-14 PROCEDURE — 86901 BLOOD TYPING SEROLOGIC RH(D): CPT | Performed by: OBSTETRICS & GYNECOLOGY

## 2022-10-14 PROCEDURE — 258N000003 HC RX IP 258 OP 636

## 2022-10-14 PROCEDURE — 258N000003 HC RX IP 258 OP 636: Performed by: OBSTETRICS & GYNECOLOGY

## 2022-10-14 PROCEDURE — 250N000009 HC RX 250

## 2022-10-14 PROCEDURE — 710N000009 HC RECOVERY PHASE 1, LEVEL 1, PER MIN: Performed by: OBSTETRICS & GYNECOLOGY

## 2022-10-14 PROCEDURE — 88331 PATH CONSLTJ SURG 1 BLK 1SPC: CPT | Mod: 26 | Performed by: PATHOLOGY

## 2022-10-14 PROCEDURE — 58611 LIGATE OVIDUCT(S) ADD-ON: CPT | Performed by: OBSTETRICS & GYNECOLOGY

## 2022-10-14 PROCEDURE — 120N000001 HC R&B MED SURG/OB

## 2022-10-14 PROCEDURE — 250N000011 HC RX IP 250 OP 636: Performed by: OBSTETRICS & GYNECOLOGY

## 2022-10-14 PROCEDURE — 250N000013 HC RX MED GY IP 250 OP 250 PS 637: Performed by: OBSTETRICS & GYNECOLOGY

## 2022-10-14 PROCEDURE — 272N000001 HC OR GENERAL SUPPLY STERILE: Performed by: OBSTETRICS & GYNECOLOGY

## 2022-10-14 PROCEDURE — 250N000011 HC RX IP 250 OP 636

## 2022-10-14 PROCEDURE — 250N000011 HC RX IP 250 OP 636: Performed by: ANESTHESIOLOGY

## 2022-10-14 PROCEDURE — 250N000009 HC RX 250: Performed by: OBSTETRICS & GYNECOLOGY

## 2022-10-14 RX ORDER — TRANEXAMIC ACID 10 MG/ML
1 INJECTION, SOLUTION INTRAVENOUS EVERY 30 MIN PRN
Status: DISCONTINUED | OUTPATIENT
Start: 2022-10-14 | End: 2022-10-17 | Stop reason: HOSPADM

## 2022-10-14 RX ORDER — IBUPROFEN 800 MG/1
800 TABLET, FILM COATED ORAL EVERY 6 HOURS
Status: DISCONTINUED | OUTPATIENT
Start: 2022-10-15 | End: 2022-10-17 | Stop reason: HOSPADM

## 2022-10-14 RX ORDER — ACETAMINOPHEN 325 MG/1
975 TABLET ORAL EVERY 6 HOURS
Status: DISCONTINUED | OUTPATIENT
Start: 2022-10-14 | End: 2022-10-17 | Stop reason: HOSPADM

## 2022-10-14 RX ORDER — MAGNESIUM HYDROXIDE 1200 MG/15ML
LIQUID ORAL PRN
Status: DISCONTINUED | OUTPATIENT
Start: 2022-10-14 | End: 2022-10-17 | Stop reason: HOSPADM

## 2022-10-14 RX ORDER — LEVOTHYROXINE SODIUM 150 UG/1
150 TABLET ORAL
Status: DISCONTINUED | OUTPATIENT
Start: 2022-10-15 | End: 2022-10-17 | Stop reason: HOSPADM

## 2022-10-14 RX ORDER — MISOPROSTOL 200 UG/1
800 TABLET ORAL
Status: DISCONTINUED | OUTPATIENT
Start: 2022-10-14 | End: 2022-10-14 | Stop reason: HOSPADM

## 2022-10-14 RX ORDER — TRANEXAMIC ACID 10 MG/ML
1 INJECTION, SOLUTION INTRAVENOUS EVERY 30 MIN PRN
Status: DISCONTINUED | OUTPATIENT
Start: 2022-10-14 | End: 2022-10-14 | Stop reason: HOSPADM

## 2022-10-14 RX ORDER — METOCLOPRAMIDE 10 MG/1
10 TABLET ORAL EVERY 6 HOURS PRN
Status: DISCONTINUED | OUTPATIENT
Start: 2022-10-14 | End: 2022-10-17 | Stop reason: HOSPADM

## 2022-10-14 RX ORDER — METOCLOPRAMIDE HYDROCHLORIDE 5 MG/ML
10 INJECTION INTRAMUSCULAR; INTRAVENOUS EVERY 6 HOURS PRN
Status: DISCONTINUED | OUTPATIENT
Start: 2022-10-14 | End: 2022-10-17 | Stop reason: HOSPADM

## 2022-10-14 RX ORDER — OXYTOCIN 10 [USP'U]/ML
10 INJECTION, SOLUTION INTRAMUSCULAR; INTRAVENOUS
Status: DISCONTINUED | OUTPATIENT
Start: 2022-10-14 | End: 2022-10-17 | Stop reason: HOSPADM

## 2022-10-14 RX ORDER — FENTANYL CITRATE 50 UG/ML
INJECTION, SOLUTION INTRAMUSCULAR; INTRAVENOUS
Status: COMPLETED | OUTPATIENT
Start: 2022-10-14 | End: 2022-10-14

## 2022-10-14 RX ORDER — OXYTOCIN/0.9 % SODIUM CHLORIDE 30/500 ML
100-340 PLASTIC BAG, INJECTION (ML) INTRAVENOUS CONTINUOUS PRN
Status: DISCONTINUED | OUTPATIENT
Start: 2022-10-14 | End: 2022-10-17 | Stop reason: HOSPADM

## 2022-10-14 RX ORDER — CEFAZOLIN SODIUM/WATER 2 G/20 ML
2 SYRINGE (ML) INTRAVENOUS SEE ADMIN INSTRUCTIONS
Status: DISCONTINUED | OUTPATIENT
Start: 2022-10-14 | End: 2022-10-14 | Stop reason: HOSPADM

## 2022-10-14 RX ORDER — CARBOPROST TROMETHAMINE 250 UG/ML
250 INJECTION, SOLUTION INTRAMUSCULAR
Status: DISCONTINUED | OUTPATIENT
Start: 2022-10-14 | End: 2022-10-14 | Stop reason: HOSPADM

## 2022-10-14 RX ORDER — ONDANSETRON 2 MG/ML
INJECTION INTRAMUSCULAR; INTRAVENOUS PRN
Status: DISCONTINUED | OUTPATIENT
Start: 2022-10-14 | End: 2022-10-14

## 2022-10-14 RX ORDER — LIDOCAINE 40 MG/G
CREAM TOPICAL
Status: DISCONTINUED | OUTPATIENT
Start: 2022-10-14 | End: 2022-10-14 | Stop reason: HOSPADM

## 2022-10-14 RX ORDER — AMOXICILLIN 250 MG
1 CAPSULE ORAL 2 TIMES DAILY
Status: DISCONTINUED | OUTPATIENT
Start: 2022-10-14 | End: 2022-10-17 | Stop reason: HOSPADM

## 2022-10-14 RX ORDER — CARBOPROST TROMETHAMINE 250 UG/ML
250 INJECTION, SOLUTION INTRAMUSCULAR
Status: DISCONTINUED | OUTPATIENT
Start: 2022-10-14 | End: 2022-10-17 | Stop reason: HOSPADM

## 2022-10-14 RX ORDER — MISOPROSTOL 200 UG/1
400 TABLET ORAL
Status: DISCONTINUED | OUTPATIENT
Start: 2022-10-14 | End: 2022-10-14 | Stop reason: HOSPADM

## 2022-10-14 RX ORDER — DEXTROSE, SODIUM CHLORIDE, SODIUM LACTATE, POTASSIUM CHLORIDE, AND CALCIUM CHLORIDE 5; .6; .31; .03; .02 G/100ML; G/100ML; G/100ML; G/100ML; G/100ML
INJECTION, SOLUTION INTRAVENOUS CONTINUOUS
Status: DISCONTINUED | OUTPATIENT
Start: 2022-10-14 | End: 2022-10-17 | Stop reason: HOSPADM

## 2022-10-14 RX ORDER — METHYLERGONOVINE MALEATE 0.2 MG/ML
200 INJECTION INTRAVENOUS
Status: DISCONTINUED | OUTPATIENT
Start: 2022-10-14 | End: 2022-10-17 | Stop reason: HOSPADM

## 2022-10-14 RX ORDER — ONDANSETRON 4 MG/1
4 TABLET, ORALLY DISINTEGRATING ORAL EVERY 6 HOURS PRN
Status: DISCONTINUED | OUTPATIENT
Start: 2022-10-14 | End: 2022-10-17 | Stop reason: HOSPADM

## 2022-10-14 RX ORDER — SODIUM CHLORIDE, SODIUM LACTATE, POTASSIUM CHLORIDE, CALCIUM CHLORIDE 600; 310; 30; 20 MG/100ML; MG/100ML; MG/100ML; MG/100ML
INJECTION, SOLUTION INTRAVENOUS CONTINUOUS
Status: DISCONTINUED | OUTPATIENT
Start: 2022-10-14 | End: 2022-10-14 | Stop reason: HOSPADM

## 2022-10-14 RX ORDER — DEXTROSE, SODIUM CHLORIDE, SODIUM LACTATE, POTASSIUM CHLORIDE, AND CALCIUM CHLORIDE 5; .6; .31; .03; .02 G/100ML; G/100ML; G/100ML; G/100ML; G/100ML
500 INJECTION, SOLUTION INTRAVENOUS ONCE
Status: COMPLETED | OUTPATIENT
Start: 2022-10-14 | End: 2022-10-14

## 2022-10-14 RX ORDER — KETOROLAC TROMETHAMINE 30 MG/ML
INJECTION, SOLUTION INTRAMUSCULAR; INTRAVENOUS PRN
Status: DISCONTINUED | OUTPATIENT
Start: 2022-10-14 | End: 2022-10-14

## 2022-10-14 RX ORDER — MORPHINE SULFATE 1 MG/ML
INJECTION, SOLUTION EPIDURAL; INTRATHECAL; INTRAVENOUS
Status: COMPLETED | OUTPATIENT
Start: 2022-10-14 | End: 2022-10-14

## 2022-10-14 RX ORDER — FENTANYL CITRATE-0.9 % NACL/PF 10 MCG/ML
100 PLASTIC BAG, INJECTION (ML) INTRAVENOUS EVERY 5 MIN PRN
Status: DISCONTINUED | OUTPATIENT
Start: 2022-10-14 | End: 2022-10-14 | Stop reason: HOSPADM

## 2022-10-14 RX ORDER — NALOXONE HYDROCHLORIDE 0.4 MG/ML
0.2 INJECTION, SOLUTION INTRAMUSCULAR; INTRAVENOUS; SUBCUTANEOUS
Status: DISCONTINUED | OUTPATIENT
Start: 2022-10-14 | End: 2022-10-17 | Stop reason: HOSPADM

## 2022-10-14 RX ORDER — NALOXONE HYDROCHLORIDE 0.4 MG/ML
0.4 INJECTION, SOLUTION INTRAMUSCULAR; INTRAVENOUS; SUBCUTANEOUS
Status: DISCONTINUED | OUTPATIENT
Start: 2022-10-14 | End: 2022-10-17 | Stop reason: HOSPADM

## 2022-10-14 RX ORDER — CEFAZOLIN SODIUM/WATER 2 G/20 ML
2 SYRINGE (ML) INTRAVENOUS
Status: COMPLETED | OUTPATIENT
Start: 2022-10-14 | End: 2022-10-14

## 2022-10-14 RX ORDER — BUPIVACAINE HYDROCHLORIDE 7.5 MG/ML
INJECTION, SOLUTION INTRASPINAL
Status: COMPLETED | OUTPATIENT
Start: 2022-10-14 | End: 2022-10-14

## 2022-10-14 RX ORDER — MISOPROSTOL 200 UG/1
400 TABLET ORAL
Status: DISCONTINUED | OUTPATIENT
Start: 2022-10-14 | End: 2022-10-17 | Stop reason: HOSPADM

## 2022-10-14 RX ORDER — FENTANYL CITRATE-0.9 % NACL/PF 10 MCG/ML
PLASTIC BAG, INJECTION (ML) INTRAVENOUS CONTINUOUS PRN
Status: DISCONTINUED | OUTPATIENT
Start: 2022-10-14 | End: 2022-10-14

## 2022-10-14 RX ORDER — METHYLERGONOVINE MALEATE 0.2 MG/ML
200 INJECTION INTRAVENOUS
Status: DISCONTINUED | OUTPATIENT
Start: 2022-10-14 | End: 2022-10-14 | Stop reason: HOSPADM

## 2022-10-14 RX ORDER — MISOPROSTOL 200 UG/1
800 TABLET ORAL
Status: DISCONTINUED | OUTPATIENT
Start: 2022-10-14 | End: 2022-10-17 | Stop reason: HOSPADM

## 2022-10-14 RX ORDER — CITRIC ACID/SODIUM CITRATE 334-500MG
30 SOLUTION, ORAL ORAL
Status: DISCONTINUED | OUTPATIENT
Start: 2022-10-14 | End: 2022-10-14 | Stop reason: HOSPADM

## 2022-10-14 RX ORDER — HYDROCORTISONE 25 MG/G
CREAM TOPICAL 3 TIMES DAILY PRN
Status: DISCONTINUED | OUTPATIENT
Start: 2022-10-14 | End: 2022-10-17 | Stop reason: HOSPADM

## 2022-10-14 RX ORDER — DEXAMETHASONE SODIUM PHOSPHATE 4 MG/ML
INJECTION, SOLUTION INTRA-ARTICULAR; INTRALESIONAL; INTRAMUSCULAR; INTRAVENOUS; SOFT TISSUE PRN
Status: DISCONTINUED | OUTPATIENT
Start: 2022-10-14 | End: 2022-10-14

## 2022-10-14 RX ORDER — NALBUPHINE HYDROCHLORIDE 10 MG/ML
2.5-5 INJECTION, SOLUTION INTRAMUSCULAR; INTRAVENOUS; SUBCUTANEOUS EVERY 6 HOURS PRN
Status: DISCONTINUED | OUTPATIENT
Start: 2022-10-14 | End: 2022-10-17 | Stop reason: HOSPADM

## 2022-10-14 RX ORDER — LIDOCAINE 40 MG/G
CREAM TOPICAL
Status: DISCONTINUED | OUTPATIENT
Start: 2022-10-14 | End: 2022-10-17 | Stop reason: HOSPADM

## 2022-10-14 RX ORDER — ACETAMINOPHEN 325 MG/1
975 TABLET ORAL ONCE
Status: COMPLETED | OUTPATIENT
Start: 2022-10-14 | End: 2022-10-14

## 2022-10-14 RX ORDER — OXYTOCIN/0.9 % SODIUM CHLORIDE 30/500 ML
340 PLASTIC BAG, INJECTION (ML) INTRAVENOUS CONTINUOUS PRN
Status: DISCONTINUED | OUTPATIENT
Start: 2022-10-14 | End: 2022-10-17 | Stop reason: HOSPADM

## 2022-10-14 RX ORDER — AMOXICILLIN 250 MG
2 CAPSULE ORAL 2 TIMES DAILY
Status: DISCONTINUED | OUTPATIENT
Start: 2022-10-14 | End: 2022-10-17 | Stop reason: HOSPADM

## 2022-10-14 RX ORDER — SCOLOPAMINE TRANSDERMAL SYSTEM 1 MG/1
1 PATCH, EXTENDED RELEASE TRANSDERMAL
Status: DISCONTINUED | OUTPATIENT
Start: 2022-10-14 | End: 2022-10-17 | Stop reason: HOSPADM

## 2022-10-14 RX ORDER — OXYTOCIN/0.9 % SODIUM CHLORIDE 30/500 ML
340 PLASTIC BAG, INJECTION (ML) INTRAVENOUS CONTINUOUS PRN
Status: DISCONTINUED | OUTPATIENT
Start: 2022-10-14 | End: 2022-10-14 | Stop reason: HOSPADM

## 2022-10-14 RX ORDER — BISACODYL 10 MG
10 SUPPOSITORY, RECTAL RECTAL DAILY PRN
Status: DISCONTINUED | OUTPATIENT
Start: 2022-10-16 | End: 2022-10-17 | Stop reason: HOSPADM

## 2022-10-14 RX ORDER — OXYTOCIN 10 [USP'U]/ML
10 INJECTION, SOLUTION INTRAMUSCULAR; INTRAVENOUS
Status: DISCONTINUED | OUTPATIENT
Start: 2022-10-14 | End: 2022-10-14 | Stop reason: HOSPADM

## 2022-10-14 RX ORDER — OXYTOCIN/0.9 % SODIUM CHLORIDE 30/500 ML
PLASTIC BAG, INJECTION (ML) INTRAVENOUS PRN
Status: DISCONTINUED | OUTPATIENT
Start: 2022-10-14 | End: 2022-10-14

## 2022-10-14 RX ORDER — ONDANSETRON 2 MG/ML
4 INJECTION INTRAMUSCULAR; INTRAVENOUS EVERY 6 HOURS PRN
Status: DISCONTINUED | OUTPATIENT
Start: 2022-10-14 | End: 2022-10-17 | Stop reason: HOSPADM

## 2022-10-14 RX ORDER — OXYCODONE HYDROCHLORIDE 5 MG/1
5 TABLET ORAL EVERY 4 HOURS PRN
Status: DISCONTINUED | OUTPATIENT
Start: 2022-10-14 | End: 2022-10-17 | Stop reason: HOSPADM

## 2022-10-14 RX ORDER — KETOROLAC TROMETHAMINE 30 MG/ML
30 INJECTION, SOLUTION INTRAMUSCULAR; INTRAVENOUS EVERY 6 HOURS
Status: COMPLETED | OUTPATIENT
Start: 2022-10-14 | End: 2022-10-15

## 2022-10-14 RX ORDER — PROCHLORPERAZINE 25 MG
25 SUPPOSITORY, RECTAL RECTAL EVERY 12 HOURS PRN
Status: DISCONTINUED | OUTPATIENT
Start: 2022-10-14 | End: 2022-10-17 | Stop reason: HOSPADM

## 2022-10-14 RX ORDER — MODIFIED LANOLIN
OINTMENT (GRAM) TOPICAL
Status: DISCONTINUED | OUTPATIENT
Start: 2022-10-14 | End: 2022-10-17 | Stop reason: HOSPADM

## 2022-10-14 RX ORDER — PROCHLORPERAZINE MALEATE 10 MG
10 TABLET ORAL EVERY 6 HOURS PRN
Status: DISCONTINUED | OUTPATIENT
Start: 2022-10-14 | End: 2022-10-17 | Stop reason: HOSPADM

## 2022-10-14 RX ORDER — SIMETHICONE 80 MG
80 TABLET,CHEWABLE ORAL 4 TIMES DAILY PRN
Status: DISCONTINUED | OUTPATIENT
Start: 2022-10-14 | End: 2022-10-17 | Stop reason: HOSPADM

## 2022-10-14 RX ORDER — EPHEDRINE SULFATE 50 MG/ML
INJECTION, SOLUTION INTRAMUSCULAR; INTRAVENOUS; SUBCUTANEOUS PRN
Status: DISCONTINUED | OUTPATIENT
Start: 2022-10-14 | End: 2022-10-14

## 2022-10-14 RX ADMIN — ACETAMINOPHEN 975 MG: 325 TABLET, FILM COATED ORAL at 08:16

## 2022-10-14 RX ADMIN — PHENYLEPHRINE HYDROCHLORIDE 100 MCG: 10 INJECTION INTRAVENOUS at 10:35

## 2022-10-14 RX ADMIN — SODIUM CHLORIDE, POTASSIUM CHLORIDE, SODIUM LACTATE AND CALCIUM CHLORIDE: 600; 310; 30; 20 INJECTION, SOLUTION INTRAVENOUS at 07:37

## 2022-10-14 RX ADMIN — OXYTOCIN-SODIUM CHLORIDE 0.9% IV SOLN 30 UNIT/500ML 1 ML/HR: 30-0.9/5 SOLUTION at 09:26

## 2022-10-14 RX ADMIN — SODIUM CHLORIDE, SODIUM LACTATE, POTASSIUM CHLORIDE, CALCIUM CHLORIDE, AND DEXTROSE MONOHYDRATE 500 ML: 600; 310; 30; 20; 5 INJECTION, SOLUTION INTRAVENOUS at 22:21

## 2022-10-14 RX ADMIN — PHENYLEPHRINE HYDROCHLORIDE 100 MCG: 10 INJECTION INTRAVENOUS at 09:58

## 2022-10-14 RX ADMIN — Medication 50 MCG/MIN: at 09:03

## 2022-10-14 RX ADMIN — BUPIVACAINE HYDROCHLORIDE IN DEXTROSE 1.6 ML: 7.5 INJECTION, SOLUTION SUBARACHNOID at 09:00

## 2022-10-14 RX ADMIN — PHENYLEPHRINE HYDROCHLORIDE 100 MCG: 10 INJECTION INTRAVENOUS at 10:21

## 2022-10-14 RX ADMIN — METOCLOPRAMIDE HYDROCHLORIDE 10 MG: 5 INJECTION INTRAMUSCULAR; INTRAVENOUS at 12:16

## 2022-10-14 RX ADMIN — PHENYLEPHRINE HYDROCHLORIDE 100 MCG: 10 INJECTION INTRAVENOUS at 09:10

## 2022-10-14 RX ADMIN — SODIUM CHLORIDE, SODIUM LACTATE, POTASSIUM CHLORIDE, CALCIUM CHLORIDE AND DEXTROSE MONOHYDRATE: 5; 600; 310; 30; 20 INJECTION, SOLUTION INTRAVENOUS at 16:59

## 2022-10-14 RX ADMIN — KETOROLAC TROMETHAMINE 30 MG: 30 INJECTION, SOLUTION INTRAMUSCULAR at 10:12

## 2022-10-14 RX ADMIN — SODIUM CHLORIDE, POTASSIUM CHLORIDE, SODIUM LACTATE AND CALCIUM CHLORIDE 1000 ML: 600; 310; 30; 20 INJECTION, SOLUTION INTRAVENOUS at 15:56

## 2022-10-14 RX ADMIN — SODIUM CHLORIDE, SODIUM LACTATE, POTASSIUM CHLORIDE, CALCIUM CHLORIDE AND DEXTROSE MONOHYDRATE: 5; 600; 310; 30; 20 INJECTION, SOLUTION INTRAVENOUS at 11:55

## 2022-10-14 RX ADMIN — Medication 5 MG: at 09:10

## 2022-10-14 RX ADMIN — ONDANSETRON 4 MG: 2 INJECTION INTRAMUSCULAR; INTRAVENOUS at 13:09

## 2022-10-14 RX ADMIN — KETOROLAC TROMETHAMINE 30 MG: 30 INJECTION, SOLUTION INTRAMUSCULAR at 19:24

## 2022-10-14 RX ADMIN — DEXAMETHASONE SODIUM PHOSPHATE 4 MG: 4 INJECTION, SOLUTION INTRA-ARTICULAR; INTRALESIONAL; INTRAMUSCULAR; INTRAVENOUS; SOFT TISSUE at 09:06

## 2022-10-14 RX ADMIN — OXYTOCIN-SODIUM CHLORIDE 0.9% IV SOLN 30 UNIT/500ML 399 ML: 30-0.9/5 SOLUTION at 10:12

## 2022-10-14 RX ADMIN — FENTANYL CITRATE 15 MCG: 50 INJECTION, SOLUTION INTRAMUSCULAR; INTRAVENOUS at 09:00

## 2022-10-14 RX ADMIN — Medication 0.2 MG: at 09:00

## 2022-10-14 RX ADMIN — ONDANSETRON 4 MG: 2 INJECTION INTRAMUSCULAR; INTRAVENOUS at 10:24

## 2022-10-14 RX ADMIN — ONDANSETRON 4 MG: 2 INJECTION INTRAMUSCULAR; INTRAVENOUS at 09:02

## 2022-10-14 RX ADMIN — Medication 2 G: at 08:16

## 2022-10-14 RX ADMIN — DEXAMETHASONE SODIUM PHOSPHATE 4 MG: 4 INJECTION, SOLUTION INTRA-ARTICULAR; INTRALESIONAL; INTRAMUSCULAR; INTRAVENOUS; SOFT TISSUE at 10:24

## 2022-10-14 ASSESSMENT — ACTIVITIES OF DAILY LIVING (ADL)
ADLS_ACUITY_SCORE: 22

## 2022-10-14 NOTE — PLAN OF CARE
Data: [facility] presented to [facility]: 10/14/2022  7:05 AM.  Reason for maternal/fetal assessment is scheduled repeat  with BS. Patient is a .  Prenatal record reviewed. Pregnancy  has been complicated by gestational diabetes, diet controlled, asthma, hypothryroidism.  Gestational Age 39w3d. VSS. Fetal movement active. Patient denies uterine contractions, leaking of vaginal fluid/rupture of membranes, vaginal bleeding, abdominal pain, pelvic pressure, nausea, vomiting, headache, visual disturbances, epigastric or URQ pain, significant edema. Support person is present.   Action: Verbal consent for EFM. Triage assessment completed. Bill of rights reviewed.  Response: Patient verbalized agreement with plan. Will contact Dr Luis Garcia with update and for further orders.

## 2022-10-14 NOTE — ANESTHESIA CARE TRANSFER NOTE
Patient: Aleyda Nicole    Procedure: Procedure(s):  REPEAT  SECTION WITH BILATERAL SALPINGECTOMY       Diagnosis: Previous  section [Z98.891]  Gestational diabetes [O24.419]  Request for sterilization [Z30.2]  Diagnosis Additional Information: No value filed.    Anesthesia Type:   Spinal     Note:    Oropharynx: oropharynx clear of all foreign objects  Level of Consciousness: awake  Oxygen Supplementation: blow-by O2  Level of Supplemental Oxygen (L/min / FiO2): 6  Independent Airway: airway patency satisfactory and stable  Dentition: dentition unchanged  Vital Signs Stable: post-procedure vital signs reviewed and stable  Report to RN Given: handoff report given  Patient transferred to: Labor and Delivery    Handoff Report: Identifed the Patient, Identified the Reponsible Provider, Reviewed the pertinent medical history, Discussed the surgical course, Reviewed Intra-OP anesthesia mangement and issues during anesthesia, Set expectations for post-procedure period and Allowed opportunity for questions and acknowledgement of understanding      Vitals:  Vitals Value Taken Time   /67 10/14/22 1034   Temp     Pulse     Resp     SpO2 93 % 10/14/22 1032   Vitals shown include unvalidated device data.    Electronically Signed By: EDUARDO Phillips CRNA  2022  10:36 AM

## 2022-10-14 NOTE — PLAN OF CARE
Data: Aleyda Nicole transferred to [id number] via cart at 1400. Baby transferred via crib.  Action: Receiving unit notified of transfer: Yes. Patient and family notified of room change. Report given to DIDIER Cool at 1405. Belongings sent to receiving unit. Accompanied by Registered Nurse. Oriented patient to surroundings. Call light within reach. ID bands double-checked with receiving RN.  Response: Patient tolerated transfer and is stable.

## 2022-10-14 NOTE — PROGRESS NOTES
Preop Diagnosis: IUP 39.3 weeks gestation, previous C/B x's 2 pt declines TOLAC, A1 GDM, maternal AMA, pos GBS, sterilization candidate     Post-Op Diagnosis: same    Procedure: LST  section, Manual placental removal and bilateral salpingectomy    Surgeon: FATEMEH FOURNIER MD,     EBL: 831 cc    Anesthesia: Spinal    Path: bilateral fallopian tubes    Complications: extensive scarring in adnexa making identification of fallopian tubes challenging, we obtained a frozen section on the R tube  Frozen section : R  Fallopian tube    Findings: see dictated operative note for full details        FATEMEH FOURNIER MD  10:17 AM  10/14/2022

## 2022-10-14 NOTE — PROVIDER NOTIFICATION
10/14/22 1538   Provider Notification   Provider Name/Title Dr Galan   Method of Notification Electronic Page   Request Evaluate-Remote   Notification Reason Other   Notified that urine output is 25/hr.  Order for 1L bolus over 60min.

## 2022-10-14 NOTE — ANESTHESIA PROCEDURE NOTES
"Intrathecal injection Procedure Note    Pre-Procedure   Staff -        Anesthesiologist:  Ela Rojas MD       Performed By: anesthesiologist       Location: OR       Procedure Start/Stop Times: 10/14/2022 9:00 AM and 10/14/2022 9:03 AM       Pre-Anesthestic Checklist: patient identified, IV checked, risks and benefits discussed, informed consent, monitors and equipment checked, pre-op evaluation and at physician/surgeon's request  Timeout:       Correct Patient: Yes        Correct Procedure: Yes        Correct Site: Yes        Correct Position: Yes   Procedure Documentation  Procedure: intrathecal injection       Patient Position: sitting       Skin prep: Chloraprep       Insertion Site: L3-4. (midline approach).       Needle Gauge: 25.        Needle Length (Inches): 3.5        Spinal Needle Type: Pencan       Introducer used       Introducer: 20 G       # of attempts: 1 and  # of redirects:  1    Assessment/Narrative         Paresthesias: No.       CSF fluid: clear.       Opening pressure was cmH2O while  Sitting.      Medication(s) Administered   0.75% Hyperbaric Bupivacaine (Intrathecal) - Intrathecal   1.6 mL - 10/14/2022 9:00:00 AM  Fentanyl PF (Intrathecal) - Intrathecal   15 mcg - 10/14/2022 9:00:00 AM  Morphine PF 1 mg/mL (Intrathecal) - Intrathecal   0.2 mg - 10/14/2022 9:00:00 AM  Medication Administration Time: 10/14/2022 9:00 AM      FOR [facility] ([facility]) ONLY:   Pain Team Contact information: please page the Pain Team Via [employer]. Search \"Pain\". During daytime hours, please page the attending first. At night please page the resident first.    "

## 2022-10-14 NOTE — PROVIDER NOTIFICATION
10/14/22 7816   Provider Notification   Provider Name/Title Dr. Garcia   Method of Notification Phone   Request Evaluate-Remote   Notification Reason Other  (Dressing, drainage)     MD updated re: incisional dressing is now saturated with sanguinous drainage. VORB to change dressing now, using abdominal pad (pressure dressing) with foam tape.

## 2022-10-14 NOTE — ANESTHESIA PREPROCEDURE EVALUATION
Anesthesia Pre-Procedure Evaluation    Patient: Aleyda Nicole   MRN: [id number] : 1985        Procedure : Procedure(s):  REPEAT  SECTION WITH BILATERAL SALPINGECTOMY          Past Medical History:   Diagnosis Date     Gestational diabetes 2013     Hypocalcemia 2016     Influenza A 2009     Papillary thyroid carcinoma     Papillary carcinoma, follicular variant with     Pneumonia     LLL     PONV (postoperative nausea and vomiting)      Postoperative hypothyroidism       labor      Uncomplicated asthma       Past Surgical History:   Procedure Laterality Date      SECTION  10/26/2013    Procedure:  SECTION;  primary  section;  Surgeon: Rodney Mckee MD;  Location: [facility] L+D      SECTION N/A 2017    Procedure:  SECTION;  Surgeon: Hakeem Combs MD;  Location: [facility] L+D     DISSECTION RADICAL NECK Left 2016    Procedure: DISSECTION RADICAL NECK;  Surgeon: Vy Theoodre MD;  Location: [facility] OR     DISSECTION RADICAL NECK MODIFIED Left 2016    Procedure: DISSECTION RADICAL NECK MODIFIED;  Surgeon: Luis Brown MD;  Location: [facility] OR     THYROIDECTOMY      Total, for cancer.       Allergies   Allergen Reactions     No Known Drug Allergies       Social History     Tobacco Use     Smoking status: Never     Smokeless tobacco: Never   Substance Use Topics     Alcohol use: No     Alcohol/week: 0.0 standard drinks      Wt Readings from Last 1 Encounters:   10/06/22 79.8 kg (176 lb)        Anesthesia Evaluation        History of anesthetic complications  - PONV.      ROS/MED HX  ENT/Pulmonary:     (+) asthma     Neurologic:       Cardiovascular:       METS/Exercise Tolerance:     Hematologic:       Musculoskeletal:       GI/Hepatic:       Renal/Genitourinary:       Endo: Comment: History of papillary thyroid cancer s/p thyroidectomy/neck dissection    (+) gestational diabetes,    Psychiatric/Substance Use:       Infectious  Disease:       Malignancy:       Other:            Physical Exam    Airway        Mallampati: II   TM distance: > 3 FB   Neck ROM: full     Respiratory Devices and Support         Dental           Cardiovascular   cardiovascular exam normal          Pulmonary   pulmonary exam normal                OUTSIDE LABS:  CBC:   Lab Results   Component Value Date    WBC 12.7 (H) 07/28/2022    WBC 8.9 05/26/2022    HGB 12.5 10/14/2022    HGB 11.2 (L) 07/28/2022    HCT 34.9 (L) 07/28/2022    HCT 38.1 05/26/2022     07/28/2022     05/26/2022     BMP:   Lab Results   Component Value Date     05/26/2022     04/28/2022    POTASSIUM 3.9 05/26/2022    POTASSIUM 3.9 04/28/2022    CHLORIDE 107 05/26/2022    CHLORIDE 107 04/28/2022    CO2 24 05/26/2022    CO2 22 04/28/2022    BUN 6 (L) 05/26/2022    BUN 13 04/28/2022    CR 0.50 (L) 05/26/2022    CR 0.59 04/28/2022    GLC 90 05/26/2022     (H) 04/28/2022     COAGS: No results found for: PTT, INR, FIBR  POC:   Lab Results   Component Value Date     (H) 01/12/2017    HCG Positive (A) 08/18/2015    HCGS Negative 04/17/2017     HEPATIC:   Lab Results   Component Value Date    ALBUMIN 2.9 (L) 05/26/2022    PROTTOTAL 7.3 05/26/2022    ALT 17 05/26/2022    AST 4 05/26/2022    ALKPHOS 56 05/26/2022    BILITOTAL 0.3 05/26/2022     OTHER:   Lab Results   Component Value Date    A1C 5.4 02/24/2020    ESTHER 8.3 (L) 05/26/2022    PHOS 4.5 05/26/2022    MAG 1.7 05/26/2022    LIPASE 78 10/06/2009    AMYLASE 124 (H) 12/02/2012    TSH 0.56 08/26/2022    T4 1.28 08/26/2022    T3 112 06/07/2016    SED 15 04/07/2014       Anesthesia Plan    ASA Status:  2      Anesthesia Type: Spinal.              Consents    Anesthesia Plan(s) and associated risks, benefits, and realistic alternatives discussed. Questions answered and patient/representative(s) expressed understanding.    - Discussed:     - Discussed with:  Patient         Postoperative Care            Comments:                 Ela Rojas MD

## 2022-10-14 NOTE — OP NOTE
Procedure Date: 10/14/2022    PREOPERATIVE DIAGNOSES:    1.  Intrauterine pregnancy at 39-3/7 weeks' gestation.  2.  Previous  section x 2; the patient declines a trial of labor after ,.  3.  White's classification A1 gestational diabetes.  4.  Advanced maternal age.  5.  Positive GBS (group B streptococcus) status.  6.  Sterilization candidate.    POSTOPERATIVE DIAGNOSES:    1.  Intrauterine pregnancy at 39-3/7 weeks' gestation.  2.  Previous  section x 2; the patient declines a trial of labor after ,.  3.  White's classification A1 gestational diabetes.  4.  Advanced maternal age.  5.  Positive GBS (group B streptococcus) status.  6.  Sterilization candidate.    TITLE OF PROCEDURES:    1.  Repeat low segment transverse  section.  2.  Manual placental removal.  3.  Bilateral salpingectomy.    SURGEON:  Luis Garcia MD    HISTORY OF PRESENT ILLNESS:  The patient is a 37-year-old [place]  female,  7, para 2-0-4-2, presently at 39-3/7 weeks' gestation, Rh positive, rubella status immune, whose pregnancy has been complicated by 2 previous  sections, White's classification A1 gestational diabetes, history of metastatic papillary thyroid cancer with a previous radical neck dissection, Campylobacter colitis this pregnancy, and positive GBS status.  The patient declines a trial of labor after .  She also desires permanent sterilization.  The risks, benefits and alternatives to permanent sterilization, including less than 100% efficacy, have been thoroughly reviewed.  I think the patient has a good understanding of the nature of the problem, the nature of the procedure.  All of her questions have been answered and informed consent has been obtained,    OPERATIVE FINDINGS:  The infant was a 7 pound 2 ounce male infant, Apgars 9 and 9 at 1 and 5 minutes respectively, delivered from an OA presentation.    DESCRIPTION OF PROCEDURE:  After obtaining informed  consent, the patient was taken to the operating room where a regional anesthetic was placed.  With adequate analgesia present, she was prepped and draped in the usual sterile fashion.  A hard stop was performed.  Following this, a Pfannenstiel skin incision was made and carried down to the level of fascia, cauterizing bleeders as necessary.  The fascia was nicked in the midline.  The fascia was meticulously dissected off the underlying rectus muscles inferiorly to the level of symphysis, superiorly to the level of the umbilicus.  There were dense adhesions of the fascia to the underlying rectus abdominis muscles.  The rectus diastasis was identified and divided in the midline, the peritoneum identified, grasped with 2 Vera, tented, entered with Metzenbaum scissors, and the incision extended superiorly and inferiorly.  The uterus was checked for rotation.  A bladder blade was placed inferiorly.  The vesicouterine reflection was identified, grasped with the pickups, incised with Metzenbaum scissors, and the incision extended laterally in the right and left direction.  The bladder was then meticulously dissected off the lower uterine segment.  Following this, a low segment transverse uterine incision was made.  The endometrial cavity was entered with the blunt end of the scalpel handle and the incision extended laterally with finger fracture.  Amniotomy was performed with return of clear fluid.  The vertex was gently elevated from the pelvis and delivered atraumatically with fundal pressure.  Oral and nasopharynx were suctioned with bulb suction.  The remainder of the infant was delivered.  Delayed cord clamping was undertaken, and the infant was handed off to the resuscitation team who were present for the delivery.  The placenta was delivered by manual extraction.  The uterus was exteriorized.  The endometrial cavity was explored and found to be free of any remaining placental fragments.  The hysterotomy incision  was closed in the following fashion.  The right and left lateral margins were closed with a figure-of-X suture of #1 Monocryl.  The first layer was closed with a running, locked suture of #1 Monocryl.  A second vertical imbricating suture of #1 Monocryl was placed superior to the first.  The bladder flap was very close to the uterine incision, and because of good hemostasis, the decision was made to not close the bladder flap.  We then proceeded with a tubal sterilization procedure.     The right and left fallopian tubes were densely adherent to the ovary and to the mesosalpinx on both the right and left side.  I was able to identify the fimbriated end of both the right and left fallopian tubes.  The right and left fallopian tubes appeared very thin and very attenuated.  I did grasp the tubes in the proximal and mid one-third portion, tented, isolated the mesosalpinx, and the mesosalpinx was taken down in successive bites with the LigaSure device and the pedicles cauterized and divided.  It was difficult to follow the fallopian tube to the ampullary and to the fimbriated end of the tube on the right side.  I did remove the fimbriated end and the ampullary portion, as well as the proximal portions of the fallopian tube.  I did send the right fallopian tube to Pathology and asked for a frozen section to confirm that indeed this was fallopian tube.  They gave me a verbal report back that after consultation with other members of the department, the consensus was that this was right fallopian tube.  Attention was then turned to the left fallopian tube.  Again, this fallopian tube was very attenuated and densely adherent.  The fallopian tube was grasped distally and proximally.  The distal fimbriated end was identifiable.  I did remove the fimbriated end with the LigaSure device.  While tenting the tube, I did identify the mesosalpinx and it was taken down in successive bites with the LigaSure device and the pedicles  cauterized and divided under direct visualization.  The specimen was submitted to Pathology.  With both fimbriated ends removed and what appeared to be left fallopian tube, as well as pathologically-confirmed right fallopian tube removed, the pedicles were inspected and found to be hemostatic.  The pelvis was irrigated with warm saline and suctioned.  The uterus was replaced in the abdominal cavity.  At this point, we paused.  We documented that sponge, needle and instrument counts had been checked and were correct x 2, that hemostasis had been checked and was found to be acceptable.  With counts correct and hemostasis acceptable, the peritoneum was closed with a running suture of 2-0 Vicryl.  Subfascial area was inspected and found to be hemostatic and the fascia reapproximated with a running suture of 0 looped PDS.  At the completion of the reapproximation, there were no palpable defects and there was good visible reapproximation.  The subcutaneous fat layer was irrigated with warm saline.  There was a small amount of bleeding from the left lateral margin, which was controlled with pressure, with electrocautery, and also for added measures, Surgicel powder was placed.  With good hemostasis, the subcutaneous layer was reapproximated with a running suture of 3-0 plain.  Skin was closed with Insorb stapler.  Quantitative blood loss for the procedure was 831 mL.  The patient tolerated the procedure well and was returned to recovery area in good condition.    Luis Garcia MD        D: 10/14/2022   T: 10/14/2022   MT: FRANNIEMT    Name:     VIVIANA MARTINEZ  MRN:      [id number]        Account:        [id number]   :      1985           Procedure Date: 10/14/2022     Document: [id number]    cc:  Luis Garcia MD

## 2022-10-14 NOTE — ANESTHESIA POSTPROCEDURE EVALUATION
Patient: Aleyda Nicole    Procedure: Procedure(s):  REPEAT  SECTION WITH BILATERAL SALPINGECTOMY       Anesthesia Type:  Spinal    Note:  Disposition: Inpatient   Postop Pain Control: Uneventful            Sign Out: Well controlled pain   PONV: No   Neuro/Psych: Uneventful            Sign Out: Acceptable/Baseline neuro status   Airway/Respiratory: Uneventful            Sign Out: Acceptable/Baseline resp. status   CV/Hemodynamics: Uneventful            Sign Out: Acceptable CV status; No obvious hypovolemia; No obvious fluid overload   Other NRE: NONE   DID A NON-ROUTINE EVENT OCCUR? No           Last vitals:  Vitals Value Taken Time   /73 10/14/22 1129   Temp 97  F (36.1  C) 10/14/22 1054   Pulse     Resp 19 10/14/22 1111   SpO2 95 % 10/14/22 1130   Vitals shown include unvalidated device data.    Electronically Signed By: Ela Rojas MD  2022  11:34 AM

## 2022-10-14 NOTE — PLAN OF CARE
Patient is vitally stable. Heart murmur noted. Fundus and lochia WDL. Not out of bed yet due to nausea and emesis, as well as dizziness. Andrea catheter in and patent. Low urine MD geraldo aware, 1L bolus infused. Reports adequate pain control. Wishes to breastfeed eventually but has not been able to yet. Bonding well with baby. FOB at bedside and supportive.

## 2022-10-15 LAB
GLUCOSE BLDC GLUCOMTR-MCNC: 89 MG/DL (ref 70–99)
HGB BLD-MCNC: 8.3 G/DL (ref 11.7–15.7)

## 2022-10-15 PROCEDURE — 250N000011 HC RX IP 250 OP 636: Performed by: OBSTETRICS & GYNECOLOGY

## 2022-10-15 PROCEDURE — 120N000001 HC R&B MED SURG/OB

## 2022-10-15 PROCEDURE — 36415 COLL VENOUS BLD VENIPUNCTURE: CPT | Performed by: OBSTETRICS & GYNECOLOGY

## 2022-10-15 PROCEDURE — 250N000013 HC RX MED GY IP 250 OP 250 PS 637: Performed by: OBSTETRICS & GYNECOLOGY

## 2022-10-15 PROCEDURE — 85018 HEMOGLOBIN: CPT | Performed by: OBSTETRICS & GYNECOLOGY

## 2022-10-15 RX ORDER — DEXTROSE, SODIUM CHLORIDE, SODIUM LACTATE, POTASSIUM CHLORIDE, AND CALCIUM CHLORIDE 5; .6; .31; .03; .02 G/100ML; G/100ML; G/100ML; G/100ML; G/100ML
500 INJECTION, SOLUTION INTRAVENOUS ONCE
Status: COMPLETED | OUTPATIENT
Start: 2022-10-15 | End: 2022-10-15

## 2022-10-15 RX ADMIN — ACETAMINOPHEN 975 MG: 325 TABLET, FILM COATED ORAL at 01:55

## 2022-10-15 RX ADMIN — SENNOSIDES AND DOCUSATE SODIUM 1 TABLET: 50; 8.6 TABLET ORAL at 09:51

## 2022-10-15 RX ADMIN — LEVOTHYROXINE SODIUM 150 MCG: 150 TABLET ORAL at 07:14

## 2022-10-15 RX ADMIN — KETOROLAC TROMETHAMINE 30 MG: 30 INJECTION, SOLUTION INTRAMUSCULAR at 01:56

## 2022-10-15 RX ADMIN — IBUPROFEN 800 MG: 800 TABLET, FILM COATED ORAL at 14:11

## 2022-10-15 RX ADMIN — ACETAMINOPHEN 975 MG: 325 TABLET, FILM COATED ORAL at 20:34

## 2022-10-15 RX ADMIN — IBUPROFEN 800 MG: 800 TABLET, FILM COATED ORAL at 20:34

## 2022-10-15 RX ADMIN — SODIUM CHLORIDE, SODIUM LACTATE, POTASSIUM CHLORIDE, CALCIUM CHLORIDE AND DEXTROSE MONOHYDRATE 500 ML: 5; 600; 310; 30; 20 INJECTION, SOLUTION INTRAVENOUS at 02:45

## 2022-10-15 RX ADMIN — KETOROLAC TROMETHAMINE 30 MG: 30 INJECTION, SOLUTION INTRAMUSCULAR at 07:55

## 2022-10-15 RX ADMIN — SENNOSIDES AND DOCUSATE SODIUM 1 TABLET: 50; 8.6 TABLET ORAL at 20:34

## 2022-10-15 RX ADMIN — ACETAMINOPHEN 975 MG: 325 TABLET, FILM COATED ORAL at 07:55

## 2022-10-15 RX ADMIN — ACETAMINOPHEN 975 MG: 325 TABLET, FILM COATED ORAL at 14:11

## 2022-10-15 ASSESSMENT — ACTIVITIES OF DAILY LIVING (ADL)
ADLS_ACUITY_SCORE: 22

## 2022-10-15 NOTE — PLAN OF CARE
Pt is bonding well with baby boy- Keven. Breastfeeding is going well per pt. Fundus is firm, midline and 2 cm below umbilicus. Lochia is scant, rubra and pt denies having any clots. VSS. Andrea catheter still in place as pt is having very low urine output. Pt has gotten up to the edge of the bed but became light headed so she did not walk yet. Tolerating incisional pain well with, tylenol and toradol.- rating a 0/10.

## 2022-10-15 NOTE — PROVIDER NOTIFICATION
10/15/22 1603   Provider Notification   Provider Name/Title Dr Garcia   Method of Notification Electronic Page   Request Evaluate in Person   Notification Reason Other   Notified that incision looks open on the right side.  Per Dr Garcia, he will assess patient when he comes in at 1800, to put steri strips on until then.

## 2022-10-15 NOTE — PROVIDER NOTIFICATION
10/14/22 6778   Provider Notification   Provider Name/Title Dr. Galan   Method of Notification Electronic Page   Request Evaluate-Remote   Notification Reason Other   Md notified of low urine output. Md ordered 500 ml fluid bolus along with CBC, BMP, encouraged anti nausea meds and wants pt bladder scanned. Will notify md of any abnormal labs and continued low urine output.

## 2022-10-15 NOTE — PROVIDER NOTIFICATION
10/15/22 0742   Provider Notification   Provider Name/Title Dr Garcia   Method of Notification In Department   Request Evaluate in Person   Notification Reason Other   Notified that patient has a heart murmur.  No new orders.

## 2022-10-15 NOTE — PROGRESS NOTES
[facility] Obstetrics Post-Op / Progress Note         Assessment and Plan:    Assessment:   Post-operative day #1  Low transverse repeat  section  Bilateral salpingectomy  L&D complications: previous C/B x's 2 pt declines TOLAC, A1 GDM, maternal AMA, pos GBS, sterilization candidate  Pt had nausea p[ot op that has resolved.  UO values reviewed;  Pt is now drinking fluids and her UO has improved  I discussed with the pt and her huby the intraoperative findings of extensive peritubal adhesive disease and my concerns that the fallopian tubes were excised completely (frozen section of R tube confirms fallopian tube)  Will await path confirmation  I do not anticipate a concern    ABLA post op  Doing well.  Clean wound without signs of infection.  Normal healing wound.  No immediate surgical complications identified.  No excessive bleeding  Pain well-controlled.      Plan:   Ambulation encouraged  Breast feeding strategies discussed  Monitor wound for signs of infection  Pain control measures as needed  Reportable signs and symptoms dicussed with the patient  Anticipate discharge in 1-2 days           Interval History:   Doing well.  Pain is well-controlled.  No fevers.  No history of wound drainage, warmth or significant erythema.  Good appetite.  Denies chest pain, shortness of breath, nausea or vomiting.  Ambulatory.  Breastfeeding well.          Significant Problems:      Past Medical History:   Diagnosis Date     Gestational diabetes 2013     Hypocalcemia 2016     Influenza A      Papillary thyroid carcinoma     Papillary carcinoma, follicular variant with     Pneumonia     LLL     PONV (postoperative nausea and vomiting)      Postoperative hypothyroidism       labor      Uncomplicated asthma              Review of Systems:    The patient denies any chest pain, shortness of breath, excessive pain, fever, chills, purulent drainage from the wound, nausea or  vomiting.          Medications:     All medications related to the patient's surgery have been reviewed  Current Facility-Administered Medications   Medication     acetaminophen (TYLENOL) tablet 975 mg     [START ON 10/16/2022] bisacodyl (DULCOLAX) suppository 10 mg     carboprost (HEMABATE) injection 250 mcg     dextrose 5% in lactated ringers infusion     hydrocortisone (Perianal) (ANUSOL-HC) 2.5 % cream     ibuprofen (ADVIL/MOTRIN) tablet 800 mg     lanolin cream     levothyroxine (SYNTHROID/LEVOTHROID) tablet 150 mcg     lidocaine (LMX4) cream     lidocaine 1 % 0.1-1 mL     methylergonovine (METHERGINE) injection 200 mcg     metoclopramide (REGLAN) injection 10 mg    Or     metoclopramide (REGLAN) tablet 10 mg     misoprostol (CYTOTEC) tablet 400 mcg    Or     misoprostol (CYTOTEC) tablet 800 mcg     nalbuphine (NUBAIN) injection 2.5-5 mg     naloxone (NARCAN) injection 0.2 mg    Or     naloxone (NARCAN) injection 0.4 mg    Or     naloxone (NARCAN) injection 0.2 mg    Or     naloxone (NARCAN) injection 0.4 mg     No MMR Needed -  Assessment: Patient does not need MMR vaccine     No Tdap Needed - Assessment: Patient does not need Tdap vaccine     ondansetron (ZOFRAN ODT) ODT tab 4 mg    Or     ondansetron (ZOFRAN) injection 4 mg     oxyCODONE (ROXICODONE) tablet 5 mg     oxytocin (PITOCIN) 30 units in 500 mL 0.9% NaCl infusion     oxytocin (PITOCIN) 30 units in 500 mL 0.9% NaCl infusion     oxytocin (PITOCIN) injection 10 Units     oxytocin (PITOCIN) injection 10 Units     prochlorperazine (COMPAZINE) injection 10 mg    Or     prochlorperazine (COMPAZINE) tablet 10 mg    Or     prochlorperazine (COMPAZINE) suppository 25 mg     scopolamine (TRANSDERM) 72 hr patch 1 patch    And     scopolamine (TRANSDERM-SCOP) Patch in Place     senna-docusate (SENOKOT-S/PERICOLACE) 8.6-50 MG per tablet 1 tablet    Or     senna-docusate (SENOKOT-S/PERICOLACE) 8.6-50 MG per tablet 2 tablet     simethicone (MYLICON) chewable tablet  80 mg     sodium chloride (PF) 0.9% PF flush 3 mL     sodium chloride (PF) 0.9% PF flush 3 mL     sodium chloride 0.9% (bottle) irrigation     [START ON 10/16/2022] sodium phosphate (FLEET ENEMA) 1 enema     tranexamic acid 1 g in 100 mL NS IV bag (premix)             Physical Exam:   Vitals were reviewed  All vitals stable  Temp: 99.1  F (37.3  C) Temp src: Oral BP: 106/76 Pulse: 82   Resp: 14 SpO2: 97 % O2 Device: None (Room air) Oxygen Delivery: 4 LPM  Wound clean and dry with minimal or no drainage.  Surrounding skin with minimal erythema.          Data:     All laboratory data related to this surgery reviewed  Hemoglobin   Date Value Ref Range Status   10/15/2022 8.3 (L) 11.7 - 15.7 g/dL Final   10/14/2022 10.8 (L) 11.7 - 15.7 g/dL Final   10/14/2022 12.5 11.7 - 15.7 g/dL Final   07/28/2022 11.2 (L) 11.7 - 15.7 g/dL Final   05/26/2022 12.5 11.7 - 15.7 g/dL Final   11/27/2019 13.1 11.7 - 15.7 g/dL Final   10/24/2018 12.7 11.7 - 15.7 g/dL Final   08/28/2018 11.9 11.7 - 15.7 g/dL Final   04/19/2017 12.8 11.7 - 15.7 g/dL Final   01/12/2017 11.5 (L) 11.7 - 15.7 g/dL Final     No imaging studies have been ordered    Luis Garcia MD

## 2022-10-15 NOTE — PROVIDER NOTIFICATION
10/15/22 0238   Provider Notification   Provider Name/Title Dr. Galan   Method of Notification Phone   Request Evaluate-Remote   Notification Reason Status Update   Md notified of continued low urine output. Md ordered another 500 ml fluid bolus and said the pt needs to drink since she hasn't been.

## 2022-10-15 NOTE — PROVIDER NOTIFICATION
10/15/22 1800   Provider Notification   Provider Name/Title Dr Garcia   Method of Notification In Department   Request Evaluate in Person   Notification Reason Other   Per Dr Garcia, incision looks ok, to keep steri strips over it and put a gauze over steri strips.

## 2022-10-15 NOTE — PLAN OF CARE
Patient is vitally stable. Heart murmur present. Fundus and lochia WDL. Incision site dehisced with sanguinous drainage. MD notified, in department to assess patient; stated that incision looks ok, to keep steri strips and gauze over it. Voiding without difficulty. Ambulating independently. States that nausea and dizziness have resolved. Breast and bottle feeding. Bonding well with baby. FOB at bedside and supportive.

## 2022-10-16 PROCEDURE — 250N000013 HC RX MED GY IP 250 OP 250 PS 637: Performed by: OBSTETRICS & GYNECOLOGY

## 2022-10-16 PROCEDURE — 120N000001 HC R&B MED SURG/OB

## 2022-10-16 RX ADMIN — ACETAMINOPHEN 975 MG: 325 TABLET, FILM COATED ORAL at 16:45

## 2022-10-16 RX ADMIN — ACETAMINOPHEN 975 MG: 325 TABLET, FILM COATED ORAL at 23:26

## 2022-10-16 RX ADMIN — IBUPROFEN 800 MG: 800 TABLET, FILM COATED ORAL at 09:25

## 2022-10-16 RX ADMIN — LEVOTHYROXINE SODIUM 150 MCG: 150 TABLET ORAL at 06:18

## 2022-10-16 RX ADMIN — SENNOSIDES AND DOCUSATE SODIUM 1 TABLET: 50; 8.6 TABLET ORAL at 09:25

## 2022-10-16 RX ADMIN — IBUPROFEN 800 MG: 800 TABLET, FILM COATED ORAL at 16:44

## 2022-10-16 RX ADMIN — IBUPROFEN 800 MG: 800 TABLET, FILM COATED ORAL at 23:26

## 2022-10-16 RX ADMIN — ACETAMINOPHEN 975 MG: 325 TABLET, FILM COATED ORAL at 02:40

## 2022-10-16 RX ADMIN — IBUPROFEN 800 MG: 800 TABLET, FILM COATED ORAL at 02:40

## 2022-10-16 RX ADMIN — SENNOSIDES AND DOCUSATE SODIUM 1 TABLET: 50; 8.6 TABLET ORAL at 23:26

## 2022-10-16 RX ADMIN — ACETAMINOPHEN 975 MG: 325 TABLET, FILM COATED ORAL at 09:25

## 2022-10-16 ASSESSMENT — ACTIVITIES OF DAILY LIVING (ADL)
ADLS_ACUITY_SCORE: 22

## 2022-10-16 NOTE — PLAN OF CARE
Patient is vitally stable. Fundus and lochia WDL. Incision site WDL, lots of bruising around incision. Ambulating independently. Voiding without difficulty. Reports adequate pain control. Breast and bottle feeding, mainly bottle feeding today. Bonding well with baby. FOB at bedside and supportive.

## 2022-10-16 NOTE — PLAN OF CARE
Pt is bonding well with baby boy- Keven. Formula feeding with some intermittent breastfeeding. Fundus is firm, midline and 1 cm below umbilicus. Lochia is scant, rubra and pt denies having any clots. VSS. Voiding and passing gas. Ambulating independently.Tolerating incisional pain well with tylenol and ibuprofen.- rating a 6/10.  Incision is not approximated and is draining small sanguinous drainage, it is covered with sterile gauze and an abd pad. In the morning MD will assess. Pt is wanting it to be closed before going home, pt is worried about infection, increased healing time and size of incisional scar.

## 2022-10-16 NOTE — LACTATION NOTE
Lactation in to see patient. Third baby, patient is pumping and supplementing with formula as well. Per feeding log patient doing more bottle feeding than breastfeeding. Discussed need for stimulation every 2-3 hours to help bring in milk supply. Patient states it usually takes a week for her milk to come in. States only one side of her pump is working, encouraged her to call the next time she pumps so writer can assess. No further questions or concerns at this time.

## 2022-10-16 NOTE — PROGRESS NOTES
[facility] Obstetrics Post-Op / Progress Note         Assessment and Plan:    Assessment:   Post-operative day #2  Low transverse repeat  section  Bilateral salpingectomy  L&D complications: None    ABLA post op  Doing well.  No immediate surgical complications identified.  No excessive bleeding  Pain well-controlled.  There is an approx 3 cm superficial skin separation of her pfannenstiel  Incision.  I reviewed this finding with the pt and her  and discussed alt modes of Rx.  They desire a re-approximation with skin adhesive (Exofin_)  We discussed potential re-separation and the need for healing by 2ndary intent  They understand and accept      Plan:   Ambulation encouraged  Breast feeding strategies discussed  Monitor wound for signs of infection  Pain control measures as needed  Reportable signs and symptoms dicussed with the patient  Will keep pt until tomorrow as she had nausea post op and to make sure her skin incision is doing well.  No evidence of any deeper infection or hematoma or dehiscence    Anticipate discharge tomorrow           Interval History:   Doing well.  Pain is well-controlled.  No fevers.  No history of wound drainage, warmth or significant erythema.  Good appetite.  Denies chest pain, shortness of breath, nausea or vomiting.  Ambulatory.  Breastfeeding well.          Significant Problems:      Past Medical History:   Diagnosis Date     Gestational diabetes 2013     Hypocalcemia 2016     Influenza A 2009     Papillary thyroid carcinoma     Papillary carcinoma, follicular variant with     Pneumonia 2009    LLL     PONV (postoperative nausea and vomiting)      Postoperative hypothyroidism       labor      Uncomplicated asthma              Review of Systems:    The patient denies any chest pain, shortness of breath, excessive pain, fever, chills, purulent drainage from the wound, nausea or vomiting.          Medications:     All medications  related to the patient's surgery have been reviewed  Current Facility-Administered Medications   Medication     acetaminophen (TYLENOL) tablet 975 mg     bisacodyl (DULCOLAX) suppository 10 mg     carboprost (HEMABATE) injection 250 mcg     dextrose 5% in lactated ringers infusion     hydrocortisone (Perianal) (ANUSOL-HC) 2.5 % cream     ibuprofen (ADVIL/MOTRIN) tablet 800 mg     lanolin cream     levothyroxine (SYNTHROID/LEVOTHROID) tablet 150 mcg     lidocaine (LMX4) cream     lidocaine 1 % 0.1-1 mL     methylergonovine (METHERGINE) injection 200 mcg     metoclopramide (REGLAN) injection 10 mg    Or     metoclopramide (REGLAN) tablet 10 mg     misoprostol (CYTOTEC) tablet 400 mcg    Or     misoprostol (CYTOTEC) tablet 800 mcg     nalbuphine (NUBAIN) injection 2.5-5 mg     naloxone (NARCAN) injection 0.2 mg    Or     naloxone (NARCAN) injection 0.4 mg    Or     naloxone (NARCAN) injection 0.2 mg    Or     naloxone (NARCAN) injection 0.4 mg     No MMR Needed -  Assessment: Patient does not need MMR vaccine     No Tdap Needed - Assessment: Patient does not need Tdap vaccine     ondansetron (ZOFRAN ODT) ODT tab 4 mg    Or     ondansetron (ZOFRAN) injection 4 mg     oxyCODONE (ROXICODONE) tablet 5 mg     oxytocin (PITOCIN) 30 units in 500 mL 0.9% NaCl infusion     oxytocin (PITOCIN) 30 units in 500 mL 0.9% NaCl infusion     oxytocin (PITOCIN) injection 10 Units     oxytocin (PITOCIN) injection 10 Units     prochlorperazine (COMPAZINE) injection 10 mg    Or     prochlorperazine (COMPAZINE) tablet 10 mg    Or     prochlorperazine (COMPAZINE) suppository 25 mg     scopolamine (TRANSDERM) 72 hr patch 1 patch    And     scopolamine (TRANSDERM-SCOP) Patch in Place     senna-docusate (SENOKOT-S/PERICOLACE) 8.6-50 MG per tablet 1 tablet    Or     senna-docusate (SENOKOT-S/PERICOLACE) 8.6-50 MG per tablet 2 tablet     simethicone (MYLICON) chewable tablet 80 mg     sodium chloride (PF) 0.9% PF flush 3 mL     sodium chloride  (PF) 0.9% PF flush 3 mL     sodium chloride 0.9% (bottle) irrigation     sodium phosphate (FLEET ENEMA) 1 enema     tranexamic acid 1 g in 100 mL NS IV bag (premix)             Physical Exam:   Vitals were reviewed  All vitals stable  Temp: 98.8  F (37.1  C) Temp src: Oral BP: 117/73 Pulse: 78   Resp: 16 SpO2: 97 % O2 Device: None (Room air)    Wound clean and dry with minimal   drainage.  Surrounding skin with minimal erythema.   skin re-approximation as noted above       Data:     All laboratory data related to this surgery reviewed  Hemoglobin   Date Value Ref Range Status   10/15/2022 8.3 (L) 11.7 - 15.7 g/dL Final   10/14/2022 10.8 (L) 11.7 - 15.7 g/dL Final   10/14/2022 12.5 11.7 - 15.7 g/dL Final   07/28/2022 11.2 (L) 11.7 - 15.7 g/dL Final   05/26/2022 12.5 11.7 - 15.7 g/dL Final   11/27/2019 13.1 11.7 - 15.7 g/dL Final   10/24/2018 12.7 11.7 - 15.7 g/dL Final   08/28/2018 11.9 11.7 - 15.7 g/dL Final   04/19/2017 12.8 11.7 - 15.7 g/dL Final   01/12/2017 11.5 (L) 11.7 - 15.7 g/dL Final     No imaging studies have been ordered    Luis Garcia MD

## 2022-10-17 VITALS
HEART RATE: 80 BPM | RESPIRATION RATE: 14 BRPM | SYSTOLIC BLOOD PRESSURE: 120 MMHG | DIASTOLIC BLOOD PRESSURE: 77 MMHG | TEMPERATURE: 97.4 F | OXYGEN SATURATION: 97 %

## 2022-10-17 LAB
PATH REPORT.COMMENTS IMP SPEC: NORMAL
PATH REPORT.FINAL DX SPEC: NORMAL
PATH REPORT.GROSS SPEC: NORMAL
PATH REPORT.INTRAOP OBS SPEC DOC: NORMAL
PATH REPORT.MICROSCOPIC SPEC OTHER STN: NORMAL
PATH REPORT.RELEVANT HX SPEC: NORMAL
PHOTO IMAGE: NORMAL

## 2022-10-17 PROCEDURE — 250N000013 HC RX MED GY IP 250 OP 250 PS 637: Performed by: OBSTETRICS & GYNECOLOGY

## 2022-10-17 RX ORDER — OXYCODONE HYDROCHLORIDE 5 MG/1
5 TABLET ORAL EVERY 4 HOURS PRN
Qty: 12 TABLET | Refills: 0 | Status: SHIPPED | OUTPATIENT
Start: 2022-10-17 | End: 2022-10-27

## 2022-10-17 RX ORDER — ACETAMINOPHEN 325 MG/1
650 TABLET ORAL EVERY 4 HOURS PRN
COMMUNITY
Start: 2022-10-17 | End: 2022-11-07

## 2022-10-17 RX ORDER — IBUPROFEN 600 MG/1
600 TABLET, FILM COATED ORAL EVERY 6 HOURS PRN
Qty: 30 TABLET | Refills: 1 | Status: SHIPPED | OUTPATIENT
Start: 2022-10-17 | End: 2022-11-07

## 2022-10-17 RX ORDER — IBUPROFEN 200 MG
400 TABLET ORAL EVERY 4 HOURS PRN
COMMUNITY
Start: 2022-10-17 | End: 2022-11-30

## 2022-10-17 RX ADMIN — IBUPROFEN 800 MG: 800 TABLET, FILM COATED ORAL at 05:53

## 2022-10-17 RX ADMIN — LEVOTHYROXINE SODIUM 150 MCG: 150 TABLET ORAL at 05:57

## 2022-10-17 RX ADMIN — ACETAMINOPHEN 975 MG: 325 TABLET, FILM COATED ORAL at 05:53

## 2022-10-17 ASSESSMENT — ACTIVITIES OF DAILY LIVING (ADL)
ADLS_ACUITY_SCORE: 22

## 2022-10-17 NOTE — PROGRESS NOTES
[facility] Obstetrics Post-Op / Progress Note         Assessment and Plan:    Assessment:   Post-operative day #3  Low transverse repeat  section  Bilateral salpingectomy  L&D complications: ABLA post op  Doing well.  No immediate surgical complications identified.  No excessive bleeding  Pain well-controlled.  Her incision is clean and intact   Bruising as noted  No evidence of a hematoma      Doing well.  Clean wound without signs of infection.  Normal healing wound.  No immediate surgical complications identified.  No excessive bleeding  Pain well-controlled.  Path report pending      Plan:   Ambulation encouraged  Breast feeding strategies discussed  Monitor wound for signs of infection  Pain control measures as needed  Reportable signs and symptoms dicussed with the patient  No lifting > 10 lbs for 6 wks  Take your tempature twice daily for 3 days and call if > 100.4  Nothing in vagina for 6 wks  Continue taking prenatal MVI daily for 1 month    Discharge later today           Interval History:   Doing well.  Pain is well-controlled.  No fevers.  No history of wound drainage, warmth or significant erythema.  Good appetite.  Denies chest pain, shortness of breath, nausea or vomiting.  Ambulatory.  Breastfeeding well.          Significant Problems:      Past Medical History:   Diagnosis Date     Gestational diabetes 2013     Hypocalcemia 2016     Influenza A      Papillary thyroid carcinoma     Papillary carcinoma, follicular variant with     Pneumonia     LLL     PONV (postoperative nausea and vomiting)      Postoperative hypothyroidism       labor      Uncomplicated asthma              Review of Systems:    The patient denies any chest pain, shortness of breath, excessive pain, fever, chills, purulent drainage from the wound, nausea or vomiting.          Medications:     All medications related to the patient's surgery have been reviewed  Current  Facility-Administered Medications   Medication     acetaminophen (TYLENOL) tablet 975 mg     bisacodyl (DULCOLAX) suppository 10 mg     carboprost (HEMABATE) injection 250 mcg     dextrose 5% in lactated ringers infusion     hydrocortisone (Perianal) (ANUSOL-HC) 2.5 % cream     ibuprofen (ADVIL/MOTRIN) tablet 800 mg     lanolin cream     levothyroxine (SYNTHROID/LEVOTHROID) tablet 150 mcg     lidocaine (LMX4) cream     lidocaine 1 % 0.1-1 mL     methylergonovine (METHERGINE) injection 200 mcg     metoclopramide (REGLAN) injection 10 mg    Or     metoclopramide (REGLAN) tablet 10 mg     misoprostol (CYTOTEC) tablet 400 mcg    Or     misoprostol (CYTOTEC) tablet 800 mcg     nalbuphine (NUBAIN) injection 2.5-5 mg     naloxone (NARCAN) injection 0.2 mg    Or     naloxone (NARCAN) injection 0.4 mg    Or     naloxone (NARCAN) injection 0.2 mg    Or     naloxone (NARCAN) injection 0.4 mg     No MMR Needed -  Assessment: Patient does not need MMR vaccine     No Tdap Needed - Assessment: Patient does not need Tdap vaccine     ondansetron (ZOFRAN ODT) ODT tab 4 mg    Or     ondansetron (ZOFRAN) injection 4 mg     oxyCODONE (ROXICODONE) tablet 5 mg     oxytocin (PITOCIN) 30 units in 500 mL 0.9% NaCl infusion     oxytocin (PITOCIN) 30 units in 500 mL 0.9% NaCl infusion     oxytocin (PITOCIN) injection 10 Units     oxytocin (PITOCIN) injection 10 Units     prochlorperazine (COMPAZINE) injection 10 mg    Or     prochlorperazine (COMPAZINE) tablet 10 mg    Or     prochlorperazine (COMPAZINE) suppository 25 mg     scopolamine (TRANSDERM) 72 hr patch 1 patch    And     scopolamine (TRANSDERM-SCOP) Patch in Place     senna-docusate (SENOKOT-S/PERICOLACE) 8.6-50 MG per tablet 1 tablet    Or     senna-docusate (SENOKOT-S/PERICOLACE) 8.6-50 MG per tablet 2 tablet     simethicone (MYLICON) chewable tablet 80 mg     sodium chloride (PF) 0.9% PF flush 3 mL     sodium chloride (PF) 0.9% PF flush 3 mL     sodium chloride 0.9% (bottle)  irrigation     sodium phosphate (FLEET ENEMA) 1 enema     tranexamic acid 1 g in 100 mL NS IV bag (premix)             Physical Exam:   Vitals were reviewed  All vitals stable  Temp: 98.7  F (37.1  C) Temp src: Oral BP: 136/82 Pulse: 80   Resp: 14   O2 Device: None (Room air)    Wound clean and dry with minimal or no drainage.  Surrounding skin with minimal erythema.          Data:     All laboratory data related to this surgery reviewed  Hemoglobin   Date Value Ref Range Status   10/15/2022 8.3 (L) 11.7 - 15.7 g/dL Final   10/14/2022 10.8 (L) 11.7 - 15.7 g/dL Final   10/14/2022 12.5 11.7 - 15.7 g/dL Final   07/28/2022 11.2 (L) 11.7 - 15.7 g/dL Final   05/26/2022 12.5 11.7 - 15.7 g/dL Final   11/27/2019 13.1 11.7 - 15.7 g/dL Final   10/24/2018 12.7 11.7 - 15.7 g/dL Final   08/28/2018 11.9 11.7 - 15.7 g/dL Final   04/19/2017 12.8 11.7 - 15.7 g/dL Final   01/12/2017 11.5 (L) 11.7 - 15.7 g/dL Final     No imaging studies have been ordered    Luis Garcia MD

## 2022-10-17 NOTE — DISCHARGE INSTRUCTIONS

## 2022-10-17 NOTE — PLAN OF CARE
VSS and WNL. Postpartum checks WNL -- see flow record. Patient eating and drinking normally. Patient able to empty bladder independently and is up ambulating. Patient performing self cares and is able to care for infant. Positive attachment behaviors observed with infant. Discharge instructions completed.  Patient states she understands all discharge instructions and all her questions have been answered.  Verbalizes when she needs to return to clinic for follow up for herself and baby. Prescriptions reviewed and sent home with patient.  Postpartum depression symptoms reviewed and encouraged frequent review of depression scale. Patient discharged to home with infant and spouse.

## 2022-10-17 NOTE — PROGRESS NOTES
PHN in to see pt. to discuss public health programs. PHN left card with [facility] contact info with pt.

## 2022-10-17 NOTE — PLAN OF CARE
Pt is bonding well with laurence Baca. Breastfeeding is painful, pt might use a nipple shield if she breastfeeds but pt has been formula feeding for the most part while here. Pt said she will most likely breastfeed at home when her milk is in. Attempting here has been encouraged along with pumping for stimulation. Fundus is firm, midline and 2 cm below umbilicus. Lochia is scant, rubra and pt denies having any clots. VSS. Voiding and passing gas. Ambulating independently.Tolerating incisional pain well with tylenol, and ibuprofen.- rating a 5/10. Incision has remained approximated with no drainage.

## 2022-10-17 NOTE — DISCHARGE SUMMARY
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  No immediate surgical complications identified.  No excessive bleeding  Pain well-controlled  Low transverse repeat  section  Bilateral salpingectomy  L&D complications: ABLA post op  Doing well.  No immediate surgical complications identified.  No excessive bleeding  Pain well-controlled.  Her incision is clean and intact   Bruising as noted  No evidence of a hematoma      Doing well.  Clean wound without signs of infection.  Normal healing wound.  No immediate surgical complications identified.  No excessive bleeding  Pain well-controlled.  Path report pending      Plan:   Ambulation encouraged  Breast feeding strategies discussed  Monitor wound for signs of infection  Pain control measures as needed  Reportable signs and symptoms dicussed with the patient  No lifting > 10 lbs for 6 wks  Take your tempature twice daily for 3 days and call if > 100.4  Nothing in vagina for 6 wks  Continue taking prenatal MVI daily for 1 month    Discharge later today           Interval History:   Doing well.  Pain is well-controlled.  No fevers.  No history of wound drainage, warmth or significant erythema.  Good appetite.  Denies chest pain, shortness of breath, nausea or vomiting.  Ambulatory.  Breastfeeding well.          Significant Problems:      Past Medical History:   Diagnosis Date     Gestational diabetes 2013     Hypocalcemia 2016     Influenza A      Papillary thyroid carcinoma     Papillary carcinoma, follicular variant with     Pneumonia     LLL     PONV  (postoperative nausea and vomiting)      Postoperative hypothyroidism       labor      Uncomplicated asthma              Review of Systems:    The patient denies any chest pain, shortness of breath, excessive pain, fever, chills, purulent drainage from the wound, nausea or vomiting.          Medications:     All medications related to the patient's surgery have been reviewed  Current Facility-Administered Medications   Medication     acetaminophen (TYLENOL) tablet 975 mg     bisacodyl (DULCOLAX) suppository 10 mg     carboprost (HEMABATE) injection 250 mcg     dextrose 5% in lactated ringers infusion     hydrocortisone (Perianal) (ANUSOL-HC) 2.5 % cream     ibuprofen (ADVIL/MOTRIN) tablet 800 mg     lanolin cream     levothyroxine (SYNTHROID/LEVOTHROID) tablet 150 mcg     lidocaine (LMX4) cream     lidocaine 1 % 0.1-1 mL     methylergonovine (METHERGINE) injection 200 mcg     metoclopramide (REGLAN) injection 10 mg    Or     metoclopramide (REGLAN) tablet 10 mg     misoprostol (CYTOTEC) tablet 400 mcg    Or     misoprostol (CYTOTEC) tablet 800 mcg     nalbuphine (NUBAIN) injection 2.5-5 mg     naloxone (NARCAN) injection 0.2 mg    Or     naloxone (NARCAN) injection 0.4 mg    Or     naloxone (NARCAN) injection 0.2 mg    Or     naloxone (NARCAN) injection 0.4 mg     No MMR Needed -  Assessment: Patient does not need MMR vaccine     No Tdap Needed - Assessment: Patient does not need Tdap vaccine     ondansetron (ZOFRAN ODT) ODT tab 4 mg    Or     ondansetron (ZOFRAN) injection 4 mg     oxyCODONE (ROXICODONE) tablet 5 mg     oxytocin (PITOCIN) 30 units in 500 mL 0.9% NaCl infusion     oxytocin (PITOCIN) 30 units in 500 mL 0.9% NaCl infusion     oxytocin (PITOCIN) injection 10 Units     oxytocin (PITOCIN) injection 10 Units     prochlorperazine (COMPAZINE) injection 10 mg    Or     prochlorperazine (COMPAZINE) tablet 10 mg    Or     prochlorperazine (COMPAZINE) suppository 25 mg     scopolamine (TRANSDERM) 72 hr  patch 1 patch    And     scopolamine (TRANSDERM-SCOP) Patch in Place     senna-docusate (SENOKOT-S/PERICOLACE) 8.6-50 MG per tablet 1 tablet    Or     senna-docusate (SENOKOT-S/PERICOLACE) 8.6-50 MG per tablet 2 tablet     simethicone (MYLICON) chewable tablet 80 mg     sodium chloride (PF) 0.9% PF flush 3 mL     sodium chloride (PF) 0.9% PF flush 3 mL     sodium chloride 0.9% (bottle) irrigation     sodium phosphate (FLEET ENEMA) 1 enema     tranexamic acid 1 g in 100 mL NS IV bag (premix)             Physical Exam:   Vitals were reviewed  All vitals stable  Temp: 98.7  F (37.1  C) Temp src: Oral BP: 136/82 Pulse: 80   Resp: 14   O2 Device: None (Room air)    Wound clean and dry with minimal or no drainage.  Surrounding skin with minimal erythema.          Data:     All laboratory data related to this surgery reviewed  Hemoglobin   Date Value Ref Range Status   10/15/2022 8.3 (L) 11.7 - 15.7 g/dL Final   10/14/2022 10.8 (L) 11.7 - 15.7 g/dL Final   10/14/2022 12.5 11.7 - 15.7 g/dL Final   07/28/2022 11.2 (L) 11.7 - 15.7 g/dL Final   05/26/2022 12.5 11.7 - 15.7 g/dL Final   11/27/2019 13.1 11.7 - 15.7 g/dL Final   10/24/2018 12.7 11.7 - 15.7 g/dL Final   08/28/2018 11.9 11.7 - 15.7 g/dL Final   04/19/2017 12.8 11.7 - 15.7 g/dL Final   01/12/2017 11.5 (L) 11.7 - 15.7 g/dL Final     No imaging studies have been ordered    Luis Garcia MD

## 2022-10-27 ENCOUNTER — OFFICE VISIT (OUTPATIENT)
Dept: OBGYN | Facility: CLINIC | Age: 37
End: 2022-10-27
Payer: COMMERCIAL

## 2022-10-27 ENCOUNTER — TELEPHONE (OUTPATIENT)
Dept: OBGYN | Facility: CLINIC | Age: 37
End: 2022-10-27

## 2022-10-27 VITALS
HEIGHT: 62 IN | DIASTOLIC BLOOD PRESSURE: 80 MMHG | SYSTOLIC BLOOD PRESSURE: 126 MMHG | BODY MASS INDEX: 29.08 KG/M2 | WEIGHT: 158 LBS

## 2022-10-27 DIAGNOSIS — T14.8XXA WOUND INFECTION: Primary | ICD-10-CM

## 2022-10-27 DIAGNOSIS — L08.9 WOUND INFECTION: Primary | ICD-10-CM

## 2022-10-27 PROCEDURE — 99024 POSTOP FOLLOW-UP VISIT: CPT | Performed by: OBSTETRICS & GYNECOLOGY

## 2022-10-27 RX ORDER — SULFAMETHOXAZOLE/TRIMETHOPRIM 800-160 MG
1 TABLET ORAL 2 TIMES DAILY
Qty: 10 TABLET | Refills: 0 | Status: SHIPPED | OUTPATIENT
Start: 2022-10-27 | End: 2022-11-07

## 2022-10-27 NOTE — PROGRESS NOTES
"Post-op Incision check    S: Aleyda Nicole is here s/p reoeat  section 13 days ago. Overall doing well, but has noted purulent drainage from right angle of incision. She also reports being very emotional and cries often. Getting very little sleep at night. Does not feel that she is a danger to herself or others.     PHQ-9: 2    O: /80 (BP Location: Left arm, Patient Position: Chair, Cuff Size: Adult Regular)   Ht 1.575 m (5' 2\")   Wt 71.7 kg (158 lb)   LMP 2022 (Within Days)   Breastfeeding Yes   BMI 28.90 kg/m     Gen: resting comfortably, in NAD  Abd: soft, minimally tender to palpation. Fundus appropriately involuting   Inc: superficial dehiscence at the right angle, 3cm long x 1cm deep, fascia intact to probing. Mild cellulitis extending around entirety of incision, thin yellow discharge. Skin glue partly sloughed, removed.     Incision cleaned with fluid flush and gently debrided with moistened sterile gauze. Bed looks intact and scant bleeding around edges.     A/P: Aleyda Nicole is a 37 year old female  POD# 13 s/p repeat c/s and bilateral salpingectomy, here for incision check.    - 1. Wound infection  - sulfamethoxazole-trimethoprim (BACTRIM DS) 800-160 MG tablet; Take 1 tablet by mouth 2 times daily  Dispense: 10 tablet; Refill: 0   - gauze provided for gentle wound packing twice daily    2. Routine Postpartum care:  - ongoing lifting restriction of 15lb    - pelvic rest through 6w PP    Return in 1 week for wound check and mood check.    Briseida Galan MD    "

## 2022-10-27 NOTE — TELEPHONE ENCOUNTER
PP pt calls stating she has yellow pus coming from her CS incision.    No fever or increased pain. No red areas visible to her.    Added to schedule for incision check.    Luisa BUITRAGO RN BSN

## 2022-10-27 NOTE — NURSING NOTE
"Chief Complaint   Patient presents with     Wound Check     Right sided drainage       Initial /80 (BP Location: Left arm, Patient Position: Chair, Cuff Size: Adult Regular)   Ht 1.575 m (5' 2\")   Wt 71.7 kg (158 lb)   LMP 2022 (Within Days)   Breastfeeding Yes   BMI 28.90 kg/m   Estimated body mass index is 28.9 kg/m  as calculated from the following:    Height as of this encounter: 1.575 m (5' 2\").    Weight as of this encounter: 71.7 kg (158 lb).  BP completed using cuff size: regular    Questioned patient about current smoking habits.  Pt. has never smoked.          The following HM Due: NONE    Meli Barroso CMA           "

## 2022-11-07 ENCOUNTER — OFFICE VISIT (OUTPATIENT)
Dept: OBGYN | Facility: CLINIC | Age: 37
End: 2022-11-07
Payer: COMMERCIAL

## 2022-11-07 VITALS
DIASTOLIC BLOOD PRESSURE: 78 MMHG | WEIGHT: 157 LBS | BODY MASS INDEX: 28.89 KG/M2 | HEIGHT: 62 IN | SYSTOLIC BLOOD PRESSURE: 120 MMHG

## 2022-11-07 DIAGNOSIS — T14.8XXA WOUND INFECTION: Primary | ICD-10-CM

## 2022-11-07 DIAGNOSIS — L08.9 WOUND INFECTION: Primary | ICD-10-CM

## 2022-11-07 PROCEDURE — 99213 OFFICE O/P EST LOW 20 MIN: CPT | Performed by: OBSTETRICS & GYNECOLOGY

## 2022-11-07 ASSESSMENT — PATIENT HEALTH QUESTIONNAIRE - PHQ9: SUM OF ALL RESPONSES TO PHQ QUESTIONS 1-9: 1

## 2022-11-07 NOTE — PROGRESS NOTES
"Incision check  Mood check    S: Aleyda Nicole is here s/p repeat  section 20 days ago,dx with post op wound infection last week.  She is s/p bactrim x5d.  No fevers.  The area isn't draining as much as it was before. Overall feels incision is much better looking than before.  Mood is improved.     PHQ-9: 1    O: /78 (BP Location: Left arm, Patient Position: Chair, Cuff Size: Adult Regular)   Ht 1.575 m (5' 2\")   Wt 71.2 kg (157 lb)   LMP 2022 (Within Days)   Breastfeeding Yes   BMI 28.72 kg/m     Gen: resting comfortably, in NAD  Abd: soft, minimally tender to palpation. Fundus appropriately involuting   Inc:  Entirety of the skin is intact without deeper opening in any area.  There is bright red granulation tissue forming on the right aspect of the incision 3cm long.  There is a 1cm polyp on the left of the incision which appears c/w with very prominent proud flesh. The incision is overall moist but without any sign of ongoing infection.     Silver nitrate was applied to the proud flesh areas.  The entire wound was dried.  We gave her some gauze strips    A/P: Aleyda Nicole is a 37 year old female  POD# 20 s/p repeat c/s and bilateral salpingectomy, s/p wound infection, here for incision check.    1. Wound infection  Recommend keeping the area clean and dry, would recommend twice daily laying down and letting the area breathe x5-10 min.  Also gave gauze to keep in the area to wick away moisture.  Return in 1 week for wound check     2. Mood improved, PHQ9 score of 1 today.  Reviewed s/s PP depression/psychosis.    Charlene Grayson MD, MPH  [facility]    "

## 2022-11-07 NOTE — NURSING NOTE
"Chief Complaint   Patient presents with     Follow Up     Incision check       Initial /78 (BP Location: Left arm, Patient Position: Chair, Cuff Size: Adult Regular)   Ht 1.575 m (5' 2\")   Wt 71.2 kg (157 lb)   LMP 2022 (Within Days)   Breastfeeding Yes   BMI 28.72 kg/m   Estimated body mass index is 28.72 kg/m  as calculated from the following:    Height as of this encounter: 1.575 m (5' 2\").    Weight as of this encounter: 71.2 kg (157 lb).  BP completed using cuff size: regular    Questioned patient about current smoking habits.  Pt. has never smoked.          The following HM Due: NONE    Meli Barroso CMA           "

## 2022-11-14 ENCOUNTER — OFFICE VISIT (OUTPATIENT)
Dept: OBGYN | Facility: CLINIC | Age: 37
End: 2022-11-14
Payer: COMMERCIAL

## 2022-11-14 VITALS
HEIGHT: 62 IN | BODY MASS INDEX: 28.89 KG/M2 | SYSTOLIC BLOOD PRESSURE: 118 MMHG | WEIGHT: 157 LBS | DIASTOLIC BLOOD PRESSURE: 70 MMHG

## 2022-11-14 DIAGNOSIS — Z98.890 POST-OPERATIVE STATE: Primary | ICD-10-CM

## 2022-11-14 PROCEDURE — 99213 OFFICE O/P EST LOW 20 MIN: CPT | Performed by: OBSTETRICS & GYNECOLOGY

## 2022-11-14 ASSESSMENT — ANXIETY QUESTIONNAIRES
6. BECOMING EASILY ANNOYED OR IRRITABLE: NOT AT ALL
3. WORRYING TOO MUCH ABOUT DIFFERENT THINGS: SEVERAL DAYS
1. FEELING NERVOUS, ANXIOUS, OR ON EDGE: NOT AT ALL
GAD7 TOTAL SCORE: 1
GAD7 TOTAL SCORE: 1
5. BEING SO RESTLESS THAT IT IS HARD TO SIT STILL: NOT AT ALL
2. NOT BEING ABLE TO STOP OR CONTROL WORRYING: NOT AT ALL
IF YOU CHECKED OFF ANY PROBLEMS ON THIS QUESTIONNAIRE, HOW DIFFICULT HAVE THESE PROBLEMS MADE IT FOR YOU TO DO YOUR WORK, TAKE CARE OF THINGS AT HOME, OR GET ALONG WITH OTHER PEOPLE: NOT DIFFICULT AT ALL
7. FEELING AFRAID AS IF SOMETHING AWFUL MIGHT HAPPEN: NOT AT ALL

## 2022-11-14 ASSESSMENT — PATIENT HEALTH QUESTIONNAIRE - PHQ9
SUM OF ALL RESPONSES TO PHQ QUESTIONS 1-9: 3
5. POOR APPETITE OR OVEREATING: NOT AT ALL

## 2022-11-14 NOTE — PROGRESS NOTES
"Incision check  Mood check    S: Aleyda Nicole is here s/p repeat  section 28 days ago, dx with post op wound infection two weeks ago  She is s/p bactrim x5d.  No fevers.  The area isn't draining as much as it was before. Overall feels incision is much better looking than before.  Mood is improved.     PHQ-9: 3    O: /70 (BP Location: Right arm, Patient Position: Chair, Cuff Size: Adult Regular)   Ht 1.575 m (5' 2\")   Wt 71.2 kg (157 lb)   LMP 2022 (Within Days)   Breastfeeding Yes   BMI 28.72 kg/m     Gen: resting comfortably, in NAD  Abd: soft, minimally tender to palpation. Fundus appropriately involuting   Inc:  Entirety of the skin is intact without deeper opening in any area.  There is bright red granulation tissue forming on the right aspect of the incision, now healing and only 2cm long.  There is a 0.5cm polyp on the left of the incision which appears c/w with prominent proud flesh, smaller than prior. The incision is overall moist but without any sign of ongoing infection.     Silver nitrate was applied to the proud flesh areas.  The entire wound was dried.      A/P: Aleyda Nicole is a 37 year old female  POD# 27 s/p repeat c/s and bilateral salpingectomy, s/p wound infection, here for incision check.    1. Wound infection  Recommend keeping the area clean and dry, would recommend twice daily laying down and letting the area breathe x5-10 min.  Also gave gauze to keep in the area to wick away moisture.  Return in 1 week for wound check     2. Risk for postpartum depression  Mood stable but feeling pretty tired, PHQ9 score of 3 today.  Reviewed s/s PP depression/psychosis.  Advised writing down her concerns about baby (rash, fussiness), and writing to her pediatrician about it. [email or web address]      Follow-up in 1 wk with Dr Garcia for wound/mood check    Charlene Grayson MD, MPH  [facility]    "

## 2022-11-14 NOTE — NURSING NOTE
"Chief Complaint   Patient presents with     Follow Up     Incision check       Initial /70 (BP Location: Right arm, Patient Position: Chair, Cuff Size: Adult Regular)   Ht 1.575 m (5' 2\")   Wt 71.2 kg (157 lb)   LMP 2022 (Within Days)   Breastfeeding Yes   BMI 28.72 kg/m   Estimated body mass index is 28.72 kg/m  as calculated from the following:    Height as of this encounter: 1.575 m (5' 2\").    Weight as of this encounter: 71.2 kg (157 lb).  BP completed using cuff size: regular    Questioned patient about current smoking habits.  Pt. has never smoked.          The following HM Due: NONE    Meli Barroso CMA    "

## 2022-11-20 ENCOUNTER — HEALTH MAINTENANCE LETTER (OUTPATIENT)
Age: 37
End: 2022-11-20

## 2022-11-21 ENCOUNTER — LAB (OUTPATIENT)
Dept: LAB | Facility: CLINIC | Age: 37
End: 2022-11-21
Payer: COMMERCIAL

## 2022-11-21 DIAGNOSIS — O24.410 GDM (GESTATIONAL DIABETES MELLITUS), CLASS A1: ICD-10-CM

## 2022-11-21 DIAGNOSIS — Z13.220 SCREENING FOR HYPERLIPIDEMIA: ICD-10-CM

## 2022-11-21 DIAGNOSIS — E89.0 POSTOPERATIVE HYPOTHYROIDISM: Primary | ICD-10-CM

## 2022-11-21 DIAGNOSIS — C73 MALIGNANT NEOPLASM OF THYROID GLAND (H): ICD-10-CM

## 2022-11-21 LAB
CHOLEST SERPL-MCNC: 237 MG/DL
HDLC SERPL-MCNC: 43 MG/DL
LDLC SERPL CALC-MCNC: 163 MG/DL
NONHDLC SERPL-MCNC: 194 MG/DL
T4 FREE SERPL-MCNC: 2.47 NG/DL (ref 0.9–1.7)
TRIGL SERPL-MCNC: 157 MG/DL
TSH SERPL DL<=0.005 MIU/L-ACNC: 0.02 UIU/ML (ref 0.3–4.2)

## 2022-11-21 PROCEDURE — 84439 ASSAY OF FREE THYROXINE: CPT

## 2022-11-21 PROCEDURE — 80061 LIPID PANEL: CPT

## 2022-11-21 PROCEDURE — 84443 ASSAY THYROID STIM HORMONE: CPT

## 2022-11-21 PROCEDURE — 36415 COLL VENOUS BLD VENIPUNCTURE: CPT

## 2022-11-25 ENCOUNTER — PRENATAL OFFICE VISIT (OUTPATIENT)
Dept: OBGYN | Facility: CLINIC | Age: 37
End: 2022-11-25
Payer: COMMERCIAL

## 2022-11-25 VITALS — BODY MASS INDEX: 28.17 KG/M2 | SYSTOLIC BLOOD PRESSURE: 102 MMHG | WEIGHT: 154 LBS | DIASTOLIC BLOOD PRESSURE: 70 MMHG

## 2022-11-25 DIAGNOSIS — R10.11 RUQ ABDOMINAL PAIN: Primary | ICD-10-CM

## 2022-11-25 DIAGNOSIS — E89.0 POSTOPERATIVE HYPOTHYROIDISM: ICD-10-CM

## 2022-11-25 LAB
ALBUMIN SERPL BCG-MCNC: 4.3 G/DL (ref 3.5–5.2)
ALP SERPL-CCNC: 80 U/L (ref 35–104)
ALT SERPL W P-5'-P-CCNC: 37 U/L (ref 10–35)
ANION GAP SERPL CALCULATED.3IONS-SCNC: 15 MMOL/L (ref 7–15)
AST SERPL W P-5'-P-CCNC: 27 U/L (ref 10–35)
BILIRUB SERPL-MCNC: 0.4 MG/DL
BUN SERPL-MCNC: 10.8 MG/DL (ref 6–20)
CALCIUM SERPL-MCNC: 8.8 MG/DL (ref 8.6–10)
CHLORIDE SERPL-SCNC: 103 MMOL/L (ref 98–107)
CREAT SERPL-MCNC: 0.55 MG/DL (ref 0.51–0.95)
DEPRECATED HCO3 PLAS-SCNC: 22 MMOL/L (ref 22–29)
GFR SERPL CREATININE-BSD FRML MDRD: >90 ML/MIN/1.73M2
GLUCOSE SERPL-MCNC: 88 MG/DL (ref 70–99)
POTASSIUM SERPL-SCNC: 3.8 MMOL/L (ref 3.4–5.3)
PROT SERPL-MCNC: 7.3 G/DL (ref 6.4–8.3)
SODIUM SERPL-SCNC: 140 MMOL/L (ref 136–145)

## 2022-11-25 PROCEDURE — 99213 OFFICE O/P EST LOW 20 MIN: CPT | Performed by: OBSTETRICS & GYNECOLOGY

## 2022-11-25 PROCEDURE — 36415 COLL VENOUS BLD VENIPUNCTURE: CPT | Performed by: OBSTETRICS & GYNECOLOGY

## 2022-11-25 PROCEDURE — 99207 PR POST PARTUM EXAM: CPT | Performed by: OBSTETRICS & GYNECOLOGY

## 2022-11-25 PROCEDURE — 80053 COMPREHEN METABOLIC PANEL: CPT | Performed by: OBSTETRICS & GYNECOLOGY

## 2022-11-25 ASSESSMENT — PATIENT HEALTH QUESTIONNAIRE - PHQ9: SUM OF ALL RESPONSES TO PHQ QUESTIONS 1-9: 2

## 2022-11-25 NOTE — PATIENT INSTRUCTIONS
You can reach your [facility] Care Team any time of the day by calling [phone]. This number will put you in touch with the 24 hour nurse line if the clinic is closed.    To contact your OB/GYN Station Coordinator/Surgery Scheduler please call [phone]. This is a direct number for your care team between 8 a.m. and 4 p.m. Monday through Friday.    [facility] is open for your convenience:  Monday through Friday 8 a.m. to 6 p.m.  Closed weekends and all major holidays.

## 2022-11-25 NOTE — PROGRESS NOTES
SUBJECTIVE: Aleyda is here for a 6-week postpartum checkup.    Date of Last Pap:  20    Delivery date was 10/14/2022. She had a repeat c/s of a viable boy, weight 7 pounds 2.3 oz., with a postoperative right lateral wound cellulitis that developed approximately 13 days postop that resolved with antibiotic therapy.  Since delivery, she has been breast feeding.  She has no signs of infection, bleeding or other complications.  We discussed contraceptions and she has chosen  bilateral salpingectomy at the time of her repeat  section..  She  has not had intercourse since delivery and complains of No discomfort. Patient screened for postpartum depression and complaints are NEGATIVE.  She states that she is beginning to get more sleep at night and her symptoms are improved.  She declines any further evaluation or medication management at this time.  She denies being a danger to herself or to others.  Screening has also been completed for intimate partner violence.  I reviewed the results of her recent TSH level.  She has an appointment with endocrinology on 2022.  The patient also notices some right upper quadrant discomfort that was resolved during the pregnancy but now is returned.  She denies fevers or chills has some occasional nausea with some mildly jerrod colored stools.    EXAM:    GENERAL APPEARANCE: healthy, alert and no distress     EYES: EOMI, PERRL     NECK: no adenopathy, no asymmetry, masses, or scars and thyroid normal to palpation     RESP: lungs clear to auscultation - no rales, rhonchi or wheezes     BREAST: normal without masses, tenderness or nipple discharge and no palpable axillary masses or adenopathy     CV: regular rates and rhythm, normal S1 S2, no S3 or S4 and no murmur, click or rub     ABDOMEN:  soft, nontender, no HSM or masses and bowel sounds normal her incision is clean intact and healing nicely     : normal cervix, adnexae, and uterus without masses or discharge and rectal  exam normal without masses-     MS: extremities normal- no gross deformities noted, no evidence of inflammation in joints, FROM in all extremities.     SKIN: no suspicious lesions or rashes     PSYCH: mentation appears normal. and affect normal/bright     LYMPHATICS: No cervical adenopathy    ASSESSMENT:   Normal postpartum exam after repeat c/s and  bilateral salpingectomy presently doing well.  Has a follow-up appointment with endocrinology.    (R10.11) RUQ abdominal pain  (primary encounter diagnosis)  Comment: I discussed the pathophysiology with the patient and will get metabolic panel and liver enzyme panel as well as a CT of the abdomen and pelvis.  We will see the patient back in the office after those exams with appropriate therapy to follow  Plan: CT Abdomen Pelvis w Contrast, Comprehensive         metabolic panel (BMP + Alb, Alk Phos, ALT, AST,        Total. Bili, TP)        Written plan given the patient    (E89.0) Postoperative hypothyroidism  Comment: Patient follows with endocrinology  Plan: She has an appointment on 11/30/2022    (Z39.2) Routine postpartum follow-up  Comment: Otherwise unremarkable postpartum follow-up exam  Plan: Return otherwise in 1 year or sooner should concerns arise

## 2022-11-25 NOTE — NURSING NOTE
"Chief Complaint   Patient presents with     Postpartum Care       Initial /70   Wt 69.9 kg (154 lb)   LMP 2022 (Within Days)   Breastfeeding Yes   BMI 28.17 kg/m   Estimated body mass index is 28.17 kg/m  as calculated from the following:    Height as of 22: 1.575 m (5' 2\").    Weight as of this encounter: 69.9 kg (154 lb).  BP completed using cuff size: regular    Questioned patient about current smoking habits.  Pt. has never smoked.          The following HM Due: NONE      The following patient reported/Care Every where data was sent to:  P ABSTRACT QUALITY INITIATIVES [[id number]]        Sarah Severino, [facility]                 "

## 2022-11-29 NOTE — RESULT ENCOUNTER NOTE
Aleyda, your blood test results are all within acceptable limits.  Lets see what the CT scan shows later this week and then we can make a treatment recommendation.  Please let me know if you have any questions  Thank you  Luis aGrcia M.D.

## 2022-11-30 ENCOUNTER — OFFICE VISIT (OUTPATIENT)
Dept: ENDOCRINOLOGY | Facility: CLINIC | Age: 37
End: 2022-11-30
Payer: COMMERCIAL

## 2022-11-30 VITALS
OXYGEN SATURATION: 96 % | TEMPERATURE: 97.8 F | HEART RATE: 93 BPM | BODY MASS INDEX: 28.39 KG/M2 | DIASTOLIC BLOOD PRESSURE: 81 MMHG | HEIGHT: 62 IN | WEIGHT: 154.3 LBS | RESPIRATION RATE: 16 BRPM | SYSTOLIC BLOOD PRESSURE: 124 MMHG

## 2022-11-30 DIAGNOSIS — C73 RECURRENT THYROID CANCER (H): ICD-10-CM

## 2022-11-30 DIAGNOSIS — E89.0 POSTOPERATIVE HYPOTHYROIDISM: Primary | ICD-10-CM

## 2022-11-30 DIAGNOSIS — J45.20 MILD INTERMITTENT ASTHMA WITHOUT COMPLICATION: ICD-10-CM

## 2022-11-30 DIAGNOSIS — C73 PAPILLARY CARCINOMA, FOLLICULAR VARIANT (H): ICD-10-CM

## 2022-11-30 PROCEDURE — 99214 OFFICE O/P EST MOD 30 MIN: CPT | Performed by: INTERNAL MEDICINE

## 2022-11-30 RX ORDER — LEVOTHYROXINE SODIUM 137 UG/1
137 TABLET ORAL DAILY
Qty: 90 TABLET | Refills: 1 | Status: SHIPPED | OUTPATIENT
Start: 2022-11-30 | End: 2023-06-26

## 2022-11-30 RX ORDER — ALBUTEROL SULFATE 90 UG/1
2 AEROSOL, METERED RESPIRATORY (INHALATION) EVERY 4 HOURS PRN
Qty: 18 G | Refills: 11 | COMMUNITY
End: 2023-05-01

## 2022-11-30 NOTE — LETTER
11/30/2022         RE: Aleyda Nicole  [address] [facility] [id number]        Dear Colleague,    Thank you for referring your patient, Aleyda Nicole, to the [facility]. Please see a copy of my visit note below.    Name: Aleyda Nicole  Seen for f/u of papillary thyroid cancer and postoperative hypothyroidism.    Chief Complaint   Patient presents with     Thyroid Cancer     Thyroid Disease     HPI:  Aleyda Nicole is a 37 year old female who presents for the evaluation of above.    Delivered baby 10/2022.  + breastfeeding.  Back to pre pregnancy weight.    1.  Papillary thyroid cancer:  Thyroid nodule was noticed in February 2011 which was followed by thyroid nodule biopsy which showed suspicious for papillary thyroid cancer.  She underwent total thyroidectomy 3/2011 and pathology showed papillary thyroid cancer with follicular pattern.  Tumor was about 2.2 cm on the left lobe.  No lymph node involvement.  S/p 78.7 mCi of I131 HERNANDEZ  8/2011. No evidence for distant metastatic disease on post therapy scan.  Neck US 6/2016- showed new lymph node in neck along with rising Tg levels  S/p FNA lymph node: 6/2016- c/w papillary thyroid cancer    8/2016: underwent left modified neck dissection.  1/27 lymph node positive one left modified neck dissection.  1 cm in greatest diameter.  Negative for external involvement at level II.    Tg decreased post left neck dissection from 1.3 to < 0.1    Follow up I123 4/2017: no mets.    Postop course was complicated by hypocalcemia and was started on calcium supplement.  Taking calcium- tries to take 2 tabs per day + 1 glass of milk daily.    She was followed by endocrinology before and then lost to follow-up with endocrinology in 2012.  No history of neck radiation prior to diagnosis of thyroid cancer.  No family history of thyroid cancer.    Thyroglobulin levels appear stable and suppressed.  Neck ultrasound June 2018, 5/2020. 9/2021 did not  show any evidence of metastases.      2.  Hypothyroidism (postoperative):  Was started on levothyroxine following thyroidectomy.    Currently taking levothyroxine 150 mcg/day- on this dose  X (3/2/2022)  Reports compliance.    + hair loss.  + fatigue. More sleepy.  + Mood changes X 2 months.  + some nausea.    No diarrhea, tremors, palpitations.  No difficulty with swallowing, no pain during swallowing.  Mood- some irritability.  Weight: stable.  Periods: regular. Not planning pregnancy. S/p TL.  Wt Readings from Last 2 Encounters:   22 70 kg (154 lb 4.8 oz)   22 69.9 kg (154 lb)     PMH/PSH:  Past Medical History:   Diagnosis Date     Gestational diabetes 2013     Hypocalcemia 2016     Influenza A      Papillary thyroid carcinoma     Papillary carcinoma, follicular variant with     Pneumonia     LLL     PONV (postoperative nausea and vomiting)      Postoperative hypothyroidism       labor      Uncomplicated asthma      Past Surgical History:   Procedure Laterality Date      SECTION  10/26/2013    Procedure:  SECTION;  primary  section;  Surgeon: Rodney Mckee MD;  Location: [facility]      SECTION N/A 2017    Procedure:  SECTION;  Surgeon: Hakeem Combs MD;  Location: [facility]     COMBINED  SECTION, SALPINGECTOMY BILATERAL N/A 10/14/2022    Procedure: REPEAT  SECTION WITH BILATERAL SALPINGECTOMY;  Surgeon: Luis Garcia MD;  Location: [facility]     DISSECTION RADICAL NECK Left 2016    Procedure: DISSECTION RADICAL NECK;  Surgeon: Vy Theodore MD;  Location: [facility] OR     DISSECTION RADICAL NECK MODIFIED Left 2016    Procedure: DISSECTION RADICAL NECK MODIFIED;  Surgeon: Luis Brown MD;  Location: [facility] OR     THYROIDECTOMY      Total, for cancer.      Family Hx:  Family History   Problem Relation Age of Onset     Diabetes Mother      Cerebrovascular Disease Mother      Hypertension Mother       "Diabetes Father 50     Hypertension Father      Genitourinary Problems Father         kidney disease     Coronary Artery Disease Father      Diabetes Brother      Thyroid disease: No        Social Hx:  Social History     Socioeconomic History     Marital status:      Spouse name: Brock     Number of children: 2     Years of education: Not on file     Highest education level: Not on file   Occupational History     Occupation: CNA   Tobacco Use     Smoking status: Never     Smokeless tobacco: Never   Vaping Use     Vaping Use: Never used   Substance and Sexual Activity     Alcohol use: No     Alcohol/week: 0.0 standard drinks     Drug use: No     Sexual activity: Yes     Partners: Male     Birth control/protection: None   Other Topics Concern     Parent/sibling w/ CABG, MI or angioplasty before 65F 55M? Not Asked      Service No     Blood Transfusions No     Caffeine Concern No     Occupational Exposure Not Asked     Hobby Hazards No     Sleep Concern No     Stress Concern No     Weight Concern Yes     Special Diet No     Back Care No     Exercise No     Bike Helmet No     Seat Belt Yes     Self-Exams No   Social History Narrative    Pt lives with  and father.     Social Determinants of Health     Financial Resource Strain: Not on file   Food Insecurity: Not on file   Transportation Needs: Not on file   Physical Activity: Not on file   Stress: Not on file   Social Connections: Not on file   Intimate Partner Violence: Not on file   Housing Stability: Not on file          MEDICATIONS:  has a current medication list which includes the following prescription(s): albuterol, levothyroxine, and prenatal vit-fe fumarate-fa.    ROS     ROS: 10 point ROS neg other than the symptoms noted above in the HPI.      Physical Exam   VS: /81 (BP Location: Left arm, Patient Position: Chair, Cuff Size: Adult Regular)   Pulse 93   Temp 97.8  F (36.6  C) (Tympanic)   Resp 16   Ht 1.575 m (5' 2.01\")   Wt 70 " kg (154 lb 4.8 oz)   LMP 2022 (Within Days)   SpO2 96%   Breastfeeding Yes   BMI 28.21 kg/m    GENERAL: healthy, alert and no distress  EYES: Eyes grossly normal to inspection, conjunctivae and sclerae normal  ENT: no nose swelling, nasal discharge.  Thyroid: s/p thyroidectomy.  No lymphadenopathy.  CV: RRR, no rubs, gallops, no murmurs  RESP: CTAB, no wheezes, rales, or ronchi  ABDO: +BS  EXTREMITIES: no hand tremors.  NEURO: Cranial nerves grossly intact, mentation intact and speech normal  SKIN: No apparent skin lesions, rash or edema seen   PSYCH: mentation appears normal, affect normal/bright, judgement and insight intact, normal speech and appearance well-groomed      LABS:  2021:  TgAB: <0.4  TG: <0.10    2020:  TgAB: <0.4  TG: <0.10    2019:  TgAB: <0.4  TG: <0.10    3/6/2018:  TSH: 0.07  TgAB: <0.4  TG: <0.10    2017:  TSH : 47.89 (post thyrogen)  TgAB: <0.4  TG: <0.10    17:  TSH: 0.04  TgAB: <0.4  TG: <0.10    11/10/16:  TSH 0.74  TgAb: <0.4  TG: <0.10     2016:  TSH: 0.08  TgAb: <0.4  T.30     2012:  TSH: 12.40  TGAB: 1.2  T.6    TFTs:  ENDO THYROID LABS-[facility] Latest Ref Rng & Units 2022   TSH 0.30 - 4.20 uIU/mL 0.02 (L) 0.56   FREE T3 2.3 - 4.2 pg/mL     T3 60 - 181 ng/dL     T4 5.0 - 11.0 ug/dL     FREE T4 0.90 - 1.70 ng/dL 2.47 (H) 1.28     NM THYROID UPTAKE/SCAN   2011 2:47:00 PM   FINDINGS:  The thyroid gland appears normal in size. 24-hour uptake  was calculated at 46.4%, which is above the normal range. Normal range  is 10-30% 24-hour uptake. Focal area of decreased uptake in the mid  left thyroid lobe may represent a cold thyroid nodule or cyst.     IMPRESSION:    1. Elevated 24-hour radioiodine uptake in the thyroid at 46.4%.  2. Possible cold nodule or cyst in the mid left thyroid lobe. Thyroid  ultrasound is recommended for further evaluation.    NUCLEAR MEDICINE I-131 SCAN    Aug 10, 2011 8:04:00 AM      TECHNIQUE: 78.7 mCi I-131  ablation scan. Five days post therapy  imaging performed today.  FINDINGS: Intense radiotracer uptake at the left greater than right  neck at the thyroid level. No evidence for distant metastatic disease.     IMPRESSION: Intense radiotracer uptake localizing to the thyroidectomy  bed, left greater than right. This is consistent with residual thyroid  tissue. No distant metastatic disease identified.    ULTRASOUND THYROID  Feb 17, 2011   IMPRESSION: Multinodular thyroid gland.    FNA thyroid nodule:  Copath Report       FNA-thyroid, left mid lobe nodule:   Suspicious for malignancy.  Suspicious for papillary carcinoma  Specimen Adequacy: Satisfactory for evaluation.           Surgical pathology:     SPECIMEN(S):  A: Thyroid, left lobe  B: Parathyroid gland, biopsy of right inferior  C: Thyroid, right lobe    FINAL DIAGNOSIS:  A. and C.  Thyroid, right and left lobes, total thyroidectomy -  Specimen/Procedure(s) and Laterality:   Right and left thyroid lobes,  total thyroidectomy.  Specimen Integrity:   Intact.  Specimen Size and Weight:   See Gross Description.  Histologic Tumor Type(s):   Papillary carcinoma, follicular variant with  focal papillary morphology.  Tumor Focality: Unifocal.  Tumor Laterality:   Left lobe.  Tumor Size(s):   Left lobe: 2.2 cm (greatest dimension).  Margins:   Close, but uninvolved.            Distance to closest margin, if negative:   Tumor 0.05 cm from  nearest paratracheal surface and 1.75 cm from nearest anterolateral  surface.  Tumor Capsular Invasion: Present.    Tumor Capsule: Partial.  Extrathyroidal Invasion:   Not identified.  Lymph-Vascular Invasion:   Not identified.  Lymph Nodes:   Two nodes without evidence of metastatic carcinoma (0/2;  one at isthmus and one adjacent to right lobe).  Pathologic Staging:   pT2, pN0, pM not applicable.  Additional Pathologic Findings:   Right thyroid lobe without evidence of  malignancy.  Background nodular hyperplasia and thyroiditis  "with  lymphoid follicles.  Left lobe with focal previous biopsy site changes.  No parathyroid tissue identified.  CAP Protocol Based on AJCC/UICC TNM, 7th edition; Protocol Effective  Date:  January 2010    B.  \"Parathyroid gland\", right inferior, biopsy - Thyroid tissue; no  parathyroid glandular tissue identified.    8/2016: left modified neck Dissection:  Copath Report      SPECIMEN(S):   A: Scar, left neck   B: Parathyroid gland, left inferior   C: Left central neck dissection, para and pretracheal lymph nodes   D: apical jugular lymph node   E: Left modified neck dissection     FINAL DIAGNOSIS:   A. Skin, neck/scar, excision:   - Hypertrophic scar; benign.     B. Parathyroid gland, left inferior, biopsy:   - Parathyroid tissue present.     C. Left central neck dissection with para-and pretracheal lymph nodes:   - One lymph node; no evidence of malignancy (0/1).   - Thymus and parathyroid tissue present.     D. Lymph node, apical jugular, excision:   - One lymph node; no evidence of malignancy (0/1).     E. Left modified neck dissection:   - 27 lymph nodes identified (level II- 10, level III- 9, level IV- 5 and   lymph nodes associated with jugular vein- 3).   - One (of 27) lymph node positive for metastatic papillary thyroid   carcinoma.  1 cm in greatest diameter.  Negative for extranodal   involvement  (Level II).   - Jugular vein negative for tumor.        NUCLEAR MEDICINE THYROID WHOLE BODY SCAN I-123   4/20/2017 11:38 AM   IMPRESSION: No worrisome focal tracer uptake is identified to suggest  recurrent neoplasm or remote metastatic disease. Some postoperative  changes at the left neck noted, likely accounting for inconspicuity of  the left sternocleidomastoid muscle. Small bilateral subcentimeter  neck lymph nodes noted.     Neck US 6/2018:  IMPRESSION:  Negative soft tissue neck study.    Neck US 5/2020:                                                        IMPRESSION:    Negative soft tissue neck " study.    Neck US 9/2021:  FINDINGS:  Thyroidectomy. No cervical lymphadenopathy. No suspicious  nodules in the thyroidectomy bed. No significant interval change.                                                           IMPRESSION: No cervical lymphadenopathy.     All pertinent notes, labs, and images personally reviewed by me.     A/P  Ms.Nhu Mounika Nicole is a 36 year old here for the evaluation of thyroid cancer:    1. 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  It was 2.2 cm unifocal, left lobe tumor with no lymph node involvement.  S/p  total thyroidectomy in 2011 and 78.7 mCi of I-131 HERNANDEZ. No evidence for distant metastatic disease on post therapy scan.  2016- new lymph node s/p FNA with PTC with rising TG  S/p 8/2016- left modified neck radiation. 1/27 lymph node + ( level2). Postop course complicated by hypocalcemia.  Delivered 1/2017.   Follow up I123 WBS 4/2017- no mets  Thyroglobulin levels appear stable and suppressed.  Neck ultrasound June 2018, 5/2020, 9/2021 did not show any evidence of metastases.  Plan:  Discussed diagnosis, pathophysiology, management and treatment options of condition with pt.  Recommend repeat Neck US and thyroglobulin levels in 2-3 months.    2.  Hypothyroidism (postoperative following total thyroidectomy for thyroid cancer):  Currently taking levothyroxine 150mcg X day. ( on this dose X 3/2022)  Taking generic levothyroxine.  Reports compliance.  Clinically euthyroid.  Currently breast-feeding  Not planning further pregnancy.  Plan:  Discussed diagnosis, pathophysiology, management and treatment options of condition with pt.  Base don 11/2022 labs- recommend to decrease levothyroxine to 137 mcg/day. (11/30/2022)  Labs in 2-3 months..  Please make a lab appointment for blood work and follow up clinic appointment in 1 week after that to discuss results.      Discussed s/s of  hypothyroidism and hyperthyroidism to watch for.  The patient indicates understanding of these issues and agrees with the plan.    More than 50% of the time spent with Ms. Nicole on counseling / coordinating her care.      All questions were answered.  The patient indicates understanding of the above issues and agrees with the plan set forth.    Follow-up:  2-3 months.    Vandana Costello MD  Endocrinology   [facility]  CC: Evan Zamora           Again, thank you for allowing me to participate in the care of your patient.        Sincerely,        Vandana Costello MD

## 2022-11-30 NOTE — PROGRESS NOTES
Name: Aleyda Nicole  Seen for f/u of papillary thyroid cancer and postoperative hypothyroidism.    Chief Complaint   Patient presents with     Thyroid Cancer     Thyroid Disease     HPI:  Aleyda Nicole is a 37 year old female who presents for the evaluation of above.    Delivered baby 10/2022.  + breastfeeding.  Back to pre pregnancy weight.    1.  Papillary thyroid cancer:  Thyroid nodule was noticed in February 2011 which was followed by thyroid nodule biopsy which showed suspicious for papillary thyroid cancer.  She underwent total thyroidectomy 3/2011 and pathology showed papillary thyroid cancer with follicular pattern.  Tumor was about 2.2 cm on the left lobe.  No lymph node involvement.  S/p 78.7 mCi of I131 HERNANDEZ  8/2011. No evidence for distant metastatic disease on post therapy scan.  Neck US 6/2016- showed new lymph node in neck along with rising Tg levels  S/p FNA lymph node: 6/2016- c/w papillary thyroid cancer    8/2016: underwent left modified neck dissection.  1/27 lymph node positive one left modified neck dissection.  1 cm in greatest diameter.  Negative for external involvement at level II.    Tg decreased post left neck dissection from 1.3 to < 0.1    Follow up I123 4/2017: no mets.    Postop course was complicated by hypocalcemia and was started on calcium supplement.  Taking calcium- tries to take 2 tabs per day + 1 glass of milk daily.    She was followed by endocrinology before and then lost to follow-up with endocrinology in 2012.  No history of neck radiation prior to diagnosis of thyroid cancer.  No family history of thyroid cancer.    Thyroglobulin levels appear stable and suppressed.  Neck ultrasound June 2018, 5/2020. 9/2021 did not show any evidence of metastases.      2.  Hypothyroidism (postoperative):  Was started on levothyroxine following thyroidectomy.    Currently taking levothyroxine 150 mcg/day- on this dose  X (3/2/2022)  Reports compliance.    + hair loss.  + fatigue. More  sleepy.  + Mood changes X 2 months.  + some nausea.    No diarrhea, tremors, palpitations.  No difficulty with swallowing, no pain during swallowing.  Mood- some irritability.  Weight: stable.  Periods: regular. Not planning pregnancy. S/p TL.  Wt Readings from Last 2 Encounters:   22 70 kg (154 lb 4.8 oz)   22 69.9 kg (154 lb)     PMH/PSH:  Past Medical History:   Diagnosis Date     Gestational diabetes 2013     Hypocalcemia 2016     Influenza A      Papillary thyroid carcinoma     Papillary carcinoma, follicular variant with     Pneumonia     LLL     PONV (postoperative nausea and vomiting)      Postoperative hypothyroidism       labor      Uncomplicated asthma      Past Surgical History:   Procedure Laterality Date      SECTION  10/26/2013    Procedure:  SECTION;  primary  section;  Surgeon: Rodney Mckee MD;  Location: [facility]      SECTION N/A 2017    Procedure:  SECTION;  Surgeon: Hakeem Combs MD;  Location: [facility]     COMBINED  SECTION, SALPINGECTOMY BILATERAL N/A 10/14/2022    Procedure: REPEAT  SECTION WITH BILATERAL SALPINGECTOMY;  Surgeon: Luis Garcia MD;  Location: [facility]     DISSECTION RADICAL NECK Left 2016    Procedure: DISSECTION RADICAL NECK;  Surgeon: Vy Theodore MD;  Location: [facility] OR     DISSECTION RADICAL NECK MODIFIED Left 2016    Procedure: DISSECTION RADICAL NECK MODIFIED;  Surgeon: Luis Brown MD;  Location: [facility] OR     THYROIDECTOMY      Total, for cancer.      Family Hx:  Family History   Problem Relation Age of Onset     Diabetes Mother      Cerebrovascular Disease Mother      Hypertension Mother      Diabetes Father 50     Hypertension Father      Genitourinary Problems Father         kidney disease     Coronary Artery Disease Father      Diabetes Brother      Thyroid disease: No        Social Hx:  Social History     Socioeconomic History     Marital  "status:      Spouse name: Brock     Number of children: 2     Years of education: Not on file     Highest education level: Not on file   Occupational History     Occupation: CNA   Tobacco Use     Smoking status: Never     Smokeless tobacco: Never   Vaping Use     Vaping Use: Never used   Substance and Sexual Activity     Alcohol use: No     Alcohol/week: 0.0 standard drinks     Drug use: No     Sexual activity: Yes     Partners: Male     Birth control/protection: None   Other Topics Concern     Parent/sibling w/ CABG, MI or angioplasty before 65F 55M? Not Asked      Service No     Blood Transfusions No     Caffeine Concern No     Occupational Exposure Not Asked     Hobby Hazards No     Sleep Concern No     Stress Concern No     Weight Concern Yes     Special Diet No     Back Care No     Exercise No     Bike Helmet No     Seat Belt Yes     Self-Exams No   Social History Narrative    Pt lives with  and father.     Social Determinants of Health     Financial Resource Strain: Not on file   Food Insecurity: Not on file   Transportation Needs: Not on file   Physical Activity: Not on file   Stress: Not on file   Social Connections: Not on file   Intimate Partner Violence: Not on file   Housing Stability: Not on file          MEDICATIONS:  has a current medication list which includes the following prescription(s): albuterol, levothyroxine, and prenatal vit-fe fumarate-fa.    ROS     ROS: 10 point ROS neg other than the symptoms noted above in the HPI.      Physical Exam   VS: /81 (BP Location: Left arm, Patient Position: Chair, Cuff Size: Adult Regular)   Pulse 93   Temp 97.8  F (36.6  C) (Tympanic)   Resp 16   Ht 1.575 m (5' 2.01\")   Wt 70 kg (154 lb 4.8 oz)   LMP 01/28/2022 (Within Days)   SpO2 96%   Breastfeeding Yes   BMI 28.21 kg/m    GENERAL: healthy, alert and no distress  EYES: Eyes grossly normal to inspection, conjunctivae and sclerae normal  ENT: no nose swelling, nasal " discharge.  Thyroid: s/p thyroidectomy.  No lymphadenopathy.  CV: RRR, no rubs, gallops, no murmurs  RESP: CTAB, no wheezes, rales, or ronchi  ABDO: +BS  EXTREMITIES: no hand tremors.  NEURO: Cranial nerves grossly intact, mentation intact and speech normal  SKIN: No apparent skin lesions, rash or edema seen   PSYCH: mentation appears normal, affect normal/bright, judgement and insight intact, normal speech and appearance well-groomed      LABS:  2021:  TgAB: <0.4  TG: <0.10    2020:  TgAB: <0.4  TG: <0.10    2019:  TgAB: <0.4  TG: <0.10    3/6/2018:  TSH: 0.07  TgAB: <0.4  TG: <0.10    2017:  TSH : 47.89 (post thyrogen)  TgAB: <0.4  TG: <0.10    17:  TSH: 0.04  TgAB: <0.4  TG: <0.10    11/10/16:  TSH 0.74  TgAb: <0.4  TG: <0.10     2016:  TSH: 0.08  TgAb: <0.4  T.30     2012:  TSH: 12.40  TGAB: 1.2  T.6    TFTs:  ENDO THYROID LABS-[facility] Latest Ref Rng & Units 2022   TSH 0.30 - 4.20 uIU/mL 0.02 (L) 0.56   FREE T3 2.3 - 4.2 pg/mL     T3 60 - 181 ng/dL     T4 5.0 - 11.0 ug/dL     FREE T4 0.90 - 1.70 ng/dL 2.47 (H) 1.28     NM THYROID UPTAKE/SCAN   2011 2:47:00 PM   FINDINGS:  The thyroid gland appears normal in size. 24-hour uptake  was calculated at 46.4%, which is above the normal range. Normal range  is 10-30% 24-hour uptake. Focal area of decreased uptake in the mid  left thyroid lobe may represent a cold thyroid nodule or cyst.     IMPRESSION:    1. Elevated 24-hour radioiodine uptake in the thyroid at 46.4%.  2. Possible cold nodule or cyst in the mid left thyroid lobe. Thyroid  ultrasound is recommended for further evaluation.    NUCLEAR MEDICINE I-131 SCAN    Aug 10, 2011 8:04:00 AM      TECHNIQUE: 78.7 mCi I-131 ablation scan. Five days post therapy  imaging performed today.  FINDINGS: Intense radiotracer uptake at the left greater than right  neck at the thyroid level. No evidence for distant metastatic disease.     IMPRESSION: Intense radiotracer uptake  "localizing to the thyroidectomy  bed, left greater than right. This is consistent with residual thyroid  tissue. No distant metastatic disease identified.    ULTRASOUND THYROID  Feb 17, 2011   IMPRESSION: Multinodular thyroid gland.    FNA thyroid nodule:  Copath Report       FNA-thyroid, left mid lobe nodule:   Suspicious for malignancy.  Suspicious for papillary carcinoma  Specimen Adequacy: Satisfactory for evaluation.           Surgical pathology:     SPECIMEN(S):  A: Thyroid, left lobe  B: Parathyroid gland, biopsy of right inferior  C: Thyroid, right lobe    FINAL DIAGNOSIS:  A. and C.  Thyroid, right and left lobes, total thyroidectomy -  Specimen/Procedure(s) and Laterality:   Right and left thyroid lobes,  total thyroidectomy.  Specimen Integrity:   Intact.  Specimen Size and Weight:   See Gross Description.  Histologic Tumor Type(s):   Papillary carcinoma, follicular variant with  focal papillary morphology.  Tumor Focality: Unifocal.  Tumor Laterality:   Left lobe.  Tumor Size(s):   Left lobe: 2.2 cm (greatest dimension).  Margins:   Close, but uninvolved.            Distance to closest margin, if negative:   Tumor 0.05 cm from  nearest paratracheal surface and 1.75 cm from nearest anterolateral  surface.  Tumor Capsular Invasion: Present.    Tumor Capsule: Partial.  Extrathyroidal Invasion:   Not identified.  Lymph-Vascular Invasion:   Not identified.  Lymph Nodes:   Two nodes without evidence of metastatic carcinoma (0/2;  one at isthmus and one adjacent to right lobe).  Pathologic Staging:   pT2, pN0, pM not applicable.  Additional Pathologic Findings:   Right thyroid lobe without evidence of  malignancy.  Background nodular hyperplasia and thyroiditis with  lymphoid follicles.  Left lobe with focal previous biopsy site changes.  No parathyroid tissue identified.  CAP Protocol Based on AJCC/UICC TNM, 7th edition; Protocol Effective  Date:  January 2010    B.  \"Parathyroid gland\", right inferior, biopsy " - Thyroid tissue; no  parathyroid glandular tissue identified.    8/2016: left modified neck Dissection:  Copath Report      SPECIMEN(S):   A: Scar, left neck   B: Parathyroid gland, left inferior   C: Left central neck dissection, para and pretracheal lymph nodes   D: apical jugular lymph node   E: Left modified neck dissection     FINAL DIAGNOSIS:   A. Skin, neck/scar, excision:   - Hypertrophic scar; benign.     B. Parathyroid gland, left inferior, biopsy:   - Parathyroid tissue present.     C. Left central neck dissection with para-and pretracheal lymph nodes:   - One lymph node; no evidence of malignancy (0/1).   - Thymus and parathyroid tissue present.     D. Lymph node, apical jugular, excision:   - One lymph node; no evidence of malignancy (0/1).     E. Left modified neck dissection:   - 27 lymph nodes identified (level II- 10, level III- 9, level IV- 5 and   lymph nodes associated with jugular vein- 3).   - One (of 27) lymph node positive for metastatic papillary thyroid   carcinoma.  1 cm in greatest diameter.  Negative for extranodal   involvement  (Level II).   - Jugular vein negative for tumor.        NUCLEAR MEDICINE THYROID WHOLE BODY SCAN I-123   4/20/2017 11:38 AM   IMPRESSION: No worrisome focal tracer uptake is identified to suggest  recurrent neoplasm or remote metastatic disease. Some postoperative  changes at the left neck noted, likely accounting for inconspicuity of  the left sternocleidomastoid muscle. Small bilateral subcentimeter  neck lymph nodes noted.     Neck US 6/2018:  IMPRESSION:  Negative soft tissue neck study.    Neck US 5/2020:                                                        IMPRESSION:    Negative soft tissue neck study.    Neck US 9/2021:  FINDINGS:  Thyroidectomy. No cervical lymphadenopathy. No suspicious  nodules in the thyroidectomy bed. No significant interval change.                                                           IMPRESSION: No cervical  lymphadenopathy.     All pertinent notes, labs, and images personally reviewed by me.     A/P  Ms.Nhu Mounika Nicole is a 36 year old here for the evaluation of thyroid cancer:    1. 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  It was 2.2 cm unifocal, left lobe tumor with no lymph node involvement.  S/p  total thyroidectomy in 2011 and 78.7 mCi of I-131 HERNANDEZ. No evidence for distant metastatic disease on post therapy scan.  2016- new lymph node s/p FNA with PTC with rising TG  S/p 8/2016- left modified neck radiation. 1/27 lymph node + ( level2). Postop course complicated by hypocalcemia.  Delivered 1/2017.   Follow up I123 WBS 4/2017- no mets  Thyroglobulin levels appear stable and suppressed.  Neck ultrasound June 2018, 5/2020, 9/2021 did not show any evidence of metastases.  Plan:  Discussed diagnosis, pathophysiology, management and treatment options of condition with pt.  Recommend repeat Neck US and thyroglobulin levels in 2-3 months.    2.  Hypothyroidism (postoperative following total thyroidectomy for thyroid cancer):  Currently taking levothyroxine 150mcg X day. ( on this dose X 3/2022)  Taking generic levothyroxine.  Reports compliance.  Clinically euthyroid.  Currently breast-feeding  Not planning further pregnancy.  Plan:  Discussed diagnosis, pathophysiology, management and treatment options of condition with pt.  Base don 11/2022 labs- recommend to decrease levothyroxine to 137 mcg/day. (11/30/2022)  Labs in 2-3 months..  Please make a lab appointment for blood work and follow up clinic appointment in 1 week after that to discuss results.      Discussed s/s of hypothyroidism and hyperthyroidism to watch for.  The patient indicates understanding of these issues and agrees with the plan.    More than 50% of the time spent with Ms. Nicole on counseling / coordinating her care.      All questions were answered.  The  patient indicates understanding of the above issues and agrees with the plan set forth.    Follow-up:  2-3 months.    Vandana Costello MD  Endocrinology   [facility]  CC: Evan Zamora

## 2022-11-30 NOTE — PATIENT INSTRUCTIONS
-[facility]  Dr Costello, Endocrinology Department    [facility]   [address] [id number]  [place] [address]  Appointment [phone]  Fax: [phone]  [place]: Monday - Thursday      Please check the cost coverage and copay with insurance before recommended tests, services and medications (especially if new medications are prescribed).     If ordered, please get blood work done 1 week prior to your next appointment so they will be available to Dr. Costello at your visit.    Decrease levothyroxine to 137 mcg/day.  Labs in 2-3 months.  Neck Ultrasound in 2-3 months.  Please make a lab appointment for blood work and follow up clinic appointment in 1 week after that to discuss results.     [facility] radiology scheduleing   [place]  [phone]   [facility] Radiology scheduling  [place]  [phone]     Please call and schedule the recommended test as discussed in clinic visit. These are the numbers to call.    Take Levothyroxine on an empty stomach. Take it with a full glass of water at least 30 minutes to 1 hour before eating breakfast.   This medicine should be taken at least 4 hours before or 4 hours after these medicines: antacids (Maalox , Mylanta , Tums ), calcium supplements, cholestyramine (Prevalite , Questran ), colestipol (Colestid ), iron supplements, orlistat (Kaden , Xenical ), simethicone (Gas-X , Mylicon ), and sucralfate (Carafate ).   Swallow the capsule whole. Do not cut or crush it.

## 2022-12-21 ENCOUNTER — HOSPITAL ENCOUNTER (OUTPATIENT)
Dept: CT IMAGING | Facility: CLINIC | Age: 37
Discharge: HOME | End: 2022-12-21
Attending: OBSTETRICS & GYNECOLOGY | Admitting: OBSTETRICS & GYNECOLOGY
Payer: COMMERCIAL

## 2022-12-21 DIAGNOSIS — R10.11 RUQ ABDOMINAL PAIN: ICD-10-CM

## 2022-12-21 PROCEDURE — 250N000011 HC RX IP 250 OP 636: Performed by: OBSTETRICS & GYNECOLOGY

## 2022-12-21 PROCEDURE — 74177 CT ABD & PELVIS W/CONTRAST: CPT

## 2022-12-21 RX ORDER — IOPAMIDOL 755 MG/ML
78 INJECTION, SOLUTION INTRAVASCULAR ONCE
Status: COMPLETED | OUTPATIENT
Start: 2022-12-21 | End: 2022-12-21

## 2022-12-21 RX ADMIN — IOPAMIDOL 78 ML: 755 INJECTION, SOLUTION INTRAVENOUS at 10:33

## 2022-12-23 ENCOUNTER — TELEPHONE (OUTPATIENT)
Dept: OBGYN | Facility: CLINIC | Age: 37
End: 2022-12-23

## 2022-12-23 NOTE — TELEPHONE ENCOUNTER
I called patient with results  of the abdominal pelvic CT scan.  I recommended beginning omeprazole 20 mg an hour before bedtime.  I would like her to try this for a period of a month and let me know if this does not help improve her pain.  The patient states that she has had the pain intermittently once or twice but it sounds better than what it was thank you

## 2022-12-25 NOTE — RESULT ENCOUNTER NOTE
I called patient with results Of the CT scan and have reviewed the results with her.  I have recommended  Omeprazole 20 mg PO at HS and reassess in 3-4 weeks.  See my previous phone note

## 2023-01-02 ENCOUNTER — IMMUNIZATION (OUTPATIENT)
Dept: NURSING | Facility: CLINIC | Age: 38
End: 2023-01-02
Payer: COMMERCIAL

## 2023-01-02 PROCEDURE — 90471 IMMUNIZATION ADMIN: CPT

## 2023-01-02 PROCEDURE — 90686 IIV4 VACC NO PRSV 0.5 ML IM: CPT

## 2023-03-01 ENCOUNTER — HOSPITAL ENCOUNTER (OUTPATIENT)
Dept: ULTRASOUND IMAGING | Facility: CLINIC | Age: 38
Discharge: HOME | End: 2023-03-01
Attending: INTERNAL MEDICINE | Admitting: INTERNAL MEDICINE
Payer: COMMERCIAL

## 2023-03-01 DIAGNOSIS — E89.0 POSTOPERATIVE HYPOTHYROIDISM: ICD-10-CM

## 2023-03-01 DIAGNOSIS — C73 PAPILLARY CARCINOMA, FOLLICULAR VARIANT (H): ICD-10-CM

## 2023-03-01 PROCEDURE — 76536 US EXAM OF HEAD AND NECK: CPT

## 2023-03-02 ENCOUNTER — LAB (OUTPATIENT)
Dept: LAB | Facility: CLINIC | Age: 38
End: 2023-03-02
Payer: MEDICAID

## 2023-03-02 DIAGNOSIS — E89.0 POSTOPERATIVE HYPOTHYROIDISM: ICD-10-CM

## 2023-03-02 DIAGNOSIS — C73 PAPILLARY CARCINOMA, FOLLICULAR VARIANT (H): ICD-10-CM

## 2023-03-02 LAB
T4 FREE SERPL-MCNC: 1.49 NG/DL (ref 0.9–1.7)
TSH SERPL DL<=0.005 MIU/L-ACNC: 0.42 UIU/ML (ref 0.3–4.2)

## 2023-03-02 PROCEDURE — 99000 SPECIMEN HANDLING OFFICE-LAB: CPT

## 2023-03-02 PROCEDURE — 84443 ASSAY THYROID STIM HORMONE: CPT

## 2023-03-02 PROCEDURE — 36415 COLL VENOUS BLD VENIPUNCTURE: CPT

## 2023-03-02 PROCEDURE — 86800 THYROGLOBULIN ANTIBODY: CPT | Mod: 90

## 2023-03-02 PROCEDURE — 84432 ASSAY OF THYROGLOBULIN: CPT | Mod: 90

## 2023-03-02 PROCEDURE — 84439 ASSAY OF FREE THYROXINE: CPT

## 2023-03-10 LAB — SCANNED LAB RESULT: NORMAL

## 2023-04-15 ENCOUNTER — HEALTH MAINTENANCE LETTER (OUTPATIENT)
Age: 38
End: 2023-04-15

## 2023-05-01 ENCOUNTER — OFFICE VISIT (OUTPATIENT)
Dept: INTERNAL MEDICINE | Facility: CLINIC | Age: 38
End: 2023-05-01
Payer: COMMERCIAL

## 2023-05-01 VITALS
DIASTOLIC BLOOD PRESSURE: 79 MMHG | HEIGHT: 62 IN | TEMPERATURE: 98.1 F | RESPIRATION RATE: 16 BRPM | OXYGEN SATURATION: 97 % | WEIGHT: 159.3 LBS | HEART RATE: 52 BPM | SYSTOLIC BLOOD PRESSURE: 125 MMHG | BODY MASS INDEX: 29.32 KG/M2

## 2023-05-01 DIAGNOSIS — Z00.00 ROUTINE GENERAL MEDICAL EXAMINATION AT A HEALTH CARE FACILITY: Primary | ICD-10-CM

## 2023-05-01 DIAGNOSIS — J45.20 MILD INTERMITTENT ASTHMA WITHOUT COMPLICATION: ICD-10-CM

## 2023-05-01 PROCEDURE — 99395 PREV VISIT EST AGE 18-39: CPT | Mod: 25 | Performed by: INTERNAL MEDICINE

## 2023-05-01 PROCEDURE — 0124A COVID-19 BIVALENT 12+ (PFIZER): CPT | Performed by: INTERNAL MEDICINE

## 2023-05-01 PROCEDURE — 99213 OFFICE O/P EST LOW 20 MIN: CPT | Mod: 25 | Performed by: INTERNAL MEDICINE

## 2023-05-01 PROCEDURE — 91312 COVID-19 BIVALENT 12+ (PFIZER): CPT | Performed by: INTERNAL MEDICINE

## 2023-05-01 RX ORDER — ALBUTEROL SULFATE 90 UG/1
2 AEROSOL, METERED RESPIRATORY (INHALATION) EVERY 4 HOURS PRN
Qty: 18 G | Refills: 11 | Status: SHIPPED | OUTPATIENT
Start: 2023-05-01 | End: 2024-01-17

## 2023-05-01 RX ORDER — FLUTICASONE PROPIONATE 44 UG/1
2 AEROSOL, METERED RESPIRATORY (INHALATION) 2 TIMES DAILY
Qty: 10.6 G | Refills: 11 | Status: SHIPPED | OUTPATIENT
Start: 2023-05-01 | End: 2024-01-17

## 2023-05-01 ASSESSMENT — ENCOUNTER SYMPTOMS
SHORTNESS OF BREATH: 0
HEMATOCHEZIA: 0
HEADACHES: 0
PALPITATIONS: 0
CHILLS: 0
ABDOMINAL PAIN: 0
EYE PAIN: 0
COUGH: 0
FEVER: 0
BREAST MASS: 0
PARESTHESIAS: 0
DIARRHEA: 0
CONSTIPATION: 0
ARTHRALGIAS: 0
WEAKNESS: 0
NERVOUS/ANXIOUS: 0
FREQUENCY: 0
SORE THROAT: 0
DIZZINESS: 0
JOINT SWELLING: 0
DYSURIA: 0
HEMATURIA: 0
NAUSEA: 0
MYALGIAS: 0
HEARTBURN: 1

## 2023-05-01 ASSESSMENT — ASTHMA QUESTIONNAIRES
QUESTION_5 LAST FOUR WEEKS HOW WOULD YOU RATE YOUR ASTHMA CONTROL: SOMEWHAT CONTROLLED
QUESTION_4 LAST FOUR WEEKS HOW OFTEN HAVE YOU USED YOUR RESCUE INHALER OR NEBULIZER MEDICATION (SUCH AS ALBUTEROL): TWO OR THREE TIMES PER WEEK
ACT_TOTALSCORE: 13
ACT_TOTALSCORE: 13
QUESTION_3 LAST FOUR WEEKS HOW OFTEN DID YOUR ASTHMA SYMPTOMS (WHEEZING, COUGHING, SHORTNESS OF BREATH, CHEST TIGHTNESS OR PAIN) WAKE YOU UP AT NIGHT OR EARLIER THAN USUAL IN THE MORNING: FOUR OR MORE NIGHTS A WEEK
QUESTION_2 LAST FOUR WEEKS HOW OFTEN HAVE YOU HAD SHORTNESS OF BREATH: THREE TO SIX TIMES A WEEK
QUESTION_1 LAST FOUR WEEKS HOW MUCH OF THE TIME DID YOUR ASTHMA KEEP YOU FROM GETTING AS MUCH DONE AT WORK, SCHOOL OR AT HOME: SOME OF THE TIME

## 2023-05-01 NOTE — PATIENT INSTRUCTIONS

## 2023-05-01 NOTE — PROGRESS NOTES
SUBJECTIVE:   CC: Aleyda is an 37 year old who presents for preventive health visit.   Patient has been advised of split billing requirements and indicates understanding: Yes  Healthy Habits:     Getting at least 3 servings of Calcium per day:  Yes    Bi-annual eye exam:  Yes    Dental care twice a year:  Yes    Sleep apnea or symptoms of sleep apnea:  None    Diet:  Regular (no restrictions) and Vegetarian/vegan    Frequency of exercise:  6-7 days/week    Duration of exercise:  45-60 minutes    Taking medications regularly:  Yes    Medication side effects:  None    PHQ-2 Total Score: 0    Additional concerns today:  No              Today's PHQ-2 Score:       2023    10:51 AM   PHQ-2 (  [employer])   Q1: Little interest or pleasure in doing things 0   Q2: Feeling down, depressed or hopeless 0   PHQ-2 Score 0   Q1: Little interest or pleasure in doing things Not at all    Not at all   Q2: Feeling down, depressed or hopeless Not at all    Not at all   PHQ-2 Score 0    0           Social History     Tobacco Use     Smoking status: Never     Smokeless tobacco: Never   Vaping Use     Vaping status: Never Used     Passive vaping exposure: Yes   Substance Use Topics     Alcohol use: No     Alcohol/week: 0.0 standard drinks of alcohol             2023    10:51 AM   Alcohol Use   Prescreen: >3 drinks/day or >7 drinks/week? No     Reviewed orders with patient.  Reviewed health maintenance and updated orders accordingly - Yes  Labs reviewed in [facility]     Breast Cancer Screenin/21/2022    12:41 PM   Breast CA Risk Assessment (FHS-7)   Do you have a family history of breast, colon, or ovarian cancer? No / Unknown       click delete button to remove this line now  Patient under 40 years of age: Routine Mammogram Screening not recommended.   Pertinent mammograms are reviewed under the imaging tab.    History of abnormal Pap smear: NO - age 30-65 PAP every 5 years with negative HPV co-testing recommended       "Latest Ref Rng & Units 2/13/2020    10:34 AM 2/13/2020    10:30 AM 3/2/2017     3:35 PM   PAP / HPV   PAP (Historical)  NIL    NIL     HPV 16 DNA NEG^Negative  Negative      HPV 18 DNA NEG^Negative  Negative      Other HR HPV NEG^Negative  Negative        Reviewed and updated as needed this visit by clinical staff   Tobacco  Allergies  Meds              Reviewed and updated as needed this visit by Provider                     Review of Systems   Constitutional: Negative for chills and fever.   HENT: Negative for congestion, ear pain, hearing loss and sore throat.    Eyes: Negative for pain and visual disturbance.   Respiratory: Negative for cough and shortness of breath.    Cardiovascular: Negative for chest pain, palpitations and peripheral edema.   Gastrointestinal: Positive for heartburn. Negative for abdominal pain, constipation, diarrhea, hematochezia and nausea.   Breasts:  Negative for tenderness, breast mass and discharge.   Genitourinary: Negative for dysuria, frequency, genital sores, hematuria, pelvic pain, urgency, vaginal bleeding and vaginal discharge.   Musculoskeletal: Negative for arthralgias, joint swelling and myalgias.   Skin: Negative for rash.   Neurological: Negative for dizziness, weakness, headaches and paresthesias.   Psychiatric/Behavioral: Negative for mood changes. The patient is not nervous/anxious.         OBJECTIVE:   /79   Pulse 52   Temp 98.1  F (36.7  C) (Temporal)   Resp 16   Ht 1.575 m (5' 2\")   Wt 72.3 kg (159 lb 4.8 oz)   LMP 04/24/2023 (Approximate)   SpO2 97%   BMI 29.14 kg/m    Physical Exam  GENERAL: healthy, alert and no distress  EYES: Eyes grossly normal to inspection, PERRL and conjunctivae and sclerae normal  HENT: ear canals and TM's normal, nose and mouth without ulcers or lesions  NECK: no adenopathy, no asymmetry, masses, or scars and thyroid normal to palpation  RESP: lungs clear to auscultation - no rales, rhonchi or wheezes  CV: regular rate and " "rhythm, normal S1 S2, no S3 or S4, no murmur, click or rub, no peripheral edema and peripheral pulses strong  ABDOMEN: soft, nontender, no hepatosplenomegaly, no masses and bowel sounds normal  MS: no gross musculoskeletal defects noted, no edema  SKIN: no suspicious lesions or rashes  NEURO: Normal strength and tone, mentation intact and speech normal  PSYCH: mentation appears normal, affect normal/bright    Diagnostic Test Results:  Labs reviewed in Epic    ASSESSMENT/PLAN:       ICD-10-CM    1. Routine general medical examination at a health care facility  Z00.00       2. Mild intermittent asthma without complication  J45.20 albuterol (PROAIR HFA/PROVENTIL HFA/VENTOLIN HFA) 108 (90 Base) MCG/ACT inhaler     Basic metabolic panel  (Ca, Cl, CO2, Creat, Gluc, K, Na, BUN)     fluticasone (FLOVENT HFA) 44 MCG/ACT inhaler          Patient has been advised of split billing requirements and indicates understanding: Yes      COUNSELING:  Reviewed preventive health counseling, as reflected in patient instructions      BMI:   Estimated body mass index is 29.14 kg/m  as calculated from the following:    Height as of this encounter: 1.575 m (5' 2\").    Weight as of this encounter: 72.3 kg (159 lb 4.8 oz).         She reports that she has never smoked. She has never used smokeless tobacco.          Evan Zamora MD  [facility]  "

## 2023-06-25 DIAGNOSIS — E89.0 POSTOPERATIVE HYPOTHYROIDISM: ICD-10-CM

## 2023-06-25 DIAGNOSIS — C73 PAPILLARY CARCINOMA, FOLLICULAR VARIANT (H): ICD-10-CM

## 2023-06-26 RX ORDER — LEVOTHYROXINE SODIUM 137 UG/1
TABLET ORAL
Qty: 90 TABLET | Refills: 0 | Status: SHIPPED | OUTPATIENT
Start: 2023-06-26 | End: 2023-07-26

## 2023-06-26 NOTE — TELEPHONE ENCOUNTER
"Requested Prescriptions   Pending Prescriptions Disp Refills     levothyroxine (SYNTHROID/LEVOTHROID) 137 MCG tablet [Pharmacy Med Name: LEVOTHYROXINE SODIUM 137MCG TABS] 90 tablet 1     Sig: TAKE ONE TABLET BY MOUTH ONCE DAILY       Thyroid Protocol Passed - 6/25/2023 11:19 AM        Passed - Patient is 12 years or older        Passed - Recent (12 mo) or future (30 days) visit within the authorizing provider's specialty     Patient has had an office visit with the authorizing provider or a provider within the authorizing providers department within the previous 12 mos or has a future within next 30 days. See \"Patient Info\" tab in inbasket, or \"Choose Columns\" in Meds & Orders section of the refill encounter.              Passed - Medication is active on med list        Passed - Normal TSH on file in past 12 months     Recent Labs   Lab Test 03/02/23  0813   TSH 0.42              Passed - No active pregnancy on record     If patient is pregnant or has had a positive pregnancy test, please check TSH.          Passed - No positive pregnancy test in past 12 months     If patient is pregnant or has had a positive pregnancy test, please check TSH.               "

## 2023-07-26 ENCOUNTER — TELEPHONE (OUTPATIENT)
Dept: ENDOCRINOLOGY | Facility: CLINIC | Age: 38
End: 2023-07-26
Payer: COMMERCIAL

## 2023-07-26 DIAGNOSIS — C73 PAPILLARY CARCINOMA, FOLLICULAR VARIANT (H): ICD-10-CM

## 2023-07-26 DIAGNOSIS — E89.0 POSTOPERATIVE HYPOTHYROIDISM: ICD-10-CM

## 2023-07-26 RX ORDER — LEVOTHYROXINE SODIUM 137 UG/1
137 TABLET ORAL DAILY
Qty: 90 TABLET | Refills: 0 | Status: SHIPPED | OUTPATIENT
Start: 2023-07-26 | End: 2023-07-26

## 2023-07-26 RX ORDER — LEVOTHYROXINE SODIUM 137 UG/1
137 TABLET ORAL DAILY
Qty: 90 TABLET | Refills: 0 | Status: SHIPPED | OUTPATIENT
Start: 2023-07-26 | End: 2023-09-13

## 2023-07-26 NOTE — TELEPHONE ENCOUNTER
Hello,    [facility] received a phone call from patient. She will be travelling and needs a 60 day supply of her levothyroxine 137mcg tablet prescription. Her insurance only allows a 30 day supply or a 90 day supply so we cannot process just 60 day supply per her request. Unfortunately the Rx on file only has 60 tablets remaining and so for us to try to process a 90 day supply (90 tablets) through insurance, we need a new Rx for 90 tablets.     Please send a new rx for 90 tablets to [facility]. Please add a note to Rx to have pharmacy Call Patient When Ready. Thank you!     Thank you!  Denise Parrish, Pharm.D.  [place] Pharmacist for [facility]

## 2023-09-13 ENCOUNTER — OFFICE VISIT (OUTPATIENT)
Dept: ENDOCRINOLOGY | Facility: CLINIC | Age: 38
End: 2023-09-13
Payer: COMMERCIAL

## 2023-09-13 VITALS
BODY MASS INDEX: 28.73 KG/M2 | DIASTOLIC BLOOD PRESSURE: 72 MMHG | TEMPERATURE: 98.5 F | RESPIRATION RATE: 16 BRPM | WEIGHT: 156.1 LBS | HEIGHT: 62 IN | OXYGEN SATURATION: 98 % | SYSTOLIC BLOOD PRESSURE: 115 MMHG | HEART RATE: 84 BPM

## 2023-09-13 DIAGNOSIS — E89.0 POSTOPERATIVE HYPOTHYROIDISM: Primary | ICD-10-CM

## 2023-09-13 DIAGNOSIS — C73 RECURRENT THYROID CANCER (H): ICD-10-CM

## 2023-09-13 DIAGNOSIS — C73 PAPILLARY CARCINOMA, FOLLICULAR VARIANT (H): ICD-10-CM

## 2023-09-13 PROCEDURE — 84439 ASSAY OF FREE THYROXINE: CPT | Performed by: INTERNAL MEDICINE

## 2023-09-13 PROCEDURE — 99214 OFFICE O/P EST MOD 30 MIN: CPT | Performed by: INTERNAL MEDICINE

## 2023-09-13 PROCEDURE — 36415 COLL VENOUS BLD VENIPUNCTURE: CPT | Performed by: INTERNAL MEDICINE

## 2023-09-13 PROCEDURE — 84443 ASSAY THYROID STIM HORMONE: CPT | Performed by: INTERNAL MEDICINE

## 2023-09-13 RX ORDER — LEVOTHYROXINE SODIUM 137 UG/1
137 TABLET ORAL DAILY
Qty: 90 TABLET | Refills: 3 | Status: SHIPPED | OUTPATIENT
Start: 2023-09-13 | End: 2024-03-14

## 2023-09-13 NOTE — PATIENT INSTRUCTIONS
-[facility]  Dr Costello, Endocrinology Department    [facility]   [address] [id number]  [place] [address]  Appointment [phone]  Fax: [phone]  [place]: Monday - Thursday      Please check the cost coverage and copay with insurance before recommended tests, services and medications (especially if new medications are prescribed).     If ordered, please get blood work done 1 week prior to your next appointment so they will be available to Dr. Costello at your visit.      Labs needed.  Dose adjustment based on that.  Repeat labs ( of stable) and neck Ultrasound in 3/2024.  Please make a lab appointment for blood work and follow up clinic appointment in 1 week after that to discuss results.     [facility] radiology scheduleing   [place]  [phone]   [facility] Radiology scheduling  [place]  [phone]     Please call and schedule the recommended test as discussed in clinic visit. These are the numbers to call.    Take Levothyroxine on an empty stomach. Take it with a full glass of water at least 30 minutes to 1 hour before eating breakfast.   This medicine should be taken at least 4 hours before or 4 hours after these medicines: antacids (Maalox , Mylanta , Tums ), calcium supplements, cholestyramine (Prevalite , Questran ), colestipol (Colestid ), iron supplements, orlistat (Kaden , Xenical ), simethicone (Gas-X , Mylicon ), and sucralfate (Carafate ).   Swallow the capsule whole. Do not cut or crush it.

## 2023-09-13 NOTE — PROGRESS NOTES
Name: Aleyda Nicole  Seen for f/u of papillary thyroid cancer and postoperative hypothyroidism.    Chief Complaint   Patient presents with    Thyroid Disease    Thyroid Cancer     HPI:  Aleyda Nicole is a 38 year old female who presents for the evaluation of above.    Delivered baby 10/2022.  Has 3 kids.  Not planning pregnancy. S/l TL.    1.  Papillary thyroid cancer:  Thyroid nodule was noticed in February 2011 which was followed by thyroid nodule biopsy which showed suspicious for papillary thyroid cancer.  She underwent total thyroidectomy 3/2011 and pathology showed papillary thyroid cancer with follicular pattern.  Tumor was about 2.2 cm on the left lobe.  No lymph node involvement.  S/p 78.7 mCi of I131 HERNANDEZ  8/2011. No evidence for distant metastatic disease on post therapy scan.  Neck US 6/2016- showed new lymph node in neck along with rising Tg levels  S/p FNA lymph node: 6/2016- c/w papillary thyroid cancer    8/2016: underwent left modified neck dissection.  1/27 lymph node positive one left modified neck dissection.  1 cm in greatest diameter.  Negative for external involvement at level II.    Tg decreased post left neck dissection from 1.3 to < 0.1    Follow up I123 4/2017: no mets.    Postop course was complicated by hypocalcemia and was started on calcium supplement.  Taking calcium- tries to take 2 tabs per day + 1 glass of milk daily.    She was followed by endocrinology before and then lost to follow-up with endocrinology in 2012.  No history of neck radiation prior to diagnosis of thyroid cancer.  No family history of thyroid cancer.    Thyroglobulin levels appear stable and suppressed.  Neck ultrasound June 2018, 5/2020. 9/2021, 3/2023 did not show any evidence of metastases.      2.  Hypothyroidism (postoperative):  Was started on levothyroxine following thyroidectomy.    Currently taking levothyroxine 137 mcg/day. (11/30/2022)  Reports compliance.    Struggling with asthma.  Feeling  better.  Hair loss is better.    No diarrhea, tremors, palpitations.  No difficulty with swallowing, no pain during swallowing.  Mood- some irritability.  Weight: trying to lose weight.  Periods: regular. Not planning pregnancy. S/p TL.  Wt Readings from Last 2 Encounters:   23 70.8 kg (156 lb 1.6 oz)   23 72.3 kg (159 lb 4.8 oz)     PMH/PSH:  Past Medical History:   Diagnosis Date    Gestational diabetes 2013    Hypocalcemia 2016    Influenza A     Papillary thyroid carcinoma     Papillary carcinoma, follicular variant with    Pneumonia     LLL    PONV (postoperative nausea and vomiting)     Postoperative hypothyroidism      labor     Uncomplicated asthma      Past Surgical History:   Procedure Laterality Date     SECTION  10/26/2013    Procedure:  SECTION;  primary  section;  Surgeon: Rodney Mckee MD;  Location: [facility] L[facility]     SECTION N/A 2017    Procedure:  SECTION;  Surgeon: Hakeem Combs MD;  Location: [facility] L+[facility]    COMBINED  SECTION, SALPINGECTOMY BILATERAL N/A 10/14/2022    Procedure: REPEAT  SECTION WITH BILATERAL SALPINGECTOMY;  Surgeon: Luis Garcia MD;  Location: [facility] L+[facility]    DISSECTION RADICAL NECK Left 2016    Procedure: DISSECTION RADICAL NECK;  Surgeon: Vy Theodore MD;  Location: [facility] OR    DISSECTION RADICAL NECK MODIFIED Left 2016    Procedure: DISSECTION RADICAL NECK MODIFIED;  Surgeon: Luis Brown MD;  Location: [facility] OR    THYROIDECTOMY      Total, for cancer.      Family Hx:  Family History   Problem Relation Age of Onset    Diabetes Mother     Cerebrovascular Disease Mother     Hypertension Mother     Diabetes Father 50    Hypertension Father     Genitourinary Problems Father         kidney disease    Coronary Artery Disease Father     Diabetes Type 2  Sister     Diabetes Type 2  Sister     Diabetes Type 2  Brother      Thyroid disease: No        Social Hx:  Social  "History     Socioeconomic History    Marital status:      Spouse name: Brock    Number of children: 2    Years of education: Not on file    Highest education level: Not on file   Occupational History    Occupation: CNA   Tobacco Use    Smoking status: Never    Smokeless tobacco: Never   Vaping Use    Vaping Use: Never used   Substance and Sexual Activity    Alcohol use: No     Alcohol/week: 0.0 standard drinks of alcohol    Drug use: No    Sexual activity: Yes     Partners: Male     Birth control/protection: None   Other Topics Concern    Parent/sibling w/ CABG, MI or angioplasty before 65F 55M? Not Asked     Service No    Blood Transfusions No    Caffeine Concern No    Occupational Exposure Not Asked    Hobby Hazards No    Sleep Concern No    Stress Concern No    Weight Concern Yes    Special Diet No    Back Care No    Exercise No    Bike Helmet No    Seat Belt Yes    Self-Exams No   Social History Narrative    Pt lives with  and father.     Social Determinants of Health     Financial Resource Strain: Not on file   Food Insecurity: Not on file   Transportation Needs: Not on file   Physical Activity: Not on file   Stress: Not on file   Social Connections: Not on file   Intimate Partner Violence: Not on file   Housing Stability: Not on file          MEDICATIONS:  has a current medication list which includes the following prescription(s): albuterol, fluticasone, levothyroxine, and prenatal vit-fe fumarate-fa.    ROS     ROS: 10 point ROS neg other than the symptoms noted above in the HPI.      Physical Exam   VS: /72 (BP Location: Left arm, Patient Position: Chair, Cuff Size: Adult Regular)   Pulse 84   Temp 98.5  F (36.9  C) (Tympanic)   Resp 16   Ht 1.575 m (5' 2.01\")   Wt 70.8 kg (156 lb 1.6 oz)   LMP 08/26/2023   SpO2 98%   Breastfeeding No   BMI 28.54 kg/m    GENERAL: healthy, alert and no distress  EYES: Eyes grossly normal to inspection, conjunctivae and sclerae normal  ENT: " no nose swelling, nasal discharge.  Thyroid: s/p thyroidectomy.  No lymphadenopathy.  CV: RRR, no rubs, gallops, no murmurs  RESP: CTAB, no wheezes, rales, or ronchi  ABDO: +BS  EXTREMITIES: no hand tremors.  NEURO: Cranial nerves grossly intact, mentation intact and speech normal  SKIN: No apparent skin lesions, rash or edema seen   PSYCH: mentation appears normal, affect normal/bright, judgement and insight intact, normal speech and appearance well-groomed      LABS:  3/2023:  TgAB: <0.4  TG: <0.10    2021:  TgAB: <0.4  TG: <0.10    2020:  TgAB: <0.4  TG: <0.10    2019:  TgAB: <0.4  TG: <0.10    3/6/2018:  TSH: 0.07  TgAB: <0.4  TG: <0.10    2017:  TSH : 47.89 (post thyrogen)  TgAB: <0.4  TG: <0.10    17:  TSH: 0.04  TgAB: <0.4  TG: <0.10    11/10/16:  TSH 0.74  TgAb: <0.4  TG: <0.10     2016:  TSH: 0.08  TgAb: <0.4  T.30     2012:  TSH: 12.40  TGAB: 1.2  T.6    TFTs:   Latest Ref Rng 3/2/2023  8:13 AM   ENDO THYROID LABS-[facility]     TSH 0.30 - 4.20 uIU/mL 0.42    Free T3 2.3 - 4.2 pg/mL    Triiodothyronine (T3) 60 - 181 ng/dL    T4 5.0 - 11.0 ug/dL    FREE T4 0.90 - 1.70 ng/dL 1.49        NM THYROID UPTAKE/SCAN   2011 2:47:00 PM   FINDINGS:  The thyroid gland appears normal in size. 24-hour uptake  was calculated at 46.4%, which is above the normal range. Normal range  is 10-30% 24-hour uptake. Focal area of decreased uptake in the mid  left thyroid lobe may represent a cold thyroid nodule or cyst.     IMPRESSION:    1. Elevated 24-hour radioiodine uptake in the thyroid at 46.4%.  2. Possible cold nodule or cyst in the mid left thyroid lobe. Thyroid  ultrasound is recommended for further evaluation.    NUCLEAR MEDICINE I-131 SCAN    Aug 10, 2011 8:04:00 AM      TECHNIQUE: 78.7 mCi I-131 ablation scan. Five days post therapy  imaging performed today.  FINDINGS: Intense radiotracer uptake at the left greater than right  neck at the thyroid level. No evidence for distant metastatic  disease.     IMPRESSION: Intense radiotracer uptake localizing to the thyroidectomy  bed, left greater than right. This is consistent with residual thyroid  tissue. No distant metastatic disease identified.    ULTRASOUND THYROID  Feb 17, 2011   IMPRESSION: Multinodular thyroid gland.    FNA thyroid nodule:  Copath Report       FNA-thyroid, left mid lobe nodule:   Suspicious for malignancy.  Suspicious for papillary carcinoma  Specimen Adequacy: Satisfactory for evaluation.           Surgical pathology:     SPECIMEN(S):  A: Thyroid, left lobe  B: Parathyroid gland, biopsy of right inferior  C: Thyroid, right lobe    FINAL DIAGNOSIS:  A. and C.  Thyroid, right and left lobes, total thyroidectomy -  Specimen/Procedure(s) and Laterality:   Right and left thyroid lobes,  total thyroidectomy.  Specimen Integrity:   Intact.  Specimen Size and Weight:   See Gross Description.  Histologic Tumor Type(s):   Papillary carcinoma, follicular variant with  focal papillary morphology.  Tumor Focality: Unifocal.  Tumor Laterality:   Left lobe.  Tumor Size(s):   Left lobe: 2.2 cm (greatest dimension).  Margins:   Close, but uninvolved.            Distance to closest margin, if negative:   Tumor 0.05 cm from  nearest paratracheal surface and 1.75 cm from nearest anterolateral  surface.  Tumor Capsular Invasion: Present.    Tumor Capsule: Partial.  Extrathyroidal Invasion:   Not identified.  Lymph-Vascular Invasion:   Not identified.  Lymph Nodes:   Two nodes without evidence of metastatic carcinoma (0/2;  one at isthmus and one adjacent to right lobe).  Pathologic Staging:   pT2, pN0, pM not applicable.  Additional Pathologic Findings:   Right thyroid lobe without evidence of  malignancy.  Background nodular hyperplasia and thyroiditis with  lymphoid follicles.  Left lobe with focal previous biopsy site changes.  No parathyroid tissue identified.  CAP Protocol Based on AJCC/UICC TNM, 7th edition; Protocol Effective  Date:  January  "2010    B.  \"Parathyroid gland\", right inferior, biopsy - Thyroid tissue; no  parathyroid glandular tissue identified.    8/2016: left modified neck Dissection:  Copath Report      SPECIMEN(S):   A: Scar, left neck   B: Parathyroid gland, left inferior   C: Left central neck dissection, para and pretracheal lymph nodes   D: apical jugular lymph node   E: Left modified neck dissection     FINAL DIAGNOSIS:   A. Skin, neck/scar, excision:   - Hypertrophic scar; benign.     B. Parathyroid gland, left inferior, biopsy:   - Parathyroid tissue present.     C. Left central neck dissection with para-and pretracheal lymph nodes:   - One lymph node; no evidence of malignancy (0/1).   - Thymus and parathyroid tissue present.     D. Lymph node, apical jugular, excision:   - One lymph node; no evidence of malignancy (0/1).     E. Left modified neck dissection:   - 27 lymph nodes identified (level II- 10, level III- 9, level IV- 5 and   lymph nodes associated with jugular vein- 3).   - One (of 27) lymph node positive for metastatic papillary thyroid   carcinoma.  1 cm in greatest diameter.  Negative for extranodal   involvement  (Level II).   - Jugular vein negative for tumor.        NUCLEAR MEDICINE THYROID WHOLE BODY SCAN I-123   4/20/2017 11:38 AM   IMPRESSION: No worrisome focal tracer uptake is identified to suggest  recurrent neoplasm or remote metastatic disease. Some postoperative  changes at the left neck noted, likely accounting for inconspicuity of  the left sternocleidomastoid muscle. Small bilateral subcentimeter  neck lymph nodes noted.     Neck US 6/2018:  IMPRESSION:  Negative soft tissue neck study.    Neck US 5/2020:                                                        IMPRESSION:    Negative soft tissue neck study.    Neck US 9/2021:  FINDINGS:  Thyroidectomy. No cervical lymphadenopathy. No suspicious  nodules in the thyroidectomy bed. No significant interval change.                                            "                IMPRESSION: No cervical lymphadenopathy.     All pertinent notes, labs, and images personally reviewed by me.     A/P  Ms.Nhu Mounika Nicole is a 36 year old here for the evaluation of thyroid cancer:    1. 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  It was 2.2 cm unifocal, left lobe tumor with no lymph node involvement.  S/p  total thyroidectomy in 2011 and 78.7 mCi of I-131 HERNANDEZ. No evidence for distant metastatic disease on post therapy scan.  2016- new lymph node s/p FNA with PTC with rising TG  S/p 8/2016- left modified neck radiation. 1/27 lymph node + ( level2). Postop course complicated by hypocalcemia.  Delivered 1/2017.   Follow up I123 WBS 4/2017- no mets  Thyroglobulin levels appear stable and suppressed.  Neck ultrasound June 2018, 5/2020, 9/2021, 3/2023 did not show any evidence of metastases.  Plan:  Discussed diagnosis, pathophysiology, management and treatment options of condition with pt.  Recommend repeat Neck US and thyroglobulin levels in 6 months.    2.  Hypothyroidism (postoperative following total thyroidectomy for thyroid cancer):  Currently taking levothyroxine 137 mcg/day. (11/30/2022)  Taking generic levothyroxine.  Reports compliance.  Clinically euthyroid.  Not planning further pregnancy.  Plan:  Discussed diagnosis, pathophysiology, management and treatment options of condition with pt.  Labs needed.  Dose adjustment based on that.  Repeat labs ( of stable) and neck Ultrasound in 3/2024.  Please make a lab appointment for blood work and follow up clinic appointment in 1 week after that to discuss results.    Discussed s/s of hypothyroidism and hyperthyroidism to watch for.  The patient indicates understanding of these issues and agrees with the plan.    All questions were answered.  The patient indicates understanding of the above issues and agrees with the plan set  forth.    Follow-up:  As noted in AVS    Vandana Costello MD  Endocrinology   [facility]  CC: Evan Zamora

## 2023-09-13 NOTE — LETTER
9/13/2023         RE: Aleyda Nicole  [address] [facility] [id number]        Dear Colleague,    Thank you for referring your patient, Aleyda Nicole, to the [facility]. Please see a copy of my visit note below.    Name: Aleyda Nicole  Seen for f/u of papillary thyroid cancer and postoperative hypothyroidism.    Chief Complaint   Patient presents with     Thyroid Disease     Thyroid Cancer     HPI:  Aleyda Nicole is a 38 year old female who presents for the evaluation of above.    Delivered baby 10/2022.  Has 3 kids.  Not planning pregnancy. S/l TL.    1.  Papillary thyroid cancer:  Thyroid nodule was noticed in February 2011 which was followed by thyroid nodule biopsy which showed suspicious for papillary thyroid cancer.  She underwent total thyroidectomy 3/2011 and pathology showed papillary thyroid cancer with follicular pattern.  Tumor was about 2.2 cm on the left lobe.  No lymph node involvement.  S/p 78.7 mCi of I131 HERNANDEZ  8/2011. No evidence for distant metastatic disease on post therapy scan.  Neck US 6/2016- showed new lymph node in neck along with rising Tg levels  S/p FNA lymph node: 6/2016- c/w papillary thyroid cancer    8/2016: underwent left modified neck dissection.  1/27 lymph node positive one left modified neck dissection.  1 cm in greatest diameter.  Negative for external involvement at level II.    Tg decreased post left neck dissection from 1.3 to < 0.1    Follow up I123 4/2017: no mets.    Postop course was complicated by hypocalcemia and was started on calcium supplement.  Taking calcium- tries to take 2 tabs per day + 1 glass of milk daily.    She was followed by endocrinology before and then lost to follow-up with endocrinology in 2012.  No history of neck radiation prior to diagnosis of thyroid cancer.  No family history of thyroid cancer.    Thyroglobulin levels appear stable and suppressed.  Neck ultrasound June 2018, 5/2020. 9/2021, 3/2023 did not  show any evidence of metastases.      2.  Hypothyroidism (postoperative):  Was started on levothyroxine following thyroidectomy.    Currently taking levothyroxine 137 mcg/day. (2022)  Reports compliance.    Struggling with asthma.  Feeling better.  Hair loss is better.    No diarrhea, tremors, palpitations.  No difficulty with swallowing, no pain during swallowing.  Mood- some irritability.  Weight: trying to lose weight.  Periods: regular. Not planning pregnancy. S/p TL.  Wt Readings from Last 2 Encounters:   23 70.8 kg (156 lb 1.6 oz)   23 72.3 kg (159 lb 4.8 oz)     PMH/PSH:  Past Medical History:   Diagnosis Date     Gestational diabetes 2013     Hypocalcemia 2016     Influenza A      Papillary thyroid carcinoma     Papillary carcinoma, follicular variant with     Pneumonia     LLL     PONV (postoperative nausea and vomiting)      Postoperative hypothyroidism       labor      Uncomplicated asthma      Past Surgical History:   Procedure Laterality Date      SECTION  10/26/2013    Procedure:  SECTION;  primary  section;  Surgeon: Rodney Mckee MD;  Location: [facility] L[facility]      SECTION N/A 2017    Procedure:  SECTION;  Surgeon: Hakeem Combs MD;  Location: [facility] L[facility]     COMBINED  SECTION, SALPINGECTOMY BILATERAL N/A 10/14/2022    Procedure: REPEAT  SECTION WITH BILATERAL SALPINGECTOMY;  Surgeon: Luis Garcia MD;  Location: [facility] L[facility]     DISSECTION RADICAL NECK Left 2016    Procedure: DISSECTION RADICAL NECK;  Surgeon: yV Theodore MD;  Location:  OR     DISSECTION RADICAL NECK MODIFIED Left 2016    Procedure: DISSECTION RADICAL NECK MODIFIED;  Surgeon: Luis Brown MD;  Location: [facility] OR     THYROIDECTOMY      Total, for cancer.      Family Hx:  Family History   Problem Relation Age of Onset     Diabetes Mother      Cerebrovascular Disease Mother      Hypertension Mother      Diabetes  Father 50     Hypertension Father      Genitourinary Problems Father         kidney disease     Coronary Artery Disease Father      Diabetes Type 2  Sister      Diabetes Type 2  Sister      Diabetes Type 2  Brother      Thyroid disease: No        Social Hx:  Social History     Socioeconomic History     Marital status:      Spouse name: Brock     Number of children: 2     Years of education: Not on file     Highest education level: Not on file   Occupational History     Occupation: CNA   Tobacco Use     Smoking status: Never     Smokeless tobacco: Never   Vaping Use     Vaping Use: Never used   Substance and Sexual Activity     Alcohol use: No     Alcohol/week: 0.0 standard drinks of alcohol     Drug use: No     Sexual activity: Yes     Partners: Male     Birth control/protection: None   Other Topics Concern     Parent/sibling w/ CABG, MI or angioplasty before 65F 55M? Not Asked      Service No     Blood Transfusions No     Caffeine Concern No     Occupational Exposure Not Asked     Hobby Hazards No     Sleep Concern No     Stress Concern No     Weight Concern Yes     Special Diet No     Back Care No     Exercise No     Bike Helmet No     Seat Belt Yes     Self-Exams No   Social History Narrative    Pt lives with  and father.     Social Determinants of Health     Financial Resource Strain: Not on file   Food Insecurity: Not on file   Transportation Needs: Not on file   Physical Activity: Not on file   Stress: Not on file   Social Connections: Not on file   Intimate Partner Violence: Not on file   Housing Stability: Not on file          MEDICATIONS:  has a current medication list which includes the following prescription(s): albuterol, fluticasone, levothyroxine, and prenatal vit-fe fumarate-fa.    ROS     ROS: 10 point ROS neg other than the symptoms noted above in the HPI.      Physical Exam   VS: /72 (BP Location: Left arm, Patient Position: Chair, Cuff Size: Adult Regular)   Pulse 84    "Temp 98.5  F (36.9  C) (Tympanic)   Resp 16   Ht 1.575 m (5' 2.01\")   Wt 70.8 kg (156 lb 1.6 oz)   LMP 2023   SpO2 98%   Breastfeeding No   BMI 28.54 kg/m    GENERAL: healthy, alert and no distress  EYES: Eyes grossly normal to inspection, conjunctivae and sclerae normal  ENT: no nose swelling, nasal discharge.  Thyroid: s/p thyroidectomy.  No lymphadenopathy.  CV: RRR, no rubs, gallops, no murmurs  RESP: CTAB, no wheezes, rales, or ronchi  ABDO: +BS  EXTREMITIES: no hand tremors.  NEURO: Cranial nerves grossly intact, mentation intact and speech normal  SKIN: No apparent skin lesions, rash or edema seen   PSYCH: mentation appears normal, affect normal/bright, judgement and insight intact, normal speech and appearance well-groomed      LABS:  3/2023:  TgAB: <0.4  TG: <0.10    2021:  TgAB: <0.4  TG: <0.10    2020:  TgAB: <0.4  TG: <0.10    2019:  TgAB: <0.4  TG: <0.10    3/6/2018:  TSH: 0.07  TgAB: <0.4  TG: <0.10    2017:  TSH : 47.89 (post thyrogen)  TgAB: <0.4  TG: <0.10    17:  TSH: 0.04  TgAB: <0.4  TG: <0.10    11/10/16:  TSH 0.74  TgAb: <0.4  TG: <0.10     2016:  TSH: 0.08  TgAb: <0.4  T.30     2012:  TSH: 12.40  TGAB: 1.2  T.6    TFTs:   Latest Ref Rng 3/2/2023  8:13 AM   ENDO THYROID LABS-[facility]     TSH 0.30 - 4.20 uIU/mL 0.42    Free T3 2.3 - 4.2 pg/mL    Triiodothyronine (T3) 60 - 181 ng/dL    T4 5.0 - 11.0 ug/dL    FREE T4 0.90 - 1.70 ng/dL 1.49        NM THYROID UPTAKE/SCAN   2011 2:47:00 PM   FINDINGS:  The thyroid gland appears normal in size. 24-hour uptake  was calculated at 46.4%, which is above the normal range. Normal range  is 10-30% 24-hour uptake. Focal area of decreased uptake in the mid  left thyroid lobe may represent a cold thyroid nodule or cyst.     IMPRESSION:    1. Elevated 24-hour radioiodine uptake in the thyroid at 46.4%.  2. Possible cold nodule or cyst in the mid left thyroid lobe. Thyroid  ultrasound is recommended for further " evaluation.    NUCLEAR MEDICINE I-131 SCAN    Aug 10, 2011 8:04:00 AM      TECHNIQUE: 78.7 mCi I-131 ablation scan. Five days post therapy  imaging performed today.  FINDINGS: Intense radiotracer uptake at the left greater than right  neck at the thyroid level. No evidence for distant metastatic disease.     IMPRESSION: Intense radiotracer uptake localizing to the thyroidectomy  bed, left greater than right. This is consistent with residual thyroid  tissue. No distant metastatic disease identified.    ULTRASOUND THYROID  Feb 17, 2011   IMPRESSION: Multinodular thyroid gland.    FNA thyroid nodule:  Copath Report       FNA-thyroid, left mid lobe nodule:   Suspicious for malignancy.  Suspicious for papillary carcinoma  Specimen Adequacy: Satisfactory for evaluation.           Surgical pathology:     SPECIMEN(S):  A: Thyroid, left lobe  B: Parathyroid gland, biopsy of right inferior  C: Thyroid, right lobe    FINAL DIAGNOSIS:  A. and C.  Thyroid, right and left lobes, total thyroidectomy -  Specimen/Procedure(s) and Laterality:   Right and left thyroid lobes,  total thyroidectomy.  Specimen Integrity:   Intact.  Specimen Size and Weight:   See Gross Description.  Histologic Tumor Type(s):   Papillary carcinoma, follicular variant with  focal papillary morphology.  Tumor Focality: Unifocal.  Tumor Laterality:   Left lobe.  Tumor Size(s):   Left lobe: 2.2 cm (greatest dimension).  Margins:   Close, but uninvolved.            Distance to closest margin, if negative:   Tumor 0.05 cm from  nearest paratracheal surface and 1.75 cm from nearest anterolateral  surface.  Tumor Capsular Invasion: Present.    Tumor Capsule: Partial.  Extrathyroidal Invasion:   Not identified.  Lymph-Vascular Invasion:   Not identified.  Lymph Nodes:   Two nodes without evidence of metastatic carcinoma (0/2;  one at isthmus and one adjacent to right lobe).  Pathologic Staging:   pT2, pN0, pM not applicable.  Additional Pathologic Findings:   Right  "thyroid lobe without evidence of  malignancy.  Background nodular hyperplasia and thyroiditis with  lymphoid follicles.  Left lobe with focal previous biopsy site changes.  No parathyroid tissue identified.  CAP Protocol Based on AJCC/UICC TNM, 7th edition; Protocol Effective  Date:  January 2010    B.  \"Parathyroid gland\", right inferior, biopsy - Thyroid tissue; no  parathyroid glandular tissue identified.    8/2016: left modified neck Dissection:  Copath Report      SPECIMEN(S):   A: Scar, left neck   B: Parathyroid gland, left inferior   C: Left central neck dissection, para and pretracheal lymph nodes   D: apical jugular lymph node   E: Left modified neck dissection     FINAL DIAGNOSIS:   A. Skin, neck/scar, excision:   - Hypertrophic scar; benign.     B. Parathyroid gland, left inferior, biopsy:   - Parathyroid tissue present.     C. Left central neck dissection with para-and pretracheal lymph nodes:   - One lymph node; no evidence of malignancy (0/1).   - Thymus and parathyroid tissue present.     D. Lymph node, apical jugular, excision:   - One lymph node; no evidence of malignancy (0/1).     E. Left modified neck dissection:   - 27 lymph nodes identified (level II- 10, level III- 9, level IV- 5 and   lymph nodes associated with jugular vein- 3).   - One (of 27) lymph node positive for metastatic papillary thyroid   carcinoma.  1 cm in greatest diameter.  Negative for extranodal   involvement  (Level II).   - Jugular vein negative for tumor.        NUCLEAR MEDICINE THYROID WHOLE BODY SCAN I-123   4/20/2017 11:38 AM   IMPRESSION: No worrisome focal tracer uptake is identified to suggest  recurrent neoplasm or remote metastatic disease. Some postoperative  changes at the left neck noted, likely accounting for inconspicuity of  the left sternocleidomastoid muscle. Small bilateral subcentimeter  neck lymph nodes noted.     Neck US 6/2018:  IMPRESSION:  Negative soft tissue neck study.    Neck US 5/2020:          "                                               IMPRESSION:    Negative soft tissue neck study.    Neck US 9/2021:  FINDINGS:  Thyroidectomy. No cervical lymphadenopathy. No suspicious  nodules in the thyroidectomy bed. No significant interval change.                                                           IMPRESSION: No cervical lymphadenopathy.     All pertinent notes, labs, and images personally reviewed by me.     A/P  Ms.Nhu Mounika Nicole is a 36 year old here for the evaluation of thyroid cancer:    1. 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  It was 2.2 cm unifocal, left lobe tumor with no lymph node involvement.  S/p  total thyroidectomy in 2011 and 78.7 mCi of I-131 HERNANDEZ. No evidence for distant metastatic disease on post therapy scan.  2016- new lymph node s/p FNA with PTC with rising TG  S/p 8/2016- left modified neck radiation. 1/27 lymph node + ( level2). Postop course complicated by hypocalcemia.  Delivered 1/2017.   Follow up I123 WBS 4/2017- no mets  Thyroglobulin levels appear stable and suppressed.  Neck ultrasound June 2018, 5/2020, 9/2021, 3/2023 did not show any evidence of metastases.  Plan:  Discussed diagnosis, pathophysiology, management and treatment options of condition with pt.  Recommend repeat Neck US and thyroglobulin levels in 6 months.    2.  Hypothyroidism (postoperative following total thyroidectomy for thyroid cancer):  Currently taking levothyroxine 137 mcg/day. (11/30/2022)  Taking generic levothyroxine.  Reports compliance.  Clinically euthyroid.  Not planning further pregnancy.  Plan:  Discussed diagnosis, pathophysiology, management and treatment options of condition with pt.  Labs needed.  Dose adjustment based on that.  Repeat labs ( of stable) and neck Ultrasound in 3/2024.  Please make a lab appointment for blood work and follow up clinic appointment in 1 week after that to  discuss results.    Discussed s/s of hypothyroidism and hyperthyroidism to watch for.  The patient indicates understanding of these issues and agrees with the plan.    All questions were answered.  The patient indicates understanding of the above issues and agrees with the plan set forth.    Follow-up:  As noted in AVS    Vandana Costello MD  Endocrinology   [facility]  CC: Evan Zamora           Again, thank you for allowing me to participate in the care of your patient.        Sincerely,        Vandana Costello MD

## 2023-09-14 LAB
T4 FREE SERPL-MCNC: 1.78 NG/DL (ref 0.9–1.7)
TSH SERPL DL<=0.005 MIU/L-ACNC: 1.5 UIU/ML (ref 0.3–4.2)

## 2023-09-15 ENCOUNTER — OFFICE VISIT (OUTPATIENT)
Dept: URGENT CARE | Facility: URGENT CARE | Age: 38
End: 2023-09-15
Payer: COMMERCIAL

## 2023-09-15 VITALS
SYSTOLIC BLOOD PRESSURE: 114 MMHG | HEART RATE: 91 BPM | DIASTOLIC BLOOD PRESSURE: 80 MMHG | OXYGEN SATURATION: 95 % | RESPIRATION RATE: 16 BRPM | WEIGHT: 156 LBS | BODY MASS INDEX: 28.53 KG/M2 | TEMPERATURE: 97.9 F

## 2023-09-15 DIAGNOSIS — J06.9 UPPER RESPIRATORY TRACT INFECTION, UNSPECIFIED TYPE: ICD-10-CM

## 2023-09-15 DIAGNOSIS — J45.901 MODERATE ASTHMA WITH EXACERBATION, UNSPECIFIED WHETHER PERSISTENT: Primary | ICD-10-CM

## 2023-09-15 PROCEDURE — 94640 AIRWAY INHALATION TREATMENT: CPT | Performed by: PHYSICIAN ASSISTANT

## 2023-09-15 PROCEDURE — 99214 OFFICE O/P EST MOD 30 MIN: CPT | Mod: 25 | Performed by: PHYSICIAN ASSISTANT

## 2023-09-15 RX ORDER — ALBUTEROL SULFATE 0.83 MG/ML
2.5 SOLUTION RESPIRATORY (INHALATION) EVERY 4 HOURS PRN
Qty: 90 ML | Refills: 0 | Status: SHIPPED | OUTPATIENT
Start: 2023-09-15 | End: 2024-01-17

## 2023-09-15 RX ORDER — ALBUTEROL SULFATE 0.83 MG/ML
2.5 SOLUTION RESPIRATORY (INHALATION) ONCE
Status: COMPLETED | OUTPATIENT
Start: 2023-09-15 | End: 2023-09-15

## 2023-09-15 RX ORDER — PREDNISONE 20 MG/1
20 TABLET ORAL 2 TIMES DAILY
Qty: 10 TABLET | Refills: 0 | Status: SHIPPED | OUTPATIENT
Start: 2023-09-15 | End: 2024-01-17

## 2023-09-15 RX ADMIN — ALBUTEROL SULFATE 2.5 MG: 0.83 SOLUTION RESPIRATORY (INHALATION) at 11:59

## 2023-09-15 NOTE — PROGRESS NOTES
Assessment & Plan     Moderate asthma with exacerbation, unspecified whether persistent    Patient given albuterol neb in clinic  She was given albuterol to use in her neb at home  She has enough inhalers  Started on prednisone today      - albuterol (PROVENTIL) neb solution 2.5 mg  - INHALATION/NEBULIZER TREATMENT, INITIAL  - predniSONE (DELTASONE) 20 MG tablet; Take 1 tablet (20 mg) by mouth 2 times daily  - albuterol (PROVENTIL) (2.5 MG/3ML) 0.083% neb solution; Take 1 vial (2.5 mg) by nebulization every 4 hours as needed for shortness of breath or wheezing    Asthma makes it hard for you to breathe. During an asthma attack, the airways swell and narrow. Severe asthma attacks can be dangerous, but you can usually prevent them. Controlling asthma and treating symptoms before they get bad can help you avoid bad attacks.  Take medications as directed.  Follow up with your family doctor if not improving or go to the ED if symptoms worsen.     Upper respiratory tract infection, unspecified type    You have been diagnosed with a viral URI.  A viral respiratory infection is an infection of the nose, sinuses, or throat caused by a virus. Colds and the flu are common types of viral respiratory infections.  The symptoms of a viral respiratory infection often start quickly. They include a fever, sore throat, and runny nose. You may also just not feel well. Or you may not want to eat much.  Most viral infections can be treated with home care. This may include drinking lots of fluids and taking over-the-counter pain medicine. You will probably feel better in 4 to 10 days.  Antibiotics are not used to treat a viral infection. Antibiotics don't kill viruses, so they won't help cure a viral illness.    - albuterol (PROVENTIL) (2.5 MG/3ML) 0.083% neb solution; Take 1 vial (2.5 mg) by nebulization every 4 hours as needed for shortness of breath or wheezing     At today's visit with Aleyda Nicole , we discussed results, diagnosis,  medications and formulated a plan.  We also discussed red flags for immediate return to clinic/ER, as well as indications for follow up with PCP if not improved in 3 days. Patient understood and agreed to plan. Aleyda Nicole was discharged with stable vitals and has no further questions.       No follow-ups on file.    Pedro Langley, [facility], [place]  [facility] MAXINE Maynard is a 38 year old, presenting for the following health issues:  Asthma (Pt states she has been using her inhaler more frequently-has seasonal allergies and asthma )      HPI   Review of Systems   Constitutional, HEENT, cardiovascular, pulmonary, gi and gu systems are negative, except as otherwise noted.      Objective    /80   Pulse 91   Temp 97.9  F (36.6  C) (Tympanic)   Resp 16   Wt 70.8 kg (156 lb)   LMP 08/26/2023   SpO2 95%   BMI 28.53 kg/m    Body mass index is 28.53 kg/m .  Physical Exam   GENERAL: healthy, alert and no distress  EYES: Eyes grossly normal to inspection, PERRL and conjunctivae and sclerae normal  HENT: ear canals and TM's normal, nose and mouth without ulcers or lesions  NECK: no adenopathy, no asymmetry, masses, or scars and thyroid normal to palpation  RESP: expiratory wheezes throughout  CV: regular rate and rhythm, normal S1 S2, no S3 or S4, no murmur, click or rub, no peripheral edema and peripheral pulses strong  MS: no gross musculoskeletal defects noted, no edema  SKIN: no suspicious lesions or rashes  NEURO: Normal strength and tone, mentation intact and speech normal  PSYCH: mentation appears normal, affect normal/bright

## 2023-09-18 NOTE — RESULT ENCOUNTER NOTE
Endo staff- can you please place labs as noted below?  Please make a lab appointment for blood work and follow up clinic appointment in 1 week after that to discuss results.  Please inform patient and help schedule virtual (video preferred)/ clinic visit.     Thank you.      [facility]    Recently done endocrinology lab test/ imaging test showed:  Thyroid labs are in acceptable range.  Continue current dose of thyroid hormone replacement.  Repeat labs few days prior to next appointment.    Here is a copy for your records.    Follow up as discussed in last clinic visit.    Please call endocrinology clinic ( [phone]) if questions.    Vandana Costello MD  Endocrinology   [facility]  September 18, 2023

## 2023-12-01 ENCOUNTER — OFFICE VISIT (OUTPATIENT)
Dept: URGENT CARE | Facility: URGENT CARE | Age: 38
End: 2023-12-01
Payer: COMMERCIAL

## 2023-12-01 VITALS
SYSTOLIC BLOOD PRESSURE: 118 MMHG | DIASTOLIC BLOOD PRESSURE: 77 MMHG | OXYGEN SATURATION: 97 % | RESPIRATION RATE: 16 BRPM | TEMPERATURE: 98.1 F | HEART RATE: 88 BPM | BODY MASS INDEX: 29.81 KG/M2 | WEIGHT: 163 LBS

## 2023-12-01 DIAGNOSIS — J45.901 EXACERBATION OF ASTHMA, UNSPECIFIED ASTHMA SEVERITY, UNSPECIFIED WHETHER PERSISTENT: Primary | ICD-10-CM

## 2023-12-01 PROCEDURE — 99214 OFFICE O/P EST MOD 30 MIN: CPT | Performed by: FAMILY MEDICINE

## 2023-12-01 RX ORDER — PREDNISONE 20 MG/1
20 TABLET ORAL 2 TIMES DAILY
Qty: 10 TABLET | Refills: 0 | Status: SHIPPED | OUTPATIENT
Start: 2023-12-01 | End: 2023-12-06

## 2023-12-01 NOTE — PROGRESS NOTES
SUBJECTIVE: Aleyda Nicole is a 38 year old female presenting with a chief complaint of cough  and wheeze noted after a cold.  Onset of symptoms was 2 week(s) ago.  Predisposing factors include HX of asthma.    Past Medical History:   Diagnosis Date    Gestational diabetes 2013    Hypocalcemia 2016    Influenza A     Papillary thyroid carcinoma     Papillary carcinoma, follicular variant with    Pneumonia     LLL    PONV (postoperative nausea and vomiting)     Postoperative hypothyroidism      labor     Uncomplicated asthma      Allergies   Allergen Reactions    No Known Drug Allergy      Social History     Tobacco Use    Smoking status: Never    Smokeless tobacco: Never   Substance Use Topics    Alcohol use: No     Alcohol/week: 0.0 standard drinks of alcohol       ROS:  SKIN: no rash  GI: no vomiting    OBJECTIVE:  /77 (BP Location: Right arm, Patient Position: Sitting, Cuff Size: Adult Regular)   Pulse 88   Temp 98.1  F (36.7  C) (Oral)   Resp 16   Wt 73.9 kg (163 lb)   SpO2 97%   BMI 29.81 kg/m  GENERAL APPEARANCE: healthy, alert and no distress  EYES: EOMI,  PERRL, conjunctiva clear  HENT: ear canals and TM's normal.  Nose and mouth without ulcers, erythema or lesions  RESP: lungs clear to auscultation - no rales, rhonchi or wheezes  SKIN: no suspicious lesions or rashes      ICD-10-CM    1. Exacerbation of asthma, unspecified asthma severity, unspecified whether persistent  J45.901 predniSONE (DELTASONE) 20 MG tablet        Cont inhaler  Fluids/Rest, f/u if worse/not any better

## 2024-01-08 ENCOUNTER — OFFICE VISIT (OUTPATIENT)
Dept: URGENT CARE | Facility: URGENT CARE | Age: 39
End: 2024-01-08
Payer: COMMERCIAL

## 2024-01-08 VITALS
WEIGHT: 161 LBS | TEMPERATURE: 98 F | BODY MASS INDEX: 29.44 KG/M2 | DIASTOLIC BLOOD PRESSURE: 89 MMHG | SYSTOLIC BLOOD PRESSURE: 129 MMHG | RESPIRATION RATE: 16 BRPM | OXYGEN SATURATION: 96 % | HEART RATE: 82 BPM

## 2024-01-08 DIAGNOSIS — J45.901 EXACERBATION OF ASTHMA, UNSPECIFIED ASTHMA SEVERITY, UNSPECIFIED WHETHER PERSISTENT: Primary | ICD-10-CM

## 2024-01-08 PROCEDURE — 99214 OFFICE O/P EST MOD 30 MIN: CPT | Performed by: FAMILY MEDICINE

## 2024-01-08 RX ORDER — ALBUTEROL SULFATE 0.83 MG/ML
2.5 SOLUTION RESPIRATORY (INHALATION) EVERY 6 HOURS PRN
Qty: 90 ML | Refills: 0 | Status: SHIPPED | OUTPATIENT
Start: 2024-01-08 | End: 2024-03-14

## 2024-01-08 RX ORDER — PREDNISONE 20 MG/1
20 TABLET ORAL 2 TIMES DAILY
Qty: 10 TABLET | Refills: 0 | Status: SHIPPED | OUTPATIENT
Start: 2024-01-08 | End: 2024-01-13

## 2024-01-08 NOTE — PROGRESS NOTES
SUBJECTIVE: Aleyda Nicole is a 38 year old female presenting with a chief complaint of cough .  Onset of symptoms was 2 week(s) ago.  Started with a cold, triggering asthma Cold has cleared  Predisposing factors include HX of asthma.    Past Medical History:   Diagnosis Date    Gestational diabetes 2013    Hypocalcemia 2016    Influenza A     Papillary thyroid carcinoma     Papillary carcinoma, follicular variant with    Pneumonia     LLL    PONV (postoperative nausea and vomiting)     Postoperative hypothyroidism      labor     Uncomplicated asthma      Allergies   Allergen Reactions    No Known Drug Allergy      Social History     Tobacco Use    Smoking status: Never    Smokeless tobacco: Never   Substance Use Topics    Alcohol use: No     Alcohol/week: 0.0 standard drinks of alcohol       ROS:  SKIN: no rash  GI: no vomiting    OBJECTIVE:  /89 (BP Location: Left arm, Patient Position: Sitting, Cuff Size: Adult Regular)   Pulse 82   Temp 98  F (36.7  C) (Tympanic)   Resp 16   Wt 73 kg (161 lb)   LMP 2024   SpO2 96%   Breastfeeding No   BMI 29.44 kg/m  GENERAL APPEARANCE: healthy, alert and no distress  EYES: EOMI,  PERRL, conjunctiva clear  HENT: ear canals and TM's normal.  Nose and mouth without ulcers, erythema or lesions  RESP: expiratory wheezes throughout  SKIN: no suspicious lesions or rashes      ICD-10-CM    1. Exacerbation of asthma, unspecified asthma severity, unspecified whether persistent  J45.901 predniSONE (DELTASONE) 20 MG tablet     albuterol (PROVENTIL) (2.5 MG/3ML) 0.083% neb solution          Fluids/Rest, f/u if worse/not any better

## 2024-01-17 ENCOUNTER — OFFICE VISIT (OUTPATIENT)
Dept: INTERNAL MEDICINE | Facility: CLINIC | Age: 39
End: 2024-01-17
Payer: COMMERCIAL

## 2024-01-17 VITALS
SYSTOLIC BLOOD PRESSURE: 136 MMHG | RESPIRATION RATE: 14 BRPM | WEIGHT: 161.9 LBS | DIASTOLIC BLOOD PRESSURE: 82 MMHG | HEIGHT: 62 IN | BODY MASS INDEX: 29.79 KG/M2 | HEART RATE: 96 BPM | TEMPERATURE: 97.5 F | OXYGEN SATURATION: 98 %

## 2024-01-17 DIAGNOSIS — J45.30 MILD PERSISTENT ASTHMA WITHOUT COMPLICATION: Primary | ICD-10-CM

## 2024-01-17 PROCEDURE — 99214 OFFICE O/P EST MOD 30 MIN: CPT | Performed by: INTERNAL MEDICINE

## 2024-01-17 RX ORDER — BUDESONIDE AND FORMOTEROL FUMARATE DIHYDRATE 80; 4.5 UG/1; UG/1
AEROSOL RESPIRATORY (INHALATION)
Qty: 20.4 G | Refills: 11 | Status: SHIPPED | OUTPATIENT
Start: 2024-01-17

## 2024-01-17 ASSESSMENT — ASTHMA QUESTIONNAIRES
QUESTION_1 LAST FOUR WEEKS HOW MUCH OF THE TIME DID YOUR ASTHMA KEEP YOU FROM GETTING AS MUCH DONE AT WORK, SCHOOL OR AT HOME: SOME OF THE TIME
QUESTION_4 LAST FOUR WEEKS HOW OFTEN HAVE YOU USED YOUR RESCUE INHALER OR NEBULIZER MEDICATION (SUCH AS ALBUTEROL): TWO OR THREE TIMES PER WEEK
QUESTION_3 LAST FOUR WEEKS HOW OFTEN DID YOUR ASTHMA SYMPTOMS (WHEEZING, COUGHING, SHORTNESS OF BREATH, CHEST TIGHTNESS OR PAIN) WAKE YOU UP AT NIGHT OR EARLIER THAN USUAL IN THE MORNING: TWO OR THREE NIGHTS A WEEK
QUESTION_5 LAST FOUR WEEKS HOW WOULD YOU RATE YOUR ASTHMA CONTROL: SOMEWHAT CONTROLLED
ACT_TOTALSCORE: 15
QUESTION_2 LAST FOUR WEEKS HOW OFTEN HAVE YOU HAD SHORTNESS OF BREATH: ONCE OR TWICE A WEEK
ACT_TOTALSCORE: 15

## 2024-01-17 NOTE — PROGRESS NOTES
"  Assessment & Plan     Mild persistent asthma without complication  Several ER visits, 3 in the last 4 months in fact.  She previously stopped her Flovent controller and has been using only as needed albuterol.  Recommend starting a ICS/LABA as a controller as well as for as needed use.  Prescription is as below according to Alayna guidelines can be used both as a controller and for as needed use rather than using a different albuterol rescue inhaler.  She knows that when using this inhaler she should not use MICHELLE/albuterol concurrently.   If needed we can increase her dose to bid based on use.   - budesonide-formoterol (SYMBICORT) 80-4.5 MCG/ACT Inhaler; Inhale 2 puffs once daily plus 1-2 puffs as needed. May use up to 12 puffs per day.    Prescription drug management        BMI  Estimated body mass index is 29.61 kg/m  as calculated from the following:    Height as of this encounter: 1.575 m (5' 2\").    Weight as of this encounter: 73.4 kg (161 lb 14.4 oz).       See Patient Instructions    Subjective   Aleyda is a 38 year old, presenting for the following health issues:  Asthma    History of Present Illness     Asthma:  She presents for follow up of asthma.  She has no cough, no wheezing, and no shortness of breath.  She is using a relief medication a few times a week. She does not miss any doses of her controller medication throughout the week. Patient is aware of the following triggers: dust mites, mold, smoke, strong odors and fumes and upper respiratory infections. The patient has had a visit to the Emergency Room, Urgent Care or Hospital due to asthma since the last clinic visit. She has been to the Emergency Room or Urgent Care 3 or more times.She has had a Hospitalization 0 times.    She eats 4 or more servings of fruits and vegetables daily.She consumes 2 sweetened beverage(s) daily. She is missing 1 dose(s) of medications per week.  She is not taking prescribed medications regularly due to remembering to " "take.                 Objective    /82   Pulse 96   Temp 97.5  F (36.4  C) (Temporal)   Resp 14   Ht 1.575 m (5' 2\")   Wt 73.4 kg (161 lb 14.4 oz)   LMP 01/01/2024   SpO2 98%   BMI 29.61 kg/m    Body mass index is 29.61 kg/m .    Physical Exam   GENERAL: alert and no distress  RESP: inspiratory wheezes initially , cleared with more inspiration. otherwise CTA bilat             Signed Electronically by: Evan Zamora MD    "

## 2024-01-18 ENCOUNTER — TELEPHONE (OUTPATIENT)
Dept: INTERNAL MEDICINE | Facility: CLINIC | Age: 39
End: 2024-01-18
Payer: COMMERCIAL

## 2024-01-18 NOTE — TELEPHONE ENCOUNTER
Received message regarding patient's ACT Score.        Upon chart review, patient saw Dr. Zamora yesterday (1/17/24) for her asthma.    Will route ACT score to PCP for review. Please route back to Triage if anything additional is needed.     Thank you,  Jory Aguilar RN

## 2024-01-23 NOTE — TELEPHONE ENCOUNTER
Dr. Zamora is out of the office this week.  We partners are helping answer his messages and patient issues in his absence.      ACT score noted.   She was just seen 1/17/24 for her asthma, medications were adjusted.   Has follow up appt in ear.y May 2024.   Nothing else needed at this time.

## 2024-02-27 DIAGNOSIS — E89.0 POSTOPERATIVE HYPOTHYROIDISM: Primary | ICD-10-CM

## 2024-02-29 NOTE — TELEPHONE ENCOUNTER
ENDO THYROID LABS-[facility] Latest Ref Rng & Units 5/4/2020   TSH 0.40 - 4.00 mU/L 0.94   T4 TOTAL 5.0 - 11.0 ug/dL    T4 FREE 0.76 - 1.46 ng/dL 1.11     Congratulations!  Yes- typically dose is increased during pregnancy.  Based on 5/2020- labs are in range for pregnancy.  Recommend repeat labs in next few weeks and dose change based on that  Will need close f/u during pregnancy and dose adjustment.   Refill Request    Medication request: HYDROcodone-acetaminophen  MG Oral Tab  Take 1 tablet by mouth every 4-6 hours PRN pain (max 5 tabs in 24 hours).     LOV:2/8/2024 Juan A Mcgee MD   Due back to clinic per last office note:  \"follow up in 2-3 months \"  NOV: Visit date not found      [place] 2/2/24 #150 - 30 day supply    Urine drug screen (if applicable): 11/2/23  Pain contract: Valid until 11/16/24    LOV plan (if weaning or changing medications): Per  at LOV: \"He will wean the Norco to one every 6 hours for the next month. \"

## 2024-03-07 ENCOUNTER — LAB (OUTPATIENT)
Dept: LAB | Facility: CLINIC | Age: 39
End: 2024-03-07
Payer: COMMERCIAL

## 2024-03-07 DIAGNOSIS — J45.20 MILD INTERMITTENT ASTHMA WITHOUT COMPLICATION: ICD-10-CM

## 2024-03-07 DIAGNOSIS — E89.0 POSTOPERATIVE HYPOTHYROIDISM: ICD-10-CM

## 2024-03-07 PROCEDURE — 84443 ASSAY THYROID STIM HORMONE: CPT

## 2024-03-07 PROCEDURE — 36415 COLL VENOUS BLD VENIPUNCTURE: CPT

## 2024-03-07 PROCEDURE — 84439 ASSAY OF FREE THYROXINE: CPT

## 2024-03-07 PROCEDURE — 80048 BASIC METABOLIC PNL TOTAL CA: CPT

## 2024-03-08 LAB
ANION GAP SERPL CALCULATED.3IONS-SCNC: 9 MMOL/L (ref 7–15)
BUN SERPL-MCNC: 15.5 MG/DL (ref 6–20)
CALCIUM SERPL-MCNC: 8.6 MG/DL (ref 8.6–10)
CHLORIDE SERPL-SCNC: 102 MMOL/L (ref 98–107)
CREAT SERPL-MCNC: 0.58 MG/DL (ref 0.51–0.95)
DEPRECATED HCO3 PLAS-SCNC: 25 MMOL/L (ref 22–29)
EGFRCR SERPLBLD CKD-EPI 2021: >90 ML/MIN/1.73M2
GLUCOSE SERPL-MCNC: 97 MG/DL (ref 70–99)
POTASSIUM SERPL-SCNC: 4.2 MMOL/L (ref 3.4–5.3)
SODIUM SERPL-SCNC: 136 MMOL/L (ref 135–145)
T4 FREE SERPL-MCNC: 2.04 NG/DL (ref 0.9–1.7)
TSH SERPL DL<=0.005 MIU/L-ACNC: 0.33 UIU/ML (ref 0.3–4.2)

## 2024-03-14 ENCOUNTER — OFFICE VISIT (OUTPATIENT)
Dept: ENDOCRINOLOGY | Facility: CLINIC | Age: 39
End: 2024-03-14
Payer: COMMERCIAL

## 2024-03-14 VITALS
TEMPERATURE: 98.1 F | RESPIRATION RATE: 16 BRPM | HEART RATE: 81 BPM | HEIGHT: 62 IN | SYSTOLIC BLOOD PRESSURE: 119 MMHG | WEIGHT: 161.4 LBS | DIASTOLIC BLOOD PRESSURE: 73 MMHG | BODY MASS INDEX: 29.7 KG/M2 | OXYGEN SATURATION: 99 %

## 2024-03-14 DIAGNOSIS — E89.0 POSTOPERATIVE HYPOTHYROIDISM: ICD-10-CM

## 2024-03-14 DIAGNOSIS — C73 PAPILLARY CARCINOMA, FOLLICULAR VARIANT (H): Primary | ICD-10-CM

## 2024-03-14 PROCEDURE — G2211 COMPLEX E/M VISIT ADD ON: HCPCS | Performed by: INTERNAL MEDICINE

## 2024-03-14 PROCEDURE — 99214 OFFICE O/P EST MOD 30 MIN: CPT | Performed by: INTERNAL MEDICINE

## 2024-03-14 RX ORDER — LEVOTHYROXINE SODIUM 137 UG/1
TABLET ORAL
Qty: 90 TABLET | Refills: 1 | Status: SHIPPED | OUTPATIENT
Start: 2024-03-14 | End: 2024-06-19

## 2024-03-14 NOTE — LETTER
3/14/2024         RE: Aleyda Nicole  [address] [id number]        Dear Colleague,    Thank you for referring your patient, Aleyda Nicole, to the [facility]. Please see a copy of my visit note below.    Name: Aleyda Nicole  Seen for f/u of papillary thyroid cancer and postoperative hypothyroidism.    Chief Complaint   Patient presents with     Thyroid Disease     Thyroid Cancer     HPI:  Aleyda Nicole is a 38 year old female who presents for the evaluation of above.    Delivered baby 10/2022.  Has 3 kids.  Not planning pregnancy. S/l TL.    1.  Papillary thyroid cancer:  Thyroid nodule was noticed in February 2011 which was followed by thyroid nodule biopsy which showed suspicious for papillary thyroid cancer.  She underwent total thyroidectomy 3/2011 and pathology showed papillary thyroid cancer with follicular pattern.  Tumor was about 2.2 cm on the left lobe.  No lymph node involvement.  S/p 78.7 mCi of I131 HERNANDEZ  8/2011. No evidence for distant metastatic disease on post therapy scan.  Neck US 6/2016- showed new lymph node in neck along with rising Tg levels  S/p FNA lymph node: 6/2016- c/w papillary thyroid cancer    8/2016: underwent left modified neck dissection.  1/27 lymph node positive one left modified neck dissection.  1 cm in greatest diameter.  Negative for external involvement at level II.    Tg decreased post left neck dissection from 1.3 to < 0.1    Follow up I123 4/2017: no mets.    Postop course was complicated by hypocalcemia and was started on calcium supplement.  Taking calcium- tries to take 2 tabs per day + 1 glass of milk daily.    She was followed by endocrinology before and then lost to follow-up with endocrinology in 2012.  No history of neck radiation prior to diagnosis of thyroid cancer.  No family history of thyroid cancer.    Thyroglobulin levels appear stable and suppressed.  Neck ultrasound June 2018, 5/2020. 9/2021, 3/2023 did not  show any evidence of metastases.      2.  Hypothyroidism (postoperative):  Was started on levothyroxine following thyroidectomy.    Currently taking levothyroxine 137 mcg/day. (2022)  Reports compliance.    Struggling with asthma.  Feeling better.    No diarrhea, tremors, palpitations.  No difficulty with swallowing, no pain during swallowing.  Mood- some irritability.  Weight: trying to lose weight.  Periods: regular. Not planning pregnancy. S/p TL.  Patient feels well at this time and denies any tachycardia, palpitations, heat intolerance, tremor, insomnia, diarrhea, or unexplained weight loss.  Patient also denies  cold intolerance, constipation, or unexplained weightgain.   Wt Readings from Last 2 Encounters:   24 73.2 kg (161 lb 6.4 oz)   24 73.4 kg (161 lb 14.4 oz)     PMH/PSH:  Past Medical History:   Diagnosis Date     Gestational diabetes 2013     Hypocalcemia 2016     Influenza A      Papillary thyroid carcinoma     Papillary carcinoma, follicular variant with     Pneumonia     LLL     PONV (postoperative nausea and vomiting)      Postoperative hypothyroidism       labor      Uncomplicated asthma      Past Surgical History:   Procedure Laterality Date      SECTION  10/26/2013    Procedure:  SECTION;  primary  section;  Surgeon: Rodney Mckee MD;  Location: [facility] L[facility]      SECTION N/A 2017    Procedure:  SECTION;  Surgeon: Hakeem Combs MD;  Location: [facility] L[facility]     COMBINED  SECTION, SALPINGECTOMY BILATERAL N/A 10/14/2022    Procedure: REPEAT  SECTION WITH BILATERAL SALPINGECTOMY;  Surgeon: Luis Garcia MD;  Location: [facility] L[facility]     DISSECTION RADICAL NECK Left 2016    Procedure: DISSECTION RADICAL NECK;  Surgeon: Vy Theodore MD;  Location: [facility] OR     DISSECTION RADICAL NECK MODIFIED Left 2016    Procedure: DISSECTION RADICAL NECK MODIFIED;  Surgeon: Luis Brown MD;  Location:  [facility] OR     THYROIDECTOMY  2011    Total, for cancer.      Family Hx:  Family History   Problem Relation Age of Onset     Diabetes Mother      Cerebrovascular Disease Mother      Hypertension Mother      Diabetes Father 50     Hypertension Father      Genitourinary Problems Father         kidney disease     Coronary Artery Disease Father      Diabetes Type 2  Sister      Diabetes Type 2  Sister      Diabetes Type 2  Brother      Thyroid disease: No        Social Hx:  Social History     Socioeconomic History     Marital status:      Spouse name: Brock     Number of children: 2     Years of education: Not on file     Highest education level: Not on file   Occupational History     Occupation: CNA   Tobacco Use     Smoking status: Never     Smokeless tobacco: Never   Vaping Use     Vaping Use: Never used   Substance and Sexual Activity     Alcohol use: No     Alcohol/week: 0.0 standard drinks of alcohol     Drug use: No     Sexual activity: Yes     Partners: Male     Birth control/protection: None   Other Topics Concern     Parent/sibling w/ CABG, MI or angioplasty before 65F 55M? Not Asked      Service No     Blood Transfusions No     Caffeine Concern No     Occupational Exposure Not Asked     Hobby Hazards No     Sleep Concern No     Stress Concern No     Weight Concern Yes     Special Diet No     Back Care No     Exercise No     Bike Helmet No     Seat Belt Yes     Self-Exams No   Social History Narrative    Pt lives with  and father.     Social Determinants of Health     Financial Resource Strain: Low Risk  (1/17/2024)    Financial Resource Strain      Within the past 12 months, have you or your family members you live with been unable to get utilities (heat, electricity) when it was really needed?: No   Food Insecurity: Low Risk  (1/17/2024)    Food Insecurity      Within the past 12 months, did you worry that your food would run out before you got money to buy more?: No      Within the past 12  "months, did the food you bought just not last and you didn t have money to get more?: No   Transportation Needs: Low Risk  (1/17/2024)    Transportation Needs      Within the past 12 months, has lack of transportation kept you from medical appointments, getting your medicines, non-medical meetings or appointments, work, or from getting things that you need?: No   Physical Activity: Not on file   Stress: Not on file   Social Connections: Not on file   Interpersonal Safety: Not on file   Housing Stability: Low Risk  (1/17/2024)    Housing Stability      Do you have housing? : Yes      Are you worried about losing your housing?: No          MEDICATIONS:  has a current medication list which includes the following prescription(s): budesonide-formoterol and levothyroxine.    ROS     ROS: 10 point ROS neg other than the symptoms noted above in the HPI.      Physical Exam   VS: /73 (BP Location: Left arm, Patient Position: Chair, Cuff Size: Adult Regular)   Pulse 81   Temp 98.1  F (36.7  C) (Tympanic)   Resp 16   Ht 1.575 m (5' 2.01\")   Wt 73.2 kg (161 lb 6.4 oz)   LMP  (LMP Unknown)   SpO2 99%   Breastfeeding No   BMI 29.51 kg/m    GENERAL: healthy, alert and no distress  EYES: Eyes grossly normal to inspection, conjunctivae and sclerae normal  ENT: no nose swelling, nasal discharge.  Thyroid: s/p thyroidectomy.  No lymphadenopathy.  CV: RRR, no rubs, gallops, no murmurs  RESP: CTAB, no wheezes, rales, or ronchi  ABDO: +BS  EXTREMITIES: no hand tremors.  NEURO: Cranial nerves grossly intact, mentation intact and speech normal  SKIN: No apparent skin lesions, rash or edema seen   PSYCH: mentation appears normal, affect normal/bright, judgement and insight intact, normal speech and appearance well-groomed      LABS:  3/2023:  TgAB: <0.4  TG: <0.10    9/2021:  TgAB: <0.4  TG: <0.10    5/2020:  TgAB: <0.4  TG: <0.10    6/2019:  TgAB: <0.4  TG: <0.10    3/6/2018:  TSH: 0.07  TgAB: <0.4  TG: <0.10    4/2017:  TSH " : 47.89 (post thyrogen)  TgAB: <0.4  TG: <0.10    17:  TSH: 0.04  TgAB: <0.4  TG: <0.10    11/10/16:  TSH 0.74  TgAb: <0.4  TG: <0.10     2016:  TSH: 0.08  TgAb: <0.4  T.30     2012:  TSH: 12.40  TGAB: 1.2  T.6    TFTs:   Latest Ref Rng 3/7/2024  10:01 AM   ENDO THYROID LABS-[facility]     TSH 0.30 - 4.20 uIU/mL 0.33    Free T3 2.3 - 4.2 pg/mL    Triiodothyronine (T3) 60 - 181 ng/dL    T4 5.0 - 11.0 ug/dL    FREE T4 0.90 - 1.70 ng/dL 2.04 (H)         NM THYROID UPTAKE/SCAN   IMPRESSION:    1. Elevated 24-hour radioiodine uptake in the thyroid at 46.4%.  2. Possible cold nodule or cyst in the mid left thyroid lobe. Thyroid  ultrasound is recommended for further evaluation.    NUCLEAR MEDICINE I-131 SCAN    Aug 10, 2011 8:04:00 AM   IMPRESSION: Intense radiotracer uptake localizing to the thyroidectomy  bed, left greater than right. This is consistent with residual thyroid  tissue. No distant metastatic disease identified.    ULTRASOUND THYROID  2011   IMPRESSION: Multinodular thyroid gland.    FNA thyroid nodule:  Copath Report       FNA-thyroid, left mid lobe nodule:   Suspicious for malignancy.  Suspicious for papillary carcinoma  Specimen Adequacy: Satisfactory for evaluation.           Surgical pathology:     SPECIMEN(S):  A: Thyroid, left lobe  B: Parathyroid gland, biopsy of right inferior  C: Thyroid, right lobe    FINAL DIAGNOSIS:  A. and C.  Thyroid, right and left lobes, total thyroidectomy -  Specimen/Procedure(s) and Laterality:   Right and left thyroid lobes,  total thyroidectomy.  Specimen Integrity:   Intact.  Specimen Size and Weight:   See Gross Description.  Histologic Tumor Type(s):   Papillary carcinoma, follicular variant with  focal papillary morphology.  Tumor Focality: Unifocal.  Tumor Laterality:   Left lobe.  Tumor Size(s):   Left lobe: 2.2 cm (greatest dimension).  Margins:   Close, but uninvolved.            Distance to closest margin, if negative:   Tumor 0.05 cm  "from  nearest paratracheal surface and 1.75 cm from nearest anterolateral  surface.  Tumor Capsular Invasion: Present.    Tumor Capsule: Partial.  Extrathyroidal Invasion:   Not identified.  Lymph-Vascular Invasion:   Not identified.  Lymph Nodes:   Two nodes without evidence of metastatic carcinoma (0/2;  one at isthmus and one adjacent to right lobe).  Pathologic Staging:   pT2, pN0, pM not applicable.  Additional Pathologic Findings:   Right thyroid lobe without evidence of  malignancy.  Background nodular hyperplasia and thyroiditis with  lymphoid follicles.  Left lobe with focal previous biopsy site changes.  No parathyroid tissue identified.  CAP Protocol Based on AJCC/UICC TNM, 7th edition; Protocol Effective  Date:  January 2010    B.  \"Parathyroid gland\", right inferior, biopsy - Thyroid tissue; no  parathyroid glandular tissue identified.    8/2016: left modified neck Dissection:  Copath Report      SPECIMEN(S):   A: Scar, left neck   B: Parathyroid gland, left inferior   C: Left central neck dissection, para and pretracheal lymph nodes   D: apical jugular lymph node   E: Left modified neck dissection     FINAL DIAGNOSIS:   A. Skin, neck/scar, excision:   - Hypertrophic scar; benign.     B. Parathyroid gland, left inferior, biopsy:   - Parathyroid tissue present.     C. Left central neck dissection with para-and pretracheal lymph nodes:   - One lymph node; no evidence of malignancy (0/1).   - Thymus and parathyroid tissue present.     D. Lymph node, apical jugular, excision:   - One lymph node; no evidence of malignancy (0/1).     E. Left modified neck dissection:   - 27 lymph nodes identified (level II- 10, level III- 9, level IV- 5 and   lymph nodes associated with jugular vein- 3).   - One (of 27) lymph node positive for metastatic papillary thyroid   carcinoma.  1 cm in greatest diameter.  Negative for extranodal   involvement  (Level II).   - Jugular vein negative for tumor.        NUCLEAR MEDICINE " THYROID WHOLE BODY SCAN I-123   4/20/2017 11:38 AM   IMPRESSION: No worrisome focal tracer uptake is identified to suggest  recurrent neoplasm or remote metastatic disease. Some postoperative  changes at the left neck noted, likely accounting for inconspicuity of  the left sternocleidomastoid muscle. Small bilateral subcentimeter  neck lymph nodes noted.     Neck US 6/2018:  IMPRESSION:  Negative soft tissue neck study.    Neck US 5/2020:                                                        IMPRESSION:    Negative soft tissue neck study.    Neck US 9/2021:  FINDINGS:  Thyroidectomy. No cervical lymphadenopathy. No suspicious  nodules in the thyroidectomy bed. No significant interval change.                                                           IMPRESSION: No cervical lymphadenopathy.    Neck US 3/2023:  IMPRESSION:  No convincing evidence for residual or recurrent  malignancy in the neck.    All pertinent notes, labs, and images personally reviewed by me.     A/P  Ms.Nhu Mounika Nicole is a 38 year old here for the evaluation of thyroid cancer:    1. 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  It was 2.2 cm unifocal, left lobe tumor with no lymph node involvement.  S/p  total thyroidectomy in 2011 and 78.7 mCi of I-131 HERNANDEZ. No evidence for distant metastatic disease on post therapy scan.  2016- new lymph node s/p FNA with PTC with rising TG  S/p 8/2016- left modified neck radiation. 1/27 lymph node + ( level2). Postop course complicated by hypocalcemia.  Follow up I123 WBS 4/2017- no mets  Thyroglobulin levels appear stable and suppressed.  Neck ultrasound June 2018, 5/2020, 9/2021, 3/2023 did not show any evidence of metastases.  Plan:  Discussed diagnosis, pathophysiology, management and treatment options of condition with pt.  Recommend repeat Neck US and thyroglobulin levels in 3 months.    2.  Hypothyroidism  (postoperative following total thyroidectomy for thyroid cancer):  Currently taking levothyroxine 137 mcg/day. (11/30/2022)  Taking generic levothyroxine.  Reports compliance.  Clinically euthyroid.  Not planning further pregnancy.  Plan:  Discussed diagnosis, pathophysiology, management and treatment options of condition with pt.  Base don 3/2024 labs with high FT4 levels-- recommend to decrease dose of levothyroxine--Take 137 mcg X 6 days a week and 68.5 mcg X 1 day a week (3/14/2024)  Labs in 3 months  Neck ultrasound in 3 months  Please make a lab appointment for blood work and follow up clinic appointment in 1 week after that to discuss results.    Plan: levothyroxine (SYNTHROID/LEVOTHROID) 137 MCG         tablet, T4 free, TSH, Thyroxine Free by         [facility], Thyroglobulin and         Antibody (Sendout to [facility]), US Head Neck Soft         Tissue      Discussed s/s of hypothyroidism and hyperthyroidism to watch for.  The patient indicates understanding of these issues and agrees with the plan.    Discussed indications, risks and benefits of all medications prescribed, and answered questions to patient's satisfaction.  The longitudinal plan of care for the diagnosis(es)/condition(s) as documented were addressed during this visit. Due to the added complexity in care, I will continue to support Aleyda in the subsequent management and with ongoing continuity of care.  All questions were answered.  The patient indicates understanding of the above issues and agrees with the plan set forth.    Follow-up:  As noted in AVS    Vandana Costello MD  Endocrinology   [facility]  CC: Evan Zamora, thank you for allowing me to participate in the care of your patient.        Sincerely,        Vandana Costello MD

## 2024-03-14 NOTE — PATIENT INSTRUCTIONS
-[facility]  Dr Costello, Endocrinology Department    [facility]   [id number]  [place] [address]  Appointment [phone]  Fax: [phone]  [place]: Monday - Thursday      Please check the cost coverage and copay with insurance before recommended tests, services and medications (especially if new medications are prescribed).     If ordered, please get blood work done 1 week prior to your next appointment so they will be available to Dr. Costello at your visit.    Decrease dose of levothyroxine--Take 137 mcg X 6 days a week and 68.5 mcg X 1 day a week  Labs in 3 months  Neck ultrasound in 3 months  Please make a lab appointment for blood work and follow up clinic appointment in 1 week after that to discuss results.     [facility] radiology scheduleing   [place]  [phone]   [facility] Radiology scheduling  [place]  [phone]     Please call and schedule the recommended test as discussed in clinic visit. These are the numbers to call.      Take Levothyroxine on an empty stomach. Take it with a full glass of water at least 30 minutes to 1 hour before eating breakfast.   This medicine should be taken at least 4 hours before or 4 hours after these medicines: antacids (Maalox , Mylanta , Tums ), calcium supplements, cholestyramine (Prevalite , Questran ), colestipol (Colestid ), iron supplements, orlistat (Kaden , Xenical ), simethicone (Gas-X , Mylicon ), and sucralfate (Carafate ).   Swallow the capsule whole. Do not cut or crush it.

## 2024-03-14 NOTE — PROGRESS NOTES
Name: Aleyda Nicole  Seen for f/u of papillary thyroid cancer and postoperative hypothyroidism.    Chief Complaint   Patient presents with    Thyroid Disease    Thyroid Cancer     HPI:  Aleyda Nicole is a 38 year old female who presents for the evaluation of above.    Delivered baby 10/2022.  Has 3 kids.  Not planning pregnancy. S/l TL.    1.  Papillary thyroid cancer:  Thyroid nodule was noticed in February 2011 which was followed by thyroid nodule biopsy which showed suspicious for papillary thyroid cancer.  She underwent total thyroidectomy 3/2011 and pathology showed papillary thyroid cancer with follicular pattern.  Tumor was about 2.2 cm on the left lobe.  No lymph node involvement.  S/p 78.7 mCi of I131 HERNANDEZ  8/2011. No evidence for distant metastatic disease on post therapy scan.  Neck US 6/2016- showed new lymph node in neck along with rising Tg levels  S/p FNA lymph node: 6/2016- c/w papillary thyroid cancer    8/2016: underwent left modified neck dissection.  1/27 lymph node positive one left modified neck dissection.  1 cm in greatest diameter.  Negative for external involvement at level II.    Tg decreased post left neck dissection from 1.3 to < 0.1    Follow up I123 4/2017: no mets.    Postop course was complicated by hypocalcemia and was started on calcium supplement.  Taking calcium- tries to take 2 tabs per day + 1 glass of milk daily.    She was followed by endocrinology before and then lost to follow-up with endocrinology in 2012.  No history of neck radiation prior to diagnosis of thyroid cancer.  No family history of thyroid cancer.    Thyroglobulin levels appear stable and suppressed.  Neck ultrasound June 2018, 5/2020. 9/2021, 3/2023 did not show any evidence of metastases.      2.  Hypothyroidism (postoperative):  Was started on levothyroxine following thyroidectomy.    Currently taking levothyroxine 137 mcg/day. (11/30/2022)  Reports compliance.    Struggling with asthma.  Feeling  better.    No diarrhea, tremors, palpitations.  No difficulty with swallowing, no pain during swallowing.  Mood- some irritability.  Weight: trying to lose weight.  Periods: regular. Not planning pregnancy. S/p TL.  Patient feels well at this time and denies any tachycardia, palpitations, heat intolerance, tremor, insomnia, diarrhea, or unexplained weight loss.  Patient also denies  cold intolerance, constipation, or unexplained weightgain.   Wt Readings from Last 2 Encounters:   24 73.2 kg (161 lb 6.4 oz)   24 73.4 kg (161 lb 14.4 oz)     PMH/PSH:  Past Medical History:   Diagnosis Date    Gestational diabetes 2013    Hypocalcemia 2016    Influenza A     Papillary thyroid carcinoma     Papillary carcinoma, follicular variant with    Pneumonia     LLL    PONV (postoperative nausea and vomiting)     Postoperative hypothyroidism      labor     Uncomplicated asthma      Past Surgical History:   Procedure Laterality Date     SECTION  10/26/2013    Procedure:  SECTION;  primary  section;  Surgeon: Rodney Mckee MD;  Location: [facility]     SECTION N/A 2017    Procedure:  SECTION;  Surgeon: Hakeem Combs MD;  Location: [facility]    COMBINED  SECTION, SALPINGECTOMY BILATERAL N/A 10/14/2022    Procedure: REPEAT  SECTION WITH BILATERAL SALPINGECTOMY;  Surgeon: Luis Garcia MD;  Location: [facility]    DISSECTION RADICAL NECK Left 2016    Procedure: DISSECTION RADICAL NECK;  Surgeon: Vy Theodore MD;  Location: [facility] OR    DISSECTION RADICAL NECK MODIFIED Left 2016    Procedure: DISSECTION RADICAL NECK MODIFIED;  Surgeon: Luis Brown MD;  Location: [facility] OR    THYROIDECTOMY      Total, for cancer.      Family Hx:  Family History   Problem Relation Age of Onset    Diabetes Mother     Cerebrovascular Disease Mother     Hypertension Mother     Diabetes Father 50    Hypertension Father     Genitourinary  Problems Father         kidney disease    Coronary Artery Disease Father     Diabetes Type 2  Sister     Diabetes Type 2  Sister     Diabetes Type 2  Brother      Thyroid disease: No        Social Hx:  Social History     Socioeconomic History    Marital status:      Spouse name: Brock    Number of children: 2    Years of education: Not on file    Highest education level: Not on file   Occupational History    Occupation: CNA   Tobacco Use    Smoking status: Never    Smokeless tobacco: Never   Vaping Use    Vaping Use: Never used   Substance and Sexual Activity    Alcohol use: No     Alcohol/week: 0.0 standard drinks of alcohol    Drug use: No    Sexual activity: Yes     Partners: Male     Birth control/protection: None   Other Topics Concern    Parent/sibling w/ CABG, MI or angioplasty before 65F 55M? Not Asked     Service No    Blood Transfusions No    Caffeine Concern No    Occupational Exposure Not Asked    Hobby Hazards No    Sleep Concern No    Stress Concern No    Weight Concern Yes    Special Diet No    Back Care No    Exercise No    Bike Helmet No    Seat Belt Yes    Self-Exams No   Social History Narrative    Pt lives with  and father.     Social Determinants of Health     Financial Resource Strain: Low Risk  (1/17/2024)    Financial Resource Strain     Within the past 12 months, have you or your family members you live with been unable to get utilities (heat, electricity) when it was really needed?: No   Food Insecurity: Low Risk  (1/17/2024)    Food Insecurity     Within the past 12 months, did you worry that your food would run out before you got money to buy more?: No     Within the past 12 months, did the food you bought just not last and you didn t have money to get more?: No   Transportation Needs: Low Risk  (1/17/2024)    Transportation Needs     Within the past 12 months, has lack of transportation kept you from medical appointments, getting your medicines, non-medical meetings  "or appointments, work, or from getting things that you need?: No   Physical Activity: Not on file   Stress: Not on file   Social Connections: Not on file   Interpersonal Safety: Not on file   Housing Stability: Low Risk  (2024)    Housing Stability     Do you have housing? : Yes     Are you worried about losing your housing?: No          MEDICATIONS:  has a current medication list which includes the following prescription(s): budesonide-formoterol and levothyroxine.    ROS     ROS: 10 point ROS neg other than the symptoms noted above in the HPI.      Physical Exam   VS: /73 (BP Location: Left arm, Patient Position: Chair, Cuff Size: Adult Regular)   Pulse 81   Temp 98.1  F (36.7  C) (Tympanic)   Resp 16   Ht 1.575 m (5' 2.01\")   Wt 73.2 kg (161 lb 6.4 oz)   LMP  (LMP Unknown)   SpO2 99%   Breastfeeding No   BMI 29.51 kg/m    GENERAL: healthy, alert and no distress  EYES: Eyes grossly normal to inspection, conjunctivae and sclerae normal  ENT: no nose swelling, nasal discharge.  Thyroid: s/p thyroidectomy.  No lymphadenopathy.  CV: RRR, no rubs, gallops, no murmurs  RESP: CTAB, no wheezes, rales, or ronchi  ABDO: +BS  EXTREMITIES: no hand tremors.  NEURO: Cranial nerves grossly intact, mentation intact and speech normal  SKIN: No apparent skin lesions, rash or edema seen   PSYCH: mentation appears normal, affect normal/bright, judgement and insight intact, normal speech and appearance well-groomed      LABS:  3/2023:  TgAB: <0.4  TG: <0.10    2021:  TgAB: <0.4  TG: <0.10    2020:  TgAB: <0.4  TG: <0.10    2019:  TgAB: <0.4  TG: <0.10    3/6/2018:  TSH: 0.07  TgAB: <0.4  TG: <0.10    2017:  TSH : 47.89 (post thyrogen)  TgAB: <0.4  TG: <0.10    17:  TSH: 0.04  TgAB: <0.4  TG: <0.10    11/10/16:  TSH 0.74  TgAb: <0.4  TG: <0.10     2016:  TSH: 0.08  TgAb: <0.4  T.30     2012:  TSH: 12.40  TGAB: 1.2  T.6    TFTs:   Latest Ref Rng 3/7/2024  10:01 AM   [facility] THYROID LAB[facility]   "   TSH 0.30 - 4.20 uIU/mL 0.33    Free T3 2.3 - 4.2 pg/mL    Triiodothyronine (T3) 60 - 181 ng/dL    T4 5.0 - 11.0 ug/dL    FREE T4 0.90 - 1.70 ng/dL 2.04 (H)         NM THYROID UPTAKE/SCAN   IMPRESSION:    1. Elevated 24-hour radioiodine uptake in the thyroid at 46.4%.  2. Possible cold nodule or cyst in the mid left thyroid lobe. Thyroid  ultrasound is recommended for further evaluation.    NUCLEAR MEDICINE I-131 SCAN    Aug 10, 2011 8:04:00 AM   IMPRESSION: Intense radiotracer uptake localizing to the thyroidectomy  bed, left greater than right. This is consistent with residual thyroid  tissue. No distant metastatic disease identified.    ULTRASOUND THYROID  Feb 17, 2011   IMPRESSION: Multinodular thyroid gland.    FNA thyroid nodule:  Copath Report       FNA-thyroid, left mid lobe nodule:   Suspicious for malignancy.  Suspicious for papillary carcinoma  Specimen Adequacy: Satisfactory for evaluation.           Surgical pathology:     SPECIMEN(S):  A: Thyroid, left lobe  B: Parathyroid gland, biopsy of right inferior  C: Thyroid, right lobe    FINAL DIAGNOSIS:  A. and C.  Thyroid, right and left lobes, total thyroidectomy -  Specimen/Procedure(s) and Laterality:   Right and left thyroid lobes,  total thyroidectomy.  Specimen Integrity:   Intact.  Specimen Size and Weight:   See Gross Description.  Histologic Tumor Type(s):   Papillary carcinoma, follicular variant with  focal papillary morphology.  Tumor Focality: Unifocal.  Tumor Laterality:   Left lobe.  Tumor Size(s):   Left lobe: 2.2 cm (greatest dimension).  Margins:   Close, but uninvolved.            Distance to closest margin, if negative:   Tumor 0.05 cm from  nearest paratracheal surface and 1.75 cm from nearest anterolateral  surface.  Tumor Capsular Invasion: Present.    Tumor Capsule: Partial.  Extrathyroidal Invasion:   Not identified.  Lymph-Vascular Invasion:   Not identified.  Lymph Nodes:   Two nodes without evidence of metastatic carcinoma  "(0/2;  one at isthmus and one adjacent to right lobe).  Pathologic Staging:   pT2, pN0, pM not applicable.  Additional Pathologic Findings:   Right thyroid lobe without evidence of  malignancy.  Background nodular hyperplasia and thyroiditis with  lymphoid follicles.  Left lobe with focal previous biopsy site changes.  No parathyroid tissue identified.  CAP Protocol Based on AJCC/UICC TNM, 7th edition; Protocol Effective  Date:  January 2010    B.  \"Parathyroid gland\", right inferior, biopsy - Thyroid tissue; no  parathyroid glandular tissue identified.    8/2016: left modified neck Dissection:  Copath Report      SPECIMEN(S):   A: Scar, left neck   B: Parathyroid gland, left inferior   C: Left central neck dissection, para and pretracheal lymph nodes   D: apical jugular lymph node   E: Left modified neck dissection     FINAL DIAGNOSIS:   A. Skin, neck/scar, excision:   - Hypertrophic scar; benign.     B. Parathyroid gland, left inferior, biopsy:   - Parathyroid tissue present.     C. Left central neck dissection with para-and pretracheal lymph nodes:   - One lymph node; no evidence of malignancy (0/1).   - Thymus and parathyroid tissue present.     D. Lymph node, apical jugular, excision:   - One lymph node; no evidence of malignancy (0/1).     E. Left modified neck dissection:   - 27 lymph nodes identified (level II- 10, level III- 9, level IV- 5 and   lymph nodes associated with jugular vein- 3).   - One (of 27) lymph node positive for metastatic papillary thyroid   carcinoma.  1 cm in greatest diameter.  Negative for extranodal   involvement  (Level II).   - Jugular vein negative for tumor.        NUCLEAR MEDICINE THYROID WHOLE BODY SCAN I-123   4/20/2017 11:38 AM   IMPRESSION: No worrisome focal tracer uptake is identified to suggest  recurrent neoplasm or remote metastatic disease. Some postoperative  changes at the left neck noted, likely accounting for inconspicuity of  the left sternocleidomastoid muscle. " Small bilateral subcentimeter  neck lymph nodes noted.     Neck US 6/2018:  IMPRESSION:  Negative soft tissue neck study.    Neck US 5/2020:                                                        IMPRESSION:    Negative soft tissue neck study.    Neck US 9/2021:  FINDINGS:  Thyroidectomy. No cervical lymphadenopathy. No suspicious  nodules in the thyroidectomy bed. No significant interval change.                                                           IMPRESSION: No cervical lymphadenopathy.    Neck US 3/2023:  IMPRESSION:  No convincing evidence for residual or recurrent  malignancy in the neck.    All pertinent notes, labs, and images personally reviewed by me.     A/P  Ms.Nhu Mounika Nicole is a 38 year old here for the evaluation of thyroid cancer:    1. 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  It was 2.2 cm unifocal, left lobe tumor with no lymph node involvement.  S/p  total thyroidectomy in 2011 and 78.7 mCi of I-131 HERNANDEZ. No evidence for distant metastatic disease on post therapy scan.  2016- new lymph node s/p FNA with PTC with rising TG  S/p 8/2016- left modified neck radiation. 1/27 lymph node + ( level2). Postop course complicated by hypocalcemia.  Follow up I123 WBS 4/2017- no mets  Thyroglobulin levels appear stable and suppressed.  Neck ultrasound June 2018, 5/2020, 9/2021, 3/2023 did not show any evidence of metastases.  Plan:  Discussed diagnosis, pathophysiology, management and treatment options of condition with pt.  Recommend repeat Neck US and thyroglobulin levels in 3 months.    2.  Hypothyroidism (postoperative following total thyroidectomy for thyroid cancer):  Currently taking levothyroxine 137 mcg/day. (11/30/2022)  Taking generic levothyroxine.  Reports compliance.  Clinically euthyroid.  Not planning further pregnancy.  Plan:  Discussed diagnosis, pathophysiology, management and treatment  options of condition with pt.  Base don 3/2024 labs with high FT4 levels-- recommend to decrease dose of levothyroxine--Take 137 mcg X 6 days a week and 68.5 mcg X 1 day a week (3/14/2024)  Labs in 3 months  Neck ultrasound in 3 months  Please make a lab appointment for blood work and follow up clinic appointment in 1 week after that to discuss results.    Plan: levothyroxine (SYNTHROID/LEVOTHROID) 137 MCG         tablet, T4 free, TSH, Thyroxine Free by         [facility], Thyroglobulin and         Antibody (Sendout to [facility]), US Head Neck Soft         Tissue      Discussed s/s of hypothyroidism and hyperthyroidism to watch for.  The patient indicates understanding of these issues and agrees with the plan.    Discussed indications, risks and benefits of all medications prescribed, and answered questions to patient's satisfaction.  The longitudinal plan of care for the diagnosis(es)/condition(s) as documented were addressed during this visit. Due to the added complexity in care, I will continue to support Aleyda in the subsequent management and with ongoing continuity of care.  All questions were answered.  The patient indicates understanding of the above issues and agrees with the plan set forth.    Follow-up:  As noted in AVS    Vandana Costello MD  Endocrinology   [facility]  CC: Evan Zamora

## 2024-05-06 ENCOUNTER — OFFICE VISIT (OUTPATIENT)
Dept: INTERNAL MEDICINE | Facility: CLINIC | Age: 39
End: 2024-05-06
Payer: COMMERCIAL

## 2024-05-06 VITALS
HEART RATE: 92 BPM | BODY MASS INDEX: 29.57 KG/M2 | TEMPERATURE: 98.2 F | OXYGEN SATURATION: 97 % | HEIGHT: 62 IN | RESPIRATION RATE: 16 BRPM | DIASTOLIC BLOOD PRESSURE: 77 MMHG | WEIGHT: 160.7 LBS | SYSTOLIC BLOOD PRESSURE: 114 MMHG

## 2024-05-06 DIAGNOSIS — C73 RECURRENT THYROID CANCER (H): ICD-10-CM

## 2024-05-06 DIAGNOSIS — J45.20 MILD INTERMITTENT ASTHMA WITHOUT COMPLICATION: ICD-10-CM

## 2024-05-06 DIAGNOSIS — Z13.220 LIPID SCREENING: ICD-10-CM

## 2024-05-06 DIAGNOSIS — Z00.00 ROUTINE GENERAL MEDICAL EXAMINATION AT A HEALTH CARE FACILITY: Primary | ICD-10-CM

## 2024-05-06 DIAGNOSIS — E89.0 POSTOPERATIVE HYPOTHYROIDISM: ICD-10-CM

## 2024-05-06 PROBLEM — Z98.890 POST-OPERATIVE STATE: Status: RESOLVED | Noted: 2022-10-14 | Resolved: 2024-05-06

## 2024-05-06 PROBLEM — O24.410 GDM (GESTATIONAL DIABETES MELLITUS), CLASS A1: Status: RESOLVED | Noted: 2022-08-25 | Resolved: 2024-05-06

## 2024-05-06 PROBLEM — O09.529 ANTEPARTUM MULTIGRAVIDA OF ADVANCED MATERNAL AGE: Status: RESOLVED | Noted: 2022-04-20 | Resolved: 2024-05-06

## 2024-05-06 PROCEDURE — 99395 PREV VISIT EST AGE 18-39: CPT | Performed by: INTERNAL MEDICINE

## 2024-05-06 SDOH — HEALTH STABILITY: PHYSICAL HEALTH: ON AVERAGE, HOW MANY DAYS PER WEEK DO YOU ENGAGE IN MODERATE TO STRENUOUS EXERCISE (LIKE A BRISK WALK)?: 6 DAYS

## 2024-05-06 ASSESSMENT — SOCIAL DETERMINANTS OF HEALTH (SDOH): HOW OFTEN DO YOU GET TOGETHER WITH FRIENDS OR RELATIVES?: NEVER

## 2024-05-06 ASSESSMENT — ASTHMA QUESTIONNAIRES: ACT_TOTALSCORE: 21

## 2024-05-06 NOTE — PROGRESS NOTES
Preventive Care Visit  [facility]  Evan Zamora MD, Internal Medicine  May 6, 2024      Assessment & Plan     Routine general medical examination at a health care facility  Updated screening, immunizations, prevention.  Please see health maintenance list, care gaps   Focus on diet. Strength training    Mild intermittent asthma without complication  Symbicort working well- continue    Lipid screening  - Lipid panel reflex to direct LDL Fasting; Future    Recurrent thyroid cancer (H)  Postoperative hypothyroidism  -per endo    Patient has been advised of split billing requirements and indicates understanding: Yes  Review of the result(s) of each unique test - labs  Ordering of each unique test        Counseling  Appropriate preventive services were discussed with this patient, including applicable screening as appropriate for fall prevention, nutrition, physical activity, Tobacco-use cessation, weight loss and cognition.  Checklist reviewing preventive services available has been given to the patient.  Reviewed patient's diet, addressing concerns and/or questions.   Patient is at risk for social isolation and has been provided with information about the benefit of social connection.       Regular exercise  See Patient Instructions    Subjective   Aleyda is a 38 year old, presenting for the following:  Physical (Spot wrists)         Health Care Directive  Patient does not have a Health Care Directive or Living Will:     HPI        Asthma Follow-Up    Was ACT completed today?  Yes        5/6/2024    10:22 AM   ACT Total Scores   ACT TOTAL SCORE (Goal Greater than or Equal to 20) 21   In the past 12 months, how many times did you visit the emergency room for your asthma without being admitted to the hospital? 0   In the past 12 months, how many times were you hospitalized overnight because of your asthma? 0        How many days per week do you miss taking your asthma controller medication?   0  Please describe any recent triggers for your asthma: upper respiratory infections and pollens  Have you had any Emergency Room Visits, Urgent Care Visits, or Hospital Admissions since your last office visit?  No  Hypothyroidism Follow-up    Since last visit, patient describes the following symptoms: Weight stable, no hair loss, no skin changes, no constipation, no loose stools        5/6/2024   General Health   How would you rate your overall physical health? Good   Feel stress (tense, anxious, or unable to sleep) Not at all         5/6/2024   Nutrition   Three or more servings of calcium each day? (!) I DON'T KNOW   Diet: Carbohydrate counting   How many servings of fruit and vegetables per day? (!) 2-3   How many sweetened beverages each day? 0-1         5/6/2024   Exercise   Days per week of moderate/strenous exercise 6 days         5/6/2024   Social Factors   Frequency of gathering with friends or relatives Never   Worry food won't last until get money to buy more No   Food not last or not have enough money for food? No   Do you have housing?  Yes   Are you worried about losing your housing? No   Lack of transportation? No   Unable to get utilities (heat,electricity)? No   (!) SOCIAL CONNECTIONS CONCERN      5/6/2024   Dental   Dentist two times every year? Yes              Today's PHQ-2 Score:       1/17/2024     9:42 AM   PHQ-2 ( 1999 [employer])   Q1: Little interest or pleasure in doing things 0   Q2: Feeling down, depressed or hopeless 0   PHQ-2 Score 0   Q1: Little interest or pleasure in doing things Not at all   Q2: Feeling down, depressed or hopeless Not at all   PHQ-2 Score 0         5/6/2024   Substance Use   Alcohol more than 3/day or more than 7/wk No   Do you use any other substances recreationally? No     Social History     Tobacco Use    Smoking status: Never    Smokeless tobacco: Never   Vaping Use    Vaping status: Never Used   Substance Use Topics    Alcohol use: No     Alcohol/week: 0.0  "standard drinks of alcohol    Drug use: No             5/6/2024   Breast Cancer Screening   Family history of breast, colon, or ovarian cancer? No / Unknown      Mammogram Screening - Patient under 40 years of age: Routine Mammogram Screening not recommended.         5/6/2024   STI Screening   New sexual partner(s) since last STI/HIV test? No     History of abnormal Pap smear: NO - age 30-65 PAP every 5 years with negative HPV co-testing recommended        Latest Ref Rng & Units 2/13/2020    10:34 AM 2/13/2020    10:30 AM 3/2/2017     3:35 PM   PAP / HPV   PAP (Historical)  NIL   NIL    HPV 16 DNA NEG^Negative  Negative     HPV 18 DNA NEG^Negative  Negative     Other HR HPV NEG^Negative  Negative             5/6/2024   Contraception/Family Planning   Questions about contraception or family planning No        Reviewed and updated as needed this visit by Provider                    Labs reviewed in EPIC         Objective    Exam  /77   Pulse 92   Temp 98.2  F (36.8  C) (Temporal)   Resp 16   Ht 1.575 m (5' 2\")   Wt 72.9 kg (160 lb 11.2 oz)   LMP 04/22/2024   SpO2 97%   BMI 29.39 kg/m     Estimated body mass index is 29.39 kg/m  as calculated from the following:    Height as of this encounter: 1.575 m (5' 2\").    Weight as of this encounter: 72.9 kg (160 lb 11.2 oz).    Physical Exam  GENERAL: alert and no distress  EYES: Eyes grossly normal to inspection, PERRL and conjunctivae and sclerae normal  HENT: ear canals and TM's normal, nose and mouth without ulcers or lesions  NECK: no adenopathy, no asymmetry, masses, or scars  RESP: lungs clear to auscultation - no rales, rhonchi or wheezes  CV: regular rate and rhythm, normal S1 S2, no S3 or S4, no murmur, click or rub, no peripheral edema  ABDOMEN: soft, nontender, no hepatosplenomegaly, no masses and bowel sounds normal  MS: no gross musculoskeletal defects noted, no edema  SKIN: keloid - neck  NEURO: Normal strength and tone, mentation intact and speech " normal  PSYCH: mentation appears normal, affect normal/bright  LYMPH: no cervical, supraclavicular, axillary, or inguinal adenopathy        Signed Electronically by: Evan Zamora MD

## 2024-05-06 NOTE — PATIENT INSTRUCTIONS
The heart-healthy Mediterranean is a healthy eating plan based on typical foods and recipes of Mediterranean-style cooking. Here's how to adopt the Mediterranean diet.   If you're looking for a heart-healthy eating plan, the Mediterranean diet might be right for you. The Mediterranean diet incorporates the basics of healthy eating -- plus a splash of flavorful olive oil and perhaps even a glass of red wine -- among other components characterizing the traditional cooking style of countries bordering the [place].  Most healthy diets include fruits, vegetables, fish and whole grains, and limit unhealthy fats. While these parts of a healthy diet remain tried-and-true, subtle variations or differences in proportions of certain foods may make a difference in your risk of heart disease.  Research has shown that the traditional Mediterranean diet reduces the risk of heart disease. In fact, an analysis of more than 1.5 million healthy adults demonstrated that following a Mediterranean diet was associated with a reduced risk of death from heart disease and cancer, as well as a reduced incidence of Parkinson's and Alzheimer's diseases.   The Dietary Guidelines for Americans recommends the Mediterranean diet as an eating plan that can help promote health and prevent disease. And the Mediterranean diet is one your whole family can follow for good health.   The Mediterranean diet emphasizes:  Eating primarily plant-based foods, such as fruits and vegetables, whole grains, legumes and nuts   Replacing butter with healthy fats, such as olive oil   Using herbs and spices instead of salt to flavor foods   Limiting red meat to no more than a few times a month   Eating fish and poultry at least twice a week   Drinking red wine in moderation (optional)  The diet also recognizes the importance of being physically active, and enjoying meals with family and friends.  The Mediterranean diet traditionally includes fruits,  "vegetables and grains. For example, residents of [facility] average six or more servings a day of antioxidant-rich fruits and vegetables.  Grains in the Mediterranean region are typically whole grain and usually contain very few unhealthy trans fats, and bread is an important part of the diet. However, throughout the Mediterranean region, bread is eaten plain or dipped in olive oil -- not eaten with butter or margarine, which contains saturated or trans fats.   Nuts are another part of a healthy Mediterranean diet. Nuts are high in fat, but most of the fat is healthy. Because nuts are high in calories, they should not be eaten in large amounts -- generally no more than a handful a day. For the best nutrition, avoid candied or honey-roasted and heavily salted nuts.  The focus of the Mediterranean diet isn't on limiting total fat consumption, but rather on choosing healthier types of fat. The Mediterranean diet discourages saturated fats and hydrogenated oils (trans fats), both of which contribute to heart disease.  The Mediterranean diet features olive oil as the primary source of fat. Olive oil is mainly monounsaturated fat -- a type of fat that can help reduce low-density lipoprotein (LDL) cholesterol levels when used in place of saturated or trans fats. \"Extra-virgin\" and \"virgin\" olive oils (the least processed forms) also contain the highest levels of protective plant compounds that provide antioxidant effects.  Canola oil and some nuts contain the beneficial linolenic acid (a type of omega-3 fatty acid) in addition to healthy unsaturated fat. Omega-3 fatty acids lower triglycerides, decrease blood clotting, and are associated with decreased incidence of sudden heart attacks, improve the health of your blood vessels, and help moderate blood pressure. Fatty fish -- such as mackerel, lake trout, herring, sardines, albacore tuna and salmon -- are rich sources of omega-3 fatty acids. Fish is eaten on a regular basis in " the Mediterranean diet.  The health effects of alcohol have been debated for many years, and some doctors are reluctant to encourage alcohol consumption because of the health consequences of excessive drinking. However, alcohol -- in moderation -- has been associated with a reduced risk of heart disease in some research studies.  The Mediterranean diet typically includes a moderate amount of wine, usually red wine. This means no more than 5 ounces (148 milliliters) of wine daily for women of all ages and men older than age 65 and no more than 10 ounces (296 milliliters) of wine daily for younger men. More than this may increase the risk of health problems, including increased risk of certain types of cancer.   If you're unable to limit your alcohol intake to the amounts defined above, if you have a personal or family history of alcohol abuse, or if you have heart or liver disease, refrain from drinking wine or any other alcohol.  The Mediterranean diet is a delicious and healthy way to eat. Many people who switch to this style of eating say they'll never eat any other way. Here are some specific steps to get you started:   Eat your veggies and fruits -- and switch to whole grains. [place] of plant foods should make up the majority of your meals. They should be minimally processed -- fresh and whole are best. Include veggies and fruits in every meal and eat them for snacks as well. Switch to whole-grain bread and cereal, and begin to eat more whole-grain rice and pasta products. Keep baby carrots, apples and bananas on hand for quick, satisfying snacks. Fruit salads are a wonderful way to eat a variety of healthy fruit.   Go nuts. Nuts and seeds are good sources of fiber, protein and healthy fats. Keep almonds, cashews, pistachios and walnuts on hand for a quick snack. Choose natural peanut butter, rather than the kind with hydrogenated fat added. Try blended sesame seeds (tahini) as a dip or spread for bread.    Pass on the butter. Try olive or canola oil as a healthy replacement for butter or margarine. Lightly drizzle it over vegetables. After cooking pasta, add a touch of olive oil, some garlic and green onions for flavoring. Dip bread in flavored olive oil or lightly spread it on whole-grain bread for a tasty alternative to butter. Try tahini as a dip or spread for bread too.   Spice it up. Herbs and spices make food tasty and can  for salt and fat in recipes.   Go fish. Eat fish at least twice a week. Fresh or water-packed tuna, salmon, trout, mackerel and herring are healthy choices. [place], bake or broil fish for great taste and easy cleanup. Avoid breaded and fried fish.   Rein in the red meat. Limit red meat to no more than a few times a month. Substitute fish and poultry for red meat. When choosing red meat, make sure it's lean and keep portions small (about the size of a deck of cards). Also avoid sausage, sandhu and other high-fat, processed meats.   Choose low-fat dairy. Limit higher fat dairy products, such as whole or 2 percent milk, cheese and ice cream. Switch to skim milk, fat-free yogurt and low-fat cheese   Preventive Care Advice   This is general advice given by our system to help you stay healthy. However, your care team may have specific advice just for you. Please talk to your care team about your preventive care needs.  Nutrition  Eat 5 or more servings of fruits and vegetables each day.  Try wheat bread, brown rice and whole grain pasta (instead of white bread, rice, and pasta).  Get enough calcium and vitamin D. Check the label on foods and aim for 100% of the RDA (recommended daily allowance).  Lifestyle  Exercise at least 150 minutes each week   (30 minutes a day, 5 days a week).  Do muscle strengthening activities 2 days a week. These help control your weight and prevent disease.  No smoking.  Wear sunscreen to prevent skin cancer.  Have a dental exam and cleaning every 6  months.  Yearly exams  See your health care team every year to talk about:  Any changes in your health.  Any medicines your care team has prescribed.  Preventive care, family planning, and ways to prevent chronic diseases.  Shots (vaccines)   HPV shots (up to age 26), if you've never had them before.  Hepatitis B shots (up to age 59), if you've never had them before.  COVID-19 shot: Get this shot when it's due.  Flu shot: Get a flu shot every year.  Tetanus shot: Get a tetanus shot every 10 years.  Pneumococcal, hepatitis A, and RSV shots: Ask your care team if you need these based on your risk.  Shingles shot (for age 50 and up).  General health tests  Diabetes screening:  Starting at age 35, Get screened for diabetes at least every 3 years.  If you are younger than age 35, ask your care team if you should be screened for diabetes.  Cholesterol test: At age 39, start having a cholesterol test every 5 years, or more often if advised.  Bone density scan (DEXA): At age 50, ask your care team if you should have this scan for osteoporosis (brittle bones).  Hepatitis C: Get tested at least once in your life.  STIs (sexually transmitted infections)  Before age 24: Ask your care team if you should be screened for STIs.  After age 24: Get screened for STIs if you're at risk. You are at risk for STIs (including HIV) if:  You are sexually active with more than one person.  You don't use condoms every time.  You or a partner was diagnosed with a sexually transmitted infection.  If you are at risk for HIV, ask about PrEP medicine to prevent HIV.  Get tested for HIV at least once in your life, whether you are at risk for HIV or not.  Cancer screening tests  Cervical cancer screening: If you have a cervix, begin getting regular cervical cancer screening tests at age 21. Most people who have regular screenings with normal results can stop after age 65. Talk about this with your provider.  Breast cancer scan (mammogram): If you've  ever had breasts, begin having regular mammograms starting at age 40. This is a scan to check for breast cancer.  Colon cancer screening: It is important to start screening for colon cancer at age 45.  Have a colonoscopy test every 10 years (or more often if you're at risk) Or, ask your provider about stool tests like a FIT test every year or Cologuard test every 3 years.  To learn more about your testing options, visit: https://[email or web address]/231582.pdf.  For help making a decision, visit: https://bit.ly/yk42726.  Prostate cancer screening test: If you have a prostate and are age 55 to 69, ask your provider if you would benefit from a yearly prostate cancer screening test.  Lung cancer screening: If you are a current or former smoker age 50 to 80, ask your care team if ongoing lung cancer screenings are right for you.  For informational purposes only. Not to replace the advice of your health care provider. Copyright   2023 [facility] [employer]. All rights reserved. Clinically reviewed by the [employer] [facility]. [employer] 978343 - REV 01/24.    Relationships for Good Health  Relationships are important for our health and happiness. Social isolation, loneliness and lack of support are bad for your health. Studies show that loneliness can harm health and limit your life span as much as high blood pressure and smoking.   Take some time to reflect on your relationships. Then answer these questions:  Are there people in your life that cause you stress or drain your energy? What can you do to set limits?  ________________________________________________________________________________________________________________________________________________________________________________________________________________________________________________________________________________________________________________________________________________  Who do you enjoy spending time with? Who can you go to for  support?  ________________________________________________________________________________________________________________________________________________________________________________________________________________________________________________________________________________________________________________________________________________  What can you do to improve your relationships with others?  __________________________________________________________________________________________________________________________________________________________________________________________________________________  ______________________________________________________________________________________________________________________________  What do you like most about your relationships with others?  ________________________________________________________________________________________________________________________________________________________________________________________________________________________________________________________________________________________________________________________________________________  My goal: ______________________________________________________________________  I will ______________________________________________________________________________________________________________________________________________________________________________________________    For informational purposes only. Not to replace the advice of your health care provider. Copyright   2018 [facility]. All rights reserved. Clinically reviewed by [facility]  Team. [employer] 052623 - Rev 04/21.

## 2024-06-10 ENCOUNTER — HOSPITAL ENCOUNTER (OUTPATIENT)
Dept: ULTRASOUND IMAGING | Facility: CLINIC | Age: 39
Discharge: HOME | End: 2024-06-10
Attending: INTERNAL MEDICINE | Admitting: INTERNAL MEDICINE
Payer: COMMERCIAL

## 2024-06-10 DIAGNOSIS — E89.0 POSTOPERATIVE HYPOTHYROIDISM: ICD-10-CM

## 2024-06-10 DIAGNOSIS — C73 PAPILLARY CARCINOMA, FOLLICULAR VARIANT (H): ICD-10-CM

## 2024-06-10 PROCEDURE — 76536 US EXAM OF HEAD AND NECK: CPT

## 2024-06-12 ENCOUNTER — LAB (OUTPATIENT)
Dept: LAB | Facility: CLINIC | Age: 39
End: 2024-06-12
Payer: COMMERCIAL

## 2024-06-12 DIAGNOSIS — Z13.220 LIPID SCREENING: ICD-10-CM

## 2024-06-12 DIAGNOSIS — E89.0 POSTOPERATIVE HYPOTHYROIDISM: ICD-10-CM

## 2024-06-12 DIAGNOSIS — C73 PAPILLARY CARCINOMA, FOLLICULAR VARIANT (H): ICD-10-CM

## 2024-06-12 LAB
CHOLEST SERPL-MCNC: 110 MG/DL
FASTING STATUS PATIENT QL REPORTED: YES
HDLC SERPL-MCNC: 30 MG/DL
LDLC SERPL CALC-MCNC: 62 MG/DL
NONHDLC SERPL-MCNC: 80 MG/DL
T4 FREE SERPL-MCNC: 1.47 NG/DL (ref 0.9–1.7)
TRIGL SERPL-MCNC: 89 MG/DL
TSH SERPL DL<=0.005 MIU/L-ACNC: 3.1 UIU/ML (ref 0.3–4.2)

## 2024-06-12 PROCEDURE — 36415 COLL VENOUS BLD VENIPUNCTURE: CPT

## 2024-06-12 PROCEDURE — 80061 LIPID PANEL: CPT

## 2024-06-12 PROCEDURE — 84443 ASSAY THYROID STIM HORMONE: CPT

## 2024-06-12 PROCEDURE — 84439 ASSAY OF FREE THYROXINE: CPT | Mod: 90

## 2024-06-12 PROCEDURE — 84432 ASSAY OF THYROGLOBULIN: CPT | Mod: 90

## 2024-06-12 PROCEDURE — 99000 SPECIMEN HANDLING OFFICE-LAB: CPT

## 2024-06-12 PROCEDURE — 86800 THYROGLOBULIN ANTIBODY: CPT | Mod: 90

## 2024-06-15 LAB — T4 FREE SERPL DIALY-MCNC: 2.1 NG/DL

## 2024-06-18 LAB — SCANNED LAB RESULT: NORMAL

## 2024-06-19 ENCOUNTER — OFFICE VISIT (OUTPATIENT)
Dept: ENDOCRINOLOGY | Facility: CLINIC | Age: 39
End: 2024-06-19
Payer: COMMERCIAL

## 2024-06-19 VITALS
OXYGEN SATURATION: 98 % | DIASTOLIC BLOOD PRESSURE: 72 MMHG | HEART RATE: 80 BPM | RESPIRATION RATE: 16 BRPM | HEIGHT: 62 IN | SYSTOLIC BLOOD PRESSURE: 120 MMHG | TEMPERATURE: 98.7 F | BODY MASS INDEX: 29.61 KG/M2 | WEIGHT: 160.9 LBS

## 2024-06-19 DIAGNOSIS — C73 PAPILLARY CARCINOMA, FOLLICULAR VARIANT (H): Primary | ICD-10-CM

## 2024-06-19 DIAGNOSIS — E89.0 POSTOPERATIVE HYPOTHYROIDISM: ICD-10-CM

## 2024-06-19 PROCEDURE — G2211 COMPLEX E/M VISIT ADD ON: HCPCS | Performed by: INTERNAL MEDICINE

## 2024-06-19 PROCEDURE — 99214 OFFICE O/P EST MOD 30 MIN: CPT | Performed by: INTERNAL MEDICINE

## 2024-06-19 RX ORDER — LEVOTHYROXINE SODIUM 137 UG/1
TABLET ORAL
Qty: 90 TABLET | Refills: 3 | Status: SHIPPED | OUTPATIENT
Start: 2024-06-19
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
## 2024-06-19 NOTE — PATIENT INSTRUCTIONS
-[facility]  Dr Costello, Endocrinology Department    [facility]   [address] [facility]  [place] [address]  Appointment [phone]  Fax: [phone]  [place]: Monday - Thursday      Please check the cost coverage and copay with insurance before recommended tests, services and medications (especially if new medications are prescribed).     If ordered, please get blood work done 1 week prior to your next appointment so they will be available to Dr. Costello at your visit.    Thyroid labs are in acceptable range.  Continue current dose of thyroid hormone replacement-- 137 mcg X 6 days a week and 68.5 mcg X 1 day a week.  Lab only appointment in 3-4 months to follow thyroid hormone trend.  Reheck labs and ultrasound in 1 year  Follow-up after that.     [facility] radiology scheduleing   [place]  [phone]   [facility] Radiology scheduling  [place]  [phone]     Please call and schedule the recommended test as discussed in clinic visit. These are the numbers to call.      Take Levothyroxine on an empty stomach. Take it with a full glass of water at least 30 minutes to 1 hour before eating breakfast.   This medicine should be taken at least 4 hours before or 4 hours after these medicines: antacids (Maalox , Mylanta , Tums ), calcium supplements, cholestyramine (Prevalite , Questran ), colestipol (Colestid ), iron supplements, orlistat (Kaden , Xenical ), simethicone (Gas-X , Mylicon ), and sucralfate (Carafate ).   Swallow the capsule whole. Do not cut or crush it.

## 2024-09-22 ENCOUNTER — HOSPITAL ENCOUNTER (OUTPATIENT)
Facility: CLINIC | Age: 39
Setting detail: OBSERVATION
Discharge: HOME | End: 2024-09-23
Attending: PHYSICIAN ASSISTANT | Admitting: INTERNAL MEDICINE
Payer: COMMERCIAL

## 2024-09-22 ENCOUNTER — APPOINTMENT (OUTPATIENT)
Dept: GENERAL RADIOLOGY | Facility: CLINIC | Age: 39
End: 2024-09-22
Attending: PHYSICIAN ASSISTANT
Payer: COMMERCIAL

## 2024-09-22 DIAGNOSIS — R11.10 VOMITING: ICD-10-CM

## 2024-09-22 DIAGNOSIS — D64.9 SYMPTOMATIC ANEMIA: ICD-10-CM

## 2024-09-22 DIAGNOSIS — D50.9 MICROCYTIC ANEMIA: ICD-10-CM

## 2024-09-22 DIAGNOSIS — R07.9 CHEST PAIN: ICD-10-CM

## 2024-09-22 LAB
ABO/RH(D): NORMAL
ALBUMIN SERPL BCG-MCNC: 4.3 G/DL (ref 3.5–5.2)
ALBUMIN UR-MCNC: NEGATIVE MG/DL
ALP SERPL-CCNC: 81 U/L (ref 40–150)
ALT SERPL W P-5'-P-CCNC: 9 U/L (ref 0–50)
ANION GAP SERPL CALCULATED.3IONS-SCNC: 14 MMOL/L (ref 7–15)
ANTIBODY SCREEN: NEGATIVE
APPEARANCE UR: CLEAR
AST SERPL W P-5'-P-CCNC: 15 U/L (ref 0–45)
BACTERIA #/AREA URNS HPF: ABNORMAL /HPF
BASOPHILS # BLD AUTO: 0 10E3/UL (ref 0–0.2)
BASOPHILS NFR BLD AUTO: 0 %
BILIRUB SERPL-MCNC: 0.3 MG/DL
BILIRUB UR QL STRIP: NEGATIVE
BLD PROD TYP BPU: NORMAL
BLOOD COMPONENT TYPE: NORMAL
BUN SERPL-MCNC: 9.8 MG/DL (ref 6–20)
CALCIUM SERPL-MCNC: 7.6 MG/DL (ref 8.8–10.4)
CHLORIDE SERPL-SCNC: 100 MMOL/L (ref 98–107)
CODING SYSTEM: NORMAL
COLOR UR AUTO: ABNORMAL
CREAT SERPL-MCNC: 0.6 MG/DL (ref 0.51–0.95)
CROSSMATCH: NORMAL
D DIMER PPP FEU-MCNC: 0.39 UG/ML FEU (ref 0–0.5)
DACRYOCYTES BLD QL SMEAR: SLIGHT
EGFRCR SERPLBLD CKD-EPI 2021: >90 ML/MIN/1.73M2
ELLIPTOCYTES BLD QL SMEAR: ABNORMAL
ELLIPTOCYTES BLD QL SMEAR: ABNORMAL
EOSINOPHIL # BLD AUTO: 0.1 10E3/UL (ref 0–0.7)
EOSINOPHIL NFR BLD AUTO: 1 %
ERYTHROCYTE [DISTWIDTH] IN BLOOD BY AUTOMATED COUNT: 22.5 % (ref 10–15)
ERYTHROCYTE [DISTWIDTH] IN BLOOD BY AUTOMATED COUNT: 25.5 % (ref 10–15)
FERRITIN SERPL-MCNC: 1 NG/ML (ref 6–175)
GLUCOSE SERPL-MCNC: 104 MG/DL (ref 70–99)
GLUCOSE UR STRIP-MCNC: NEGATIVE MG/DL
HCG SERPL QL: NEGATIVE
HCO3 SERPL-SCNC: 22 MMOL/L (ref 22–29)
HCT VFR BLD AUTO: 28.9 % (ref 35–47)
HCT VFR BLD AUTO: 31.3 % (ref 35–47)
HGB BLD-MCNC: 7.1 G/DL (ref 11.7–15.7)
HGB BLD-MCNC: 7.7 G/DL (ref 11.7–15.7)
HGB BLD-MCNC: 8.2 G/DL (ref 11.7–15.7)
HGB BLD-MCNC: 8.5 G/DL (ref 11.7–15.7)
HGB UR QL STRIP: NEGATIVE
IMM GRANULOCYTES # BLD: 0 10E3/UL
IMM GRANULOCYTES NFR BLD: 0 %
IRON BINDING CAPACITY (ROCHE): 466 UG/DL (ref 240–430)
IRON SATN MFR SERPL: 3 % (ref 15–46)
IRON SERPL-MCNC: 13 UG/DL (ref 37–145)
ISSUE DATE AND TIME: NORMAL
KETONES UR STRIP-MCNC: NEGATIVE MG/DL
LEUKOCYTE ESTERASE UR QL STRIP: NEGATIVE
LIPASE SERPL-CCNC: 31 U/L (ref 13–60)
LYMPHOCYTES # BLD AUTO: 0.9 10E3/UL (ref 0.8–5.3)
LYMPHOCYTES NFR BLD AUTO: 11 %
MAGNESIUM SERPL-MCNC: 1.8 MG/DL (ref 1.7–2.3)
MCH RBC QN AUTO: 15.3 PG (ref 26.5–33)
MCH RBC QN AUTO: 16.4 PG (ref 26.5–33)
MCHC RBC AUTO-ENTMCNC: 26.6 G/DL (ref 31.5–36.5)
MCHC RBC AUTO-ENTMCNC: 27.2 G/DL (ref 31.5–36.5)
MCV RBC AUTO: 58 FL (ref 78–100)
MCV RBC AUTO: 60 FL (ref 78–100)
MONOCYTES # BLD AUTO: 0.3 10E3/UL (ref 0–1.3)
MONOCYTES NFR BLD AUTO: 4 %
MUCOUS THREADS #/AREA URNS LPF: PRESENT /LPF
NEUTROPHILS # BLD AUTO: 6.9 10E3/UL (ref 1.6–8.3)
NEUTROPHILS NFR BLD AUTO: 84 %
NITRATE UR QL: NEGATIVE
NRBC # BLD AUTO: 0 10E3/UL
NRBC BLD AUTO-RTO: 0 /100
PH UR STRIP: 6 [PH] (ref 5–7)
PLAT MORPH BLD: ABNORMAL
PLAT MORPH BLD: ABNORMAL
PLATELET # BLD AUTO: 371 10E3/UL (ref 150–450)
PLATELET # BLD AUTO: 403 10E3/UL (ref 150–450)
POLYCHROMASIA BLD QL SMEAR: SLIGHT
POTASSIUM SERPL-SCNC: 3.7 MMOL/L (ref 3.4–5.3)
PROT SERPL-MCNC: 7.4 G/DL (ref 6.4–8.3)
PTH-INTACT SERPL-MCNC: 20 PG/ML (ref 15–65)
RBC # BLD AUTO: 5.03 10E6/UL (ref 3.8–5.2)
RBC # BLD AUTO: 5.18 10E6/UL (ref 3.8–5.2)
RBC MORPH BLD: ABNORMAL
RBC MORPH BLD: ABNORMAL
RBC URINE: 1 /HPF
RETICS # AUTO: 0.08 10E6/UL (ref 0.03–0.1)
RETICS/RBC NFR AUTO: 1.7 % (ref 0.5–2)
SODIUM SERPL-SCNC: 136 MMOL/L (ref 135–145)
SP GR UR STRIP: 1.01 (ref 1–1.03)
SPECIMEN EXPIRATION DATE: NORMAL
SQUAMOUS EPITHELIAL: 1 /HPF
TROPONIN T SERPL HS-MCNC: <6 NG/L
TROPONIN T SERPL HS-MCNC: <6 NG/L
TSH SERPL DL<=0.005 MIU/L-ACNC: 1.71 UIU/ML (ref 0.3–4.2)
UNIT ABO/RH: NORMAL
UNIT NUMBER: NORMAL
UNIT STATUS: NORMAL
UNIT TYPE ISBT: 5100
UROBILINOGEN UR STRIP-MCNC: NORMAL MG/DL
WBC # BLD AUTO: 8.2 10E3/UL (ref 4–11)
WBC # BLD AUTO: 8.8 10E3/UL (ref 4–11)
WBC URINE: 2 /HPF

## 2024-09-22 PROCEDURE — P9016 RBC LEUKOCYTES REDUCED: HCPCS | Performed by: PHYSICIAN ASSISTANT

## 2024-09-22 PROCEDURE — 250N000011 HC RX IP 250 OP 636: Performed by: STUDENT IN AN ORGANIZED HEALTH CARE EDUCATION/TRAINING PROGRAM

## 2024-09-22 PROCEDURE — 36430 TRANSFUSION BLD/BLD COMPNT: CPT

## 2024-09-22 PROCEDURE — 258N000003 HC RX IP 258 OP 636: Performed by: STUDENT IN AN ORGANIZED HEALTH CARE EDUCATION/TRAINING PROGRAM

## 2024-09-22 PROCEDURE — 80053 COMPREHEN METABOLIC PANEL: CPT | Performed by: PHYSICIAN ASSISTANT

## 2024-09-22 PROCEDURE — 250N000013 HC RX MED GY IP 250 OP 250 PS 637: Performed by: STUDENT IN AN ORGANIZED HEALTH CARE EDUCATION/TRAINING PROGRAM

## 2024-09-22 PROCEDURE — 81001 URINALYSIS AUTO W/SCOPE: CPT | Performed by: PHYSICIAN ASSISTANT

## 2024-09-22 PROCEDURE — G0378 HOSPITAL OBSERVATION PER HR: HCPCS

## 2024-09-22 PROCEDURE — 85379 FIBRIN DEGRADATION QUANT: CPT | Performed by: PHYSICIAN ASSISTANT

## 2024-09-22 PROCEDURE — 36415 COLL VENOUS BLD VENIPUNCTURE: CPT | Performed by: INTERNAL MEDICINE

## 2024-09-22 PROCEDURE — 82728 ASSAY OF FERRITIN: CPT | Performed by: INTERNAL MEDICINE

## 2024-09-22 PROCEDURE — 84484 ASSAY OF TROPONIN QUANT: CPT | Performed by: PHYSICIAN ASSISTANT

## 2024-09-22 PROCEDURE — 85025 COMPLETE CBC W/AUTO DIFF WBC: CPT | Performed by: PHYSICIAN ASSISTANT

## 2024-09-22 PROCEDURE — 96375 TX/PRO/DX INJ NEW DRUG ADDON: CPT

## 2024-09-22 PROCEDURE — 84703 CHORIONIC GONADOTROPIN ASSAY: CPT | Performed by: PHYSICIAN ASSISTANT

## 2024-09-22 PROCEDURE — 83690 ASSAY OF LIPASE: CPT | Performed by: PHYSICIAN ASSISTANT

## 2024-09-22 PROCEDURE — 250N000011 HC RX IP 250 OP 636: Performed by: PHYSICIAN ASSISTANT

## 2024-09-22 PROCEDURE — 83970 ASSAY OF PARATHORMONE: CPT | Performed by: INTERNAL MEDICINE

## 2024-09-22 PROCEDURE — 86900 BLOOD TYPING SEROLOGIC ABO: CPT | Performed by: PHYSICIAN ASSISTANT

## 2024-09-22 PROCEDURE — 83550 IRON BINDING TEST: CPT | Performed by: INTERNAL MEDICINE

## 2024-09-22 PROCEDURE — 99222 1ST HOSP IP/OBS MODERATE 55: CPT | Performed by: STUDENT IN AN ORGANIZED HEALTH CARE EDUCATION/TRAINING PROGRAM

## 2024-09-22 PROCEDURE — 250N000013 HC RX MED GY IP 250 OP 250 PS 637: Performed by: PHYSICIAN ASSISTANT

## 2024-09-22 PROCEDURE — 36415 COLL VENOUS BLD VENIPUNCTURE: CPT | Performed by: STUDENT IN AN ORGANIZED HEALTH CARE EDUCATION/TRAINING PROGRAM

## 2024-09-22 PROCEDURE — 36415 COLL VENOUS BLD VENIPUNCTURE: CPT | Performed by: PHYSICIAN ASSISTANT

## 2024-09-22 PROCEDURE — 86923 COMPATIBILITY TEST ELECTRIC: CPT | Performed by: PHYSICIAN ASSISTANT

## 2024-09-22 PROCEDURE — 83735 ASSAY OF MAGNESIUM: CPT | Performed by: INTERNAL MEDICINE

## 2024-09-22 PROCEDURE — 99285 EMERGENCY DEPT VISIT HI MDM: CPT | Mod: 25

## 2024-09-22 PROCEDURE — 93005 ELECTROCARDIOGRAM TRACING: CPT

## 2024-09-22 PROCEDURE — 85045 AUTOMATED RETICULOCYTE COUNT: CPT | Performed by: INTERNAL MEDICINE

## 2024-09-22 PROCEDURE — 84443 ASSAY THYROID STIM HORMONE: CPT | Performed by: PHYSICIAN ASSISTANT

## 2024-09-22 PROCEDURE — 71046 X-RAY EXAM CHEST 2 VIEWS: CPT

## 2024-09-22 PROCEDURE — 258N000003 HC RX IP 258 OP 636: Performed by: PHYSICIAN ASSISTANT

## 2024-09-22 PROCEDURE — 85018 HEMOGLOBIN: CPT | Performed by: STUDENT IN AN ORGANIZED HEALTH CARE EDUCATION/TRAINING PROGRAM

## 2024-09-22 PROCEDURE — 85027 COMPLETE CBC AUTOMATED: CPT | Performed by: PHYSICIAN ASSISTANT

## 2024-09-22 PROCEDURE — 96374 THER/PROPH/DIAG INJ IV PUSH: CPT

## 2024-09-22 PROCEDURE — 96361 HYDRATE IV INFUSION ADD-ON: CPT

## 2024-09-22 PROCEDURE — 85018 HEMOGLOBIN: CPT | Performed by: INTERNAL MEDICINE

## 2024-09-22 RX ORDER — PROCHLORPERAZINE 25 MG
25 SUPPOSITORY, RECTAL RECTAL EVERY 12 HOURS PRN
Status: DISCONTINUED | OUTPATIENT
Start: 2024-09-22 | End: 2024-09-23 | Stop reason: HOSPADM

## 2024-09-22 RX ORDER — AMOXICILLIN 250 MG
1 CAPSULE ORAL 2 TIMES DAILY PRN
Status: DISCONTINUED | OUTPATIENT
Start: 2024-09-22 | End: 2024-09-23 | Stop reason: HOSPADM

## 2024-09-22 RX ORDER — MAGNESIUM HYDROXIDE/ALUMINUM HYDROXICE/SIMETHICONE 120; 1200; 1200 MG/30ML; MG/30ML; MG/30ML
15 SUSPENSION ORAL ONCE
Status: COMPLETED | OUTPATIENT
Start: 2024-09-22 | End: 2024-09-22

## 2024-09-22 RX ORDER — FAMOTIDINE 10 MG
10 TABLET ORAL 2 TIMES DAILY
Status: DISCONTINUED | OUTPATIENT
Start: 2024-09-22 | End: 2024-09-23 | Stop reason: HOSPADM

## 2024-09-22 RX ORDER — ONDANSETRON 2 MG/ML
4 INJECTION INTRAMUSCULAR; INTRAVENOUS ONCE
Status: COMPLETED | OUTPATIENT
Start: 2024-09-22 | End: 2024-09-22

## 2024-09-22 RX ORDER — ACETAMINOPHEN 650 MG/1
650 SUPPOSITORY RECTAL EVERY 4 HOURS PRN
Status: DISCONTINUED | OUTPATIENT
Start: 2024-09-22 | End: 2024-09-23 | Stop reason: HOSPADM

## 2024-09-22 RX ORDER — CALCIUM CARBONATE 500 MG/1
500 TABLET, CHEWABLE ORAL 2 TIMES DAILY PRN
Status: DISCONTINUED | OUTPATIENT
Start: 2024-09-22 | End: 2024-09-23 | Stop reason: HOSPADM

## 2024-09-22 RX ORDER — METOCLOPRAMIDE HYDROCHLORIDE 5 MG/ML
5 INJECTION INTRAMUSCULAR; INTRAVENOUS ONCE
Status: COMPLETED | OUTPATIENT
Start: 2024-09-22 | End: 2024-09-22

## 2024-09-22 RX ORDER — PROCHLORPERAZINE MALEATE 5 MG
10 TABLET ORAL EVERY 6 HOURS PRN
Status: DISCONTINUED | OUTPATIENT
Start: 2024-09-22 | End: 2024-09-23 | Stop reason: HOSPADM

## 2024-09-22 RX ORDER — AMOXICILLIN 250 MG
2 CAPSULE ORAL 2 TIMES DAILY PRN
Status: DISCONTINUED | OUTPATIENT
Start: 2024-09-22 | End: 2024-09-23 | Stop reason: HOSPADM

## 2024-09-22 RX ORDER — ACETAMINOPHEN 325 MG/1
650 TABLET ORAL EVERY 4 HOURS PRN
Status: DISCONTINUED | OUTPATIENT
Start: 2024-09-22 | End: 2024-09-23 | Stop reason: HOSPADM

## 2024-09-22 RX ORDER — LEVOTHYROXINE SODIUM 137 UG/1
137 TABLET ORAL
Status: DISCONTINUED | OUTPATIENT
Start: 2024-09-22 | End: 2024-09-23 | Stop reason: HOSPADM

## 2024-09-22 RX ADMIN — LEVOTHYROXINE SODIUM 137 MCG: 137 TABLET ORAL at 19:52

## 2024-09-22 RX ADMIN — SODIUM CHLORIDE, POTASSIUM CHLORIDE, SODIUM LACTATE AND CALCIUM CHLORIDE 1000 ML: 600; 310; 30; 20 INJECTION, SOLUTION INTRAVENOUS at 11:54

## 2024-09-22 RX ADMIN — SODIUM CHLORIDE 1000 ML: 9 INJECTION, SOLUTION INTRAVENOUS at 09:53

## 2024-09-22 RX ADMIN — IRON SUCROSE 200 MG: 20 INJECTION, SOLUTION INTRAVENOUS at 17:56

## 2024-09-22 RX ADMIN — FAMOTIDINE 10 MG: 10 TABLET ORAL at 19:51

## 2024-09-22 RX ADMIN — ALUMINUM HYDROXIDE, MAGNESIUM HYDROXIDE, AND SIMETHICONE 15 ML: 1200; 120; 1200 SUSPENSION ORAL at 10:10

## 2024-09-22 RX ADMIN — ONDANSETRON 4 MG: 2 INJECTION INTRAMUSCULAR; INTRAVENOUS at 09:52

## 2024-09-22 RX ADMIN — METOCLOPRAMIDE 5 MG: 5 INJECTION, SOLUTION INTRAMUSCULAR; INTRAVENOUS at 11:54

## 2024-09-22 ASSESSMENT — ENCOUNTER SYMPTOMS
WEAKNESS: 1
CHEST TIGHTNESS: 1
FATIGUE: 1
PALPITATIONS: 0
APPETITE CHANGE: 0
BLOOD IN STOOL: 0
HEADACHES: 0
CONSTIPATION: 0
ABDOMINAL PAIN: 0
FREQUENCY: 0
NAUSEA: 0
WHEEZING: 1
HEMATURIA: 0
ROS GI COMMENTS: REFLUX.
SHORTNESS OF BREATH: 1
DIARRHEA: 0
DIZZINESS: 1
VOMITING: 1
DYSURIA: 0
LIGHT-HEADEDNESS: 1

## 2024-09-22 ASSESSMENT — ACTIVITIES OF DAILY LIVING (ADL)
ADLS_ACUITY_SCORE: 39
ADLS_ACUITY_SCORE: 37
ADLS_ACUITY_SCORE: 22
ADLS_ACUITY_SCORE: 39
ADLS_ACUITY_SCORE: 37
ADLS_ACUITY_SCORE: 37

## 2024-09-22 ASSESSMENT — COLUMBIA-SUICIDE SEVERITY RATING SCALE - C-SSRS
2. HAVE YOU ACTUALLY HAD ANY THOUGHTS OF KILLING YOURSELF IN THE PAST MONTH?: NO
6. HAVE YOU EVER DONE ANYTHING, STARTED TO DO ANYTHING, OR PREPARED TO DO ANYTHING TO END YOUR LIFE?: NO
1. IN THE PAST MONTH, HAVE YOU WISHED YOU WERE DEAD OR WISHED YOU COULD GO TO SLEEP AND NOT WAKE UP?: NO

## 2024-09-22 NOTE — ED TRIAGE NOTES
Pt arrives to the ED due to dizziness that began yesterday. Pt states today starting to have mid sternal chest pain. Nauseous with emesis this AM. Denies SOB. No cardiac h/o. Pale in triage.

## 2024-09-22 NOTE — PHARMACY-ADMISSION MEDICATION HISTORY
Pharmacy Intern Admission Medication History    Admission medication history is complete. The information provided in this note is only as accurate as the sources available at the time of the update.    Information Source(s): Patient and CareEverywhere/SureScripts via in-person    Pertinent Information: None    Changes made to PTA medication list:  Added: None  Deleted: None  Changed: None    Allergies reviewed with patient and updates made in EHR: yes    Medication History Completed By: Ethel Varma RPH 9/22/2024 11:49 AM    PTA Med List   Medication Sig Note Last Dose    budesonide-formoterol (SYMBICORT) 80-4.5 MCG/ACT Inhaler Inhale 2 puffs once daily plus 1-2 puffs as needed. May use up to 12 puffs per day.  9/21/2024    levothyroxine (SYNTHROID/LEVOTHROID) 137 MCG tablet Take 137 mcg X 6 days a week and 68.5 mcg X 1 day a week (Patient taking differently: Take 137 mcg X 6 days a week and 68.5 mcg X 1 day a week (Tues)) 9/22/2024: Half tab is taken on Tuesdays 9/21/2024

## 2024-09-22 NOTE — ED PROVIDER NOTES
Emergency Department Note      History of Present Illness     Chief Complaint   Chest Pain and Dizziness      HPI   Aleyda Nicole is a 39 year old female with a history of papillary thyroid carcinoma who presents for evaluation of chest pain dizziness and vomiting.  The patient states that she started to feel dizzy last night and yesterday evening.  She describes this as a lightheadedness that is worse when she sits up.  She does not feel like the room spinning or like she is off balance.  This morning around 7 AM she felt onset of central chest pain that does not radiate.  Nothing seems to make it better or worse.  Associated with this she also developed some nausea vomiting and emesis.  She does not feel short of breath.  She has no prior history of CAD or thromboembolism or other cardiac problems.  She denies abdominal pain or fever.  She has not been coughing or having hemoptysis.  Denies leg swelling or calf pain recent long flights or surgeries/immobilization.  She does not drink alcohol or use drugs.  She denies urinary symptoms.  She has not had similar symptoms before.  She notes no blood in her vomit.  No black or bloody stools or heavy vaginal bleeding or hematuria.  Not aware of ever being told she had anemia and doesn't take iron.  She had the chest pain around the same time the vomiting started.  She is not on oral contraceptive pills.      Independent Historian   None    Review of External Notes   I reviewed Dr. Costello Endocrinology note from [facility] 24 note pt has hx of recurrent papillary thyroid cancer.  Not currently on chemo therapy. On synthroid.    Past Medical History     Medical History and Problem List   Asthma  Hypocalcemia  Papillary thyroid carcinoma    Medications   Budesonide-formoterol   Levothyroxine    Surgical History    section x3  Thyroidectomy  Dissection radical neck  Bilateral salphingectomy    Physical Exam     Patient Vitals for the past  "24 hrs:   BP Temp Temp src Pulse Resp SpO2 Height Weight   09/22/24 1352 106/66 97.8  F (36.6  C) Oral 72 18 100 % -- --   09/22/24 1337 108/64 97.4  F (36.3  C) -- 69 18 -- -- --   09/22/24 1155 110/70 -- -- 64 -- 99 % -- --   09/22/24 1030 121/82 -- -- 67 18 100 % -- --   09/22/24 1015 118/72 -- -- 65 10 100 % -- --   09/22/24 1000 111/77 -- -- 70 20 99 % -- --   09/22/24 0945 109/71 -- -- 63 14 100 % -- --   09/22/24 0930 111/85 -- -- 68 24 99 % -- --   09/22/24 0915 113/71 -- -- 69 16 -- -- --   09/22/24 0843 (!) 147/82 97.6  F (36.4  C) Oral 80 20 97 % 1.575 m (5' 2\") 72.6 kg (160 lb)     Physical Exam  General: Awake, alert, non-toxic. Tired appearing.  Head:  Scalp is NC/AT  Eyes:  Conjunctiva normal, PERRL  ENT:  The external nose and ears are normal.   Neck:  Normal range of motion without rigidity.  CV:  Regular rate and rhythm    No pathologic murmur, rubs, or gallops.  Resp:  Breath sounds are clear bilaterally    Non-labored, no retractions or accessory muscle use  Abdomen: Abdomen is soft, no distension, no tenderness, no masses, no CVA ttp  MS:  No lower extremity edema/swelling. No midline cervical, thoracic, or lumbar tenderness.  Extremities without joint swelling or redness.  Skin:  Warm and dry, No rash or lesions noted.  Neuro:  Alert and oriented. GCS 15 5/5 strength BL to all joints of upper and lower extremities.  Normal and symmetric sensation to touch BL in arms, legs, and face.  CNII-XII intact.  Normal finger to nose testing BL. Gait normal.  Psych: Awake. Alert. Normal affect. Appropriate interactions.      Diagnostics     Lab Results   Labs Ordered and Resulted from Time of ED Arrival to Time of ED Departure   COMPREHENSIVE METABOLIC PANEL - Abnormal       Result Value    Sodium 136      Potassium 3.7      Carbon Dioxide (CO2) 22      Anion Gap 14      Urea Nitrogen 9.8      Creatinine 0.60      GFR Estimate >90      Calcium 7.6 (*)     Chloride 100      Glucose 104 (*)     Alkaline " Phosphatase 81      AST 15      ALT 9      Protein Total 7.4      Albumin 4.3      Bilirubin Total 0.3     ROUTINE UA WITH MICROSCOPIC REFLEX TO CULTURE - Abnormal    Color Urine Straw      Appearance Urine Clear      Glucose Urine Negative      Bilirubin Urine Negative      Ketones Urine Negative      Specific Gravity Urine 1.007      Blood Urine Negative      pH Urine 6.0      Protein Albumin Urine Negative      Urobilinogen Urine Normal      Nitrite Urine Negative      Leukocyte Esterase Urine Negative      Bacteria Urine Few (*)     Mucus Urine Present (*)     RBC Urine 1      WBC Urine 2      Squamous Epithelials Urine 1     CBC WITH PLATELETS AND DIFFERENTIAL - Abnormal    WBC Count 8.2      RBC Count 5.03      Hemoglobin 7.7 (*)     Hematocrit 28.9 (*)     MCV 58 (*)     MCH 15.3 (*)     MCHC 26.6 (*)     RDW 22.5 (*)     Platelet Count 403      % Neutrophils 84      % Lymphocytes 11      % Monocytes 4      % Eosinophils 1      % Basophils 0      % Immature Granulocytes 0      NRBCs per 100 WBC 0      Absolute Neutrophils 6.9      Absolute Lymphocytes 0.9      Absolute Monocytes 0.3      Absolute Eosinophils 0.1      Absolute Basophils 0.0      Absolute Immature Granulocytes 0.0      Absolute NRBCs 0.0     RBC AND PLATELET MORPHOLOGY - Abnormal    RBC Morphology Confirmed RBC Indices      Platelet Assessment        Value: Automated Count Confirmed. Platelet morphology is normal.    Elliptocytes Moderate (*)     Polychromasia Slight (*)    HEMOGLOBIN - Abnormal    Hemoglobin 7.1 (*)    IRON AND IRON BINDING CAPACITY - Abnormal    Iron 13 (*)     Iron Binding Capacity 466 (*)     Iron Sat Index 3 (*)    LIPASE - Normal    Lipase 31     TROPONIN T, HIGH SENSITIVITY - Normal    Troponin T, High Sensitivity <6     TSH WITH FREE T4 REFLEX - Normal    TSH 1.71     HCG QUALITATIVE PREGNANCY - Normal    hCG Serum Qualitative Negative     D DIMER QUANTITATIVE - Normal    D-Dimer Quantitative 0.39     TROPONIN T, HIGH  SENSITIVITY - Normal    Troponin T, High Sensitivity <6     RETICULOCYTE COUNT - Normal    % Reticulocyte 1.7      Absolute Reticulocyte 0.084     PARATHYROID HORMONE INTACT - Normal    Parathyroid Hormone Intact 20     MAGNESIUM - Normal    Magnesium 1.8     FERRITIN   TYPE AND SCREEN, ADULT    ABO/RH(D) O POS      Antibody Screen Negative      SPECIMEN EXPIRATION DATE [id number]     PREPARE RED BLOOD CELLS (UNIT)    Blood Component Type Red Blood Cells      Product Code [id number]      Unit Status Transfused      Unit Number [id number]      CROSSMATCH Compatible      CODING SYSTEM [id number]      ISSUE DATE AND TIME [id number]      UNIT ABO/RH O+      UNIT TYPE ISBT 5100     PREPARE RED BLOOD CELLS (UNIT)   TRANSFUSE RED BLOOD CELLS (UNIT)   ABO/RH TYPE AND SCREEN       Imaging   Chest XR,  PA & LAT   Final Result   IMPRESSION: Negative chest.        EKG   ECG results from 09/22/24   EKG 12 lead     Value    Systolic Blood Pressure     Diastolic Blood Pressure     Ventricular Rate 72    Atrial Rate 72    MT Interval 162    QRS Duration 94        QTc 479    P Axis 46    R AXIS 48    T Axis 26    Interpretation ECG      Sinus rhythm  Normal ECG  Interpretation done      Independent Interpretation   CXR: No pneumothorax, infiltrate, pleural effusion, pulmonary edema, or mediastinal widening.    ED Course      Medications Administered   Medications   sodium chloride 0.9% BOLUS 1,000 mL (0 mLs Intravenous Stopped 9/22/24 1101)   ondansetron (ZOFRAN) injection 4 mg (4 mg Intravenous $Given 9/22/24 0952)   alum & mag hydroxide-simethicone (MAALOX) suspension 15 mL (15 mLs Oral $Given 9/22/24 1010)   metoclopramide (REGLAN) injection 5 mg (5 mg Intravenous $Given 9/22/24 1154)   lactated ringers BOLUS 1,000 mL (0 mLs Intravenous Stopped 9/22/24 1255)     Procedures   Procedures     Discussion of Management   Admitting HospitalistBenjamín MD    ED Course   ED Course as of 09/22/24 1542   Sun Sep 22, 2024    8727 I spoke with admitting hospitalist Benjamín CANDELARIO.       Additional Documentation  None    Medical Decision Making / Diagnosis     CMS Diagnoses: None    MIPS       None    MDM   Aleyda CARMEL Nicole is a 39 year old female with past history of thyroid carcinoma who presents for evaluation of chest pain shortness of breath and lightheadedness.  Broad differential considered.  Her workup here is most notable for marked microcytic anemia with hemoglobin of 7.7.  Other cell lines are normal.  No formal diagnosis of anemia though I do note that the last hemoglobin I see was in 2022 right after she delivered child but had previously been normal seem to trend down during that hospitalization likely from acute blood loss though nothing since and so difficult to determine the timeline of this whether it return to normal and then dropped or has been low for some time.  She does not have any evidence of acute bleeding Hemoccult was attempted though there is no stool in the rectal vault and denies heavy vaginal bleeding.  Her vitals are stable and a repeat hemoglobin went from 7.7-7.1 though this is likely driven by dilution rather than actual drop as she received fluids for her vomiting and dizziness.  There is nothing to suggest hemolysis.  Likely iron deficiency.  Her chest pain workup is reassuring.  Her EKG is normal her troponins are undetectable x 2 and her D-dimer is normal very low suspicion for ACS PE dissection, esophageal rupture and her chest x-ray is clear.  She did have some vomiting but her abdominal exam is benign and nontender not suggestive of intra-abdominal catastrophe and I do not feel advanced imaging indicated.  Her LFTs lipase electrolytes glucose etc. also unremarkable.  Her neurologic exam is normal and not suggestive of CNS lesion/process.    Given her marked symptomatic anemia we will transfuse 1 unit of blood and bring her into the hospital for further evaluation as she is quite dizzy and having  difficulty even ambulating.  Discussed with hospitalist who agrees to accept.    Disposition   The patient was admitted to the hospital.     Diagnosis     ICD-10-CM    1. Symptomatic anemia  D64.9       2. Microcytic anemia  D50.9       3. Chest pain  R07.9       4. Vomiting  R11.10            Discharge Medications   New Prescriptions    No medications on file     Scribe Disclosure:  I, Ela Quevedo, am serving as a scribe at 3:17 PM on 9/22/2024 to document services personally performed by Samuel Wagner PA-C based on my observations and the provider's statements to me.        Samuel Wagner PA-C  09/22/24 0356
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
## 2024-09-22 NOTE — ED NOTES
[facility]  ED Nurse Handoff Report    ED Chief complaint: Chest Pain and Dizziness  . ED Diagnosis:   Final diagnoses:   Symptomatic anemia   Microcytic anemia   Chest pain   Vomiting       Allergies:   Allergies   Allergen Reactions    No Known Drug Allergy        Code Status: Full Code    Activity level - Baseline/Home:  independent.  Activity Level - Current:   standby.   Lift room needed: No.   Bariatric: No   Needed: No   Isolation: No.   Infection: Not Applicable.     Respiratory status: Room air    Vital Signs (within 30 minutes):   Vitals:    09/22/24 1000 09/22/24 1015 09/22/24 1030 09/22/24 1155   BP: 111/77 118/72 121/82 110/70   Pulse: 70 65 67 64   Resp: 20 10 18    Temp:       TempSrc:       SpO2: 99% 100% 100% 99%   Weight:       Height:           Cardiac Rhythm:  ,      Pain level:    Patient confused: No.   Patient Falls Risk: patient and family education and activity supervised.   Elimination Status: Has voided     Patient Report - Initial Complaint: Dizziness, chest pain.   Focused Assessment: Per provider note:  Aleyda Nicole is a 39 year old female with a history of papillary thyroid carcinoma who presents for evaluation of chest pain dizziness and vomiting.  The patient states that she started to feel dizzy last night and yesterday evening.  She describes this as a lightheadedness that is worse when she sits up.  She does not feel like the room spinning or like she is off balance.  This morning around 7 AM she felt onset of central chest pain that does not radiate.  Nothing seems to make it better or worse.  Associated with this she also developed some nausea vomiting and emesis.  She does not feel short of breath.  She has no prior history of CAD or thromboembolism or other cardiac problems.  She denies abdominal pain or fever.  She has not been coughing or having hemoptysis.  Denies leg swelling or calf pain recent long flights or surgeries/immobilization.   She does not drink alcohol or use drugs.  She denies urinary symptoms.  She has not had similar symptoms before.  She notes no blood in her vomit.  She had the chest pain around the same time the vomiting started.  She is not on oral contraceptive pills.      Abnormal Results:   Labs Ordered and Resulted from Time of ED Arrival to Time of ED Departure   COMPREHENSIVE METABOLIC PANEL - Abnormal       Result Value    Sodium 136      Potassium 3.7      Carbon Dioxide (CO2) 22      Anion Gap 14      Urea Nitrogen 9.8      Creatinine 0.60      GFR Estimate >90      Calcium 7.6 (*)     Chloride 100      Glucose 104 (*)     Alkaline Phosphatase 81      AST 15      ALT 9      Protein Total 7.4      Albumin 4.3      Bilirubin Total 0.3     ROUTINE UA WITH MICROSCOPIC REFLEX TO CULTURE - Abnormal    Color Urine Straw      Appearance Urine Clear      Glucose Urine Negative      Bilirubin Urine Negative      Ketones Urine Negative      Specific Gravity Urine 1.007      Blood Urine Negative      pH Urine 6.0      Protein Albumin Urine Negative      Urobilinogen Urine Normal      Nitrite Urine Negative      Leukocyte Esterase Urine Negative      Bacteria Urine Few (*)     Mucus Urine Present (*)     RBC Urine 1      WBC Urine 2      Squamous Epithelials Urine 1     CBC WITH PLATELETS AND DIFFERENTIAL - Abnormal    WBC Count 8.2      RBC Count 5.03      Hemoglobin 7.7 (*)     Hematocrit 28.9 (*)     MCV 58 (*)     MCH 15.3 (*)     MCHC 26.6 (*)     RDW 22.5 (*)     Platelet Count 403      % Neutrophils 84      % Lymphocytes 11      % Monocytes 4      % Eosinophils 1      % Basophils 0      % Immature Granulocytes 0      NRBCs per 100 WBC 0      Absolute Neutrophils 6.9      Absolute Lymphocytes 0.9      Absolute Monocytes 0.3      Absolute Eosinophils 0.1      Absolute Basophils 0.0      Absolute Immature Granulocytes 0.0      Absolute NRBCs 0.0     RBC AND PLATELET MORPHOLOGY - Abnormal    RBC Morphology Confirmed RBC Indices       Platelet Assessment        Value: Automated Count Confirmed. Platelet morphology is normal.    Elliptocytes Moderate (*)     Polychromasia Slight (*)    LIPASE - Normal    Lipase 31     TROPONIN T, HIGH SENSITIVITY - Normal    Troponin T, High Sensitivity <6     TSH WITH FREE T4 REFLEX - Normal    TSH 1.71     HCG QUALITATIVE PREGNANCY - Normal    hCG Serum Qualitative Negative     D DIMER QUANTITATIVE - Normal    D-Dimer Quantitative 0.39     OCCULT BLOOD STOOL   ABO/RH TYPE AND SCREEN        Chest XR,  PA & LAT   Final Result   IMPRESSION: Negative chest.          Treatments provided: See MAR  Family Comments: Updated by pt  OBS brochure/video discussed/provided to patient:  No  ED Medications:   Medications   lactated ringers BOLUS 1,000 mL (1,000 mLs Intravenous $New Bag 9/22/24 1154)   sodium chloride 0.9% BOLUS 1,000 mL (0 mLs Intravenous Stopped 9/22/24 1101)   ondansetron (ZOFRAN) injection 4 mg (4 mg Intravenous $Given 9/22/24 0952)   alum & mag hydroxide-simethicone (MAALOX) suspension 15 mL (15 mLs Oral $Given 9/22/24 1010)   metoclopramide (REGLAN) injection 5 mg (5 mg Intravenous $Given 9/22/24 1154)       Drips infusing:  No  For the majority of the shift this patient was Green.   Interventions performed were NA.    Sepsis treatment initiated: No    Cares/treatment/interventions/medications to be completed following ED care: ED cares complete. See inpatient orders.    ED Nurse Name: Pamela Landry RN  12:19 PM

## 2024-09-23 ENCOUNTER — TELEPHONE (OUTPATIENT)
Dept: PHARMACY | Facility: CLINIC | Age: 39
End: 2024-09-23

## 2024-09-23 ENCOUNTER — PATIENT OUTREACH (OUTPATIENT)
Dept: ONCOLOGY | Facility: CLINIC | Age: 39
End: 2024-09-23

## 2024-09-23 ENCOUNTER — TELEPHONE (OUTPATIENT)
Dept: PHARMACY | Facility: CLINIC | Age: 39
End: 2024-09-23
Payer: COMMERCIAL

## 2024-09-23 VITALS
TEMPERATURE: 98.1 F | SYSTOLIC BLOOD PRESSURE: 135 MMHG | WEIGHT: 160 LBS | BODY MASS INDEX: 29.44 KG/M2 | HEART RATE: 89 BPM | HEIGHT: 62 IN | OXYGEN SATURATION: 96 % | RESPIRATION RATE: 18 BRPM | DIASTOLIC BLOOD PRESSURE: 84 MMHG

## 2024-09-23 DIAGNOSIS — D50.9 MICROCYTIC ANEMIA: Primary | ICD-10-CM

## 2024-09-23 LAB
ANION GAP SERPL CALCULATED.3IONS-SCNC: 15 MMOL/L (ref 7–15)
ATRIAL RATE - MUSE: 72 BPM
BUN SERPL-MCNC: 9.2 MG/DL (ref 6–20)
CA-I BLD-MCNC: 4 MG/DL (ref 4.4–5.2)
CALCIUM SERPL-MCNC: 7.3 MG/DL (ref 8.8–10.4)
CHLORIDE SERPL-SCNC: 104 MMOL/L (ref 98–107)
CREAT SERPL-MCNC: 0.66 MG/DL (ref 0.51–0.95)
DACRYOCYTES BLD QL SMEAR: SLIGHT
DIASTOLIC BLOOD PRESSURE - MUSE: NORMAL MMHG
EGFRCR SERPLBLD CKD-EPI 2021: >90 ML/MIN/1.73M2
ELLIPTOCYTES BLD QL SMEAR: ABNORMAL
ERYTHROCYTE [DISTWIDTH] IN BLOOD BY AUTOMATED COUNT: 25.2 % (ref 10–15)
FRAGMENTS BLD QL SMEAR: SLIGHT
GLUCOSE SERPL-MCNC: 97 MG/DL (ref 70–99)
HCO3 SERPL-SCNC: 22 MMOL/L (ref 22–29)
HCT VFR BLD AUTO: 30.1 % (ref 35–47)
HGB BLD-MCNC: 8.1 G/DL (ref 11.7–15.7)
INTERPRETATION ECG - MUSE: NORMAL
MAGNESIUM SERPL-MCNC: 1.9 MG/DL (ref 1.7–2.3)
MCH RBC QN AUTO: 16 PG (ref 26.5–33)
MCHC RBC AUTO-ENTMCNC: 26.9 G/DL (ref 31.5–36.5)
MCV RBC AUTO: 60 FL (ref 78–100)
P AXIS - MUSE: 46 DEGREES
PHOSPHATE SERPL-MCNC: 3.8 MG/DL (ref 2.5–4.5)
PLAT MORPH BLD: ABNORMAL
PLATELET # BLD AUTO: 359 10E3/UL (ref 150–450)
POLYCHROMASIA BLD QL SMEAR: SLIGHT
POTASSIUM SERPL-SCNC: 3.7 MMOL/L (ref 3.4–5.3)
PR INTERVAL - MUSE: 162 MS
QRS DURATION - MUSE: 94 MS
QT - MUSE: 438 MS
QTC - MUSE: 479 MS
R AXIS - MUSE: 48 DEGREES
RBC # BLD AUTO: 5.05 10E6/UL (ref 3.8–5.2)
RBC MORPH BLD: ABNORMAL
SODIUM SERPL-SCNC: 141 MMOL/L (ref 135–145)
SYSTOLIC BLOOD PRESSURE - MUSE: NORMAL MMHG
T AXIS - MUSE: 26 DEGREES
VENTRICULAR RATE- MUSE: 72 BPM
WBC # BLD AUTO: 5.9 10E3/UL (ref 4–11)

## 2024-09-23 PROCEDURE — 84100 ASSAY OF PHOSPHORUS: CPT | Performed by: INTERNAL MEDICINE

## 2024-09-23 PROCEDURE — 99239 HOSP IP/OBS DSCHRG MGMT >30: CPT | Performed by: INTERNAL MEDICINE

## 2024-09-23 PROCEDURE — 82330 ASSAY OF CALCIUM: CPT | Performed by: STUDENT IN AN ORGANIZED HEALTH CARE EDUCATION/TRAINING PROGRAM

## 2024-09-23 PROCEDURE — 80048 BASIC METABOLIC PNL TOTAL CA: CPT | Performed by: STUDENT IN AN ORGANIZED HEALTH CARE EDUCATION/TRAINING PROGRAM

## 2024-09-23 PROCEDURE — 250N000013 HC RX MED GY IP 250 OP 250 PS 637: Performed by: STUDENT IN AN ORGANIZED HEALTH CARE EDUCATION/TRAINING PROGRAM

## 2024-09-23 PROCEDURE — 83735 ASSAY OF MAGNESIUM: CPT | Performed by: INTERNAL MEDICINE

## 2024-09-23 PROCEDURE — 36415 COLL VENOUS BLD VENIPUNCTURE: CPT | Performed by: STUDENT IN AN ORGANIZED HEALTH CARE EDUCATION/TRAINING PROGRAM

## 2024-09-23 PROCEDURE — G0378 HOSPITAL OBSERVATION PER HR: HCPCS

## 2024-09-23 PROCEDURE — 250N000011 HC RX IP 250 OP 636: Performed by: INTERNAL MEDICINE

## 2024-09-23 PROCEDURE — 99207 PR NO BILLABLE SERVICE THIS VISIT: CPT | Performed by: INTERNAL MEDICINE

## 2024-09-23 PROCEDURE — 85027 COMPLETE CBC AUTOMATED: CPT | Performed by: STUDENT IN AN ORGANIZED HEALTH CARE EDUCATION/TRAINING PROGRAM

## 2024-09-23 PROCEDURE — 258N000003 HC RX IP 258 OP 636: Performed by: INTERNAL MEDICINE

## 2024-09-23 PROCEDURE — 96376 TX/PRO/DX INJ SAME DRUG ADON: CPT

## 2024-09-23 RX ORDER — FAMOTIDINE 10 MG
10 TABLET ORAL 2 TIMES DAILY
Qty: 60 TABLET | Refills: 3 | Status: SHIPPED | OUTPATIENT
Start: 2024-09-23

## 2024-09-23 RX ORDER — DIPHENHYDRAMINE HYDROCHLORIDE 50 MG/ML
50 INJECTION INTRAMUSCULAR; INTRAVENOUS
Status: DISCONTINUED | OUTPATIENT
Start: 2024-09-23 | End: 2024-09-23 | Stop reason: HOSPADM

## 2024-09-23 RX ORDER — MEPERIDINE HYDROCHLORIDE 25 MG/ML
25 INJECTION INTRAMUSCULAR; INTRAVENOUS; SUBCUTANEOUS EVERY 30 MIN PRN
Status: CANCELLED | OUTPATIENT
Start: 2024-09-23

## 2024-09-23 RX ORDER — HEPARIN SODIUM,PORCINE 10 UNIT/ML
5-20 VIAL (ML) INTRAVENOUS DAILY PRN
Status: CANCELLED | OUTPATIENT
Start: 2024-09-23

## 2024-09-23 RX ORDER — ALBUTEROL SULFATE 0.83 MG/ML
2.5 SOLUTION RESPIRATORY (INHALATION)
Status: CANCELLED | OUTPATIENT
Start: 2024-09-23

## 2024-09-23 RX ORDER — HEPARIN SODIUM (PORCINE) LOCK FLUSH IV SOLN 100 UNIT/ML 100 UNIT/ML
5 SOLUTION INTRAVENOUS
Status: CANCELLED | OUTPATIENT
Start: 2024-09-23

## 2024-09-23 RX ORDER — METHYLPREDNISOLONE SODIUM SUCCINATE 125 MG/2ML
125 INJECTION, POWDER, LYOPHILIZED, FOR SOLUTION INTRAMUSCULAR; INTRAVENOUS
Status: CANCELLED
Start: 2024-09-23

## 2024-09-23 RX ORDER — ALBUTEROL SULFATE 90 UG/1
1-2 AEROSOL, METERED RESPIRATORY (INHALATION)
Status: CANCELLED
Start: 2024-09-23

## 2024-09-23 RX ORDER — CALCIUM CARBONATE 500 MG/1
2 TABLET, CHEWABLE ORAL 2 TIMES DAILY PRN
Qty: 100 TABLET | Refills: 0 | Status: SHIPPED | OUTPATIENT
Start: 2024-09-23

## 2024-09-23 RX ORDER — DIPHENHYDRAMINE HYDROCHLORIDE 50 MG/ML
50 INJECTION INTRAMUSCULAR; INTRAVENOUS
Status: CANCELLED
Start: 2024-09-23

## 2024-09-23 RX ORDER — EPINEPHRINE 1 MG/ML
0.3 INJECTION, SOLUTION INTRAMUSCULAR; SUBCUTANEOUS EVERY 5 MIN PRN
Status: CANCELLED | OUTPATIENT
Start: 2024-09-23

## 2024-09-23 RX ORDER — METHYLPREDNISOLONE SODIUM SUCCINATE 125 MG/2ML
125 INJECTION, POWDER, LYOPHILIZED, FOR SOLUTION INTRAMUSCULAR; INTRAVENOUS
Status: DISCONTINUED | OUTPATIENT
Start: 2024-09-23 | End: 2024-09-23 | Stop reason: HOSPADM

## 2024-09-23 RX ADMIN — LEVOTHYROXINE SODIUM 137 MCG: 137 TABLET ORAL at 06:43

## 2024-09-23 RX ADMIN — FAMOTIDINE 10 MG: 10 TABLET ORAL at 08:17

## 2024-09-23 RX ADMIN — IRON SUCROSE 200 MG: 20 INJECTION, SOLUTION INTRAVENOUS at 10:46

## 2024-09-23 RX ADMIN — ACETAMINOPHEN 325MG 650 MG: 325 TABLET ORAL at 16:26

## 2024-09-23 ASSESSMENT — ACTIVITIES OF DAILY LIVING (ADL)
ADLS_ACUITY_SCORE: 21
ADLS_ACUITY_SCORE: 22
ADLS_ACUITY_SCORE: 22
ADLS_ACUITY_SCORE: 21
ADLS_ACUITY_SCORE: 21
ADLS_ACUITY_SCORE: 22
ADLS_ACUITY_SCORE: 22
ADLS_ACUITY_SCORE: 21
ADLS_ACUITY_SCORE: 22
ADLS_ACUITY_SCORE: 21
ADLS_ACUITY_SCORE: 21
ADLS_ACUITY_SCORE: 22
ADLS_ACUITY_SCORE: 21
ADLS_ACUITY_SCORE: 22
ADLS_ACUITY_SCORE: 22
ADLS_ACUITY_SCORE: 21

## 2024-09-23 NOTE — PROGRESS NOTES
Patient's After Visit Summary was reviewed with patient and/or self.   Patient verbalized understanding of After Visit Summary, recommended follow up and was given an opportunity to ask questions.   Discharge medications sent home with patient/family: YES   Discharged with spouse and family          Assumed care of pt. @ 1500 today. To DC to home as soon as  is able to pick pt. up.DC orders given to pt. PIV was Dc'd,meds from DC pharmacy delivered to pt. Pt. C/o HA shortly before she left, received 650mg Tylenol PO x 1 for this. Family arrived to floor, pt.was escorted down to waiting car and safely transferred to car. Pt. and family left hospital.

## 2024-09-23 NOTE — CARE PLAN
ROOM # 231    Living Situation (if not independent, order SW consult):Home  Facility name:  : Spouse     Activity level at baseline: IND   Activity level on admit: SBA    Who will be transporting you at discharge: Family    Patient registered to observation; given Patient Bill of Rights; given the opportunity to ask questions about observation status and their plan of care.  Patient has been oriented to the observation room, bathroom and call light is in place.    Discussed discharge goals and expectations with patient/family.

## 2024-09-23 NOTE — CARE PLAN
"PRIMARY DIAGNOSIS: Anemia   OUTPATIENT/OBSERVATION GOALS TO BE MET BEFORE DISCHARGE:  ADLs back to baseline: Yes    Activity and level of assistance: Up with standby assistance.    Pain status: Pain free.    Return to near baseline physical activity: Yes     Discharge Planner Nurse   Safe discharge environment identified: Yes  Barriers to discharge: Yes       Entered by: Prince Cardenas RN 09/22/2024      Please review provider order for any additional goals.   Nurse to notify provider when observation goals have been met and patient is ready for discharge.    Patient alert and oriented x4,VSS on RA,regular diet tolerated well,PIV SL,SBA, last Hgb = 8.2    /67 (BP Location: Left arm)   Pulse 67   Temp 99  F (37.2  C) (Oral)   Resp 18   Ht 1.575 m (5' 2\")   Wt 72.6 kg (160 lb)   SpO2 99%   BMI 29.26 kg/m     "

## 2024-09-23 NOTE — PLAN OF CARE
"Goal Outcome Evaluation:      Plan of Care Reviewed With: patient      PRIMARY DIAGNOSIS: SYMPTOMATIC ANEMIA/N/V  OUTPATIENT/OBSERVATION GOALS TO BE MET BEFORE DISCHARGE:  ADLs back to baseline: Yes    Activity and level of assistance: Ambulating independently.    Pain status: Pain free.    Return to near baseline physical activity: Yes     Discharge Planner Nurse   Safe discharge environment identified: Yes  Barriers to discharge: Yes       Entered by: Urszula Kamara RN 09/23/2024 12:00 PM   Pt A/Ox4, VSS on RA. Denies pain, dizziness, N/V. No known bleeding. IV iron sucrose given. On regular diet. Up ind in room.   Please review provider order for any additional goals.   Nurse to notify provider when observation goals have been met and patient is ready for discharge.        Problem: Adult Inpatient Plan of Care  Goal: Plan of Care Review  Description: The Plan of Care Review/Shift note should be completed every shift.  The Outcome Evaluation is a brief statement about your assessment that the patient is improving, declining, or no change.  This information will be displayed automatically on your shift  note.  9/23/2024 1200 by Urszula Kamara RN  Outcome: Progressing  Flowsheets (Taken 9/23/2024 1200)  Plan of Care Reviewed With: patient  9/23/2024 0915 by Urszula Kamara RN  Outcome: Progressing  Flowsheets (Taken 9/23/2024 0915)  Plan of Care Reviewed With: patient  Goal: Patient-Specific Goal (Individualized)  Description: You can add care plan individualizations to a care plan. Examples of Individualization might be:  \"Parent requests to be called daily at 9am for status\", \"I have a hard time hearing out of my right ear\", or \"Do not touch me to wake me up as it startles  me\".  9/23/2024 1200 by Urszula Kamara RN  Outcome: Progressing  9/23/2024 0915 by Urszula Kamara RN  Outcome: Progressing  Goal: Absence of Hospital-Acquired Illness or Injury  9/23/2024 1200 by Urszula Kamara RN  Outcome: " Progressing  9/23/2024 0915 by Urszula Kamara RN  Outcome: Progressing  Intervention: Prevent and Manage VTE (Venous Thromboembolism) Risk  Recent Flowsheet Documentation  Taken 9/23/2024 0800 by Urszula Kamara RN  VTE Prevention/Management: SCDs off (sequential compression devices)  Goal: Optimal Comfort and Wellbeing  9/23/2024 1200 by Urszula Kamara RN  Outcome: Progressing  9/23/2024 0915 by Urszula Kamara RN  Outcome: Progressing  Goal: Readiness for Transition of Care  9/23/2024 1200 by Urszula Kamara RN  Outcome: Progressing  9/23/2024 0915 by Urszula Kamara RN  Outcome: Progressing

## 2024-09-23 NOTE — PLAN OF CARE
"Goal Outcome Evaluation:      Plan of Care Reviewed With: patient      PRIMARY DIAGNOSIS: SYMPTOMATIC ANEMIA/N/V  OUTPATIENT/OBSERVATION GOALS TO BE MET BEFORE DISCHARGE:  ADLs back to baseline: Yes    Activity and level of assistance: Ambulating independently.    Pain status: Pain free.    Return to near baseline physical activity: Yes     Discharge Planner Nurse   Safe discharge environment identified: Yes  Barriers to discharge: No       Entered by: Urszula Kamara RN 09/23/2024 9:16 AM   Pt A/Ox4, VSS on RA. Denies pain, dizziness, N/V. No known bleeding. IV SL. On regular diet. Up ind in room.   Please review provider order for any additional goals.   Nurse to notify provider when observation goals have been met and patient is ready for discharge.        Problem: Adult Inpatient Plan of Care  Goal: Plan of Care Review  Description: The Plan of Care Review/Shift note should be completed every shift.  The Outcome Evaluation is a brief statement about your assessment that the patient is improving, declining, or no change.  This information will be displayed automatically on your shift  note.  Outcome: Progressing  Flowsheets (Taken 9/23/2024 0915)  Plan of Care Reviewed With: patient  Goal: Patient-Specific Goal (Individualized)  Description: You can add care plan individualizations to a care plan. Examples of Individualization might be:  \"Parent requests to be called daily at 9am for status\", \"I have a hard time hearing out of my right ear\", or \"Do not touch me to wake me up as it startles  me\".  Outcome: Progressing  Goal: Absence of Hospital-Acquired Illness or Injury  Outcome: Progressing  Intervention: Prevent and Manage VTE (Venous Thromboembolism) Risk  Recent Flowsheet Documentation  Taken 9/23/2024 0800 by Urszula Kamara RN  VTE Prevention/Management: SCDs off (sequential compression devices)  Goal: Optimal Comfort and Wellbeing  Outcome: Progressing  Goal: Readiness for Transition of Care  Outcome: " Progressing

## 2024-09-23 NOTE — PLAN OF CARE
"PRIMARY DIAGNOSIS: Symptomatic anemia, chest pain, dizziness and vomiting .    OUTPATIENT/OBSERVATION GOALS TO BE MET BEFORE DISCHARGE  Orthostatic performed: No    Stable Hgb Yes.   Recent Labs   Lab Test 09/22/24  2108 09/22/24  1741 09/22/24  1253   HGB 8.2* 8.5* 7.1*       Resolved or declined bleeding episodes: Denies     Appropriate testing complete: Yes    Cleared for discharge by consultants (if involved): No    Safe discharge environment identified: Yes    Discharge Planner Nurse   Safe discharge environment identified: Yes  Barriers to discharge: Yes   A & O X 4. Pleasant. Lung sounds clear bilaterally to auscultation. Denies chest/epigastric pain, chills, shortness of breath, lightheadedness, nausea, vomit, or diarrhea. Bowel sounds present and active. Voiding spontaneously without difficulty. Denies black, or bloody stools, or hematuria. Peripheral IV saline lock. Standby assist. BMP, CBC, Ionized calcium lab draw this morning . Afebrile.  Will continue to provide supportive cares.  /67 (BP Location: Left arm)   Pulse 68   Temp 98.4  F (36.9  C) (Oral)   Resp 18   Ht 1.575 m (5' 2\")   Wt 72.6 kg (160 lb)   SpO2 96%   BMI 29.26 kg/m          Entered by: Felicity Crespo RN 09/23/2024 4:04 AM     Problem: Adult Inpatient Plan of Care  Goal: Plan of Care Review  Description: The Plan of Care Review/Shift note should be completed every shift.  The Outcome Evaluation is a brief statement about your assessment that the patient is improving, declining, or no change.  This information will be displayed automatically on your shift  note.  Outcome: Progressing  Flowsheets (Taken 9/23/2024 0403)  Plan of Care Reviewed With: patient  Goal: Patient-Specific Goal (Individualized)  Description: You can add care plan individualizations to a care plan. Examples of Individualization might be:  \"Parent requests to be called daily at 9am for status\", \"I have a hard time hearing out of my right ear\", or \"Do " "not touch me to wake me up as it startles  me\".  Outcome: Progressing  Goal: Absence of Hospital-Acquired Illness or Injury  Outcome: Progressing  Intervention: Identify and Manage Fall Risk  Recent Flowsheet Documentation  Taken 9/23/2024 0013 by Felicity Crespo RN  Safety Promotion/Fall Prevention:   assistive device/personal items within reach   clutter free environment maintained   nonskid shoes/slippers when out of bed   safety round/check completed   supervised activity  Intervention: Prevent Skin Injury  Recent Flowsheet Documentation  Taken 9/23/2024 0013 by Felicity Crespo RN  Body Position: position changed independently  Intervention: Prevent and Manage VTE (Venous Thromboembolism) Risk  Recent Flowsheet Documentation  Taken 9/23/2024 0013 by Felicity Crespo RN  VTE Prevention/Management: SCDs off (sequential compression devices)  Intervention: Prevent Infection  Recent Flowsheet Documentation  Taken 9/23/2024 0013 by Felicity Crespo RN  Infection Prevention:   hand hygiene promoted   rest/sleep promoted   single patient room provided  Goal: Optimal Comfort and Wellbeing  Outcome: Progressing  Intervention: Monitor Pain and Promote Comfort  Recent Flowsheet Documentation  Taken 9/23/2024 0011 by Felicity Crespo RN  Pain Management Interventions: medication (see MAR)  Goal: Readiness for Transition of Care  Outcome: Progressing     Please review provider order for any additional goals.   Nurse to notify provider when observation goals have been met and patient is ready for discharge.      Plan of Care Reviewed With: patient                 "

## 2024-09-23 NOTE — TELEPHONE ENCOUNTER
[facility]: Cancer Care                                                                                          Writer reached out to patient to try and confirm scheduling follow up visit with Dr. Morse and x3 Venofer infusions starting on September 30th. Writer left brief VM will appointment details.     Brice Lee, RN, BSN.  RN Care Coordinator     [facility]   [phone]

## 2024-09-23 NOTE — PROGRESS NOTES
IV removed. Discharge instructions given. Awaiting meds from discharge pharmacy.  to transport home.

## 2024-09-23 NOTE — DISCHARGE SUMMARY
"[facility]    Discharge Summary  Hospitalist    Date of Admission:  9/22/2024  Date of Discharge:  9/23/2024  Discharging Provider: Karlene Rodgers MD  Date of Service (when I saw the patient): 09/23/24    Discharge Diagnoses   Microcytic anemia  Iron deficiency  Gastric Reflux  Postoperative hypothyroidism  Status post 2011 papillary thyroid cancer  Hypocalcemia    History of Present Illness   Aleyda Nicole is a 39 year old woman who was admitted on 9/22/2024. PMH significant for papillary thyroid carcinoma status post thyroidectomy on chronic levothyroxine who is being admitted today with symptomatic anemia.  She reports progressive dizziness and presyncope that has been associated with fatigue, chest discomfort, and nausea x 2 days.  She denies any concerns for active bleeding.  Specifically, denies any hematuria, extensive bruising, recent phlebotomy, hemoptysis, or trauma.  She does tell me that she had a bout of \"food poisoning\" approximately 2 weeks ago during which time she had significant diarrhea and decreased appetite but denies any bloody stool. She does tell me that she had her period last week for approximately 3 days and does believe that she perhaps had more bleeding than normal.  She also goes on to tell me that she typically struggles with gastric reflux around the time of her period for which she has previously tried omeprazole without any significant improvement.  She has not had any recent PPI use. No prior workup for H. Pylori that I see.     Upon arrival to the emergency department she was noted to be hemodynamically stable and afebrile.  Electrolytes, renal function, hepatic function, lipase, troponin, parathyroid hormone, ddimer all within normal limits. ECG nonischemic. She was found to have microcytic anemia with a hemoglobin of 7.7.  Her TSH was within normal limits and she has not had any recent changes to her levothyroxine.  Iron panel demonstrated profound iron " "deficiency with iron of 13, binding capacity of 466, iron saturation of 3, and ferritin of 1. Prior to this her last hemoglobin was 8.3 after giving birth. Previously seemed to maintain levels between 10-12. She received 2 L of IV fluid and 1 unit PRBCs for which she reports improvement in her symptoms.     Discharge notes to patient:   You were hospitalized with iron deficiency anemia. You received 1 unit(s) red blood cells as well as two doses of IV iron (Venofer 200 mg).     You need to follow with [facility] and Dr. Ruggiero's office should be in touch with you to schedule an EGD (to look at your stomach) and possibly a colonoscopy (to look at your colon). If you do not hear from them, please call [phone].     You will also need to follow with [facility] for your iron deficiency anemia. You will likely see Dr. Vickie Morse in [place] on 9/30 and their office should call you to schedule. Location is the [facility]: [facility][note 3] [facility], [facility]. Call [phone] if you do not hear from them.     You will also need to call your OBGYN to schedule follow up and to ensure your periods are not contributing to your anemia.     Follow with your primary care office within the next week for \"HOSPITAL FOLLOW-UP.\" They should follow your hemoglobin, calcium, and vitamin D. Resume your vitamin D supplements that you have at home.     Hospital Course   Aleyda Nicole was admitted on 9/22/2024.  The following problems were addressed during her hospitalization:    Microcytic anemia  Iron deficiency  -Patient presented with chest discomfort, lightheadedness, and presyncope found to have a microcytic anemia with profound iron deficiency anemia.  No obvious source of bleeding aside from recent menses which may certainly be the cause of her anemia. She does not report any significant dietary changes or medications that would impact her iron levels side from a GI bug/diarrhea " approximately 2 weeks ago. She also reports chronic gastric reflux and has not been tested for H.pylori from what I can see. Her most recent hemoglobin was in the postpartum period roughly 2 years ago and was measured at 8.2.  Prior to that hemoglobin level seems to be steady between 10 and 12.   - She has received 1 unit PRBCs and 2 L IV fluid on presentation.  -Patient has received IV iron (Venofer) 200 mg x 2 doses.  Patient to follow with Dr. Vickie Morse with [facility], appointment possibly 9/30.  Further IV iron dosing to be managed outpatient.  -GI evaluation planned as below.  Patient to make follow-up appointment with her OB/GYN to discuss any contribution by menstruation.   -No evidence of acute or ongoing bleeding and suspect that this has been a more subacute process.  Hemoglobin has remained stable.  -Patient to be given information on diet rich in iron on discharge as well.     Gastric Reflux  Patient reports ongoing reflux symptoms.  [facility] to be in touch for scheduling EGD with or without colonoscopy.  On admission, patient was started on famotidine and as needed Tums.  Will continue this given potential interaction of PPI with iron absorption.  However, starting replace with IV iron and once iron replaced, could likely consider PPI if needed.  EGD/H. pylori evaluation pending outpatient GI appointment.     Postoperative hypothyroidism  Status post 2011 papillary thyroid cancer  Patient has followed with Dr. Costello with endocrinology.  Status post 2011 total thyroidectomy in my head I did not think followed by 2016 left modified neck dissection with autotransplant of left parathyroid gland.  Continue PTA levothyroxine. Has not had hypocalcemia in the past. Dr. Costello notes OK for patient to follow for this with PCP.     Hypocalcemia  Patient notes that she has vitamin D supplementation at home that she should be taking.  Recommend that she resume this and follow-up with primary  care for further calcium and vitamin D monitoring.    Karlene Rodgers MD FACP  Hospitalist Service  [facility]      Significant Results and Procedures   Labs    Pending Results   N/A    Code Status   Full Code       Primary Care Physician   Evan Zamora    Physical Exam   Temp: 98.1  F (36.7  C) Temp src: Oral BP: 135/84 Pulse: 89   Resp: 18 SpO2: 96 % O2 Device: None (Room air)    Vitals:    09/22/24 0843   Weight: 72.6 kg (160 lb)     Vital Signs with Ranges  Temp:  [97.7  F (36.5  C)-99  F (37.2  C)] 98.1  F (36.7  C)  Pulse:  [67-89] 89  Resp:  [16-18] 18  BP: (105-135)/(63-84) 135/84  SpO2:  [96 %-99 %] 96 %  I/O last 3 completed shifts:  In: 411.67   Out: -     Constitutional: Pleasant young woman seen resting in bed. Alert, oriented x3. No acute distress.   HEENT: NCAT. EOMI. No scleral icterus. Moist oral mucosa.  Respiratory: Clear to auscultation bilaterally. No crackles or wheezes.  Cardiovascular: Regular rate and rhythm.  GI: Soft, nontender, nondistended. Normoactive bowel sounds.   Musculoskeletal: No gross deformities.   Neurologic: Alert and oriented x3. No focal neurologic deficits. Did not assess gait.    Discharge Disposition   Discharged to home  Condition at discharge: Stable    Consultations This Hospital Stay   None    Time Spent on this Encounter   I, Karlene Rodgers MD, personally saw the patient today and spent greater than 30 minutes discharging this patient.    Discharge Orders      Adult Oncology/Hematology  Referral      Reason for your hospital stay    You were hospitalized with iron deficiency anemia. You received 1 unit(s) red blood cells as well as two doses of IV iron (Venofer 200 mg). You need to follow with [facility] and Dr. Ruggiero's office should be in touch with you to schedule an EGD (to look at your stomach) and possibly a colonoscopy (to look at your colon). If you do not hear from them, please call [phone]. You will also need to  "follow with [facility] for your iron deficiency anemia. You will likely see Dr. Vickie Morse in [place] on 9/30 and their office should call you to schedule. Location is the [facility]: [address] [place]. Call [phone] if you do not hear from them. You will also need to call your OBGYN to schedule follow up and to ensure your periods are not contributing to your anemia. Follow with your primary care office within the next week for \"HOSPITAL FOLLOW-UP.\" They should follow your hemoglobin, calcium, and vitamin D. Resume your vitamin D supplements that you have at home.     Follow-up and recommended labs and tests     Follow up with primary care provider, Evan Zamora, within 7 days for hospital follow- up.  The following labs/tests are recommended: Hemoglobin, vitamin D, calcium.    You need to follow with [facility] and Dr. Ruggiero's office should be in touch with you to schedule an EGD (to look at your stomach) and possibly a colonoscopy (to look at your colon). If you do not hear from them, please call [phone].     You will also need to follow with [facility] for your iron deficiency anemia. You will likely see Dr. Vickie Morse in [place] on 9/30 and their office should call you to schedule. Location is the [facility]: [address] Call [phone] if you do not hear from them.     You will also need to call your OBGYN to schedule follow up and to ensure your periods are not contributing to your anemia.     Activity    Your activity upon discharge: activity as tolerated     Diet    Follow this diet upon discharge: Regular diet. Try to eat foods high in iron.     Discharge Medications   Current Discharge Medication List        START taking these medications    Details   calcium carbonate (TUMS) 500 MG chewable tablet Take 2 tablets (1,000 mg) by mouth 2 times daily as needed for " heartburn.  Qty: 100 tablet, Refills: 0    Associated Diagnoses: Microcytic anemia      famotidine (PEPCID) 10 MG tablet Take 1 tablet (10 mg) by mouth 2 times daily.  Qty: 60 tablet, Refills: 3    Associated Diagnoses: Microcytic anemia           CONTINUE these medications which have NOT CHANGED    Details   budesonide-formoterol (SYMBICORT) 80-4.5 MCG/ACT Inhaler Inhale 2 puffs once daily plus 1-2 puffs as needed. May use up to 12 puffs per day.  Qty: 20.4 g, Refills: 11    Comments: Please dispense #2 10.2g inhalers for a 30-day supply per NEHA 2021 guidelines. If reject arises, enter DUR codes: HD / M0 / 1G. Note: MA prefers brand Symbicort.  Associated Diagnoses: Mild persistent asthma without complication      levothyroxine (SYNTHROID/LEVOTHROID) 137 MCG tablet Take 137 mcg X 6 days a week and 68.5 mcg X 1 day a week  Qty: 90 tablet, Refills: 3    Associated Diagnoses: Papillary carcinoma, follicular variant; Postoperative hypothyroidism           Allergies   Allergies   Allergen Reactions    No Known Drug Allergy      Data   Most Recent 3 CBC's:  Recent Labs   Lab Test 09/23/24  0632 09/22/24  2108 09/22/24  1741 09/22/24  1253 09/22/24  0955   WBC 5.9  --  8.8  --  8.2   HGB 8.1* 8.2* 8.5*   < > 7.7*   MCV 60*  --  60*  --  58*     --  371  --  403    < > = values in this interval not displayed.      Most Recent 3 BMP's:  Recent Labs   Lab Test 09/23/24  0632 09/22/24  0955 03/07/24  1001    136 136   POTASSIUM 3.7 3.7 4.2   CHLORIDE 104 100 102   CO2 22 22 25   BUN 9.2 9.8 15.5   CR 0.66 0.60 0.58   ANIONGAP 15 14 9   ESTHER 7.3* 7.6* 8.6   GLC 97 104* 97     Most Recent 2 LFT's:  Recent Labs   Lab Test 09/22/24  0955 11/25/22  1037   AST 15 27   ALT 9 37*   ALKPHOS 81 80   BILITOTAL 0.3 0.4     Most Recent TSH, T4 and A1c Labs:  Recent Labs   Lab Test 09/22/24  0955 06/12/24  1041 05/04/20  0800 02/24/20  1227   TSH 1.71 3.10   < >  --    T4  --  1.47   < >  --    A1C  --   --   --  5.4    < > =  values in this interval not displayed.     Results for orders placed or performed during the hospital encounter of 09/22/24   Chest XR,  PA & LAT    Narrative    EXAM: XR CHEST 2 VIEWS  LOCATION: [facility]  DATE: 9/22/2024    INDICATION: Chest pain  COMPARISON: None.      Impression    IMPRESSION: Negative chest.

## 2024-09-24 ENCOUNTER — PATIENT OUTREACH (OUTPATIENT)
Dept: INTERNAL MEDICINE | Facility: CLINIC | Age: 39
End: 2024-09-24
Payer: COMMERCIAL

## 2024-09-24 NOTE — TELEPHONE ENCOUNTER
Transitions of Care Outreach  Chief Complaint   Patient presents with    Hospital F/U       Most Recent Admission Date: 9/22/2024   Most Recent Admission Diagnosis: Vomiting - R11.10  Microcytic anemia - D50.9  Chest pain - R07.9  Symptomatic anemia - D64.9     Most Recent Discharge Date: 9/23/2024   Most Recent Discharge Diagnosis: Symptomatic anemia - D64.9  Microcytic anemia - D50.9  Chest pain - R07.9  Vomiting - R11.10     Transitions of Care Assessment    Discharge Assessment  How are you doing now that you are home?: better  How are your symptoms? (Red Flag symptoms escalate to triage hotline per guidelines): Improved  Do you know how to contact your clinic care team if you have future questions or changes to your health status? : Yes  Does the patient have their discharge instructions? : Yes  Does the patient have questions regarding their discharge instructions? : No  Were you started on any new medications or were there changes to any of your previous medications? : Yes  Does the patient have all of their medications?: Yes  Do you have questions regarding any of your medications? : No  Do you have all of your needed medical supplies or equipment (DME)?  (i.e. oxygen tank, CPAP, cane, etc.): Yes    Follow up Plan     Discharge Follow-Up  Discharge follow up appointment scheduled in alignment with recommended follow up timeframe or Transitions of Risk Category? (Low = within 30 days; Moderate= within 14 days; High= within 7 days): Yes  Discharge Follow Up Appointment Date: 09/26/24  Discharge Follow Up Appointment Scheduled with?: Primary Care Provider    Future Appointments   Date Time Provider Department Center   9/26/2024 11:30 AM Evan Zamora MD OXIM OX   9/30/2024 12:30 PM Vickie Morse MD [facility]   9/30/2024  1:30 PM RH INFUSION CHAIR [facility]   10/3/2024  2:30 PM [facility] INFUSION CHAIR [facility]   10/7/2024  3:00 PM  CANCER INFUSION NURSE [facility]    [facility]       Outpatient Plan as outlined on AVS reviewed with patient.    For any urgent concerns, please contact our 24 hour nurse triage line: [phone] ([phone])       Kayla Del Cid RN

## 2024-09-24 NOTE — TELEPHONE ENCOUNTER
[facility]:                                                                                 Writer spoke with patient about scheduled Iron infusions and hospital follow up visit with Dr. Morse. Patient had no further questions or concerns. She is scheduled for her remaining three IV iron treatments and follow up with Dr. Morse.     Will continue to follow as needed.     Brice Lee, RN, BSN.  RN Care Coordinator     [facility]   [phone]

## 2024-09-26 ENCOUNTER — OFFICE VISIT (OUTPATIENT)
Dept: INTERNAL MEDICINE | Facility: CLINIC | Age: 39
End: 2024-09-26
Payer: COMMERCIAL

## 2024-09-26 VITALS
TEMPERATURE: 97.6 F | SYSTOLIC BLOOD PRESSURE: 118 MMHG | HEART RATE: 72 BPM | HEIGHT: 62 IN | DIASTOLIC BLOOD PRESSURE: 78 MMHG | BODY MASS INDEX: 29.39 KG/M2 | WEIGHT: 159.7 LBS | OXYGEN SATURATION: 99 %

## 2024-09-26 DIAGNOSIS — D50.0 IRON DEFICIENCY ANEMIA DUE TO CHRONIC BLOOD LOSS: Primary | ICD-10-CM

## 2024-09-26 PROBLEM — R07.9 CHEST PAIN: Status: RESOLVED | Noted: 2024-09-22 | Resolved: 2024-09-26

## 2024-09-26 PROCEDURE — 99495 TRANSJ CARE MGMT MOD F2F 14D: CPT | Performed by: INTERNAL MEDICINE

## 2024-09-26 NOTE — PROGRESS NOTES
"  Assessment & Plan     Iron deficiency anemia due to chronic blood loss  Seems to be chronic iron def whether source being menstrual, malabsorption or both. Some GI loss cannot be excluded (and will be with upcoming endoscopies) this is much less likely. Hopefully with the EGD they can bx for celiac as well just to be complete.   Cont IV iron rx. Recheck iron 8 wks , should see hgb response from Rx in the next 2 wks providing some improvement in sx.  Has appt with hematology upcoming prior to next iron infusion   - CBC with platelets; Future    Hypocalemia  Given prior thyroidectomy , PTH checked and normal  Cont  vit D supplementation    MED REC REQUIRED  Post Medication Reconciliation Status:  Discharge medications reconciled, continue medications without change  BMI  Estimated body mass index is 29.21 kg/m  as calculated from the following:    Height as of this encounter: 1.575 m (5' 2\").    Weight as of this encounter: 72.4 kg (159 lb 11.2 oz).         See Patient Instructions    Subjective   Aleyda is a 39 year old, presenting for the following health issues:  Hospital F/U   Pt reports taking vitamin D3 5,000 ICU  HPI          Hospital Follow-up Visit:  She went tot he ER with progressive dizziness and presyncope that has been associated with fatigue, chest discomfort, and nausea x 2 days.   She denies any concerns for active bleeding. Specifically, denies any hematuria, extensive bruising, recent phlebotomy, hemoptysis, or trauma.      Microcytic anemia  Iron deficiency  No obvious source of bleeding aside from recent menses which may certainly be the cause of her anemia. She does not report any significant dietary changes or medications that would impact her iron levels side from a GI bug/diarrhea approximately 2 weeks ago. She also reports chronic gastric reflux and has not been tested for H.pylori from what I can see. Her most recent hemoglobin was in the postpartum period roughly 2 years ago and was measured " at 8.2.  Prior to that hemoglobin level seems to be steady between 10 and 12.   - She has received 1 unit PRBCs and 2 L IV fluid on presentation.  -Patient has received IV iron (Venofer) 200 mg x 2 doses.  Patient to follow with Dr. Vickie Morse with [facility] oncology, appointment possibly 9/30.  Further IV iron dosing to be managed outpatient.  -GI evaluation planned as below.  Patient to make follow-up appointment with her OB/GYN to discuss any contribution by menstruation.   -No evidence of acute or ongoing bleeding and suspect that this has been a more subacute process.  Hemoglobin has remained stable.  -Patient to be given information on diet rich in iron on discharge as well.     Gastric Reflux  Patient reports ongoing reflux symptoms.  [facility] to be in touch for scheduling EGD with or without colonoscopy.  On admission, patient was started on famotidine and as needed Tums.  Will continue this given potential interaction of PPI with iron absorption.  However, starting replace with IV iron and once iron replaced, could likely consider PPI if needed.  EGD/H. pylori evaluation pending outpatient GI appointment.     Postoperative hypothyroidism  Status post 2011 papillary thyroid cancer  Patient has followed with Dr. Costello with endocrinology.  Status post 2011 total thyroidectomy in my head I did not think followed by 2016 left modified neck dissection with autotransplant of left parathyroid gland.  Continue PTA levothyroxine. Has not had hypocalcemia in the past. Dr. Costello notes OK for patient to follow for this with PCP.      Hypocalcemia  Patient notes that she has vitamin D supplementation at home that she should be taking.  Recommend that she resume this and follow-up with primary care for further calcium and vitamin D monitoring.     Hospital/Nursing Home/IP Rehab Facility: [facility]  Date of Admission: 9/22  Date of Discharge: 9/23  Reason(s) for Admission:  "dizziness  Was the patient in the ICU or did the patient experience delirium during hospitalization?  No  Do you have any other stressors you would like to discuss with your provider? No    Problems taking medications regularly:  None  Medication changes since discharge: None  Problems adhering to non-medication therapy:  None    Summary of hospitalization:  [facility] discharge summary reviewed  Diagnostic Tests/Treatments reviewed.  Follow up needed: none  Other Healthcare Providers Involved in Patient s Care:         Specialist appointment -    Update since discharge: improved.         Plan of care communicated with patient and family                       Objective    /78   Pulse 72   Temp 97.6  F (36.4  C) (Temporal)   Ht 1.575 m (5' 2\")   Wt 72.4 kg (159 lb 11.2 oz)   SpO2 99%   BMI 29.21 kg/m    Body mass index is 29.21 kg/m .  Physical Exam   GENERAL: alert and no distress  HENT: normal cephalic/atraumatic  NECK: no adenopathy, no asymmetry, masses, or scars  RESP: lungs clear to auscultation - no rales, rhonchi or wheezes  CV: regular rate and rhythm, normal S1 S2, no S3 or S4, no murmur, click or rub, no peripheral edema   MS: no gross musculoskeletal defects noted, no edema            Signed Electronically by: Evan Zamora MD    "

## 2024-09-30 ENCOUNTER — INFUSION THERAPY VISIT (OUTPATIENT)
Dept: INFUSION THERAPY | Facility: CLINIC | Age: 39
End: 2024-09-30
Attending: INTERNAL MEDICINE
Payer: COMMERCIAL

## 2024-09-30 ENCOUNTER — ONCOLOGY VISIT (OUTPATIENT)
Dept: ONCOLOGY | Facility: CLINIC | Age: 39
End: 2024-09-30
Attending: INTERNAL MEDICINE
Payer: COMMERCIAL

## 2024-09-30 VITALS
OXYGEN SATURATION: 97 % | WEIGHT: 159 LBS | BODY MASS INDEX: 29.26 KG/M2 | HEIGHT: 62 IN | DIASTOLIC BLOOD PRESSURE: 73 MMHG | RESPIRATION RATE: 16 BRPM | TEMPERATURE: 97 F | SYSTOLIC BLOOD PRESSURE: 115 MMHG | HEART RATE: 88 BPM

## 2024-09-30 VITALS
HEART RATE: 84 BPM | OXYGEN SATURATION: 97 % | DIASTOLIC BLOOD PRESSURE: 68 MMHG | RESPIRATION RATE: 20 BRPM | SYSTOLIC BLOOD PRESSURE: 107 MMHG

## 2024-09-30 DIAGNOSIS — D64.9 SYMPTOMATIC ANEMIA: ICD-10-CM

## 2024-09-30 DIAGNOSIS — D50.9 MICROCYTIC ANEMIA: Primary | ICD-10-CM

## 2024-09-30 PROCEDURE — 99204 OFFICE O/P NEW MOD 45 MIN: CPT | Performed by: INTERNAL MEDICINE

## 2024-09-30 PROCEDURE — 258N000003 HC RX IP 258 OP 636: Performed by: INTERNAL MEDICINE

## 2024-09-30 PROCEDURE — 99213 OFFICE O/P EST LOW 20 MIN: CPT | Performed by: INTERNAL MEDICINE

## 2024-09-30 PROCEDURE — 96365 THER/PROPH/DIAG IV INF INIT: CPT

## 2024-09-30 PROCEDURE — 250N000011 HC RX IP 250 OP 636: Performed by: INTERNAL MEDICINE

## 2024-09-30 RX ORDER — HEPARIN SODIUM,PORCINE 10 UNIT/ML
5-20 VIAL (ML) INTRAVENOUS DAILY PRN
Status: CANCELLED | OUTPATIENT
Start: 2024-10-02

## 2024-09-30 RX ORDER — DIPHENHYDRAMINE HYDROCHLORIDE 50 MG/ML
50 INJECTION INTRAMUSCULAR; INTRAVENOUS
Status: CANCELLED
Start: 2024-10-02

## 2024-09-30 RX ORDER — HEPARIN SODIUM (PORCINE) LOCK FLUSH IV SOLN 100 UNIT/ML 100 UNIT/ML
5 SOLUTION INTRAVENOUS
Status: CANCELLED | OUTPATIENT
Start: 2024-10-02

## 2024-09-30 RX ORDER — EPINEPHRINE 1 MG/ML
0.3 INJECTION, SOLUTION INTRAMUSCULAR; SUBCUTANEOUS EVERY 5 MIN PRN
Status: CANCELLED | OUTPATIENT
Start: 2024-10-02

## 2024-09-30 RX ORDER — ALBUTEROL SULFATE 90 UG/1
1-2 AEROSOL, METERED RESPIRATORY (INHALATION)
Status: CANCELLED
Start: 2024-10-02

## 2024-09-30 RX ORDER — METHYLPREDNISOLONE SODIUM SUCCINATE 125 MG/2ML
125 INJECTION, POWDER, LYOPHILIZED, FOR SOLUTION INTRAMUSCULAR; INTRAVENOUS
Status: CANCELLED
Start: 2024-10-02

## 2024-09-30 RX ORDER — ALBUTEROL SULFATE 0.83 MG/ML
2.5 SOLUTION RESPIRATORY (INHALATION)
Status: CANCELLED | OUTPATIENT
Start: 2024-10-02

## 2024-09-30 RX ORDER — MEPERIDINE HYDROCHLORIDE 25 MG/ML
25 INJECTION INTRAMUSCULAR; INTRAVENOUS; SUBCUTANEOUS EVERY 30 MIN PRN
Status: CANCELLED | OUTPATIENT
Start: 2024-10-02

## 2024-09-30 RX ADMIN — IRON SUCROSE 200 MG: 20 INJECTION, SOLUTION INTRAVENOUS at 13:12

## 2024-09-30 RX ADMIN — SODIUM CHLORIDE 250 ML: 9 INJECTION, SOLUTION INTRAVENOUS at 13:10

## 2024-09-30 ASSESSMENT — ENCOUNTER SYMPTOMS
PSYCHIATRIC NEGATIVE: 1
HEMATOLOGIC/LYMPHATIC NEGATIVE: 1
NUMBNESS: 1
CARDIOVASCULAR NEGATIVE: 1
FATIGUE: 1
EYES NEGATIVE: 1
ABDOMINAL PAIN: 1
DIZZINESS: 1
RESPIRATORY NEGATIVE: 1

## 2024-09-30 ASSESSMENT — PAIN SCALES - GENERAL: PAINLEVEL: NO PAIN (0)
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
## 2024-09-30 NOTE — PROGRESS NOTES
Infusion Nursing Note:  Aleyda Nicole presents today for Venofer.    Patient seen by provider today: Yes, Dr Morse    present during visit today: Not Applicable.    Note: Per patient, she had first two doses in the hospital. Reports she tolerated well, but did experience some lightheadedness with those dose.  While infusing today, again noted some lightheadedness; VSS. Offered to turn infusion rate down, but she stated it was tolerable. This feeling improved during 15 min observation period.    Intravenous Access:  Peripheral IV placed.    Treatment Conditions:  Lab Results   Component Value Date    HGB 8.1 (L) 09/23/2024    WBC 5.9 09/23/2024    ANEU 5.9 11/27/2019    ANEUTAUTO 6.9 09/22/2024     09/23/2024       Post Infusion Assessment:  Patient tolerated infusion without incident.  Patient observed for 15 minutes post Venofer per protocol.  Site patent and intact, free from redness, edema or discomfort.  No evidence of extravasations.  Access discontinued per protocol.     Discharge Plan:   [facility]  Patient will return 10/3 for next appointment.   Patient discharged in stable condition accompanied by: .  Departure Mode: Ambulatory.    Hazel Sethi RN

## 2024-09-30 NOTE — NURSING NOTE
"Oncology Rooming Note    September 30, 2024 12:30 PM   Aleyda Nicole is a 39 year old female who presents for:    Chief Complaint   Patient presents with    Oncology Clinic Visit     Symptomatic anemia     Initial Vitals: /73   Pulse 88   Temp 97  F (36.1  C) (Temporal)   Resp 16   Ht 1.575 m (5' 2\")   Wt 72.1 kg (159 lb)   SpO2 97%   BMI 29.08 kg/m   Estimated body mass index is 29.08 kg/m  as calculated from the following:    Height as of this encounter: 1.575 m (5' 2\").    Weight as of this encounter: 72.1 kg (159 lb). Body surface area is 1.78 meters squared.  No Pain (0) Comment: Data Unavailable   No LMP recorded.  Allergies reviewed: Yes  Medications reviewed: Yes    Medications: Medication refills not needed today.  Pharmacy name entered into [employer]: [place], [place] - [address] [place] [address]    Frailty Screening:   Is the patient here for a new oncology consult visit in cancer care? 2. No      Clinical concerns: f/u       Lyudmila Serna, SAVI              "

## 2024-10-03 ENCOUNTER — INFUSION THERAPY VISIT (OUTPATIENT)
Dept: INFUSION THERAPY | Facility: CLINIC | Age: 39
End: 2024-10-03
Attending: INTERNAL MEDICINE
Payer: COMMERCIAL

## 2024-10-03 VITALS
SYSTOLIC BLOOD PRESSURE: 114 MMHG | DIASTOLIC BLOOD PRESSURE: 72 MMHG | TEMPERATURE: 97.4 F | RESPIRATION RATE: 20 BRPM | OXYGEN SATURATION: 97 % | HEART RATE: 81 BPM

## 2024-10-03 DIAGNOSIS — D50.9 MICROCYTIC ANEMIA: Primary | ICD-10-CM

## 2024-10-03 PROCEDURE — 250N000011 HC RX IP 250 OP 636: Performed by: INTERNAL MEDICINE

## 2024-10-03 PROCEDURE — 258N000003 HC RX IP 258 OP 636: Performed by: INTERNAL MEDICINE

## 2024-10-03 PROCEDURE — 96374 THER/PROPH/DIAG INJ IV PUSH: CPT

## 2024-10-03 RX ORDER — DIPHENHYDRAMINE HYDROCHLORIDE 50 MG/ML
50 INJECTION INTRAMUSCULAR; INTRAVENOUS
Start: 2024-10-04

## 2024-10-03 RX ORDER — EPINEPHRINE 1 MG/ML
0.3 INJECTION, SOLUTION INTRAMUSCULAR; SUBCUTANEOUS EVERY 5 MIN PRN
OUTPATIENT
Start: 2024-10-04

## 2024-10-03 RX ORDER — METHYLPREDNISOLONE SODIUM SUCCINATE 125 MG/2ML
125 INJECTION INTRAMUSCULAR; INTRAVENOUS
Start: 2024-10-04

## 2024-10-03 RX ORDER — MEPERIDINE HYDROCHLORIDE 25 MG/ML
25 INJECTION INTRAMUSCULAR; INTRAVENOUS; SUBCUTANEOUS EVERY 30 MIN PRN
OUTPATIENT
Start: 2024-10-04

## 2024-10-03 RX ORDER — ALBUTEROL SULFATE 90 UG/1
1-2 INHALANT RESPIRATORY (INHALATION)
Start: 2024-10-04

## 2024-10-03 RX ORDER — HEPARIN SODIUM,PORCINE 10 UNIT/ML
5-20 VIAL (ML) INTRAVENOUS DAILY PRN
OUTPATIENT
Start: 2024-10-04

## 2024-10-03 RX ORDER — HEPARIN SODIUM (PORCINE) LOCK FLUSH IV SOLN 100 UNIT/ML 100 UNIT/ML
5 SOLUTION INTRAVENOUS
OUTPATIENT
Start: 2024-10-04

## 2024-10-03 RX ORDER — ALBUTEROL SULFATE 0.83 MG/ML
2.5 SOLUTION RESPIRATORY (INHALATION)
OUTPATIENT
Start: 2024-10-04

## 2024-10-03 RX ADMIN — IRON SUCROSE 200 MG: 20 INJECTION, SOLUTION INTRAVENOUS at 14:50

## 2024-10-03 NOTE — PROGRESS NOTES
Infusion Nursing Note:  Aleyda Nicole presents today for Venofer 200.    Patient seen by provider today: No   present during visit today: Not Applicable.    Note: Patient prefers to have Venofer 200 mg diluted in saline, not IV push.  Patient c/o cold symptoms and wearing a mask today.      Intravenous Access:  Peripheral IV placed.    Treatment Conditions:  Not Applicable.      Post Infusion Assessment:  Patient tolerated infusion without incident.  Patient observed for 15 minutes post Venofer per protocol.  Blood return noted pre and post infusion.  Site patent and intact, free from redness, edema or discomfort.  No evidence of extravasations.  Access discontinued per protocol.       Discharge Plan:   Discharge instructions reviewed with: Patient.  Patient and/or family verbalized understanding of discharge instructions and all questions answered.  AVS to patient via [facility].  Patient will return to [facility] 10/7/24 for next appointment.   Patient discharged in stable condition accompanied by: self.  Departure Mode: Ambulatory.      Yocasta Davidson RN

## 2024-10-07 ENCOUNTER — PATIENT OUTREACH (OUTPATIENT)
Dept: ONCOLOGY | Facility: CLINIC | Age: 39
End: 2024-10-07
Payer: COMMERCIAL

## 2024-10-07 NOTE — PROGRESS NOTES
[facility]:                                                                                       Patient calling in to report she was cancelled for her final iron infusion today due too the IV fluid shortage currently. Writer spoke with Dr. Morse and per Dr. Morse no need to finish last 5th iron infusion due to the shortage. She can repeat labs and see her at the end of November.     Writer communicated this with the patient and she will be scheduled at 9:30 am at 11/26/24 for her follow up labs/ return visit for review.     Brice Lee, RN, BSN.  RN Care Coordinator     [facility]   [phone]

## 2024-10-24 ENCOUNTER — TRANSFERRED RECORDS (OUTPATIENT)
Dept: HEALTH INFORMATION MANAGEMENT | Facility: CLINIC | Age: 39
End: 2024-10-24
Payer: COMMERCIAL

## 2024-11-07 ENCOUNTER — PATIENT OUTREACH (OUTPATIENT)
Dept: GASTROENTEROLOGY | Facility: CLINIC | Age: 39
End: 2024-11-07
Payer: COMMERCIAL

## 2024-11-19 ENCOUNTER — LAB (OUTPATIENT)
Dept: LAB | Facility: CLINIC | Age: 39
End: 2024-11-19
Payer: COMMERCIAL

## 2024-11-19 DIAGNOSIS — E89.0 POSTOPERATIVE HYPOTHYROIDISM: ICD-10-CM

## 2024-11-19 DIAGNOSIS — C73 PAPILLARY CARCINOMA, FOLLICULAR VARIANT (H): ICD-10-CM

## 2024-11-19 LAB
T4 FREE SERPL-MCNC: 1.91 NG/DL (ref 0.9–1.7)
TSH SERPL DL<=0.005 MIU/L-ACNC: 1.17 UIU/ML (ref 0.3–4.2)

## 2024-11-19 PROCEDURE — 84439 ASSAY OF FREE THYROXINE: CPT | Mod: 90

## 2024-11-19 PROCEDURE — 99000 SPECIMEN HANDLING OFFICE-LAB: CPT

## 2024-11-19 PROCEDURE — 36415 COLL VENOUS BLD VENIPUNCTURE: CPT

## 2024-11-19 PROCEDURE — 84443 ASSAY THYROID STIM HORMONE: CPT

## 2024-11-23 LAB — T4 FREE SERPL DIALY-MCNC: 3.3 NG/DL

## 2024-12-01 ENCOUNTER — TELEPHONE (OUTPATIENT)
Dept: ENDOCRINOLOGY | Facility: CLINIC | Age: 39
End: 2024-12-01
Payer: COMMERCIAL

## 2024-12-01 DIAGNOSIS — C73 PAPILLARY CARCINOMA, FOLLICULAR VARIANT (H): ICD-10-CM

## 2024-12-01 DIAGNOSIS — E89.0 POSTOPERATIVE HYPOTHYROIDISM: ICD-10-CM

## 2024-12-01 RX ORDER — LEVOTHYROXINE SODIUM 137 UG/1
TABLET ORAL
Qty: 90 TABLET | Refills: 3 | Status: SHIPPED | OUTPATIENT
Start: 2024-12-01

## 2024-12-02 NOTE — TELEPHONE ENCOUNTER
Component      Latest Ref Rng 11/19/2024  10:26 AM   TSH      0.30 - 4.20 uIU/mL 1.17    T4 Free      0.90 - 1.70 ng/dL 1.91 (H)    Free T4 Eq Dialysis      1.1 - 2.4 ng/dL 3.3 (H)       Legend:  (H) High    H/o thyroid cancer.  current dose of levothyroxine--Take 137 mcg X 6 days a week and 68.5 mcg X 1 day a week     Based on 11/2024 labs-- decrease dose--Take 137 mcg X 6 days a week and SKIP X 1 day a week   Labs in 2-3 months.  Please make a lab appointment for blood work and follow up clinic appointment in 1 week after that to discuss results.    OK to book in next available ONE OUT OF TWO open MEG slot. (1/2)  Please note that there are 2 MEG slots/day.  Please make sure that 1 MEG slot is available for urgent needs.  Let me know if you have any questions.
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
## 2024-12-09 ENCOUNTER — ONCOLOGY VISIT (OUTPATIENT)
Dept: ONCOLOGY | Facility: CLINIC | Age: 39
End: 2024-12-09
Attending: INTERNAL MEDICINE
Payer: COMMERCIAL

## 2024-12-09 VITALS
RESPIRATION RATE: 16 BRPM | HEART RATE: 77 BPM | OXYGEN SATURATION: 96 % | DIASTOLIC BLOOD PRESSURE: 76 MMHG | TEMPERATURE: 98 F | BODY MASS INDEX: 29.81 KG/M2 | WEIGHT: 162 LBS | SYSTOLIC BLOOD PRESSURE: 120 MMHG | HEIGHT: 62 IN

## 2024-12-09 DIAGNOSIS — D50.0 IRON DEFICIENCY ANEMIA DUE TO CHRONIC BLOOD LOSS: ICD-10-CM

## 2024-12-09 DIAGNOSIS — D64.9 SYMPTOMATIC ANEMIA: ICD-10-CM

## 2024-12-09 DIAGNOSIS — D50.9 MICROCYTIC ANEMIA: Primary | ICD-10-CM

## 2024-12-09 LAB
BASOPHILS # BLD AUTO: 0 10E3/UL (ref 0–0.2)
BASOPHILS NFR BLD AUTO: 1 %
EOSINOPHIL # BLD AUTO: 0.3 10E3/UL (ref 0–0.7)
EOSINOPHIL NFR BLD AUTO: 5 %
ERYTHROCYTE [DISTWIDTH] IN BLOOD BY AUTOMATED COUNT: 19.2 % (ref 10–15)
FERRITIN SERPL-MCNC: 9 NG/ML (ref 6–175)
HCT VFR BLD AUTO: 36.9 % (ref 35–47)
HGB BLD-MCNC: 11.5 G/DL (ref 11.7–15.7)
IMM GRANULOCYTES # BLD: 0 10E3/UL
IMM GRANULOCYTES NFR BLD: 0 %
LYMPHOCYTES # BLD AUTO: 2 10E3/UL (ref 0.8–5.3)
LYMPHOCYTES NFR BLD AUTO: 34 %
MCH RBC QN AUTO: 21.8 PG (ref 26.5–33)
MCHC RBC AUTO-ENTMCNC: 31.2 G/DL (ref 31.5–36.5)
MCV RBC AUTO: 70 FL (ref 78–100)
MONOCYTES # BLD AUTO: 0.5 10E3/UL (ref 0–1.3)
MONOCYTES NFR BLD AUTO: 8 %
NEUTROPHILS # BLD AUTO: 3.1 10E3/UL (ref 1.6–8.3)
NEUTROPHILS NFR BLD AUTO: 52 %
NRBC # BLD AUTO: 0 10E3/UL
NRBC BLD AUTO-RTO: 0 /100
PLATELET # BLD AUTO: 345 10E3/UL (ref 150–450)
RBC # BLD AUTO: 5.27 10E6/UL (ref 3.8–5.2)
WBC # BLD AUTO: 5.9 10E3/UL (ref 4–11)

## 2024-12-09 PROCEDURE — 82728 ASSAY OF FERRITIN: CPT | Performed by: INTERNAL MEDICINE

## 2024-12-09 PROCEDURE — 85004 AUTOMATED DIFF WBC COUNT: CPT | Performed by: INTERNAL MEDICINE

## 2024-12-09 PROCEDURE — 99214 OFFICE O/P EST MOD 30 MIN: CPT | Performed by: INTERNAL MEDICINE

## 2024-12-09 PROCEDURE — 36415 COLL VENOUS BLD VENIPUNCTURE: CPT

## 2024-12-09 PROCEDURE — 99213 OFFICE O/P EST LOW 20 MIN: CPT | Performed by: INTERNAL MEDICINE

## 2024-12-09 PROCEDURE — 85018 HEMOGLOBIN: CPT | Performed by: INTERNAL MEDICINE

## 2024-12-09 RX ORDER — ALBUTEROL SULFATE 90 UG/1
1-2 INHALANT RESPIRATORY (INHALATION)
Start: 2024-12-10

## 2024-12-09 RX ORDER — DIPHENHYDRAMINE HYDROCHLORIDE 50 MG/ML
50 INJECTION INTRAMUSCULAR; INTRAVENOUS
Start: 2024-12-10

## 2024-12-09 RX ORDER — HEPARIN SODIUM (PORCINE) LOCK FLUSH IV SOLN 100 UNIT/ML 100 UNIT/ML
5 SOLUTION INTRAVENOUS
OUTPATIENT
Start: 2024-12-10

## 2024-12-09 RX ORDER — ALBUTEROL SULFATE 0.83 MG/ML
2.5 SOLUTION RESPIRATORY (INHALATION)
OUTPATIENT
Start: 2024-12-10

## 2024-12-09 RX ORDER — DIPHENHYDRAMINE HYDROCHLORIDE 50 MG/ML
50 INJECTION INTRAMUSCULAR; INTRAVENOUS
Start: 2024-12-09

## 2024-12-09 RX ORDER — MULTIVITAMIN
1 TABLET ORAL DAILY
COMMUNITY

## 2024-12-09 RX ORDER — METHYLPREDNISOLONE SODIUM SUCCINATE 125 MG/2ML
125 INJECTION INTRAMUSCULAR; INTRAVENOUS
Start: 2024-12-10

## 2024-12-09 RX ORDER — HEPARIN SODIUM (PORCINE) LOCK FLUSH IV SOLN 100 UNIT/ML 100 UNIT/ML
5 SOLUTION INTRAVENOUS
OUTPATIENT
Start: 2024-12-09

## 2024-12-09 RX ORDER — ALBUTEROL SULFATE 90 UG/1
1-2 INHALANT RESPIRATORY (INHALATION)
Start: 2024-12-09

## 2024-12-09 RX ORDER — METHYLPREDNISOLONE SODIUM SUCCINATE 40 MG/ML
40 INJECTION INTRAMUSCULAR; INTRAVENOUS
Start: 2024-12-09

## 2024-12-09 RX ORDER — EPINEPHRINE 1 MG/ML
0.3 INJECTION, SOLUTION INTRAMUSCULAR; SUBCUTANEOUS EVERY 5 MIN PRN
OUTPATIENT
Start: 2024-12-09

## 2024-12-09 RX ORDER — MEPERIDINE HYDROCHLORIDE 25 MG/ML
25 INJECTION INTRAMUSCULAR; INTRAVENOUS; SUBCUTANEOUS EVERY 30 MIN PRN
OUTPATIENT
Start: 2024-12-10

## 2024-12-09 RX ORDER — EPINEPHRINE 1 MG/ML
0.3 INJECTION, SOLUTION INTRAMUSCULAR; SUBCUTANEOUS EVERY 5 MIN PRN
OUTPATIENT
Start: 2024-12-10

## 2024-12-09 RX ORDER — DIPHENHYDRAMINE HYDROCHLORIDE 50 MG/ML
25 INJECTION INTRAMUSCULAR; INTRAVENOUS
Start: 2024-12-09

## 2024-12-09 RX ORDER — HEPARIN SODIUM,PORCINE 10 UNIT/ML
5-20 VIAL (ML) INTRAVENOUS DAILY PRN
OUTPATIENT
Start: 2024-12-10

## 2024-12-09 RX ORDER — MEPERIDINE HYDROCHLORIDE 25 MG/ML
25 INJECTION INTRAMUSCULAR; INTRAVENOUS; SUBCUTANEOUS
OUTPATIENT
Start: 2024-12-09

## 2024-12-09 RX ORDER — ALBUTEROL SULFATE 0.83 MG/ML
2.5 SOLUTION RESPIRATORY (INHALATION)
OUTPATIENT
Start: 2024-12-09

## 2024-12-09 RX ORDER — HEPARIN SODIUM,PORCINE 10 UNIT/ML
5-20 VIAL (ML) INTRAVENOUS DAILY PRN
OUTPATIENT
Start: 2024-12-09

## 2024-12-09 ASSESSMENT — ENCOUNTER SYMPTOMS
BACK PAIN: 1
ARTHRALGIAS: 1
HEMATOLOGIC/LYMPHATIC NEGATIVE: 1
GASTROINTESTINAL NEGATIVE: 1
CARDIOVASCULAR NEGATIVE: 1
RESPIRATORY NEGATIVE: 1
HEADACHES: 1
EYES NEGATIVE: 1
PSYCHIATRIC NEGATIVE: 1
ENDOCRINE NEGATIVE: 1
DIZZINESS: 1
FATIGUE: 1

## 2024-12-09 ASSESSMENT — PAIN SCALES - GENERAL: PAINLEVEL_OUTOF10: NO PAIN (0)

## 2024-12-09 NOTE — NURSING NOTE
"Oncology Rooming Note    December 9, 2024 8:12 AM   Aleyda Nicole is a 39 year old female who presents for:    Chief Complaint   Patient presents with    Oncology Clinic Visit     Initial Vitals: /76   Pulse 77   Temp 98  F (36.7  C) (Temporal)   Resp 16   Wt 73.5 kg (162 lb)   SpO2 96%   BMI 29.63 kg/m   Estimated body mass index is 29.63 kg/m  as calculated from the following:    Height as of 9/30/24: 1.575 m (5' 2\").    Weight as of this encounter: 73.5 kg (162 lb). Body surface area is 1.79 meters squared.  No Pain (0) Comment: Data Unavailable   No LMP recorded.  Allergies reviewed: Yes  Medications reviewed: Yes    Medications: Medication refills not needed today.  Pharmacy name entered into [facility]: [place], [place] - [address] [place] [address]    Frailty Screening:   Is the patient here for a new oncology consult visit in cancer care? 2. No      Clinical concerns:  discuss dizziness, weakness and numbness in hands and feet upon standing      Tosha Silver CMA              "

## 2024-12-09 NOTE — PROGRESS NOTES
Medical Assistant Note:  Aleyda Nicole presents today for blood draw.    Patient seen by provider today: Yes: Dr. Morse.   present during visit today: Not Applicable.    Concerns: No Concerns.    Procedure:  Lab draw site: left antecub, Needle type: butterfly, Gauge: 23.    Post Assessment:  Labs drawn without difficulty: Yes.    Discharge Plan:  Departure Mode: Ambulatory.    Face to Face Time: 10 minutes.    Dalia Gustafson MA
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
## 2024-12-31 ENCOUNTER — INFUSION THERAPY VISIT (OUTPATIENT)
Dept: INFUSION THERAPY | Facility: CLINIC | Age: 39
End: 2024-12-31
Attending: INTERNAL MEDICINE
Payer: COMMERCIAL

## 2024-12-31 VITALS
OXYGEN SATURATION: 98 % | DIASTOLIC BLOOD PRESSURE: 83 MMHG | TEMPERATURE: 98 F | SYSTOLIC BLOOD PRESSURE: 125 MMHG | HEART RATE: 75 BPM

## 2024-12-31 DIAGNOSIS — D50.9 MICROCYTIC ANEMIA: ICD-10-CM

## 2024-12-31 DIAGNOSIS — D50.0 IRON DEFICIENCY ANEMIA DUE TO CHRONIC BLOOD LOSS: Primary | ICD-10-CM

## 2024-12-31 PROCEDURE — 96365 THER/PROPH/DIAG IV INF INIT: CPT

## 2024-12-31 PROCEDURE — 258N000003 HC RX IP 258 OP 636: Performed by: INTERNAL MEDICINE

## 2024-12-31 PROCEDURE — 250N000011 HC RX IP 250 OP 636: Performed by: INTERNAL MEDICINE

## 2024-12-31 RX ORDER — ALBUTEROL SULFATE 90 UG/1
1-2 INHALANT RESPIRATORY (INHALATION)
Start: 2025-01-02

## 2024-12-31 RX ORDER — DIPHENHYDRAMINE HYDROCHLORIDE 50 MG/ML
25 INJECTION INTRAMUSCULAR; INTRAVENOUS
Start: 2025-01-02

## 2024-12-31 RX ORDER — DIPHENHYDRAMINE HYDROCHLORIDE 50 MG/ML
50 INJECTION INTRAMUSCULAR; INTRAVENOUS
Start: 2025-01-02

## 2024-12-31 RX ORDER — EPINEPHRINE 1 MG/ML
0.3 INJECTION, SOLUTION INTRAMUSCULAR; SUBCUTANEOUS EVERY 5 MIN PRN
OUTPATIENT
Start: 2025-01-02

## 2024-12-31 RX ORDER — METHYLPREDNISOLONE SODIUM SUCCINATE 40 MG/ML
40 INJECTION INTRAMUSCULAR; INTRAVENOUS
Start: 2025-01-02

## 2024-12-31 RX ORDER — HEPARIN SODIUM (PORCINE) LOCK FLUSH IV SOLN 100 UNIT/ML 100 UNIT/ML
5 SOLUTION INTRAVENOUS
OUTPATIENT
Start: 2025-01-02

## 2024-12-31 RX ORDER — HEPARIN SODIUM,PORCINE 10 UNIT/ML
5-20 VIAL (ML) INTRAVENOUS DAILY PRN
OUTPATIENT
Start: 2025-01-02

## 2024-12-31 RX ORDER — MEPERIDINE HYDROCHLORIDE 25 MG/ML
25 INJECTION INTRAMUSCULAR; INTRAVENOUS; SUBCUTANEOUS
OUTPATIENT
Start: 2025-01-02

## 2024-12-31 RX ORDER — ALBUTEROL SULFATE 0.83 MG/ML
2.5 SOLUTION RESPIRATORY (INHALATION)
OUTPATIENT
Start: 2025-01-02

## 2024-12-31 RX ADMIN — IRON SUCROSE 300 MG: 20 INJECTION, SOLUTION INTRAVENOUS at 14:14

## 2024-12-31 NOTE — PROGRESS NOTES
Infusion Nursing Note:  Aleyda Nicole presents today for Venofer #2/3.    Patient seen by provider today: No   present during visit today: Not Applicable.    Note: NA.      Intravenous Access:  Peripheral IV placed.    Treatment Conditions:  Not Applicable.      Post Infusion Assessment:  Patient tolerated infusion without incident.  Blood return noted pre and post infusion.  Site patent and intact, free from redness, edema or discomfort.  No evidence of extravasations.  Access discontinued per protocol.       Discharge Plan:   AVS to patient via MYCHART.  Patient will return 1/10/25 for Venofer #3/3   Patient discharged in stable condition accompanied by: self.  Departure Mode: Ambulatory.      Rose Mary March RN
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
## 2025-02-24 DIAGNOSIS — D50.0 IRON DEFICIENCY ANEMIA DUE TO CHRONIC BLOOD LOSS: Primary | ICD-10-CM

## 2025-03-03 ENCOUNTER — LAB (OUTPATIENT)
Dept: LAB | Facility: CLINIC | Age: 40
End: 2025-03-03
Payer: COMMERCIAL

## 2025-03-03 DIAGNOSIS — D50.0 IRON DEFICIENCY ANEMIA DUE TO CHRONIC BLOOD LOSS: ICD-10-CM

## 2025-03-03 DIAGNOSIS — E89.0 POSTOPERATIVE HYPOTHYROIDISM: ICD-10-CM

## 2025-03-03 DIAGNOSIS — J45.30 MILD PERSISTENT ASTHMA WITHOUT COMPLICATION: ICD-10-CM

## 2025-03-03 DIAGNOSIS — C73 PAPILLARY CARCINOMA, FOLLICULAR VARIANT (H): ICD-10-CM

## 2025-03-03 LAB
BASOPHILS # BLD AUTO: 0 10E3/UL (ref 0–0.2)
BASOPHILS NFR BLD AUTO: 1 %
ELLIPTOCYTES BLD QL SMEAR: SLIGHT
EOSINOPHIL # BLD AUTO: 0.5 10E3/UL (ref 0–0.7)
EOSINOPHIL NFR BLD AUTO: 8 %
ERYTHROCYTE [DISTWIDTH] IN BLOOD BY AUTOMATED COUNT: 19.1 % (ref 10–15)
FERRITIN SERPL-MCNC: 69 NG/ML (ref 6–175)
HCT VFR BLD AUTO: 36.3 % (ref 35–47)
HGB BLD-MCNC: 12.1 G/DL (ref 11.7–15.7)
IMM GRANULOCYTES # BLD: 0 10E3/UL
IMM GRANULOCYTES NFR BLD: 0 %
LYMPHOCYTES # BLD AUTO: 2.2 10E3/UL (ref 0.8–5.3)
LYMPHOCYTES NFR BLD AUTO: 34 %
MCH RBC QN AUTO: 26.2 PG (ref 26.5–33)
MCHC RBC AUTO-ENTMCNC: 33.3 G/DL (ref 31.5–36.5)
MCV RBC AUTO: 79 FL (ref 78–100)
MONOCYTES # BLD AUTO: 0.4 10E3/UL (ref 0–1.3)
MONOCYTES NFR BLD AUTO: 7 %
NEUTROPHILS # BLD AUTO: 3.3 10E3/UL (ref 1.6–8.3)
NEUTROPHILS NFR BLD AUTO: 51 %
NRBC # BLD AUTO: 0 10E3/UL
NRBC BLD AUTO-RTO: 0 /100
PLAT MORPH BLD: ABNORMAL
PLATELET # BLD AUTO: 277 10E3/UL (ref 150–450)
RBC # BLD AUTO: 4.62 10E6/UL (ref 3.8–5.2)
RBC MORPH BLD: ABNORMAL
T4 FREE SERPL-MCNC: 1.6 NG/DL (ref 0.9–1.7)
TSH SERPL DL<=0.005 MIU/L-ACNC: 1.73 UIU/ML (ref 0.3–4.2)
WBC # BLD AUTO: 6.5 10E3/UL (ref 4–11)

## 2025-03-03 PROCEDURE — 82728 ASSAY OF FERRITIN: CPT

## 2025-03-03 PROCEDURE — 84443 ASSAY THYROID STIM HORMONE: CPT

## 2025-03-03 PROCEDURE — 36415 COLL VENOUS BLD VENIPUNCTURE: CPT

## 2025-03-03 PROCEDURE — 84439 ASSAY OF FREE THYROXINE: CPT | Mod: 90

## 2025-03-03 PROCEDURE — 99000 SPECIMEN HANDLING OFFICE-LAB: CPT

## 2025-03-03 PROCEDURE — 85025 COMPLETE CBC W/AUTO DIFF WBC: CPT

## 2025-03-04 RX ORDER — BUDESONIDE AND FORMOTEROL FUMARATE DIHYDRATE 80; 4.5 UG/1; UG/1
AEROSOL RESPIRATORY (INHALATION)
Qty: 20.4 G | Refills: 5 | Status: SHIPPED | OUTPATIENT
Start: 2025-03-04

## 2025-03-10 ENCOUNTER — ONCOLOGY VISIT (OUTPATIENT)
Dept: ONCOLOGY | Facility: CLINIC | Age: 40
End: 2025-03-10
Attending: INTERNAL MEDICINE
Payer: COMMERCIAL

## 2025-03-10 VITALS
DIASTOLIC BLOOD PRESSURE: 72 MMHG | BODY MASS INDEX: 29.63 KG/M2 | HEART RATE: 82 BPM | RESPIRATION RATE: 16 BRPM | SYSTOLIC BLOOD PRESSURE: 110 MMHG | HEIGHT: 62 IN | OXYGEN SATURATION: 96 % | TEMPERATURE: 97.7 F | WEIGHT: 161 LBS

## 2025-03-10 DIAGNOSIS — D50.9 MICROCYTIC ANEMIA: Primary | ICD-10-CM

## 2025-03-10 DIAGNOSIS — C73 PAPILLARY CARCINOMA, FOLLICULAR VARIANT (H): ICD-10-CM

## 2025-03-10 PROCEDURE — 99214 OFFICE O/P EST MOD 30 MIN: CPT | Performed by: INTERNAL MEDICINE

## 2025-03-10 PROCEDURE — G2211 COMPLEX E/M VISIT ADD ON: HCPCS | Performed by: INTERNAL MEDICINE

## 2025-03-10 ASSESSMENT — ENCOUNTER SYMPTOMS
PSYCHIATRIC NEGATIVE: 1
HEMATOLOGIC/LYMPHATIC NEGATIVE: 1
SHORTNESS OF BREATH: 1
WHEEZING: 1
FATIGUE: 1
DIZZINESS: 1
CARDIOVASCULAR NEGATIVE: 1
EYES NEGATIVE: 1
GASTROINTESTINAL NEGATIVE: 1
CHEST TIGHTNESS: 1
COUGH: 1
ENDOCRINE NEGATIVE: 1
ARTHRALGIAS: 1

## 2025-03-10 ASSESSMENT — PAIN SCALES - GENERAL: PAINLEVEL_OUTOF10: NO PAIN (0)
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
## 2025-05-03 SDOH — HEALTH STABILITY: PHYSICAL HEALTH: ON AVERAGE, HOW MANY MINUTES DO YOU ENGAGE IN EXERCISE AT THIS LEVEL?: 0 MIN

## 2025-05-03 SDOH — HEALTH STABILITY: PHYSICAL HEALTH: ON AVERAGE, HOW MANY DAYS PER WEEK DO YOU ENGAGE IN MODERATE TO STRENUOUS EXERCISE (LIKE A BRISK WALK)?: 0 DAYS

## 2025-05-03 ASSESSMENT — SOCIAL DETERMINANTS OF HEALTH (SDOH): HOW OFTEN DO YOU GET TOGETHER WITH FRIENDS OR RELATIVES?: TWICE A WEEK

## 2025-05-03 ASSESSMENT — ASTHMA QUESTIONNAIRES
QUESTION_2 LAST FOUR WEEKS HOW OFTEN HAVE YOU HAD SHORTNESS OF BREATH: ONCE OR TWICE A WEEK
QUESTION_3 LAST FOUR WEEKS HOW OFTEN DID YOUR ASTHMA SYMPTOMS (WHEEZING, COUGHING, SHORTNESS OF BREATH, CHEST TIGHTNESS OR PAIN) WAKE YOU UP AT NIGHT OR EARLIER THAN USUAL IN THE MORNING: TWO OR THREE NIGHTS A WEEK
QUESTION_5 LAST FOUR WEEKS HOW WOULD YOU RATE YOUR ASTHMA CONTROL: SOMEWHAT CONTROLLED
QUESTION_1 LAST FOUR WEEKS HOW MUCH OF THE TIME DID YOUR ASTHMA KEEP YOU FROM GETTING AS MUCH DONE AT WORK, SCHOOL OR AT HOME: A LITTLE OF THE TIME
QUESTION_4 LAST FOUR WEEKS HOW OFTEN HAVE YOU USED YOUR RESCUE INHALER OR NEBULIZER MEDICATION (SUCH AS ALBUTEROL): TWO OR THREE TIMES PER WEEK
ACT_TOTALSCORE: 16

## 2025-05-06 ENCOUNTER — OFFICE VISIT (OUTPATIENT)
Dept: INTERNAL MEDICINE | Facility: CLINIC | Age: 40
End: 2025-05-06
Attending: INTERNAL MEDICINE
Payer: COMMERCIAL

## 2025-05-06 VITALS
HEIGHT: 62 IN | WEIGHT: 162.2 LBS | TEMPERATURE: 98.6 F | BODY MASS INDEX: 29.85 KG/M2 | SYSTOLIC BLOOD PRESSURE: 116 MMHG | OXYGEN SATURATION: 100 % | RESPIRATION RATE: 16 BRPM | DIASTOLIC BLOOD PRESSURE: 82 MMHG | HEART RATE: 80 BPM

## 2025-05-06 DIAGNOSIS — Z00.00 ROUTINE GENERAL MEDICAL EXAMINATION AT A HEALTH CARE FACILITY: ICD-10-CM

## 2025-05-06 DIAGNOSIS — M72.2 PLANTAR FASCIITIS: ICD-10-CM

## 2025-05-06 DIAGNOSIS — J45.30 MILD PERSISTENT ASTHMA WITHOUT COMPLICATION: ICD-10-CM

## 2025-05-06 DIAGNOSIS — D50.0 IRON DEFICIENCY ANEMIA DUE TO CHRONIC BLOOD LOSS: ICD-10-CM

## 2025-05-06 DIAGNOSIS — E89.0 POSTOPERATIVE HYPOTHYROIDISM: ICD-10-CM

## 2025-05-06 DIAGNOSIS — N92.0 MENORRHAGIA WITH REGULAR CYCLE: Primary | ICD-10-CM

## 2025-05-06 PROCEDURE — 3074F SYST BP LT 130 MM HG: CPT | Performed by: INTERNAL MEDICINE

## 2025-05-06 PROCEDURE — 99395 PREV VISIT EST AGE 18-39: CPT | Performed by: INTERNAL MEDICINE

## 2025-05-06 PROCEDURE — 3079F DIAST BP 80-89 MM HG: CPT | Performed by: INTERNAL MEDICINE

## 2025-05-06 NOTE — PATIENT INSTRUCTIONS
Patient Education   Preventive Care Advice   This is general advice given by our system to help you stay healthy. However, your care team may have specific advice just for you. Please talk to your care team about your preventive care needs.  Nutrition  Eat 5 or more servings of fruits and vegetables each day.  Try wheat bread, brown rice and whole grain pasta (instead of white bread, rice, and pasta).  Get enough calcium and vitamin D. Check the label on foods and aim for 100% of the RDA (recommended daily allowance).  Lifestyle  Exercise at least 150 minutes each week  (30 minutes a day, 5 days a week).  Do muscle strengthening activities 2 days a week. These help control your weight and prevent disease.  No smoking.  Wear sunscreen to prevent skin cancer.  Have a dental exam and cleaning every 6 months.  Yearly exams  See your health care team every year to talk about:  Any changes in your health.  Any medicines your care team has prescribed.  Preventive care, family planning, and ways to prevent chronic diseases.  Shots (vaccines)   HPV shots (up to age 26), if you've never had them before.  Hepatitis B shots (up to age 59), if you've never had them before.  COVID-19 shot: Get this shot when it's due.  Flu shot: Get a flu shot every year.  Tetanus shot: Get a tetanus shot every 10 years.  Pneumococcal, hepatitis A, and RSV shots: Ask your care team if you need these based on your risk.  Shingles shot (for age 50 and up)  General health tests  Diabetes screening:  Starting at age 35, Get screened for diabetes at least every 3 years.  If you are younger than age 35, ask your care team if you should be screened for diabetes.  Cholesterol test: At age 39, start having a cholesterol test every 5 years, or more often if advised.  Bone density scan (DEXA): At age 50, ask your care team if you should have this scan for osteoporosis (brittle bones).  Hepatitis C: Get tested at least once in your life.  STIs (sexually  transmitted infections)  Before age 24: Ask your care team if you should be screened for STIs.  After age 24: Get screened for STIs if you're at risk. You are at risk for STIs (including HIV) if:  You are sexually active with more than one person.  You don't use condoms every time.  You or a partner was diagnosed with a sexually transmitted infection.  If you are at risk for HIV, ask about PrEP medicine to prevent HIV.  Get tested for HIV at least once in your life, whether you are at risk for HIV or not.  Cancer screening tests  Cervical cancer screening: If you have a cervix, begin getting regular cervical cancer screening tests starting at age 21.  Breast cancer scan (mammogram): If you've ever had breasts, begin having regular mammograms starting at age 40. This is a scan to check for breast cancer.  Colon cancer screening: It is important to start screening for colon cancer at age 45.  Have a colonoscopy test every 10 years (or more often if you're at risk) Or, ask your provider about stool tests like a FIT test every year or Cologuard test every 3 years.  To learn more about your testing options, visit:   .  For help making a decision, visit:   https://bit.ly/fo79060.  Prostate cancer screening test: If you have a prostate, ask your care team if a prostate cancer screening test (PSA) at age 55 is right for you.  Lung cancer screening: If you are a current or former smoker ages 50 to 80, ask your care team if ongoing lung cancer screenings are right for you.  For informational purposes only. Not to replace the advice of your health care provider. Copyright   2023 [employer]. All rights reserved. Clinically reviewed by the [facility]itions Program. [employer] [id number] - REV 01/24.

## 2025-05-06 NOTE — PROGRESS NOTES
"Preventive Care Visit  [facility]  Evan Zamora MD, Internal Medicine  May 6, 2025      Assessment & Plan     Routine general medical examination at a health care facility  Updated screening, immunizations, prevention.  Please see health maintenance list, care gaps  See gyn for pap per her preference     Menorrhagia with regular cycle  Iron deficiency anemia due to chronic blood loss  -anemia resolved with iron supplementation  -see gyn regarding options for reducing menstrual blood loss.     Postoperative hypothyroidism  Cont f/u with endo for hx thyroid ca    Mild persistent asthma without complication  Cont symbicort, rx allergies w/nasal steroid    Plantar fasciitis  Stretching exercises handout given. She's frustrated sx ongoing or > 1 yr  - Orthopedic  Referral; Future    Patient has been advised of split billing requirements and indicates understanding: Yes    The longitudinal plan of care for the diagnosis(es)/condition(s) as documented were addressed during this visit. Due to the added complexity in care, I will continue to support Aleyda in the subsequent management and with ongoing continuity of care.    BMI  Estimated body mass index is 29.67 kg/m  as calculated from the following:    Height as of this encounter: 1.575 m (5' 2\").    Weight as of this encounter: 73.6 kg (162 lb 3.2 oz).   Weight management plan: Discussed healthy diet and exercise guidelines    Counseling  Appropriate preventive services were addressed with this patient via screening, questionnaire, or discussion as appropriate for fall prevention, nutrition, physical activity, Tobacco-use cessation, social engagement, weight loss and cognition.  Checklist reviewing preventive services available has been given to the patient.  Reviewed patient's diet, addressing concerns and/or questions.           Subjective   Aleyda is a 39 year old, presenting for the following:  Physical           HPI  Since last year - " presented to ER with significant anemia.  GI w/u negative and source seems likely to be heavy menstrual bleeding. Hasn't f/u with gyn regarding this.  Not interested in BCP if at all possible.          Advance Care Planning    Discussed advance care planning with patient; however, patient declined at this time.        5/3/2025   General Health   How would you rate your overall physical health? (!) FAIR   Feel stress (tense, anxious, or unable to sleep) Not at all         5/3/2025   Nutrition   Three or more servings of calcium each day? (!) I DON'T KNOW   Diet: Regular (no restrictions)   How many servings of fruit and vegetables per day? (!) 2-3   How many sweetened beverages each day? (!) 2         5/3/2025   Exercise   Days per week of moderate/strenous exercise 0 days   Average minutes spent exercising at this level 0 min   (!) EXERCISE CONCERN      5/3/2025   Social Factors   Frequency of gathering with friends or relatives Twice a week   Worry food won't last until get money to buy more No   Food not last or not have enough money for food? No   Do you have housing? (Housing is defined as stable permanent housing and does not include staying outside in a car, in a tent, in an abandoned building, in an overnight shelter, or couch-surfing.) Yes   Are you worried about losing your housing? No   Lack of transportation? No   Unable to get utilities (heat,electricity)? No         5/3/2025   Dental   Dentist two times every year? Yes         Today's PHQ-2 Score:       5/6/2025    10:37 AM   PHQ-2 ( 1999 [employer])   Q1: Little interest or pleasure in doing things 1   Q2: Feeling down, depressed or hopeless 0   PHQ-2 Score 1    Q1: Little interest or pleasure in doing things Several days   Q2: Feeling down, depressed or hopeless Not at all   PHQ-2 Score 1       Patient-reported           5/3/2025   Substance Use   Alcohol more than 3/day or more than 7/wk No   Do you use any other substances recreationally? No     Social  "History     Tobacco Use    Smoking status: Never    Smokeless tobacco: Never   Vaping Use    Vaping status: Never Used   Substance Use Topics    Alcohol use: No    Drug use: No                  5/3/2025   STI Screening   New sexual partner(s) since last STI/HIV test? No     History of abnormal Pap smear: No - age 30- 64 PAP with HPV every 5 years recommended        Latest Ref Rng & Units 2/13/2020    10:34 AM 2/13/2020    10:30 AM 3/2/2017     3:35 PM   PAP / HPV   PAP (Historical)  NIL   NIL    HPV 16 DNA NEG^Negative  Negative     HPV 18 DNA NEG^Negative  Negative     Other HR HPV NEG^Negative  Negative             5/3/2025   Contraception/Family Planning   Questions about contraception or family planning No        Reviewed and updated as needed this visit by Provider                    Labs reviewed in EPIC         Objective    Exam  /82   Pulse 80   Temp 98.6  F (37  C) (Temporal)   Resp 16   Ht 1.575 m (5' 2\")   Wt 73.6 kg (162 lb 3.2 oz)   LMP 05/02/2025   SpO2 100%   BMI 29.67 kg/m     Estimated body mass index is 29.67 kg/m  as calculated from the following:    Height as of this encounter: 1.575 m (5' 2\").    Weight as of this encounter: 73.6 kg (162 lb 3.2 oz).    Physical Exam  GENERAL: alert and no distress  EYES: Eyes grossly normal to inspection, PERRL and conjunctivae and sclerae normal  HENT: ear canals and TM's normal, nose and mouth without ulcers or lesions  NECK: no adenopathy and surgicl scars unchanged on L  RESP: lungs clear to auscultation - no rales, rhonchi or wheezes  CV: regular rate and rhythm, normal S1 S2, no S3 or S4, no murmur, click or rub, no peripheral edema  ABDOMEN: soft, nontender, no hepatosplenomegaly, no masses and bowel sounds normal  MS: no gross musculoskeletal defects noted, no edema. Feet normal in appearance. Tender L plantar surface midway   SKIN: no suspicious lesions or rashes  NEURO: Normal strength and tone, mentation intact and speech normal  PSYCH: " mentation appears normal, affect normal/bright  LYMPH: no cervical adenopathy        Signed Electronically by: Evan Zamora MD

## 2025-05-07 ENCOUNTER — PATIENT OUTREACH (OUTPATIENT)
Dept: CARE COORDINATION | Facility: CLINIC | Age: 40
End: 2025-05-07
Payer: COMMERCIAL

## 2025-05-12 ENCOUNTER — OFFICE VISIT (OUTPATIENT)
Dept: OBGYN | Facility: CLINIC | Age: 40
End: 2025-05-12
Attending: INTERNAL MEDICINE
Payer: COMMERCIAL

## 2025-05-12 VITALS
SYSTOLIC BLOOD PRESSURE: 118 MMHG | DIASTOLIC BLOOD PRESSURE: 82 MMHG | HEIGHT: 62 IN | WEIGHT: 162.7 LBS | BODY MASS INDEX: 29.94 KG/M2

## 2025-05-12 DIAGNOSIS — N92.0 MENORRHAGIA WITH REGULAR CYCLE: ICD-10-CM

## 2025-05-12 DIAGNOSIS — L65.9 HAIR LOSS: Primary | ICD-10-CM

## 2025-05-12 DIAGNOSIS — Z12.4 PAP SMEAR FOR CERVICAL CANCER SCREENING: ICD-10-CM

## 2025-05-12 PROCEDURE — 99459 PELVIC EXAMINATION: CPT | Performed by: FAMILY MEDICINE

## 2025-05-12 PROCEDURE — 3074F SYST BP LT 130 MM HG: CPT | Performed by: FAMILY MEDICINE

## 2025-05-12 PROCEDURE — 87624 HPV HI-RISK TYP POOLED RSLT: CPT | Performed by: FAMILY MEDICINE

## 2025-05-12 PROCEDURE — G0145 SCR C/V CYTO,THINLAYER,RESCR: HCPCS | Performed by: FAMILY MEDICINE

## 2025-05-12 PROCEDURE — G2211 COMPLEX E/M VISIT ADD ON: HCPCS | Performed by: FAMILY MEDICINE

## 2025-05-12 PROCEDURE — 3079F DIAST BP 80-89 MM HG: CPT | Performed by: FAMILY MEDICINE

## 2025-05-12 PROCEDURE — 99214 OFFICE O/P EST MOD 30 MIN: CPT | Performed by: FAMILY MEDICINE

## 2025-05-12 RX ORDER — TRANEXAMIC ACID 650 MG/1
1300 TABLET ORAL 2 TIMES DAILY
Qty: 20 TABLET | Refills: 11 | Status: SHIPPED | OUTPATIENT
Start: 2025-05-12

## 2025-05-12 RX ORDER — UREA 10 %
45 LOTION (ML) TOPICAL DAILY
Qty: 90 TABLET | Refills: 3 | Status: SHIPPED | OUTPATIENT
Start: 2025-05-12

## 2025-05-12 NOTE — NURSING NOTE
"Chief Complaint   Patient presents with    Abnormal Uterine Bleeding     History of anemia-after period very tired and dizzy--has clots     initial /82   Ht 1.575 m (5' 2\")   Wt 73.8 kg (162 lb 11.2 oz)   LMP 05/02/2025   BMI 29.76 kg/m   Estimated body mass index is 29.76 kg/m  as calculated from the following:    Height as of this encounter: 1.575 m (5' 2\").    Weight as of this encounter: 73.8 kg (162 lb 11.2 oz).  BP completed using cuff size regular    Dian Collazo CMA on 5/12/2025 at 8:00 AM    "
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
## 2025-05-12 NOTE — PATIENT INSTRUCTIONS
1)   Menorrhagia: Discussed possible treatment options including OCP's, Nuvaring, patch, Mirena IUD, hysteroscopy dilation and curettage, lysteda, Novasure endometrial ablation and hysterectomy.  Recommend pelvic ultrasound and endometrial sampling, discussed endometrial biopsy and dilation and curettage.      Patient would like to start with Pelvic ultrasound and lysteda, will also add iron daily          2)  Hypothyroid 2/2 thyroidectomy for thyroid cancer x 2:  per endocrinology and general surgery (initially)     3) Hair loss:  may be 2/2 anemia, hypothyroidism, and/or perimenopause:  dermatology referral, ok to take [employer] vitamin drink,   Consider nutrifol, rx rograine    TODAY:    PELVIC US, LYSTEDA TO HELP WITH THE PERIOD    Dr. Mounika Farias, DO    Obstetrics and Gynecology  [facility] - [place] and [place]

## 2025-05-13 ENCOUNTER — PATIENT OUTREACH (OUTPATIENT)
Dept: CARE COORDINATION | Facility: CLINIC | Age: 40
End: 2025-05-13
Payer: COMMERCIAL

## 2025-05-13 LAB
HPV HR 12 DNA CVX QL NAA+PROBE: NEGATIVE
HPV16 DNA CVX QL NAA+PROBE: NEGATIVE
HPV18 DNA CVX QL NAA+PROBE: NEGATIVE
HUMAN PAPILLOMA VIRUS FINAL DIAGNOSIS: NORMAL

## 2025-05-14 ENCOUNTER — RESULTS FOLLOW-UP (OUTPATIENT)
Dept: OBGYN | Facility: CLINIC | Age: 40
End: 2025-05-14

## 2025-05-15 ENCOUNTER — PATIENT OUTREACH (OUTPATIENT)
Dept: CARE COORDINATION | Facility: CLINIC | Age: 40
End: 2025-05-15
Payer: COMMERCIAL

## 2025-05-15 LAB
BKR AP ASSOCIATED HPV REPORT: NORMAL
BKR LAB AP GYN ADEQUACY: NORMAL
BKR LAB AP GYN INTERPRETATION: NORMAL
BKR LAB AP PREVIOUS ABNORMAL: NORMAL
PATH REPORT.COMMENTS IMP SPEC: NORMAL
PATH REPORT.COMMENTS IMP SPEC: NORMAL
PATH REPORT.RELEVANT HX SPEC: NORMAL

## 2025-05-16 ENCOUNTER — ANCILLARY PROCEDURE (OUTPATIENT)
Dept: ULTRASOUND IMAGING | Facility: CLINIC | Age: 40
End: 2025-05-16
Attending: FAMILY MEDICINE
Payer: COMMERCIAL

## 2025-05-16 DIAGNOSIS — N92.0 MENORRHAGIA WITH REGULAR CYCLE: ICD-10-CM

## 2025-05-16 PROCEDURE — 76856 US EXAM PELVIC COMPLETE: CPT | Performed by: OBSTETRICS & GYNECOLOGY

## 2025-05-16 PROCEDURE — 76830 TRANSVAGINAL US NON-OB: CPT | Performed by: OBSTETRICS & GYNECOLOGY

## 2025-05-21 ENCOUNTER — HOSPITAL ENCOUNTER (OUTPATIENT)
Dept: ULTRASOUND IMAGING | Facility: CLINIC | Age: 40
Discharge: HOME | End: 2025-05-21
Attending: INTERNAL MEDICINE
Payer: COMMERCIAL

## 2025-05-21 DIAGNOSIS — E89.0 POSTOPERATIVE HYPOTHYROIDISM: ICD-10-CM

## 2025-05-21 DIAGNOSIS — C73 PAPILLARY CARCINOMA, FOLLICULAR VARIANT (H): ICD-10-CM

## 2025-05-21 PROCEDURE — 76536 US EXAM OF HEAD AND NECK: CPT

## 2025-05-31 ENCOUNTER — RESULTS FOLLOW-UP (OUTPATIENT)
Dept: ENDOCRINOLOGY | Facility: CLINIC | Age: 40
End: 2025-05-31

## 2025-06-16 ENCOUNTER — TELEPHONE (OUTPATIENT)
Dept: INTERNAL MEDICINE | Facility: CLINIC | Age: 40
End: 2025-06-16
Payer: COMMERCIAL

## 2025-06-16 NOTE — TELEPHONE ENCOUNTER
Patient Quality Outreach    Patient is due for the following:   Asthma  -  ACT needed    Action(s) Taken:   Patient was assigned appropriate questionnaire to complete    Type of outreach:    Sent [employer] message.    Questions for provider review:    None         Sol Felipe, [facility]  Chart routed to None.

## (undated) DEVICE — CAP BABY PINK/BLUE IC-2

## (undated) DEVICE — SOL ADH LIQUID BENZOIN SWAB 0.6ML C1544

## (undated) DEVICE — ESU GROUND PAD ADULT W/CORD E7507

## (undated) DEVICE — SU VICRYL 2-0 CT-1 36" J345H

## (undated) DEVICE — SURGICEL POWDER ABSORBABLE HEMOSTAT 3GM 3013SP

## (undated) DEVICE — SU PDS II 0 CTX 60" Z990G

## (undated) DEVICE — SOL WATER IRRIG 1000ML BOTTLE 2F7114

## (undated) DEVICE — SU PLAIN 3-0 CT 27" 852H

## (undated) DEVICE — LINEN BABY BLANKET 5434

## (undated) DEVICE — TRANSFER DEVICE BLOOD NDL HOLDER 364880

## (undated) DEVICE — BAG CLEAR TRASH 1.3M 39X33" P4040C

## (undated) DEVICE — SU MONOCRYL 1 CT-1 36" Y947H

## (undated) DEVICE — PREP CHLORAPREP 26ML TINTED ORANGE  260815

## (undated) DEVICE — PACK C-SECTION LF PL15OTA83B

## (undated) DEVICE — SU MONOCRYL 3-0 SH 27" Y316H

## (undated) DEVICE — LINEN FULL SHEET 5511

## (undated) DEVICE — GLOVE PROTEXIS MICRO 7.5  2D73PM75

## (undated) DEVICE — SOL NACL 0.9% IRRIG 1000ML BOTTLE 2F7124

## (undated) DEVICE — LINEN HALF SHEET 5512

## (undated) DEVICE — LINEN TOWEL PACK X10 5473

## (undated) DEVICE — CATH TRAY FOLEY SURESTEP 16FR DRAIN BAG STATOCK A899916

## (undated) DEVICE — STOCKING SLEEVE VASOPRESS COMPRESSION CALF MED VP501M

## (undated) RX ORDER — KETOROLAC TROMETHAMINE 30 MG/ML
INJECTION, SOLUTION INTRAMUSCULAR; INTRAVENOUS
Status: DISPENSED
Start: 2022-10-14

## (undated) RX ORDER — EPHEDRINE SULFATE 50 MG/ML
INJECTION, SOLUTION INTRAMUSCULAR; INTRAVENOUS; SUBCUTANEOUS
Status: DISPENSED
Start: 2022-10-14

## (undated) RX ORDER — DEXAMETHASONE SODIUM PHOSPHATE 4 MG/ML
INJECTION, SOLUTION INTRA-ARTICULAR; INTRALESIONAL; INTRAMUSCULAR; INTRAVENOUS; SOFT TISSUE
Status: DISPENSED
Start: 2022-10-14

## (undated) RX ORDER — FENTANYL CITRATE-0.9 % NACL/PF 10 MCG/ML
PLASTIC BAG, INJECTION (ML) INTRAVENOUS
Status: DISPENSED
Start: 2022-10-14

## (undated) RX ORDER — FENTANYL CITRATE 50 UG/ML
INJECTION, SOLUTION INTRAMUSCULAR; INTRAVENOUS
Status: DISPENSED
Start: 2022-10-14

## (undated) RX ORDER — MORPHINE SULFATE 1 MG/ML
INJECTION, SOLUTION EPIDURAL; INTRATHECAL; INTRAVENOUS
Status: DISPENSED
Start: 2022-10-14

## (undated) RX ORDER — ONDANSETRON 2 MG/ML
INJECTION INTRAMUSCULAR; INTRAVENOUS
Status: DISPENSED
Start: 2022-10-14